# Patient Record
Sex: MALE | Race: WHITE | NOT HISPANIC OR LATINO | Employment: OTHER | URBAN - NONMETROPOLITAN AREA
[De-identification: names, ages, dates, MRNs, and addresses within clinical notes are randomized per-mention and may not be internally consistent; named-entity substitution may affect disease eponyms.]

---

## 2017-01-03 ENCOUNTER — COMMUNICATION - GICH (OUTPATIENT)
Dept: FAMILY MEDICINE | Facility: OTHER | Age: 72
End: 2017-01-03

## 2017-01-03 DIAGNOSIS — N52.9 MALE ERECTILE DYSFUNCTION: ICD-10-CM

## 2017-05-22 ENCOUNTER — COMMUNICATION - GICH (OUTPATIENT)
Dept: FAMILY MEDICINE | Facility: OTHER | Age: 72
End: 2017-05-22

## 2017-05-22 DIAGNOSIS — I10 ESSENTIAL (PRIMARY) HYPERTENSION: ICD-10-CM

## 2017-07-24 ENCOUNTER — HISTORY (OUTPATIENT)
Dept: FAMILY MEDICINE | Facility: OTHER | Age: 72
End: 2017-07-24

## 2017-07-24 ENCOUNTER — OFFICE VISIT - GICH (OUTPATIENT)
Dept: FAMILY MEDICINE | Facility: OTHER | Age: 72
End: 2017-07-24

## 2017-07-24 DIAGNOSIS — I10 ESSENTIAL (PRIMARY) HYPERTENSION: ICD-10-CM

## 2017-07-24 DIAGNOSIS — E78.00 PURE HYPERCHOLESTEROLEMIA: ICD-10-CM

## 2017-07-24 DIAGNOSIS — N52.9 MALE ERECTILE DYSFUNCTION: ICD-10-CM

## 2017-07-24 DIAGNOSIS — Z23 ENCOUNTER FOR IMMUNIZATION: ICD-10-CM

## 2017-07-24 DIAGNOSIS — N42.9 DISORDER OF PROSTATE: ICD-10-CM

## 2017-07-24 DIAGNOSIS — I71.20 THORACIC AORTIC ANEURYSM WITHOUT RUPTURE (H): ICD-10-CM

## 2017-07-24 LAB
ANION GAP - HISTORICAL: 12 (ref 5–18)
BUN SERPL-MCNC: 17 MG/DL (ref 7–25)
BUN/CREAT RATIO - HISTORICAL: 18
CALCIUM SERPL-MCNC: 9.3 MG/DL (ref 8.6–10.3)
CHLORIDE SERPLBLD-SCNC: 98 MMOL/L (ref 98–107)
CO2 SERPL-SCNC: 27 MMOL/L (ref 21–31)
CREAT SERPL-MCNC: 0.97 MG/DL (ref 0.7–1.3)
GFR IF NOT AFRICAN AMERICAN - HISTORICAL: >60 ML/MIN/1.73M2
GLUCOSE SERPL-MCNC: 102 MG/DL (ref 70–105)
POTASSIUM SERPL-SCNC: 4.3 MMOL/L (ref 3.5–5.1)
PSA TOTAL (DIAGNOSTIC) - HISTORICAL: 0.76 NG/ML
SODIUM SERPL-SCNC: 137 MMOL/L (ref 133–143)

## 2017-09-05 ENCOUNTER — HOSPITAL ENCOUNTER (OUTPATIENT)
Dept: RADIOLOGY | Facility: OTHER | Age: 72
End: 2017-09-05
Attending: FAMILY MEDICINE

## 2017-09-05 ENCOUNTER — MEDICAL CORRESPONDENCE (OUTPATIENT)
Facility: CLINIC | Age: 72
End: 2017-09-05
Payer: COMMERCIAL

## 2017-09-05 DIAGNOSIS — I71.20 THORACIC AORTIC ANEURYSM WITHOUT RUPTURE (H): ICD-10-CM

## 2017-09-05 PROCEDURE — 93306 TTE W/DOPPLER COMPLETE: CPT | Mod: 26 | Performed by: INTERNAL MEDICINE

## 2017-10-26 ENCOUNTER — OFFICE VISIT - GICH (OUTPATIENT)
Dept: INTERNAL MEDICINE | Facility: OTHER | Age: 72
End: 2017-10-26

## 2017-10-26 ENCOUNTER — HISTORY (OUTPATIENT)
Dept: INTERNAL MEDICINE | Facility: OTHER | Age: 72
End: 2017-10-26

## 2017-10-26 DIAGNOSIS — R35.0 FREQUENCY OF MICTURITION: ICD-10-CM

## 2017-10-26 DIAGNOSIS — L98.9 DISORDER OF SKIN OR SUBCUTANEOUS TISSUE: ICD-10-CM

## 2017-10-26 DIAGNOSIS — I10 ESSENTIAL (PRIMARY) HYPERTENSION: ICD-10-CM

## 2017-10-26 DIAGNOSIS — R31.29 OTHER MICROSCOPIC HEMATURIA: ICD-10-CM

## 2017-10-26 LAB
BACTERIA URINE: ABNORMAL BACTERIA/HPF
BILIRUB UR QL: NEGATIVE
CLARITY, URINE: CLEAR CLARITY
COLOR UR: YELLOW COLOR
EPITHELIAL CELLS: ABNORMAL EPI/HPF
GLUCOSE URINE: NEGATIVE MG/DL
GRANULAR CAST: ABNORMAL /LPF
HYALINE CASTS: ABNORMAL /LPF
KETONES UR QL: NEGATIVE MG/DL
LEUKOCYTE ESTERASE URINE: NEGATIVE
NITRITE UR QL STRIP: NEGATIVE
OCCULT BLOOD,URINE - HISTORICAL: ABNORMAL
PH UR: 5.5 [PH]
PROTEIN QUALITATIVE,URINE - HISTORICAL: NEGATIVE MG/DL
RBC - HISTORICAL: ABNORMAL /HPF
SP GR UR STRIP: 1.01
UROBILINOGEN,QUALITATIVE - HISTORICAL: NORMAL EU/DL
WBC - HISTORICAL: ABNORMAL /HPF

## 2017-10-26 ASSESSMENT — PATIENT HEALTH QUESTIONNAIRE - PHQ9: SUM OF ALL RESPONSES TO PHQ QUESTIONS 1-9: 0

## 2017-10-27 ENCOUNTER — OFFICE VISIT - GICH (OUTPATIENT)
Dept: UROLOGY | Facility: OTHER | Age: 72
End: 2017-10-27

## 2017-10-27 ENCOUNTER — HISTORY (OUTPATIENT)
Dept: UROLOGY | Facility: OTHER | Age: 72
End: 2017-10-27

## 2017-10-27 DIAGNOSIS — R31.29 OTHER MICROSCOPIC HEMATURIA: ICD-10-CM

## 2017-10-27 DIAGNOSIS — N52.9 MALE ERECTILE DYSFUNCTION: ICD-10-CM

## 2017-11-20 ENCOUNTER — AMBULATORY - GICH (OUTPATIENT)
Dept: LAB | Facility: OTHER | Age: 72
End: 2017-11-20

## 2017-11-20 ENCOUNTER — HISTORY (OUTPATIENT)
Dept: UROLOGY | Facility: OTHER | Age: 72
End: 2017-11-20

## 2017-11-20 ENCOUNTER — AMBULATORY - GICH (OUTPATIENT)
Dept: UROLOGY | Facility: OTHER | Age: 72
End: 2017-11-20

## 2017-11-20 ENCOUNTER — HOSPITAL ENCOUNTER (OUTPATIENT)
Dept: RADIOLOGY | Facility: OTHER | Age: 72
End: 2017-11-20
Attending: UROLOGY

## 2017-11-20 DIAGNOSIS — R31.0 GROSS HEMATURIA: ICD-10-CM

## 2017-11-20 DIAGNOSIS — R31.29 OTHER MICROSCOPIC HEMATURIA: ICD-10-CM

## 2017-11-20 LAB
CREAT SERPL-MCNC: 1.08 MG/DL (ref 0.7–1.3)
GFR IF NOT AFRICAN AMERICAN - HISTORICAL: >60 ML/MIN/1.73M2

## 2017-12-27 NOTE — PROGRESS NOTES
Patient Information     Patient Name MRN Sex Arun Israel [id number] Male 1945      Progress Notes by Rolanda Mclain at 2017 12:56 PM     Author:  Rolanda Mclain Service:  (none) Author Type:  Other Clinical Staff     Filed:  2017 12:56 PM Date of Service:  2017 12:56 PM Status:  Signed     :  Rolanda Mclain (Other Clinical Staff)            Falls Risk Criteria:    Age 65 and older or under age 4        Sensory deficits    Poor vision    Use of ambulatory aides    Impaired judgment    Unable to walk independently    Meets High Risk criteria for falls:  Yes             1.  Do you have dizziness or vertigo?    no                    2.  Do you need help standing or walking?   no                 3.  Have you fallen within the last 6 months?    no           4.  Has the patient been fasting?      yes       If any risks are marked Yes, the following interventions are utilized:    Do not leave patient unattended     Assist patient in the dressing room and bathroom    Have ambulatory aides available throughout procedure    Involve patient s family if available

## 2017-12-27 NOTE — PROGRESS NOTES
Patient Information     Patient Name MRN Sex Arun Israel [id number] Male 1945      Progress Notes by Vin Sandoval MD at 2017  8:30 AM     Author:  Vin Sandoval MD Service:  (none) Author Type:  Physician     Filed:  2017  9:58 AM Encounter Date:  2017 Status:  Signed     :  Vin Sandoval MD (Physician)            SUBJECTIVE:    Arun Wren is a 72 y.o. male who presents for evaluation of multiple chronic issues    HPI: Patient reports that overall he feels well. He continues to take his lisinopril and torsemide as prescribed for high blood pressure. He is also taking simvastatin for high cholesterol. Has not noticed any side effects.  He does continue to have erectile dysfunction. Uses Cialis 20 mg. Also in the past has used Viagra 100 mg. Both seem to be effective. Uses Cialis when he is planning on engaging in sexual activity couple times over a few days versus Viagra when he thinks it will just be single event. Has not had any chest pain or shortness of breath. Thinks perhaps some of his erectile dysfunction is worsened since his orchiectomy.  Has not had any recurrent fluid buildup from this. Denies any bowel changes. Some increased urinary frequency but not really having any problems with nocturia.    PROBLEM LIST:  Patient Active Problem List     Diagnosis  Code     ACTINIC KERATOSIS, CHEEK, LEFT L57.0     ESSENTIAL HYPERTENSION I10     HYPERCHOLESTEROLEMIA E78.00     ERECTILE DYSFUNCTION, ORGANIC N52.9     POISON IVY DERMATITIS L25.5     Basal cell carcinoma C44.91     Health care maintenance Z00.00     Diverticulosis of sigmoid colon K57.30     H/O adenomatous polyp of colon Z86.010     Thoracic aortic aneurysm without rupture (HC) I71.2     PAST MEDICAL HISTORY:  Past Medical History:     Diagnosis  Date     Basal cell carcinoma      Basilar skull fracture (HC)      Carpal tunnel syndrome     right greater than left        Erectile  dysfunction     decreased libido      External hemorrhoid     Past external hemorrhoid      Former cigarette smoker      Ganglion      Hypercholesterolemia      Hypertension      Keratosis 05/19/09    Keratosis left external ear, treated with cryocautery, recheck if recurs or persists      Left hydrocele      Onychomycosis      Poison ivy     recurrent      SURGICAL HISTORY:  Past Surgical History:      Procedure  Laterality Date     COLONOSCOPY DIAGNOSTIC  3/2/15    F/U 2020       COLONOSCOPY SCREENING  1998    at [facility], which was normal        COLONOSCOPY SCREENING  03/03/05    at [facility]       HERNIA REPAIR       Hydrocelectomy Left 05/24/16     Dr. Jeremiah GIBSON       ORCHIECTOMY SIMPLE / PARTIAL / RADICAL W/ SCROTAL / INGUINAL APPROACH Left 07/28/16    Dr. Jeremiah GIBSON         SOCIAL HISTORY:  Social History     Social History        Marital status:       Spouse name: N/A     Number of children:  N/A     Years of education:  N/A     Occupational History      Not on file.     Social History Main Topics          Smoking status:   Former Smoker      Years:  27.00      Types:  Cigarettes      Quit date:  1/1/1988      Smokeless tobacco:   Never Used      Alcohol use   Yes      Comment: 1-2 drinks per day, whiskey       Drug use:   Not on file      Sexual activity:   Not on file      Other Topics  Concern     Not on file      Social History Narrative     Quit smoking 1990.  Occ alcohol.  .     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      Stays active, owns tree farm.                 FAMILY HISTORY:  Family History       Problem   Relation Age of Onset     Diabetes  Mother      Other  Mother      joint disease       Heart Disease  Mother 70     CABG       Stroke  Father      stroke/heavy smoker       Diabetes  Paternal Grandmother      ?GMother        CURRENT MEDICATIONS:   Current Outpatient Prescriptions       Medication  Sig Dispense Refill     aspirin enteric  coated 81 mg tablet Take 1 tablet by mouth once daily with a meal.  0     lisinopril (PRINIVIL; ZESTRIL) 20 mg tablet Take 1 tablet by mouth once daily. 90 tablet 3     nystatin (MYCOSTATIN) cream Apply  topically to affected area(s) 2 times daily. 60 g 3     sildenafil (REVATIO) 20 mg tablet Take 4-5 tablets by mouth once daily if needed for Other (Specify). Take 30 minutes before sexual activity. 80 tablet 2     simvastatin (ZOCOR) 20 mg tablet Take 1 tablet by mouth at bedtime. 90 tablet 3     tadalafil (CIALIS) 20 mg tablet Take 1 tablet by mouth once daily if needed for Erectile Dysfunction. Take 30 minutes before sexual activity. 6 tablet 3     torsemide (DEMADEX) 10 mg tablet Take 1 tablet by mouth once daily. 90 tablet 3     No current facility-administered medications for this visit.      Medications have been reviewed by me and are current to the best of my knowledge and ability.    ALLERGIES:  Review of patient's allergies indicates no known allergies.    REVIEW OF SYSTEMS:  General: denies any general problems.  Eyes: denies problems  Ears/Nose/Throat: denies problems  Cardiovascular: denies problems  Respiratory: denies problems  Gastrointestinal: denies problems  Genitourinary: denies problems  Musculoskeletal: denies problems  Skin: denies problems  Neurologic: denies problems  Psychiatric: denies problems  Endocrine: denies problems  Heme/Lymphatic: denies problems  Allergic/Immunologic: denies problems  PHQ Depression Screening 7/24/2017   Date of PHQ exam (doc flow) 7/24/2017   1. Lack of interest/pleasure 0 - Not at all   2. Feeling down/depressed 0 - Not at all   PHQ-2 TOTAL SCORE 0   3. Trouble sleeping -   4. Decreased energy -   5. Appetite change -   6. Feelings of failure -   7. Trouble concentrating -   8. Activity level -   9. Hurting yourself -   PHQ-9 TOTAL SCORE -   PHQ-9 Severity Level -   Functional Impairment -   Some recent data might be hidden       OBJECTIVE:  /86  Pulse 60  " Ht 1.74 m (5' 8.5\")  Wt 101.6 kg (224 lb)  BMI 33.56 kg/m2  EXAM:   General Appearance: Pleasant, alert, appropriate appearance for age. No acute distress  Head Exam: Normal. Normocephalic, atraumatic.  Eye Exam:  Normal external eye, conjunctiva, lids, cornea. CRISTHIAN.  Fundoscopic Exam: Normal red reflex and fundoscopic exam.  Ear Exam: Normal TM's bilaterally. Normal auditory canals and external ears. Non-tender.  Nose Exam: Normal external nose, mucus membranes, and septum.  OroPharynx Exam:  Dental hygiene adequate. Normal buccal mucosa. Normal pharynx.  Neck Exam:  Supple, no masses or nodes.  Thyroid Exam: No nodules or enlargement.  Chest/Respiratory Exam: Normal chest wall and respirations. Clear to auscultation.  Cardiovascular Exam: Regular rate and rhythm. S1, S2, no murmur, click, gallop, or rubs.  Gastrointestinal Exam: Soft, non-tender, no masses or organomegaly.  Rectal Exam: Patient declined  Lymphatic Exam: Non-palpable nodes in neck, clavicular, axillary, or inguinal regions.  Musculoskeletal Exam: Back is straight and non-tender, full ROM of upper and lower extremities.  Foot Exam: Left and right foot: good pedal pulses, no lesions, nail hygiene good.  Skin: no rash or abnormalities  Neurologic Exam: Nonfocal, symmetric DTRs, normal gross motor, tone coordination and no tremor.  Psychiatric Exam: Alert and oriented - appropriate affect.    Results for orders placed or performed in visit on 07/24/17      PSA, TOTAL      Result  Value Ref Range    PSA TOTAL (DIAGNOSTIC) 0.757 <=3.100 ng/mL   BASIC METABOLIC PANEL      Result  Value Ref Range    SODIUM 137 133 - 143 mmol/L    POTASSIUM 4.3 3.5 - 5.1 mmol/L    CHLORIDE 98 98 - 107 mmol/L    CO2,TOTAL 27 21 - 31 mmol/L    ANION GAP 12 5 - 18                    GLUCOSE 102 70 - 105 mg/dL    CALCIUM 9.3 8.6 - 10.3 mg/dL    BUN 17 7 - 25 mg/dL    CREATININE 0.97 0.70 - 1.30 mg/dL    BUN/CREAT RATIO           18                    GFR if  " >60 >60 ml/min/1.73m2    GFR if not African American >60 >60 ml/min/1.73m2         ASSESSMENT/PLAN:    ICD-10-CM    1. Essential hypertension with goal blood pressure less than 140/90 I10 torsemide (DEMADEX) 10 mg tablet      lisinopril (PRINIVIL; ZESTRIL) 20 mg tablet      BASIC METABOLIC PANEL      BASIC METABOLIC PANEL - total pressure at goal. Continue current dosage    2. HYPERCHOLESTEROLEMIA E78.00 simvastatin (ZOCOR) 20 mg tablet - continue simvastatin    3. Erectile dysfunction, unspecified erectile dysfunction type N52.9 tadalafil (CIALIS) 20 mg tablet      sildenafil (REVATIO) 20 mg tablet - discussed with patient from a cost savings perspective could trial sildenafil. May need to take up to 5 tablets but overall would be less costly. He would like to trial this.       PSA, TOTAL      PSA, TOTAL   4. Disorder of prostate  N42.9 PSA, TOTAL      PSA, TOTAL - Discussed risks of false positives and benefits of early detection with COURT and PSA, he is currently asymptomatic.  Suggest both screening modalities.  He declines COURT but does accept PSA.  He understands limitations of this approach and that about 5% of prostate cancers do not produce PSA, making them undetectable by this method alone.  He voiced good understanding.       5. Immunization due Z23 OMNI PREVNAR 13 (AKA PNEUMOCOCCAL VACCINE 13-VALENT IM)      CT ADMIN VACC INITIAL   6. Thoracic aortic aneurysm without rupture (HC) I71.2 ECHO COMPLETE WO CONTRAST - we'll update echocardiogram      Mr. Bolden Body mass index is 33.56 kg/(m^2). This is out of the normal range for a 72 y.o. Normal range for ages 18+ is between 18.5 and 24.9. To lose weight we reviewed risks and benefits of appropriate options such as diet, exercise, and medications. Patient's strategy will be  self-directed nutrition plan and self-directed exercise program  BP Readings from Last 1 Encounters:07/24/17 : 138/86  Mr. Bolden blood pressure is out of the normal range for  adults. Per JNC-8 guidelines normal adult blood pressure is < 120/80, pre-hypertensive is between 120/80 and 139/89, and hypertension is 140/90 or greater. Risks of hypertension were discussed. Patient's strategy will be weight loss and reduced salt intake    Vin Sandoval MD

## 2017-12-28 NOTE — PROGRESS NOTES
Patient Information     Patient Name MRN Sex Arun Israel [id number] Male 1945      Progress Notes by Jorge Alberto Daniels MD at 2017  2:00 PM     Author:  Jorge Alberto Daniels MD Service:  (none) Author Type:  Physician     Filed:  2017  2:58 PM Encounter Date:  2017 Status:  Signed     :  Jorge Alberto Daniels MD (Physician)            Preprocedure diagnosis  Hematuria    Postprocedure diagnosis  Hematuria    Procedure  Flexible Cystourethroscopy    Surgeon  Jorge Alberto Daniels MD    Anesthesia  2% lidocaine jelly intraurethrally    Complications  None    Indications  72 y.o. male undergoing a flexible cystoscopy for the above mentioned indications.    Findings  Cystoscopic findings included a normal anterior urethra.    There was not a prominent median lobe.    The lateral lobes were not obstructive in appearance.  The bladder appeared to be normal capacity.    There were no tumors, stones or foreign bodies.    The orifices were slit-shaped and in their normal location.    Procedure  The patient was placed in supine position and prepped and draped in sterile fashion with lidocaine jelly per urethra for anesthesia.    I passed a lubricated 14F flexible cystoscope through the penile urethra and into the bladder and the bladder was completely visualized.  The cystoscope was retroflexed and the bladder neck and prostate visualized.    The cystoscope was slowly withdrawn while visualizing the urethra and the procedure terminated.    The patient tolerated the procedure well.      Plan  Reassurance

## 2017-12-28 NOTE — PATIENT INSTRUCTIONS
Patient Information     Patient Name MRN Arun Nichols [id number] Male 1945      Patient Instructions by Lety Wheeler RN at 2017  2:00 PM     Author:  Lety Wheeler RN Service:  (none) Author Type:  NURS- Registered Nurse     Filed:  2017  1:51 PM Encounter Date:  2017 Status:  Signed     :  Lety Wheeler RN (NURS- Registered Nurse)            Home Care after Cystoscopy  Follow these guidelines for your care after your procedure.    Activity  No limitations    Bathing or showering  No limitations    Symptoms  You may notice some burning with urination but this usually resolves after 1-2 days.  You may also notice small amounts of blood in your urine.  Please increase water intake for the next few days to help with these symptoms.    Contacts  General Questions: [phone]  Appointments:  [phone]  Emergencies:  [phone]    When to call the clinic  If you develop any of the following symptoms please call the clinic immediately.  If the clinic is closed please be seen at an urgent care clinic or the Emergency Department.  - Burning with urination that worsens after 2 days  - Unable to urinate causing severe pelvic pain  - Fevers of greater than 101 degrees F  - Flank pain that is not responding to pain medication    Follow up  Please follow up as discussed at the appointment.

## 2017-12-28 NOTE — PROGRESS NOTES
Patient Information     Patient Name MRN Sex Arun Israel [id number] Male 1945      Progress Notes by Rolanda Mclain at 2017 12:55 PM     Author:  Rolanda Mclain Service:  (none) Author Type:  Other Clinical Staff     Filed:  2017 12:55 PM Date of Service:  2017 12:55 PM Status:  Signed     :  Rolanda Mclain (Other Clinical Staff)            1.  Has the patient had a previous reaction to IV contrast? No    2.  Does the patient have kidney disease? No    3.  Is the patient on dialysis? No    If YES to any of these questions, exam will be reviewed with a Radiologist before administering contrast.

## 2017-12-28 NOTE — PROGRESS NOTES
Patient Information     Patient Name MRN Sex Arun Israel [id number] Male 1945      Progress Notes by Rolanda Mclain at 2017 12:53 PM     Author:  Rolanda Mclain Service:  (none) Author Type:  Other Clinical Staff     Filed:  2017 12:55 PM Date of Service:  2017 12:53 PM Status:  Signed     :  Rolanda Mclain (Other Clinical Staff)            IV Contrast- Discharge Instructions After Your CT Scan      The IV contrast you received today will be filtered from your bloodstream by your kidneys during the next 24 hours and pass from the body in urine.  You will not be aware of this process and your urine will not change in color.  To help this process you should drink at least 4 additional glasses of water or juice today.  This reduces stress on your kidneys.    Most contrast reactions are immediate.  Should you develop symptoms of concern after discharge, contact the department at the number below.  After hours you should contact your personal physician.  If you develop breathing distress or wheezing, call 911.

## 2017-12-28 NOTE — PROGRESS NOTES
Patient Information     Patient Name MRN Sex Arun Israel [id number] Male 1945      Progress Notes by Dallas Child MD at 10/26/2017  9:40 AM     Author:  Dallas Child MD Service:  (none) Author Type:  Physician     Filed:  10/26/2017 10:03 AM Encounter Date:  10/26/2017 Status:  Signed     :  Dallas Child MD (Physician)            SUBJECTIVE:    Arun Wren is a 72 y.o. male who presents for urine concerns.    HPI Comments: This patient comes in today with urine complaints. He has noticed urinary frequency and dribbling and urination of small amounts starting this morning. He has never had urinary problems including urine infections. He does not have any history of prostate problems. He has had a previous orchiectomy related to hydrocele, etc. He has not had any postsurgical issues related to that. He has not had any hernia problems. He has not had a fever with this. He does have a little bit of discomfort down in his lower abdomen. It just feels like he has to urinate all the time but he is just going in very small streams. His urine appears dark yellow but otherwise normal with no blood.    He was noted today that his blood pressure was high as well. I reviewed that with him. He tells me that the blood pressure occasionally is higher than it should be. His medications are reconciled as listed below.    He also has a skin lesion on his face that he would like me to check.      No Known Allergies,   Current Outpatient Prescriptions     Medication  Sig     aspirin enteric coated 81 mg tablet Take 1 tablet by mouth once daily with a meal.     lisinopril (PRINIVIL; ZESTRIL) 20 mg tablet Take 1 tablet by mouth once daily.     nystatin (MYCOSTATIN) cream Apply  topically to affected area(s) 2 times daily.     sildenafil (REVATIO) 20 mg tablet Take 4-5 tablets by mouth once daily if needed for Other (Specify). Take 30 minutes before sexual activity.     simvastatin (ZOCOR) 20 mg tablet  "Take 1 tablet by mouth at bedtime.     tadalafil (CIALIS) 20 mg tablet Take 1 tablet by mouth once daily if needed for Erectile Dysfunction. Take 30 minutes before sexual activity.     torsemide (DEMADEX) 10 mg tablet Take 1 tablet by mouth once daily.     No current facility-administered medications for this visit.      Medications have been reviewed by me and are current to the best of my knowledge and ability. ,   Past Medical History:     Diagnosis  Date     Basal cell carcinoma      Basilar skull fracture (HC)      Carpal tunnel syndrome     right greater than left        Erectile dysfunction     decreased libido      External hemorrhoid     Past external hemorrhoid      Former cigarette smoker      Ganglion      Hypercholesterolemia      Hypertension      Keratosis 05/19/09    Keratosis left external ear, treated with cryocautery, recheck if recurs or persists      Left hydrocele      Onychomycosis      Poison ivy     recurrent     and   Patient Active Problem List      Diagnosis Date Noted     Thoracic aortic aneurysm without rupture (HC) 07/24/2017     Diverticulosis of sigmoid colon 03/02/2015     H/O adenomatous polyp of colon 03/02/2015     Health care maintenance 02/26/2015     Basal cell carcinoma 01/22/2015     POISON IVY DERMATITIS 07/26/2011     ACTINIC KERATOSIS, CHEEK, LEFT 07/15/2010     ESSENTIAL HYPERTENSION 07/15/2010     HYPERCHOLESTEROLEMIA 07/15/2010     ERECTILE DYSFUNCTION, ORGANIC 07/15/2010       REVIEW OF SYSTEMS:  Review of Systems   Constitutional: Negative for chills and fever.   Gastrointestinal: Positive for abdominal pain. Negative for diarrhea.   Genitourinary: Positive for dysuria, frequency and urgency. Negative for flank pain and hematuria.   All other systems reviewed and are negative.      OBJECTIVE:  BP (!) 156/108  Pulse 72  Temp 97.6  F (36.4  C) (Tympanic)  Ht 1.753 m (5' 9\")  Wt 105.1 kg (231 lb 9.6 oz)  BMI 34.2 kg/m2    EXAM:   Physical Exam   Constitutional: He " is well-developed, well-nourished, and in no distress. No distress.   HENT:   Head:       Abdominal: Soft. Bowel sounds are normal. He exhibits no distension and no mass. There is no tenderness. There is no rebound and no guarding. No hernia. Hernia confirmed negative in the right inguinal area and confirmed negative in the left inguinal area.   No flank tenderness   Genitourinary: Rectum normal, prostate normal, testes/scrotum normal and penis normal. Prostate is not tender. He exhibits no abnormal testicular mass, no testicular tenderness, no abnormal scrotal mass and no scrotal tenderness.   Genitourinary Comments: Status post left orchiectomy   Skin: He is not diaphoretic.   Nursing note and vitals reviewed.      ASSESSMENT/PLAN:    ICD-10-CM    1. Frequency of urination R35.0 URINALYSIS W REFLEX MICROSCOPIC IF POSITIVE      URINALYSIS W REFLEX MICROSCOPIC IF POSITIVE      URINALYSIS MICROSCOPIC      URINALYSIS MICROSCOPIC   2. Essential hypertension I10    3. Microscopic hematuria R31.29 AMB CONSULT TO UROLOGY   4. Skin lesion L98.9         Plan:  Reassured regarding the skin lesion. Follow-up on this if it changes. He needs to monitor his blood pressure and return if it remains high. Urine today showed microscopic hematuria, no evidence for infection. This could possibly be a kidney stone although other pathology is certainly possible. Urology consultation is requested for further evaluation.

## 2017-12-30 NOTE — NURSING NOTE
Patient Information     Patient Name MRN Sex Arun Israel [id number] Male 1945      Nursing Note by Lety Wheeler RN at 2017  2:00 PM     Author:  Lety Wheeler RN Service:  (none) Author Type:  NURS- Registered Nurse     Filed:  2017  2:09 PM Encounter Date:  2017 Status:  Signed     :  Lety Wheeler RN (NURS- Registered Nurse)            Patient positioned in supine position, perineum area prepped with chlorhexidene Gluconate and patient draped per sterile technique. Per verbal order read back by Jorge Alberto Daniels MD, Urojet 10mL 2% lidocaine jelly to be instilled into urethra.  Urojet- 10ml 2% Lidocaine jelly instilled into the urethra.    Urojet 2%  Lot#: [id number]  Expiration date:   : [employer]  NDC: [address]-6    [place] Protocol    A. Pre-procedure verification complete yes  1-relevant information / documentation available, reviewed and properly matched to the patient; 2-consent accurate and complete, 3-equipment and supplies available    B. Site marking complete N/A  Site marked if not in continuous attendance with patient    C. TIME OUT completed yes  Time Out was conducted just prior to starting procedure to verify the eight required elements: 1-patient identity, 2-consent accurate and complete, 3-position, 4-correct side/site marked (if applicable), 5-procedure, 6-relevant images / results properly labeled and displayed (if applicable), 7-antibiotics / irrigation fluids (if applicable), 8-safety precautions.    After procedure perineum area rinsed. Discharge instructions reviewed with patient. Patient verbalized understanding of discharge instructions and discharged ambulatory.

## 2017-12-30 NOTE — NURSING NOTE
Patient Information     Patient Name MRN Sex Arun Israel [id number] Male 1945      Nursing Note by Dilia Goodwin LPN at 10/26/2017  9:40 AM     Author:  Dilia Goodwin LPN Service:  (none) Author Type:  NURS- Licensed Practical Nurse     Filed:  10/26/2017  9:43 AM Encounter Date:  10/26/2017 Status:  Signed     :  Dilia Goodwin LPN (NURS- Licensed Practical Nurse)            The patient is here today to be seen for lots of frequency with urination and pain on his left lower abdomen.  Dilia Goodwin LPN......10/26/2017  9:30 AM

## 2017-12-30 NOTE — NURSING NOTE
Patient Information     Patient Name MRN Sex Arun Israel [id number] Male 1945      Nursing Note by Sheela Stewart RN at 10/27/2017 11:45 AM     Author:  Sheela Stewart RN Service:  (none) Author Type:  NURS- Registered Nurse     Filed:  10/27/2017 11:31 AM Encounter Date:  10/27/2017 Status:  Signed     :  Sheela Stewart RN (NURS- Registered Nurse)            Patient here for FU and microscopic hematuria. Was seen in clinic yesterday by Dr. Child with symptoms of weak stream and feeling of retention and pain radiating from pubic bone to back. Patient states symptoms have resolved after rectal exam yesterday. No complaints today. SHEELA STEWART RN ....................  10/27/2017   11:29 AM          Review of Systems:    Weight loss:    No     Recent fever/chills:  No   Night sweats:   No  Current skin rash:  No   Recent hair loss:  No  Heat intolerance:  No   Cold intolerance:  No  Chest pain:   No   Palpitations:   No  Shortness of breath:  No   Wheezing:   No  Constipation:    No   Diarrhea:   No   Nausea:   No   Vomiting:   No   Kidney/side pain:  No   Back pain:   No  Frequent headaches:  No   Dizziness:     No  Leg swelling:   No   Calf pain:    No

## 2018-01-02 NOTE — TELEPHONE ENCOUNTER
Patient Information     Patient Name MRN Arun Nichols [id number] Male 1945      Telephone Encounter by Julia Zamora RN at 2017  9:23 AM     Author:  Julia Zamora RN Service:  (none) Author Type:  NURS- Registered Nurse     Filed:  2017  9:30 AM Encounter Date:  1/3/2017 Status:  Signed     :  Julia Zamora RN (NURS- Registered Nurse)            Medication no longer on active medication list. Please advise.    This is a Refill request from: [facility]   Name of Medication: Cialis  Quantity requested: 6  Last fill date: 10/31/2015  Due for refill: No longer on active med list  Last visit with VIN SANDOVAL was on: 2016 in [facility]  PCP:  Vin Sandoval MD  Controlled Substance Agreement:  n/a   Diagnosis r/t this medication request: Erectile Dysfunction     Unable to complete prescription refill per RN Medication Refill Policy.................... Julia Zamora RN ....................  2017   9:27 AM

## 2018-01-05 NOTE — TELEPHONE ENCOUNTER
Patient Information     Patient Name MRN Sex Arun Israel [id number] Male 1945      Telephone Encounter by Sheela Stewart RN at 2017 12:16 PM     Author:  Sheela Stewart RN Service:  (none) Author Type:  NURS- Registered Nurse     Filed:  2017 12:18 PM Encounter Date:  2017 Status:  Signed     :  Sheela Stewart RN (NURS- Registered Nurse)            Diuretics (may be prescribed for edema)    Office visit in the past 12 months or per provider note.    Last visit with KEELY THORNTON was on: 2016 in [facility]  Next visit with KEELY THORNTON is on: No future appointment listed with this provider  Next visit with Family Practice is on: No future appointment listed in this department    Lab testing requirements:  Creatinine and Potassium annually, if ordering lab, order BMP.  CREATININE (mg/dL)    Date Value   2016 0.97     POTASSIUM (mmol/L)    Date Value   2016 4.5       Review last provider visit note.  If BP reviewed and plan is noted, can refill.  Max refill for 12 months from last office visit or per provider note.    Due for BP FU per LOV note will given limited supply and send letter. Prescription refilled per RN Medication Refill Policy.................... SHEELA STEWART RN ....................  2017   12:16 PM

## 2018-01-25 VITALS
HEIGHT: 69 IN | DIASTOLIC BLOOD PRESSURE: 80 MMHG | WEIGHT: 230 LBS | HEART RATE: 62 BPM | RESPIRATION RATE: 16 BRPM | RESPIRATION RATE: 16 BRPM | BODY MASS INDEX: 32.8 KG/M2 | HEART RATE: 56 BPM | BODY MASS INDEX: 34.07 KG/M2 | WEIGHT: 228.6 LBS | SYSTOLIC BLOOD PRESSURE: 126 MMHG

## 2018-01-25 VITALS
SYSTOLIC BLOOD PRESSURE: 156 MMHG | TEMPERATURE: 97.6 F | HEIGHT: 69 IN | WEIGHT: 231.6 LBS | HEART RATE: 72 BPM | BODY MASS INDEX: 34.3 KG/M2 | DIASTOLIC BLOOD PRESSURE: 108 MMHG

## 2018-01-25 VITALS
DIASTOLIC BLOOD PRESSURE: 86 MMHG | HEART RATE: 60 BPM | BODY MASS INDEX: 33.18 KG/M2 | WEIGHT: 224 LBS | SYSTOLIC BLOOD PRESSURE: 138 MMHG | HEIGHT: 69 IN

## 2018-01-29 ENCOUNTER — DOCUMENTATION ONLY (OUTPATIENT)
Dept: FAMILY MEDICINE | Facility: OTHER | Age: 73
End: 2018-01-29

## 2018-01-29 PROBLEM — I71.20 THORACIC AORTIC ANEURYSM WITHOUT RUPTURE (H): Status: ACTIVE | Noted: 2017-07-24

## 2018-01-29 RX ORDER — SILDENAFIL CITRATE 20 MG/1
4-5 TABLET ORAL DAILY PRN
COMMUNITY
Start: 2017-07-24 | End: 2018-03-12

## 2018-01-29 RX ORDER — LISINOPRIL 20 MG/1
1 TABLET ORAL DAILY
COMMUNITY
Start: 2017-07-24 | End: 2018-06-25

## 2018-01-29 RX ORDER — ASPIRIN 81 MG/1
1 TABLET ORAL
COMMUNITY
Start: 2013-08-26 | End: 2020-03-09

## 2018-01-29 RX ORDER — TORSEMIDE 10 MG/1
1 TABLET ORAL DAILY
COMMUNITY
Start: 2017-07-24 | End: 2018-09-05

## 2018-01-29 RX ORDER — SIMVASTATIN 20 MG
1 TABLET ORAL AT BEDTIME
COMMUNITY
Start: 2017-07-24 | End: 2018-09-05

## 2018-01-29 RX ORDER — TADALAFIL 20 MG/1
1 TABLET ORAL DAILY PRN
COMMUNITY
Start: 2017-07-24 | End: 2018-09-05

## 2018-01-29 RX ORDER — SILDENAFIL 100 MG/1
.5-1 TABLET, FILM COATED ORAL PRN
COMMUNITY
Start: 2017-10-27 | End: 2018-08-07

## 2018-01-29 RX ORDER — NYSTATIN 100000 U/G
CREAM TOPICAL
COMMUNITY
Start: 2015-11-18 | End: 2020-07-22

## 2018-01-30 ASSESSMENT — PATIENT HEALTH QUESTIONNAIRE - PHQ9: SUM OF ALL RESPONSES TO PHQ QUESTIONS 1-9: 0

## 2018-03-12 DIAGNOSIS — N52.9 IMPOTENCE OF ORGANIC ORIGIN: Primary | ICD-10-CM

## 2018-03-15 RX ORDER — SILDENAFIL CITRATE 20 MG/1
TABLET ORAL
Qty: 80 TABLET | Refills: 1 | Status: SHIPPED | OUTPATIENT
Start: 2018-03-15 | End: 2018-09-05

## 2018-03-29 ENCOUNTER — OFFICE VISIT (OUTPATIENT)
Dept: FAMILY MEDICINE | Facility: OTHER | Age: 73
End: 2018-03-29
Attending: FAMILY MEDICINE
Payer: COMMERCIAL

## 2018-03-29 VITALS
SYSTOLIC BLOOD PRESSURE: 128 MMHG | DIASTOLIC BLOOD PRESSURE: 68 MMHG | HEART RATE: 60 BPM | WEIGHT: 234.6 LBS | BODY MASS INDEX: 34.64 KG/M2

## 2018-03-29 DIAGNOSIS — L24.89 IRRITANT CONTACT DERMATITIS DUE TO OTHER AGENTS: Primary | ICD-10-CM

## 2018-03-29 PROCEDURE — 99213 OFFICE O/P EST LOW 20 MIN: CPT | Performed by: FAMILY MEDICINE

## 2018-03-29 PROCEDURE — G0463 HOSPITAL OUTPT CLINIC VISIT: HCPCS

## 2018-03-29 RX ORDER — PREDNISONE 20 MG/1
20 TABLET ORAL DAILY
Qty: 5 TABLET | Refills: 1 | Status: SHIPPED | OUTPATIENT
Start: 2018-03-29 | End: 2018-09-05

## 2018-03-29 NOTE — MR AVS SNAPSHOT
"              After Visit Summary   3/29/2018    Arun Wren    MRN: [id number]           Patient Information     Date Of Birth          1945        Visit Information        Provider Department      3/29/2018 9:45 AM Timothy Baptiste MD [facility]        Today's Diagnoses     Irritant contact dermatitis due to other agents    -  1       Follow-ups after your visit        Who to contact     If you have questions or need follow up information about today's clinic visit or your schedule please contact [facility] directly at [phone].  Normal or non-critical lab and imaging results will be communicated to you by Tripnaryhart, letter or phone within 4 business days after the clinic has received the results. If you do not hear from us within 7 days, please contact the clinic through [employer] or phone. If you have a critical or abnormal lab result, we will notify you by phone as soon as possible.  Submit refill requests through [employer] or call your pharmacy and they will forward the refill request to us. Please allow 3 business days for your refill to be completed.          Additional Information About Your Visit        MyChart Information     [employer] lets you send messages to your doctor, view your test results, renew your prescriptions, schedule appointments and more. To sign up, go to [email or web address] . Click on \"Log in\" on the left side of the screen, which will take you to the Welcome page. Then click on \"Sign up Now\" on the right side of the page.     You will be asked to enter the access code listed below, as well as some personal information. Please follow the directions to create your username and password.     Your access code is: [email or web address]  Expires: 2018  9:41 AM     Your access code will  in 90 days. If you need help or a new code, please call your [place] clinic or [phone].        Care EveryWhere ID     This is your Care EveryWhere " ID. This could be used by other organizations to access your [place] medical records  [id number]        Your Vitals Were     Pulse BMI (Body Mass Index)                60 34.64 kg/m2           Blood Pressure from Last 3 Encounters:   03/29/18 128/68   10/27/17 126/80   10/26/17 (!) 156/108    Weight from Last 3 Encounters:   03/29/18 234 lb 9.6 oz (106.4 kg)   11/20/17 230 lb (104.3 kg)   10/27/17 228 lb 9.6 oz (103.7 kg)              Today, you had the following     No orders found for display         Today's Medication Changes          These changes are accurate as of 3/29/18 11:59 PM.  If you have any questions, ask your nurse or doctor.               Start taking these medicines.        Dose/Directions    predniSONE 20 MG tablet   Commonly known as:  DELTASONE   Used for:  Irritant contact dermatitis due to other agents   Started by:  Timothy Baptiste MD        Dose:  20 mg   Take 1 tablet (20 mg) by mouth daily   Quantity:  5 tablet   Refills:  1            Where to get your medicines      These medications were sent to [id number] - [place], [place] - [address]  [address], [facility] [address]     Phone:  [phone]     predniSONE 20 MG tablet                Primary Care Provider Office Phone # Fax #    Vin Sandoval [phone]       [address] [facility] [address]        Equal Access to Services     [facility]: Hadii erica ferraroo Soed, waaxda luqadaha, qaybta kaalmacristian cage roni vogel adegraciela mcqueen. So [facility] [phone].    ATENCIÓN: Si habla español, tiene a negrete disposición servicios gratuitos de asistencia lingüística. Llame al [phone].    We comply with applicable federal civil rights laws and [place] laws. We do not discriminate on the basis of race, color, national origin, age, disability, sex, sexual orientation, or gender identity.            Thank you!     Thank you for choosing  [facility] AND [facility]  for your care. Our goal is always to provide you with excellent care. Hearing back from our patients is one way we can continue to improve our services. Please take a few minutes to complete the written survey that you may receive in the mail after your visit with us. Thank you!             Your Updated Medication List - Protect others around you: Learn how to safely use, store and throw away your medicines at www.disposemymeds.org.          This list is accurate as of 3/29/18 11:59 PM.  Always use your most recent med list.                   Brand Name Dispense Instructions for use Diagnosis    aspirin EC 81 MG EC tablet      Take 1 tablet by mouth daily with food        CIALIS 20 MG tablet   Generic drug:  tadalafil      Take 1 tablet by mouth daily as needed for erectile dysfunction Take 30 minutes before sexual activity        lisinopril 20 MG tablet    PRINIVIL/ZESTRIL     Take 1 tablet by mouth daily        nystatin cream    MYCOSTATIN          predniSONE 20 MG tablet    DELTASONE    5 tablet    Take 1 tablet (20 mg) by mouth daily    Irritant contact dermatitis due to other agents       sildenafil 100 MG tablet    VIAGRA     Take 0.5-1 tablets by mouth as needed Take 1 hour prior to sexual activity        sildenafil 20 MG tablet    REVATIO    80 tablet    Take 4-5 tablets by mouth once daily if needed for Other (Specify). Take 30 minutes before sexual activity.    Impotence of organic origin       simvastatin 20 MG tablet    ZOCOR     Take 1 tablet by mouth At Bedtime        torsemide 10 MG tablet    DEMADEX     Take 1 tablet by mouth daily

## 2018-03-29 NOTE — NURSING NOTE
Patient here for rash on the left shoulder he feels it is poison poppy. Marisol Gutierrez LPN .......................3/29/2018  9:37 AM

## 2018-03-31 ASSESSMENT — ENCOUNTER SYMPTOMS
CHILLS: 0
ACTIVITY CHANGE: 0
FEVER: 0

## 2018-03-31 NOTE — PROGRESS NOTES
SUBJECTIVE:   Arun Wren is a 73 year old male who presents to clinic today for the following health issues: Poison ivy    HPI Comments: Patient arrives here for possible poison ivy.  He states that this is very similar to previous episodes.  Located on his left shoulder.  He although does not recall any exposures.  States last time he did not have any exposures that he is aware of.  He reports no pain but very itchy.        Patient Active Problem List    Diagnosis Date Noted     Irritant contact dermatitis due to other agents 03/29/2018     Priority: Medium     Thoracic aortic aneurysm without rupture (H) 07/24/2017     Priority: Medium     Diverticulosis of sigmoid colon 03/02/2015     Priority: Medium     H/O adenomatous polyp of colon 03/02/2015     Priority: Medium     Basal cell carcinoma of skin 01/22/2015     Priority: Medium     Contact dermatitis and other eczema due to plants (except food) 07/26/2011     Priority: Medium     Actinic keratosis 07/15/2010     Priority: Medium     Impotence of organic origin 07/15/2010     Priority: Medium     Essential hypertension 07/15/2010     Priority: Medium     Hypercholesterolemia 07/15/2010     Priority: Medium     Past Medical History:   Diagnosis Date     Actinic keratosis     05/19/09,Keratosis left external ear, treated with cryocautery, recheck if recurs or persists     Allergic contact dermatitis due to plants, except food     recurrent     Basal cell carcinoma of skin     No Comments Provided     Carpal tunnel syndrome     right greater than left     Closed fracture of base of skull (H)     No Comments Provided     Essential (primary) hypertension     No Comments Provided     Ganglion     No Comments Provided     Hydrocele     No Comments Provided     Male erectile dysfunction     decreased libido     Personal history of nicotine dependence     No Comments Provided     Pure hypercholesterolemia     No Comments Provided     Residual hemorrhoidal skin  tags     Past external hemorrhoid     Tinea unguium     No Comments Provided      Past Surgical History:   Procedure Laterality Date     COLONOSCOPY      1998,at [facility], which was normal     COLONOSCOPY      03/03/05,at [facility]     COLONOSCOPY      3/2/15,F/U 2020     OTHER SURGICAL HISTORY      ,HERNIA REPAIR     OTHER SURGICAL HISTORY      05/24/16,757214,OTHER,Left,Dr. Jeremiah GIBSON     OTHER SURGICAL HISTORY      07/28/16,FMX091,ORCHIECTOMY SIMPLE / PARTIAL / RADICAL W/ SCROTAL / INGUINAL APPROACH,Left,Dr. Jeremiah GIBSON     No Known Allergies    Review of Systems   Constitutional: Negative for activity change, chills and fever.        OBJECTIVE:     /68  Pulse 60  Wt 234 lb 9.6 oz (106.4 kg)  BMI 34.64 kg/m2  Body mass index is 34.64 kg/(m^2).  Physical Exam   Constitutional: He appears well-developed.   Skin:   Large erythemic area on his left shoulder extending down over the scapula.  20 x 30 cm.  Nontender to palpate.  Some edema present.  No linear lesions       none     ASSESSMENT/PLAN:         1. Irritant contact dermatitis due to other agents  This is most consistent with a contact dermatitis of some sort.  I doubt that this is cellulitis.  Patient reports history of it in the past and responded well to prednisone.  Will start a prednisone taper.  Follow-up if getting worse.  - predniSONE (DELTASONE) 20 MG tablet; Take 1 tablet (20 mg) by mouth daily  Dispense: 5 tablet; Refill: 1      Timothy Baptiste MD  [facility]

## 2018-06-25 DIAGNOSIS — I10 ESSENTIAL HYPERTENSION WITH GOAL BLOOD PRESSURE LESS THAN 140/90: Primary | ICD-10-CM

## 2018-06-28 RX ORDER — LISINOPRIL 20 MG/1
TABLET ORAL
Qty: 90 TABLET | Refills: 0 | Status: SHIPPED | OUTPATIENT
Start: 2018-06-28 | End: 2018-09-05

## 2018-06-28 NOTE — TELEPHONE ENCOUNTER
LOV was with Dr. Baptiste on 3/29/18. No changes to Rx as requested in office visit notes on that date. Patient is coming due for labs in July 2018 in relation to Rx as requested. Writer will refill Rx as requested for a 90 day supply at this time. Will not input lab orders in at this time as patient with sporadic labs in chart and some are not due at this time or in July.    Prescription refilled per RN Medication Refill Policy..................Keyon Skaggs 6/28/2018 9:24 AM

## 2018-07-23 NOTE — PROGRESS NOTES
Patient Information     Patient Name  Arun Wren MRN  [id number] Sex  Male   1945      Letter by Vin Sandoval MD at      Author:  Vin Sandoval MD Service:  (none) Author Type:  (none)    Filed:   Date of Service:   Status:  (Other)         Arun Wren  [address]  [facility] [address]    2017      Dear Mr. Wren,      The cardiologist reviewed your recent echo and overall things look quite good.  Your aorta is only mildly dilated at 4.1cm.  You have a little left ventricular hypertrophy but no wall motion abnormalities and your ejection fraction is normal.  This means the heart is functioning well and our main goal is to keep your blood pressure and cholesterol normal.    I'm sending along the actual report so that you will have it for your general interest and  records.       Take Care,   Vin Sandoval MD      No results found from the In Basket message.

## 2018-07-23 NOTE — PROGRESS NOTES
Patient Information     Patient Name  Arun Wren MRN  [id number] Sex  Male   1945      Letter by Vin Sandoval MD at      Author:  Vin Sandoval MD Service:  (none) Author Type:  (none)    Filed:   Encounter Date:  2017 Status:  (Other)           Arun Wren  [address]  [facility] [address]          May 23, 2017    Dear Mr. Wren:    A refill of  torsemide (DEMADEX) 10 mg tablet has been called into your pharmacy.    Additional refills require an office visit with Vin Sandoval MD for blood pressure follow up.   Please call the clinic at [phone] to schedule your appointment.    If you should require additional refills before your scheduled appointment, please contact your pharmacy and we will refill your medication until that date.      Thank you,    The Refill Nurse  [facility]

## 2018-07-24 NOTE — PROGRESS NOTES
Patient Information     Patient Name  Arun Wren MRN  [id number] Sex  Male   1945      Letter by Vin Sandoval MD at      Author:  Vin Sandoval MD Service:  (none) Author Type:  (none)    Filed:   Encounter Date:  2017 Status:  (Other)         Arun Wren  [address]  [place] [address]    2017      Dear Mr. Wren,    Your lab results are listed below and are acceptable/stable.  Please continue on your current medications.    Contact us with any questions.    I'm sending along your actual numbers so that you will have them for your general interest and your records.       Take Care,   Vin Sandoval MD      Resulted Orders      PSA, TOTAL (Collected: 2017  9:54 AM)      Result  Value Ref Range    PSA TOTAL (DIAGNOSTIC) 0.757 <=3.100 ng/mL    Narrative      The percentage of Free PSA can be used to enhance the   differentiation of prostate cancer from benign prostatic  disease in subjects whose PSA levels are between 4.00 and  10.00 ng/mL.      The DXI PSA assay is a Chemiluminescent Assay.  Assay values obtained with different assay   methods cannot be used interchangeably due to differences  in assay methods and reagent specificity.       BASIC METABOLIC PANEL (Collected: 2017  9:54 AM)      Result  Value Ref Range    SODIUM 137 133 - 143 mmol/L    POTASSIUM 4.3 3.5 - 5.1 mmol/L    CHLORIDE 98 98 - 107 mmol/L    CO2,TOTAL 27 21 - 31 mmol/L    ANION GAP 12 5 - 18                    GLUCOSE 102 70 - 105 mg/dL    CALCIUM 9.3 8.6 - 10.3 mg/dL    BUN 17 7 - 25 mg/dL    CREATININE 0.97 0.70 - 1.30 mg/dL    BUN/CREAT RATIO           18                    GFR if African American >60 >60 ml/min/1.73m2    GFR if not African American >60 >60 ml/min/1.73m2

## 2018-08-07 DIAGNOSIS — N52.9 ED (ERECTILE DYSFUNCTION): Primary | ICD-10-CM

## 2018-08-07 DIAGNOSIS — N52.9 ERECTILE DYSFUNCTION, UNSPECIFIED ERECTILE DYSFUNCTION TYPE: ICD-10-CM

## 2018-08-07 NOTE — TELEPHONE ENCOUNTER
Pt.'s last office visit with Jorge Alberto Daniels MD was on 11/20/17.  Last note does not clairfy whether this was ordered, but pharmacies records show it was filled on 10/27/17 the same day he had an office visit with Jorge Alberto Daniels MD.  Patient is requesting 8 tabs per fill.   To MD to address.  Corry Perez RN on 8/7/2018 at 1:55 PM

## 2018-08-08 RX ORDER — SILDENAFIL 100 MG/1
50-100 TABLET, FILM COATED ORAL PRN
Qty: 8 TABLET | Refills: 3 | Status: SHIPPED | OUTPATIENT
Start: 2018-08-08 | End: 2018-09-05

## 2018-09-05 ENCOUNTER — OFFICE VISIT (OUTPATIENT)
Dept: FAMILY MEDICINE | Facility: OTHER | Age: 73
End: 2018-09-05
Attending: FAMILY MEDICINE
Payer: MEDICARE

## 2018-09-05 VITALS
WEIGHT: 233.4 LBS | HEART RATE: 64 BPM | DIASTOLIC BLOOD PRESSURE: 80 MMHG | RESPIRATION RATE: 14 BRPM | BODY MASS INDEX: 34.47 KG/M2 | SYSTOLIC BLOOD PRESSURE: 134 MMHG

## 2018-09-05 DIAGNOSIS — L24.89 IRRITANT CONTACT DERMATITIS DUE TO OTHER AGENTS: ICD-10-CM

## 2018-09-05 DIAGNOSIS — N52.9 IMPOTENCE OF ORGANIC ORIGIN: ICD-10-CM

## 2018-09-05 DIAGNOSIS — I10 ESSENTIAL HYPERTENSION WITH GOAL BLOOD PRESSURE LESS THAN 140/90: Primary | ICD-10-CM

## 2018-09-05 DIAGNOSIS — E78.00 HYPERCHOLESTEROLEMIA: ICD-10-CM

## 2018-09-05 DIAGNOSIS — I71.20 THORACIC AORTIC ANEURYSM WITHOUT RUPTURE (H): ICD-10-CM

## 2018-09-05 DIAGNOSIS — Z12.5 ENCOUNTER FOR SCREENING FOR MALIGNANT NEOPLASM OF PROSTATE: ICD-10-CM

## 2018-09-05 LAB
ALBUMIN SERPL-MCNC: 4.2 G/DL (ref 3.5–5.7)
ALP SERPL-CCNC: 37 U/L (ref 34–104)
ALT SERPL W P-5'-P-CCNC: 33 U/L (ref 7–52)
ANION GAP SERPL CALCULATED.3IONS-SCNC: 7 MMOL/L (ref 3–14)
AST SERPL W P-5'-P-CCNC: 38 U/L (ref 13–39)
BILIRUB SERPL-MCNC: 0.6 MG/DL (ref 0.3–1)
BUN SERPL-MCNC: 16 MG/DL (ref 7–25)
CALCIUM SERPL-MCNC: 9 MG/DL (ref 8.6–10.3)
CHLORIDE SERPL-SCNC: 100 MMOL/L (ref 98–107)
CHOLEST SERPL-MCNC: 166 MG/DL
CO2 SERPL-SCNC: 30 MMOL/L (ref 21–31)
CREAT SERPL-MCNC: 1.03 MG/DL (ref 0.7–1.3)
GFR SERPL CREATININE-BSD FRML MDRD: 71 ML/MIN/1.7M2
GLUCOSE SERPL-MCNC: 102 MG/DL (ref 70–105)
HDLC SERPL-MCNC: 73 MG/DL (ref 23–92)
LDLC SERPL CALC-MCNC: 77 MG/DL
NONHDLC SERPL-MCNC: 93 MG/DL
POTASSIUM SERPL-SCNC: 3.8 MMOL/L (ref 3.5–5.1)
PROT SERPL-MCNC: 7.3 G/DL (ref 6.4–8.9)
PSA SERPL-ACNC: 0.82 NG/ML
SODIUM SERPL-SCNC: 137 MMOL/L (ref 134–144)
TRIGL SERPL-MCNC: 81 MG/DL

## 2018-09-05 PROCEDURE — 99212 OFFICE O/P EST SF 10 MIN: CPT | Mod: 25 | Performed by: FAMILY MEDICINE

## 2018-09-05 PROCEDURE — 80061 LIPID PANEL: CPT | Performed by: FAMILY MEDICINE

## 2018-09-05 PROCEDURE — G0463 HOSPITAL OUTPT CLINIC VISIT: HCPCS

## 2018-09-05 PROCEDURE — G0103 PSA SCREENING: HCPCS | Performed by: FAMILY MEDICINE

## 2018-09-05 PROCEDURE — 17000 DESTRUCT PREMALG LESION: CPT | Performed by: FAMILY MEDICINE

## 2018-09-05 PROCEDURE — 36415 COLL VENOUS BLD VENIPUNCTURE: CPT | Performed by: FAMILY MEDICINE

## 2018-09-05 PROCEDURE — 80053 COMPREHEN METABOLIC PANEL: CPT | Performed by: FAMILY MEDICINE

## 2018-09-05 RX ORDER — SILDENAFIL CITRATE 20 MG/1
TABLET ORAL
Qty: 80 TABLET | Refills: 1 | Status: SHIPPED | OUTPATIENT
Start: 2018-09-05 | End: 2019-06-24

## 2018-09-05 RX ORDER — LISINOPRIL 20 MG/1
TABLET ORAL
Qty: 90 TABLET | Refills: 3 | Status: SHIPPED | OUTPATIENT
Start: 2018-09-05 | End: 2019-06-24

## 2018-09-05 RX ORDER — TORSEMIDE 10 MG/1
10 TABLET ORAL DAILY
Qty: 90 TABLET | Refills: 3 | Status: SHIPPED | OUTPATIENT
Start: 2018-09-05 | End: 2019-06-24

## 2018-09-05 RX ORDER — PREDNISONE 20 MG/1
20 TABLET ORAL DAILY
Qty: 5 TABLET | Refills: 0 | Status: SHIPPED | OUTPATIENT
Start: 2018-09-05 | End: 2019-10-29

## 2018-09-05 RX ORDER — SIMVASTATIN 20 MG
20 TABLET ORAL AT BEDTIME
Qty: 90 TABLET | Refills: 3 | Status: SHIPPED | OUTPATIENT
Start: 2018-09-05 | End: 2019-06-24

## 2018-09-05 ASSESSMENT — PAIN SCALES - GENERAL: PAINLEVEL: NO PAIN (0)

## 2018-09-05 NOTE — Clinical Note
Please let patient know his chart shows a history of thoracic aneurysm although I dont see imaging regarding this.  We ordered an US a year ago but I dont see that it was done.  I have re-entered the ultrasound and would like him to have this done.  Also can you confirm that I froze an AK on his left temple.

## 2018-09-05 NOTE — NURSING NOTE
"Chief Complaint   Patient presents with     Refill Request     Medication refills       Initial /80 (BP Location: Right arm, Patient Position: Sitting, Cuff Size: Adult Large)  Pulse 64  Resp 14  Wt 233 lb 6.4 oz (105.9 kg)  BMI 34.47 kg/m2 Estimated body mass index is 34.47 kg/(m^2) as calculated from the following:    Height as of 11/20/17: 5' 9\" (1.753 m).    Weight as of this encounter: 233 lb 6.4 oz (105.9 kg).  Medication Reconciliation: complete    Boyd Nguyen LPN    "

## 2018-09-05 NOTE — MR AVS SNAPSHOT
After Visit Summary   9/5/2018    Arun Wren    MRN: [id number]           Patient Information     Date Of Birth          1945        Visit Information        Provider Department      9/5/2018 7:45 AM Vin Sandoval MD [facility]        Today's Diagnoses     Essential hypertension with goal blood pressure less than 140/90    -  1    Irritant contact dermatitis due to other agents        Impotence of organic origin        Encounter for screening for malignant neoplasm of prostate         Thoracic aortic aneurysm without rupture (H)        Hypercholesterolemia        Actinic keratosis           Follow-ups after your visit        Who to contact     If you have questions or need follow up information about today's clinic visit or your schedule please contact [facility] directly at [phone].  Normal or non-critical lab and imaging results will be communicated to you by MyChart, letter or phone within 4 business days after the clinic has received the results. If you do not hear from us within 7 days, please contact the clinic through MyChart or phone. If you have a critical or abnormal lab result, we will notify you by phone as soon as possible.  Submit refill requests through [employer] or call your pharmacy and they will forward the refill request to us. Please allow 3 business days for your refill to be completed.          Additional Information About Your Visit        Care EveryWhere ID     This is your Care EveryWhere ID. This could be used by other organizations to access your [facility] medical records  [id number]        Your Vitals Were     Pulse Respirations BMI (Body Mass Index)             64 14 34.47 kg/m2          Blood Pressure from Last 3 Encounters:   09/05/18 134/80   03/29/18 128/68   10/27/17 126/80    Weight from Last 3 Encounters:   09/05/18 233 lb 6.4 oz (105.9 kg)   03/29/18 234 lb 9.6 oz (106.4 kg)   11/20/17 230 lb (104.3 kg)               We Performed the Following     Comprehensive Metabolic Panel     DESTRUCT PREMALIGNANT LESION, FIRST     Lipid Panel     PSA Screen GH          Today's Medication Changes          These changes are accurate as of 9/5/18 11:59 PM.  If you have any questions, ask your nurse or doctor.               These medicines have changed or have updated prescriptions.        Dose/Directions    predniSONE 20 MG tablet   Commonly known as:  DELTASONE   This may have changed:  additional instructions   Used for:  Irritant contact dermatitis due to other agents   Changed by:  Vin Sandoval MD        Dose:  20 mg   Take 1 tablet (20 mg) by mouth daily for 5d if needed poison ivy   Quantity:  5 tablet   Refills:  0            Where to get your medicines      These medications were sent to [id number] - [place], [place] [facility] [address] [facility]  [address] [facility] [address]     Phone:  [phone]     lisinopril 20 MG tablet    predniSONE 20 MG tablet    sildenafil 20 MG tablet    simvastatin 20 MG tablet    torsemide 10 MG tablet                Primary Care Provider Office Phone # Fax #    Vin Sandoval [phone]       [address]  [facility] [address]        Equal Access to Services     [facility]: Hadii aad ku hadasho Soomaali, waaxda luqadaha, qaybta kaalmada adeegyada, anisa tamayo hayjorge fine . So [facility] [phone].    ATENCIÓN: Si habla español, tiene a negrete disposición servicios gratuitos de asistencia lingüística. [facility] [phone].    We comply with applicable federal civil rights laws and [place] laws. We do not discriminate on the basis of race, color, national origin, age, disability, sex, sexual orientation, or gender identity.            Thank you!     Thank you for choosing [facility] [place] [facility]  for your care. Our goal is always to provide you with excellent care. Hearing back from our patients is one  way we can continue to improve our services. Please take a few minutes to complete the written survey that you may receive in the mail after your visit with us. Thank you!             Your Updated Medication List - Protect others around you: Learn how to safely use, store and throw away your medicines at www.disposemymeds.org.          This list is accurate as of 9/5/18 11:59 PM.  Always use your most recent med list.                   Brand Name Dispense Instructions for use Diagnosis    aspirin 81 MG EC tablet      Take 1 tablet by mouth daily with food        lisinopril 20 MG tablet    PRINIVIL/ZESTRIL    90 tablet    TAKE 1 TABLET BY MOUTH ONCE DAILY.    Essential hypertension with goal blood pressure less than 140/90       nystatin cream    MYCOSTATIN          predniSONE 20 MG tablet    DELTASONE    5 tablet    Take 1 tablet (20 mg) by mouth daily for 5d if needed poison ivy    Irritant contact dermatitis due to other agents       sildenafil 20 MG tablet    REVATIO    80 tablet    Take 4-5 tablets by mouth once daily if needed for Other (Specify). Take 30 minutes before sexual activity.    Impotence of organic origin       simvastatin 20 MG tablet    ZOCOR    90 tablet    Take 1 tablet (20 mg) by mouth At Bedtime    Impotence of organic origin       torsemide 10 MG tablet    DEMADEX    90 tablet    Take 1 tablet (10 mg) by mouth daily    Essential hypertension with goal blood pressure less than 140/90

## 2018-09-05 NOTE — NURSING NOTE
Arun presents to the clinic today for his annual physical and medication refills.          Boyd Nguyen LPN 09/05/18 7:42 AM

## 2018-09-05 NOTE — LETTER
Arun VARGAS [place]  [address]   [place] [address]    9/10/2018      Dear Mr. Wren,      We've gotten results back from the laboratory for the samples you gave in clinic.  Things look great! Contact us with any questions.    I'm sending along your actual numbers so that youwill have them for your general interest and your records.       Take Care,   Vin Sandoval      Resulted Orders   PSA Screen GH   Result Value Ref Range    PSA Screen 0.823 <3.100 ng/mL   Lipid Panel   Result Value Ref Range    Cholesterol 166 <200 mg/dL    Triglycerides 81 <150 mg/dL    HDL Cholesterol 73 23 - 92 mg/dL    LDL Cholesterol Calculated 77 <100 mg/dL      Comment:      Desirable:       <100 mg/dl    Non HDL Cholesterol 93 <130 mg/dL   Comprehensive Metabolic Panel   Result Value Ref Range    Sodium 137 134 - 144 mmol/L    Potassium 3.8 3.5 - 5.1 mmol/L    Chloride 100 98 - 107 mmol/L    Carbon Dioxide 30 21 - 31 mmol/L    Anion Gap 7 3 - 14 mmol/L    Glucose 102 70 - 105 mg/dL    Urea Nitrogen 16 7 - 25 mg/dL    Creatinine 1.03 0.70 - 1.30 mg/dL    GFR Estimate 71 >60 mL/min/1.7m2    GFR Estimate If Black 86 >60 mL/min/1.7m2    Calcium 9.0 8.6 - 10.3 mg/dL    Bilirubin Total 0.6 0.3 - 1.0 mg/dL    Albumin 4.2 3.5 - 5.7 g/dL    Protein Total 7.3 6.4 - 8.9 g/dL    Alkaline Phosphatase 37 34 - 104 U/L    ALT 33 7 - 52 U/L    AST 38 13 - 39 U/L

## 2018-09-10 NOTE — PROGRESS NOTES
"Nursing Notes:   Boyd Nguyen LPN  9/5/2018  8:05 AM  Signed  Arun presents to the clinic today for his annual physical and medication refills.          Boyd Nguyen LPN 09/05/18 7:42 AM                 Boyd Nguyen LPN  9/5/2018 11:12 AM  Signed  Chief Complaint   Patient presents with     Refill Request     Medication refills       Initial /80 (BP Location: Right arm, Patient Position: Sitting, Cuff Size: Adult Large)  Pulse 64  Resp 14  Wt 233 lb 6.4 oz (105.9 kg)  BMI 34.47 kg/m2 Estimated body mass index is 34.47 kg/(m^2) as calculated from the following:    Height as of 11/20/17: 5' 9\" (1.753 m).    Weight as of this encounter: 233 lb 6.4 oz (105.9 kg).  Medication Reconciliation: complete    Boyd Nguyen LPN        SUBJECTIVE:  Arun Wren is a 73 year old male who comes in today to follow up on hypertension, hyperlipidemia, and impotence.  He continues to take lisinopril and torsemide for HTN.  He reports no orthostatic symptoms.  He has not had any chest pain, shortness of breath or other anginal equivalents.  His weight is up 10lbs from last year.  He has some LE edema later in the day but nothing progressive.  He admits he needs to eat healthier and watch his overall caloric intake, which he plans to do.    He has not had any side effects from simvastatin or ASA 81mg EC.      He is UTD on colonoscopy, due in 2020.  No black or bloody stools.  No change in bowel habits.    Urination also without change, he is using sildenafil for impotence and reports that it works well with no side effects.      PHQ-9  PHQ-9 SCORE 7/7/2016 9/12/2016 10/26/2017   Total Score 0 0 0     Past Surgical History:   Procedure Laterality Date     COLONOSCOPY      1998,at [facility], which was normal     COLONOSCOPY      03/03/05,at [facility]     COLONOSCOPY      3/2/15,F/U 2020     OTHER SURGICAL HISTORY      ,HERNIA REPAIR     OTHER SURGICAL HISTORY      " 05/24/16,[id number],OTHER,Left,Dr. Jeremiah GIBSON     OTHER SURGICAL HISTORY      07/28/16,[id number],ORCHIECTOMY SIMPLE / PARTIAL / RADICAL W/ SCROTAL / INGUINAL APPROACH,Left,Dr. Jeremiah GIBSON           Current Outpatient Prescriptions   Medication Sig Dispense Refill     aspirin EC 81 MG EC tablet Take 1 tablet by mouth daily with food       lisinopril (PRINIVIL/ZESTRIL) 20 MG tablet TAKE 1 TABLET BY MOUTH ONCE DAILY. 90 tablet 3     nystatin (MYCOSTATIN) cream        predniSONE (DELTASONE) 20 MG tablet Take 1 tablet (20 mg) by mouth daily for 5d if needed poison ivy 5 tablet 0     sildenafil (REVATIO) 20 MG tablet Take 4-5 tablets by mouth once daily if needed for Other (Specify). Take 30 minutes before sexual activity. 80 tablet 1     simvastatin (ZOCOR) 20 MG tablet Take 1 tablet (20 mg) by mouth At Bedtime 90 tablet 3     torsemide (DEMADEX) 10 MG tablet Take 1 tablet (10 mg) by mouth daily 90 tablet 3         Social History     Social History     Marital status:      Spouse name: N/A     Number of children: N/A     Years of education: N/A     Occupational History     Not on file.     Social History Main Topics     Smoking status: Former Smoker     Years: 27.00     Types: Cigarettes     Quit date: 1/1/1988     Smokeless tobacco: Never Used     Alcohol use Yes      Comment: Alcoholic Drinks/day: 1-2 drinks per day, whiskey     Drug use: No     Sexual activity: Not on file     Other Topics Concern     Not on file     Social History Narrative    Quit smoking 1990.  Occ alcohol.  .   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    Stays active, owns [employer].       I have personally reviewed and updated above noted social, family and/or past medical history.    ROS  CONSTITUTIONAL: NEGATIVE for fever, chills, change in weight  INTEGUMENTARY/SKIN: NEGATIVE for worrisome rashes, moles or lesions  ENT/MOUTH: NEGATIVE for ear, mouth and throat problems  RESP: NEGATIVE for significant cough or  SOB  CV: NEGATIVE for chest pain, palpitations or peripheral edema  GI: NEGATIVE for nausea, abdominal pain, heartburn, or change in bowel habits  : negative for dysuria, hematuria, or recent worsening of decreased urinary stream POSITIVE for erectile dysfunction  MUSCULOSKELETAL: NEGATIVE for significant arthralgias or myalgia  NEURO: NEGATIVE for weakness, dizziness or paresthesias  ENDOCRINE: NEGATIVE for temperature intolerance, skin/hair changes  PSYCHIATRIC: NEGATIVE for changes in mood or affect      /80 (BP Location: Right arm, Patient Position: Sitting, Cuff Size: Adult Large)  Pulse 64  Resp 14  Wt 233 lb 6.4 oz (105.9 kg)  BMI 34.47 kg/m2    Exam:  GENERAL: healthy, alert and no distress  EYES: Eyes grossly normal to inspection, PERRL and conjunctivae and sclerae normal  HENT: ear canals and TM's normal, nose and mouth without ulcers or lesions  NECK: no adenopathy, no asymmetry, masses, or scars and thyroid normal to palpation  RESP: lungs clear to auscultation - no rales, rhonchi or wheezes  CV: regular rate and rhythm, No murmurs, no JVD.  Trace peripheral edema and peripheral pulses strong  ABDOMEN: soft, nontender, no hepatosplenomegaly, no masses and bowel sounds normal  MS: no gross musculoskeletal defects noted, no edema  NEURO: Normal strength and tone, mentation intact and speech normal  PSYCH: mentation appears normal, affect normal/bright  LYMPH: no cervical, supraclavicular, axillary, or inguinal adenopathy      ASSESSMENT/Plan :    I personally reviewed the patients' labs and will review imaging.    Results for orders placed or performed in visit on 09/05/18   PSA Screen GH   Result Value Ref Range    PSA Screen 0.823 <3.100 ng/mL   Lipid Panel   Result Value Ref Range    Cholesterol 166 <200 mg/dL    Triglycerides 81 <150 mg/dL    HDL Cholesterol 73 23 - 92 mg/dL    LDL Cholesterol Calculated 77 <100 mg/dL    Non HDL Cholesterol 93 <130 mg/dL   Comprehensive Metabolic Panel    Result Value Ref Range    Sodium 137 134 - 144 mmol/L    Potassium 3.8 3.5 - 5.1 mmol/L    Chloride 100 98 - 107 mmol/L    Carbon Dioxide 30 21 - 31 mmol/L    Anion Gap 7 3 - 14 mmol/L    Glucose 102 70 - 105 mg/dL    Urea Nitrogen 16 7 - 25 mg/dL    Creatinine 1.03 0.70 - 1.30 mg/dL    GFR Estimate 71 >60 mL/min/1.7m2    GFR Estimate If Black 86 >60 mL/min/1.7m2    Calcium 9.0 8.6 - 10.3 mg/dL    Bilirubin Total 0.6 0.3 - 1.0 mg/dL    Albumin 4.2 3.5 - 5.7 g/dL    Protein Total 7.3 6.4 - 8.9 g/dL    Alkaline Phosphatase 37 34 - 104 U/L    ALT 33 7 - 52 U/L    AST 38 13 - 39 U/L       No images are attached to the encounter or orders placed in the encounter.      1. Essential hypertension with goal blood pressure less than 140/90  BP with borderline elevation.  BMP stable, continue current medication.  - lisinopril (PRINIVIL/ZESTRIL) 20 MG tablet; TAKE 1 TABLET BY MOUTH ONCE DAILY.  Dispense: 90 tablet; Refill: 3  - torsemide (DEMADEX) 10 MG tablet; Take 1 tablet (10 mg) by mouth daily  Dispense: 90 tablet; Refill: 3  - Lipid Panel; Future  - Comprehensive Metabolic Panel; Future  - Lipid Panel  - Comprehensive Metabolic Panel    2. Irritant contact dermatitis due to other agents  Currently without poison ivy but is very susceptible and would like an active order for prednisone which has worked well for him should he come into contact with it this fall.  - predniSONE (DELTASONE) 20 MG tablet; Take 1 tablet (20 mg) by mouth daily for 5d if needed poison ivy  Dispense: 5 tablet; Refill: 0    3. Impotence of organic origin  Patient reports sildenafil is working well for him and would like to continue.  - sildenafil (REVATIO) 20 MG tablet; Take 4-5 tablets by mouth once daily if needed for Other (Specify). Take 30 minutes before sexual activity.  Dispense: 80 tablet; Refill: 1  - simvastatin (ZOCOR) 20 MG tablet; Take 1 tablet (20 mg) by mouth At Bedtime  Dispense: 90 tablet; Refill: 3  - PSA Screen GH; Future  - PSA  Screen GH    4. Encounter for screening for malignant neoplasm of prostate   Discussed risks of false positives and benefits of early detection with COURT and PSA, he is currently asymptomatic.  He declines COURT but does accept PSA.  He understands limitations of this approach.  He voiced good understanding of all considerations regarding prostate cancer screening.    PSA remains stable.    - PSA Screen GH; Future  - PSA Screen GH    5. Thoracic aortic aneurysm without rupture (H)  Patient did not have his US done last year as suggested, will order US.  - US Aorta Medicare AAA Screening; Future    6.  Hyperlipidemia  Continue simvastatin.      There are no Patient Instructions on file for this visit.    Vin Sandoval    This document was created using computer generated templates and voiceactivated software.

## 2018-09-18 ENCOUNTER — HOSPITAL ENCOUNTER (OUTPATIENT)
Dept: ULTRASOUND IMAGING | Facility: OTHER | Age: 73
Discharge: HOME | End: 2018-09-18
Attending: FAMILY MEDICINE | Admitting: FAMILY MEDICINE
Payer: MEDICARE

## 2018-09-18 DIAGNOSIS — I71.20 THORACIC AORTIC ANEURYSM WITHOUT RUPTURE (H): ICD-10-CM

## 2018-09-18 PROCEDURE — 76706 US ABDL AORTA SCREEN AAA: CPT

## 2018-10-10 ENCOUNTER — TELEPHONE (OUTPATIENT)
Dept: FAMILY MEDICINE | Facility: OTHER | Age: 73
End: 2018-10-10

## 2018-10-10 NOTE — TELEPHONE ENCOUNTER
Patient had never received a report on his labs from last visit, nor on the ultrasound. I reprinted the letter and notified him of normal AAA screen result.  Sherrell Goel CMA([facility])..................10/10/2018   1:46 PM

## 2018-10-31 ENCOUNTER — HOSPITAL ENCOUNTER (OUTPATIENT)
Dept: CARDIOLOGY | Facility: OTHER | Age: 73
Discharge: HOME | End: 2018-10-31
Attending: FAMILY MEDICINE | Admitting: FAMILY MEDICINE
Payer: MEDICARE

## 2018-10-31 DIAGNOSIS — I71.20 THORACIC AORTIC ANEURYSM WITHOUT RUPTURE (H): ICD-10-CM

## 2018-10-31 PROCEDURE — 93306 TTE W/DOPPLER COMPLETE: CPT | Mod: 26 | Performed by: INTERNAL MEDICINE

## 2018-10-31 PROCEDURE — 93306 TTE W/DOPPLER COMPLETE: CPT

## 2019-05-07 ENCOUNTER — DOCUMENTATION ONLY (OUTPATIENT)
Dept: OTHER | Facility: CLINIC | Age: 74
End: 2019-05-07

## 2019-06-24 ENCOUNTER — OFFICE VISIT (OUTPATIENT)
Dept: FAMILY MEDICINE | Facility: OTHER | Age: 74
End: 2019-06-24
Attending: FAMILY MEDICINE
Payer: COMMERCIAL

## 2019-06-24 VITALS
HEIGHT: 69 IN | RESPIRATION RATE: 14 BRPM | SYSTOLIC BLOOD PRESSURE: 136 MMHG | TEMPERATURE: 97.9 F | BODY MASS INDEX: 34.33 KG/M2 | HEART RATE: 58 BPM | WEIGHT: 231.8 LBS | DIASTOLIC BLOOD PRESSURE: 82 MMHG

## 2019-06-24 DIAGNOSIS — N52.9 IMPOTENCE OF ORGANIC ORIGIN: ICD-10-CM

## 2019-06-24 DIAGNOSIS — Z00.00 ROUTINE GENERAL MEDICAL EXAMINATION AT A HEALTH CARE FACILITY: Primary | ICD-10-CM

## 2019-06-24 DIAGNOSIS — L57.0 ACTINIC KERATOSIS: ICD-10-CM

## 2019-06-24 DIAGNOSIS — E78.00 HYPERCHOLESTEROLEMIA: ICD-10-CM

## 2019-06-24 DIAGNOSIS — I10 ESSENTIAL HYPERTENSION WITH GOAL BLOOD PRESSURE LESS THAN 140/90: ICD-10-CM

## 2019-06-24 LAB — TESTOST SERPL-MCNC: 384 NG/DL (ref 175–781)

## 2019-06-24 PROCEDURE — 99397 PER PM REEVAL EST PAT 65+ YR: CPT | Mod: 25 | Performed by: FAMILY MEDICINE

## 2019-06-24 PROCEDURE — 17003 DESTRUCT PREMALG LES 2-14: CPT | Performed by: FAMILY MEDICINE

## 2019-06-24 PROCEDURE — G0463 HOSPITAL OUTPT CLINIC VISIT: HCPCS

## 2019-06-24 PROCEDURE — 17000 DESTRUCT PREMALG LESION: CPT | Performed by: FAMILY MEDICINE

## 2019-06-24 PROCEDURE — 36415 COLL VENOUS BLD VENIPUNCTURE: CPT | Mod: ZL | Performed by: FAMILY MEDICINE

## 2019-06-24 PROCEDURE — 84403 ASSAY OF TOTAL TESTOSTERONE: CPT | Mod: ZL | Performed by: FAMILY MEDICINE

## 2019-06-24 RX ORDER — SILDENAFIL CITRATE 20 MG/1
TABLET ORAL
Qty: 80 TABLET | Refills: 1 | Status: SHIPPED | OUTPATIENT
Start: 2019-06-24 | End: 2019-10-29

## 2019-06-24 RX ORDER — ATORVASTATIN CALCIUM 80 MG/1
80 TABLET, FILM COATED ORAL DAILY
Qty: 90 TABLET | Refills: 3 | Status: SHIPPED | OUTPATIENT
Start: 2019-06-24 | End: 2020-07-17

## 2019-06-24 RX ORDER — LISINOPRIL 20 MG/1
TABLET ORAL
Qty: 90 TABLET | Refills: 3 | Status: SHIPPED | OUTPATIENT
Start: 2019-06-24 | End: 2020-07-17

## 2019-06-24 RX ORDER — SIMVASTATIN 20 MG
20 TABLET ORAL AT BEDTIME
Qty: 90 TABLET | Refills: 3 | Status: SHIPPED | OUTPATIENT
Start: 2019-06-24 | End: 2019-06-24

## 2019-06-24 RX ORDER — TORSEMIDE 10 MG/1
10 TABLET ORAL DAILY
Qty: 90 TABLET | Refills: 3 | Status: SHIPPED | OUTPATIENT
Start: 2019-06-24 | End: 2020-07-17

## 2019-06-24 ASSESSMENT — ANXIETY QUESTIONNAIRES
5. BEING SO RESTLESS THAT IT IS HARD TO SIT STILL: NOT AT ALL
GAD7 TOTAL SCORE: 0
IF YOU CHECKED OFF ANY PROBLEMS ON THIS QUESTIONNAIRE, HOW DIFFICULT HAVE THESE PROBLEMS MADE IT FOR YOU TO DO YOUR WORK, TAKE CARE OF THINGS AT HOME, OR GET ALONG WITH OTHER PEOPLE: NOT DIFFICULT AT ALL
2. NOT BEING ABLE TO STOP OR CONTROL WORRYING: NOT AT ALL
6. BECOMING EASILY ANNOYED OR IRRITABLE: NOT AT ALL
3. WORRYING TOO MUCH ABOUT DIFFERENT THINGS: NOT AT ALL
1. FEELING NERVOUS, ANXIOUS, OR ON EDGE: NOT AT ALL
7. FEELING AFRAID AS IF SOMETHING AWFUL MIGHT HAPPEN: NOT AT ALL

## 2019-06-24 ASSESSMENT — MIFFLIN-ST. JEOR: SCORE: 1786.82

## 2019-06-24 ASSESSMENT — PATIENT HEALTH QUESTIONNAIRE - PHQ9: 5. POOR APPETITE OR OVEREATING: NOT AT ALL

## 2019-06-24 ASSESSMENT — PAIN SCALES - GENERAL: PAINLEVEL: NO PAIN (0)

## 2019-06-24 NOTE — NURSING NOTE
"Coming in for a physical, is fasting    Chief Complaint   Patient presents with     Physical     is fasting        Initial /82 (BP Location: Right arm, Patient Position: Sitting, Cuff Size: Adult Large)   Pulse 58   Temp 97.9  F (36.6  C) (Temporal)   Resp 14   Ht 1.753 m (5' 9\")   Wt 105.1 kg (231 lb 12.8 oz)   BMI 34.23 kg/m   Estimated body mass index is 34.23 kg/m  as calculated from the following:    Height as of this encounter: 1.753 m (5' 9\").    Weight as of this encounter: 105.1 kg (231 lb 12.8 oz).  Medication Reconciliation: complete    Kathleen Rene LPN  "

## 2019-06-24 NOTE — PROGRESS NOTES
SUBJECTIVE:   CC: Arun Wren is an 74 year old male who presents for preventive health visit.     Healthy Habits:    Do you get at least three servings of calcium containing foods daily (dairy, green leafy vegetables, etc.)? yes    Amount of exercise or daily activities, outside of work: 2-3 day(s) per week    Problems taking medications regularly No    Medication side effects: No    Have you had an eye exam in the past two years? yes    Do you see a dentist twice per year? yes    Do you have sleep apnea, excessive snoring or daytime drowsiness?no      He wants refills on his meds. Has no side effects from them.  Labs were done last .  Had an orchiectomy and feels tired, worried his testosterone is low.  Ongoing erectile dysfunction, Viagra helps a little.     The 10-year ASCVD risk score (Rima BERMUDEZ Jr., et al., 2013) is: 22.7%    Values used to calculate the score:      Age: 74 years      Sex: Male      Is Non- : No      Diabetic: No      Tobacco smoker: No      Systolic Blood Pressure: 136 mmHg      Is BP treated: Yes      HDL Cholesterol: 73 mg/dL      Total Cholesterol: 166 mg/dL        Today's PHQ-2 Score:   PHQ-2 (  [employer]) 2019   Q1: Little interest or pleasure in doing things 0 0   Q2: Feeling down, depressed or hopeless 0 0   PHQ-2 Score 0 0       Abuse: Current or Past(Physical, Sexual or Emotional)- No  Do you feel safe in your environment? Yes    Social History     Tobacco Use     Smoking status: Former Smoker     Years: 27.00     Types: Cigarettes     Last attempt to quit: 1988     Years since quittin.4     Smokeless tobacco: Never Used   Substance Use Topics     Alcohol use: Yes     Comment: Alcoholic Drinks/day: 1-2 drinks per day, whiskey     If you drink alcohol do you typically have >3 drinks per day or >7 drinks per week? No                      Last PSA: No results found for: PSA    Reviewed orders with patient. Reviewed health  maintenance and updated orders accordingly - Yes  Labs reviewed in [facility]  Current Outpatient Medications   Medication Sig Dispense Refill     aspirin EC 81 MG EC tablet Take 1 tablet by mouth daily with food       lisinopril (PRINIVIL/ZESTRIL) 20 MG tablet TAKE 1 TABLET BY MOUTH ONCE DAILY. 90 tablet 3     nystatin (MYCOSTATIN) cream        predniSONE (DELTASONE) 20 MG tablet Take 1 tablet (20 mg) by mouth daily for 5d if needed poison ivy 5 tablet 0     sildenafil (REVATIO) 20 MG tablet Take 4-5 tablets by mouth once daily if needed for Other (Specify). Take 30 minutes before sexual activity. 80 tablet 1     simvastatin (ZOCOR) 20 MG tablet Take 1 tablet (20 mg) by mouth At Bedtime 90 tablet 3     torsemide (DEMADEX) 10 MG tablet Take 1 tablet (10 mg) by mouth daily 90 tablet 3     No Known Allergies    Reviewed and updated as needed this visit by clinical staff  Tobacco  Allergies  Meds  Med Hx  Soc Hx        Reviewed and updated as needed this visit by Provider        Past Medical History:   Diagnosis Date     Actinic keratosis     05/19/09,Keratosis left external ear, treated with cryocautery, recheck if recurs or persists     Allergic contact dermatitis due to plants, except food     recurrent     Basal cell carcinoma of skin     No Comments Provided     Carpal tunnel syndrome     right greater than left     Closed fracture of base of skull (H)     No Comments Provided     Essential (primary) hypertension     No Comments Provided     Ganglion     No Comments Provided     Hydrocele     No Comments Provided     Male erectile dysfunction     decreased libido     Personal history of nicotine dependence     No Comments Provided     Pure hypercholesterolemia     No Comments Provided     Residual hemorrhoidal skin tags     Past external hemorrhoid     Tinea unguium     No Comments Provided      Past Surgical History:   Procedure Laterality Date     COLONOSCOPY  01/01/1998 1998,at [facility], which was  "normal     COLONOSCOPY  03/03/2005 03/03/05,at [facility]     COLONOSCOPY  03/02/2015    3/2/15,F/U 2020     OTHER SURGICAL HISTORY      ,HERNIA REPAIR     OTHER SURGICAL HISTORY      05/24/16,[id number],OTHER,Left,Dr. Jeremiah GIBSON     OTHER SURGICAL HISTORY      07/28/16,[id number],ORCHIECTOMY SIMPLE / PARTIAL / RADICAL W/ SCROTAL / INGUINAL APPROACH,Left,Dr. Jeremiah GIBSON       ROS:  CONSTITUTIONAL:tired  INTEGUMENTARY/SKIN: NEGATIVE for worrisome rashes, moles or lesions  EYES: NEGATIVE for vision changes or irritation  ENT: NEGATIVE for ear, mouth and throat problems  RESP: NEGATIVE for significant cough or SOB  CV: NEGATIVE for chest pain, palpitations or peripheral edema  GI: NEGATIVE for nausea, abdominal pain, heartburn, or change in bowel habits   male: erectile dysfunction  MUSCULOSKELETAL: NEGATIVE for significant arthralgias or myalgia  NEURO: NEGATIVE for weakness, dizziness or paresthesias  PSYCHIATRIC: NEGATIVE for changes in mood or affect  Skin: lesion on left cheek and left ear.  History of cryo for actinic keratoses. Biopsy 5 years ago for actinic keratosis on left cheek area.    OBJECTIVE:   /82 (BP Location: Right arm, Patient Position: Sitting, Cuff Size: Adult Large)   Pulse 58   Temp 97.9  F (36.6  C) (Temporal)   Resp 14   Ht 1.753 m (5' 9\")   Wt 105.1 kg (231 lb 12.8 oz)   BMI 34.23 kg/m    EXAM:  GENERAL: healthy, alert and no distress  EYES: Eyes grossly normal to inspection, PERRL and conjunctivae and sclerae normal  HENT: ear canals and TM's normal, nose and mouth without ulcers or lesions  NECK: no adenopathy, no asymmetry, masses, or scars and thyroid normal to palpation  RESP: lungs clear to auscultation - no rales, rhonchi or wheezes  CV: regular rate and rhythm, normal S1 S2, no S3 or S4, no murmur, click or rub, no peripheral edema and peripheral pulses strong  ABDOMEN: soft, nontender, no hepatosplenomegaly, no masses and bowel sounds normal  MS: no gross " "musculoskeletal defects noted, no edema  SKIN: no suspicious lesions or rashes  NEURO: Normal strength and tone, mentation intact and speech normal  PSYCH: mentation appears normal, affect normal/bright    Left malar area and left pinna of year with scaling patches, about 3-5 mm or so.  Mild scaling.  Both treated with 2 cycles of cryo.    Diagnostic Test Results:  Labs reviewed in Epic    ASSESSMENT/PLAN:       ICD-10-CM    1. Routine general medical examination at a health care facility Z00.00    2. Essential hypertension with goal blood pressure less than 140/90 I10 lisinopril (PRINIVIL/ZESTRIL) 20 MG tablet     torsemide (DEMADEX) 10 MG tablet   3. Impotence of organic origin N52.9 sildenafil (REVATIO) 20 MG tablet     Testosterone, Total     DISCONTINUED: simvastatin (ZOCOR) 20 MG tablet   4. Hypercholesterolemia E78.00 atorvastatin (LIPITOR) 80 MG tablet   5. Actinic keratosis L57.0 DESTRUC BENIGN/PREMAL,2-14 LESIONS [01530]       COUNSELING:  Reviewed preventive health counseling, as reflected in patient instructions       Regular exercise       Healthy diet/nutrition       Colon cancer screening       Prostate cancer screening    Estimated body mass index is 34.23 kg/m  as calculated from the following:    Height as of this encounter: 1.753 m (5' 9\").    Weight as of this encounter: 105.1 kg (231 lb 12.8 oz).    Weight management plan: Discussed healthy diet and exercise guidelines     reports that he quit smoking about 31 years ago. His smoking use included cigarettes. He quit after 27.00 years of use. He has never used smokeless tobacco.      Counseling Resources:  ATP IV Guidelines  Pooled Cohorts Equation Calculator  FRAX Risk Assessment  ICSI Preventive Guidelines  Dietary Guidelines for Americans, 2010  USDA's MyPlate  ASA Prophylaxis  Lung CA Screening    Richard Arita MD  [facility]  "

## 2019-06-24 NOTE — PATIENT INSTRUCTIONS
Preventive Health Recommendations:     See your health care provider every year to    Review health changes.     Discuss preventive care.      Review your medicines if your doctor has prescribed any.      Talk with your health care provider about whether you should have a test to screen for prostate cancer (PSA).    Every 3 years, have a diabetes test (fasting glucose). If you are at risk for diabetes, you should have this test more often.    Every 5 years, have a cholesterol test. Have this test more often if you are at risk for high cholesterol or heart disease.     Every 10 years, have a colonoscopy. Or, have a yearly FIT test (stool test). These exams will check for colon cancer.    Talk to with your health care provider about screening for Abdominal Aortic Aneurysm if you have a family history of AAA or have a history of smoking.    Shots:     Get a flu shot each year.     Get a tetanus shot every 10 years.     Talk to your doctor about your pneumonia vaccines. There are now two you should receive - Pneumovax (PPSV 23) and Prevnar (PCV 13).     Talk to your pharmacist about a shingles vaccine.     Talk to your doctor about the hepatitis B vaccine.  Nutrition:     Eat at least 5 servings of fruits and vegetables each day.     Eat whole-grain bread, whole-wheat pasta and brown rice instead of white grains and rice.     Get adequate Calcium and Vitamin D.   Lifestyle    Exercise for at least 150 minutes a week (30 minutes a day, 5 days a week). This will help you control your weight and prevent disease.     Limit alcohol to one drink per day.     No smoking.     Wear sunscreen to prevent skin cancer.    See your dentist every six months for an exam and cleaning.    See your eye doctor every 1 to 2 years to screen for conditions such as glaucoma, macular degeneration, cataracts, etc.    Personalized Prevention Plan  You are due for the preventive services outlined below.  Your care team is available to assist you  in scheduling these services.  If you have already completed any of these items, please share that information with your care team to update in your medical record.  Health Maintenance Due   Topic Date Due     Zoster (Shingles) Vaccine (2 of 3) 05/01/2008     Annual Wellness Visit  01/22/2011     PHQ-2  01/01/2019

## 2019-06-24 NOTE — LETTER
June 25, 2019      Arun Wren  [address] [address] [address]        Dear Arun,     This is normal.    Results for orders placed or performed in visit on 06/24/19   Testosterone, Total   Result Value Ref Range    Testosterone Total 384 175 - 781 ng/dL           Sincerely,        Richard Arita MD

## 2019-06-25 ASSESSMENT — ANXIETY QUESTIONNAIRES: GAD7 TOTAL SCORE: 0

## 2019-10-29 ENCOUNTER — OFFICE VISIT (OUTPATIENT)
Dept: FAMILY MEDICINE | Facility: OTHER | Age: 74
End: 2019-10-29
Attending: FAMILY MEDICINE
Payer: COMMERCIAL

## 2019-10-29 VITALS
TEMPERATURE: 98.6 F | HEART RATE: 61 BPM | RESPIRATION RATE: 16 BRPM | DIASTOLIC BLOOD PRESSURE: 72 MMHG | BODY MASS INDEX: 34.56 KG/M2 | WEIGHT: 234 LBS | OXYGEN SATURATION: 97 % | SYSTOLIC BLOOD PRESSURE: 136 MMHG

## 2019-10-29 DIAGNOSIS — N52.9 IMPOTENCE OF ORGANIC ORIGIN: ICD-10-CM

## 2019-10-29 DIAGNOSIS — L57.0 ACTINIC KERATOSIS: Primary | ICD-10-CM

## 2019-10-29 PROCEDURE — G0463 HOSPITAL OUTPT CLINIC VISIT: HCPCS

## 2019-10-29 PROCEDURE — 99213 OFFICE O/P EST LOW 20 MIN: CPT | Performed by: FAMILY MEDICINE

## 2019-10-29 RX ORDER — FLUOROURACIL 50 MG/G
CREAM TOPICAL 2 TIMES DAILY
Qty: 40 G | Refills: 0 | Status: SHIPPED | OUTPATIENT
Start: 2019-10-29 | End: 2019-11-28

## 2019-10-29 RX ORDER — SILDENAFIL CITRATE 20 MG/1
TABLET ORAL
Qty: 80 TABLET | Refills: 1 | Status: SHIPPED | OUTPATIENT
Start: 2019-10-29 | End: 2020-04-27

## 2019-10-29 ASSESSMENT — ANXIETY QUESTIONNAIRES
6. BECOMING EASILY ANNOYED OR IRRITABLE: NOT AT ALL
7. FEELING AFRAID AS IF SOMETHING AWFUL MIGHT HAPPEN: NOT AT ALL
3. WORRYING TOO MUCH ABOUT DIFFERENT THINGS: NOT AT ALL
2. NOT BEING ABLE TO STOP OR CONTROL WORRYING: NOT AT ALL
GAD7 TOTAL SCORE: 0
5. BEING SO RESTLESS THAT IT IS HARD TO SIT STILL: NOT AT ALL
IF YOU CHECKED OFF ANY PROBLEMS ON THIS QUESTIONNAIRE, HOW DIFFICULT HAVE THESE PROBLEMS MADE IT FOR YOU TO DO YOUR WORK, TAKE CARE OF THINGS AT HOME, OR GET ALONG WITH OTHER PEOPLE: NOT DIFFICULT AT ALL
1. FEELING NERVOUS, ANXIOUS, OR ON EDGE: NOT AT ALL

## 2019-10-29 ASSESSMENT — PATIENT HEALTH QUESTIONNAIRE - PHQ9: 5. POOR APPETITE OR OVEREATING: NOT AT ALL

## 2019-10-29 ASSESSMENT — ENCOUNTER SYMPTOMS
SHORTNESS OF BREATH: 0
FEVER: 0

## 2019-10-29 ASSESSMENT — PAIN SCALES - GENERAL: PAINLEVEL: NO PAIN (0)

## 2019-10-29 NOTE — PROGRESS NOTES
SUBJECTIVE:   Arun Wren is a 74 year old male who presents to clinic today for the following health issues:    Follow up for a lesion on his left side of his face and left ear that was frozen off 5 months ago. He was told to come back in and get it rechecked. Notes it looks about the same. It was scaly in appearance before, less scaly now. No pain at all. They told him it was actinic keratoses. Notes it was on his face for over a year. Notes he has had the lesion cut off before, but they continue to come back.         Patient Active Problem List    Diagnosis Date Noted     Irritant contact dermatitis due to other agents 03/29/2018     Priority: Medium     Thoracic aortic aneurysm without rupture (H) 07/24/2017     Priority: Medium     Diverticulosis of sigmoid colon 03/02/2015     Priority: Medium     H/O adenomatous polyp of colon 03/02/2015     Priority: Medium     Basal cell carcinoma of skin 01/22/2015     Priority: Medium     Contact dermatitis and other eczema due to plants (except food) 07/26/2011     Priority: Medium     Actinic keratosis 07/15/2010     Priority: Medium     Impotence of organic origin 07/15/2010     Priority: Medium     Essential hypertension 07/15/2010     Priority: Medium     Hypercholesterolemia 07/15/2010     Priority: Medium     Past Medical History:   Diagnosis Date     Actinic keratosis     05/19/09,Keratosis left external ear, treated with cryocautery, recheck if recurs or persists     Allergic contact dermatitis due to plants, except food     recurrent     Basal cell carcinoma of skin     No Comments Provided     Carpal tunnel syndrome     right greater than left     Closed fracture of base of skull (H)     No Comments Provided     Essential (primary) hypertension     No Comments Provided     Ganglion     No Comments Provided     Hydrocele     No Comments Provided     Male erectile dysfunction     decreased libido     Personal history of nicotine dependence     No Comments  Provided     Pure hypercholesterolemia     No Comments Provided     Residual hemorrhoidal skin tags     Past external hemorrhoid     Tinea unguium     No Comments Provided      Past Surgical History:   Procedure Laterality Date     COLONOSCOPY  1998    1998,at [facility], which was normal     COLONOSCOPY  2005,at [facility]     COLONOSCOPY  2015    3/2/15,F/U 2020  tubular adnomas     OTHER SURGICAL HISTORY      ,HERNIA REPAIR     OTHER SURGICAL HISTORY      [id number],OTHER,Left,Dr. Jeremiah GIBSON     OTHER SURGICAL HISTORY      16,[id number],ORCHIECTOMY SIMPLE / PARTIAL / RADICAL W/ SCROTAL / INGUINAL APPROACH,Left,Dr. Jeremiah GIBSON     Social History     Tobacco Use     Smoking status: Former Smoker     Years: 27.00     Types: Cigarettes     Last attempt to quit: 1988     Years since quittin.8     Smokeless tobacco: Never Used   Substance Use Topics     Alcohol use: Yes     Comment: Alcoholic Drinks/day: 1-2 drinks per day, whiskey     Social History     Patient does not qualify to have social determinant information on file (likely too young).   Social History Narrative    Quit smoking .  Occ alcohol.  .   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    Stays active, owns tree farm.     No Known Allergies    Review of Systems   Constitutional: Negative for fever.   HENT: Negative for congestion.    Respiratory: Negative for shortness of breath.    Cardiovascular: Negative for chest pain.   Skin: Positive for rash.   Allergic/Immunologic: Negative for immunocompromised state.        OBJECTIVE:     /72 (BP Location: Right arm, Patient Position: Sitting, Cuff Size: Adult Large)   Pulse 61   Temp 98.6  F (37  C) (Tympanic)   Resp 16   Wt 106.1 kg (234 lb)   SpO2 97%   BMI 34.56 kg/m    Body mass index is 34.56 kg/m .  Physical Exam  Constitutional:       Appearance: Normal appearance.   Cardiovascular:      Rate and Rhythm:  Normal rate and regular rhythm.      Heart sounds: Murmur present. Systolic murmur present with a grade of 2/6.   Pulmonary:      Effort: Pulmonary effort is normal.      Breath sounds: Normal breath sounds.   Skin:     General: Skin is warm.      Findings: Rash present.      Comments: Rash on left side of face and ear. Dry scaly, white area.    Neurological:      General: No focal deficit present.      Mental Status: He is alert.   Psychiatric:         Mood and Affect: Mood normal.         Diagnostic Test Results:  No results found for this or any previous visit (from the past 24 hour(s)).    ASSESSMENT/PLAN:       (L57.0) Actinic keratosis  (primary encounter diagnosis)  Comment: Was told to come back in after lesions were frozen off 5 months ago. Area is well healed but is white and scaly. Discussed either retrying the cryotherapy or 5-fluorouracil. He decided on the cream. Follow up in 4 weeks. We discussed the side effect of extreme redness from the cream.   Plan: fluorouracil (EFUDEX) 5 % external cream    (N52.9) Impotence of organic origin  Comment: Stable   Plan: sildenafil (REVATIO) 20 MG tablet               Manda Nunes, MS3, RPAP student   Working under the supervision of MD Richard Garza MD  [facility] AND [facility]    Pt was seen and examined by me as well as Manda Nunes, MS 3, I was present for the exam portion also.      Richard Arita MD on 10/29/2019 at 3:22 PM

## 2019-10-29 NOTE — NURSING NOTE
"coming in for a recheck of a lesion that was frozen on his face    Chief Complaint   Patient presents with     Lesion     lesion was frozen the last visit       Initial /72 (BP Location: Right arm, Patient Position: Sitting, Cuff Size: Adult Large)   Pulse 61   Temp 98.6  F (37  C) (Tympanic)   Resp 16   Wt 106.1 kg (234 lb)   SpO2 97%   BMI 34.56 kg/m   Estimated body mass index is 34.56 kg/m  as calculated from the following:    Height as of 6/24/19: 1.753 m (5' 9\").    Weight as of this encounter: 106.1 kg (234 lb).  Medication Reconciliation: complete    Kathleen Rene LPN  "

## 2019-10-30 ASSESSMENT — ANXIETY QUESTIONNAIRES: GAD7 TOTAL SCORE: 0

## 2020-02-14 DIAGNOSIS — Z86.0101 H/O ADENOMATOUS POLYP OF COLON: Primary | ICD-10-CM

## 2020-02-17 RX ORDER — BISACODYL 5 MG
TABLET, DELAYED RELEASE (ENTERIC COATED) ORAL
Qty: 2 TABLET | Refills: 0 | Status: ON HOLD | OUTPATIENT
Start: 2020-02-17 | End: 2020-03-16

## 2020-02-17 RX ORDER — POLYETHYLENE GLYCOL 3350, SODIUM CHLORIDE, SODIUM BICARBONATE, POTASSIUM CHLORIDE 420; 11.2; 5.72; 1.48 G/4L; G/4L; G/4L; G/4L
4000 POWDER, FOR SOLUTION ORAL ONCE
Qty: 4000 ML | Refills: 0 | Status: ON HOLD | OUTPATIENT
Start: 2020-02-17 | End: 2020-03-16

## 2020-03-16 ENCOUNTER — HOSPITAL ENCOUNTER (OUTPATIENT)
Facility: OTHER | Age: 75
Discharge: HOME | End: 2020-03-16
Attending: SURGERY | Admitting: SURGERY
Payer: COMMERCIAL

## 2020-03-16 ENCOUNTER — ANESTHESIA (OUTPATIENT)
Dept: SURGERY | Facility: OTHER | Age: 75
End: 2020-03-16
Payer: COMMERCIAL

## 2020-03-16 ENCOUNTER — ANESTHESIA EVENT (OUTPATIENT)
Dept: SURGERY | Facility: OTHER | Age: 75
End: 2020-03-16
Payer: COMMERCIAL

## 2020-03-16 VITALS
RESPIRATION RATE: 14 BRPM | TEMPERATURE: 97.5 F | SYSTOLIC BLOOD PRESSURE: 156 MMHG | BODY MASS INDEX: 34.7 KG/M2 | HEART RATE: 54 BPM | WEIGHT: 235 LBS | DIASTOLIC BLOOD PRESSURE: 98 MMHG | OXYGEN SATURATION: 93 %

## 2020-03-16 PROBLEM — K63.5 COLON POLYPS: Status: ACTIVE | Noted: 2020-03-16

## 2020-03-16 PROCEDURE — 45384 COLONOSCOPY W/LESION REMOVAL: CPT | Performed by: SURGERY

## 2020-03-16 PROCEDURE — 25000128 H RX IP 250 OP 636: Performed by: NURSE ANESTHETIST, CERTIFIED REGISTERED

## 2020-03-16 PROCEDURE — 88305 TISSUE EXAM BY PATHOLOGIST: CPT

## 2020-03-16 PROCEDURE — 45384 COLONOSCOPY W/LESION REMOVAL: CPT | Mod: PT | Performed by: SURGERY

## 2020-03-16 PROCEDURE — 99100 ANES PT EXTEME AGE<1 YR&>70: CPT | Performed by: NURSE ANESTHETIST, CERTIFIED REGISTERED

## 2020-03-16 PROCEDURE — 40000010 ZZH STATISTIC ANES STAT CODE-CRNA PER MINUTE: Performed by: SURGERY

## 2020-03-16 PROCEDURE — 45384 COLONOSCOPY W/LESION REMOVAL: CPT | Performed by: NURSE ANESTHETIST, CERTIFIED REGISTERED

## 2020-03-16 PROCEDURE — 25000125 ZZHC RX 250: Performed by: NURSE ANESTHETIST, CERTIFIED REGISTERED

## 2020-03-16 PROCEDURE — 25800030 ZZH RX IP 258 OP 636: Performed by: SURGERY

## 2020-03-16 PROCEDURE — 25000132 ZZH RX MED GY IP 250 OP 250 PS 637: Performed by: SURGERY

## 2020-03-16 PROCEDURE — 25000132 ZZH RX MED GY IP 250 OP 250 PS 637: Performed by: NURSE ANESTHETIST, CERTIFIED REGISTERED

## 2020-03-16 PROCEDURE — 25000125 ZZHC RX 250: Performed by: SURGERY

## 2020-03-16 RX ORDER — LIDOCAINE 40 MG/G
CREAM TOPICAL
Status: DISCONTINUED | OUTPATIENT
Start: 2020-03-16 | End: 2020-03-16 | Stop reason: HOSPADM

## 2020-03-16 RX ORDER — PROPOFOL 10 MG/ML
INJECTION, EMULSION INTRAVENOUS PRN
Status: DISCONTINUED | OUTPATIENT
Start: 2020-03-16 | End: 2020-03-16

## 2020-03-16 RX ORDER — SODIUM CHLORIDE, SODIUM LACTATE, POTASSIUM CHLORIDE, CALCIUM CHLORIDE 600; 310; 30; 20 MG/100ML; MG/100ML; MG/100ML; MG/100ML
INJECTION, SOLUTION INTRAVENOUS CONTINUOUS
Status: DISCONTINUED | OUTPATIENT
Start: 2020-03-16 | End: 2020-03-16 | Stop reason: HOSPADM

## 2020-03-16 RX ORDER — ONDANSETRON 2 MG/ML
4 INJECTION INTRAMUSCULAR; INTRAVENOUS
Status: DISCONTINUED | OUTPATIENT
Start: 2020-03-16 | End: 2020-03-16 | Stop reason: HOSPADM

## 2020-03-16 RX ORDER — FLUMAZENIL 0.1 MG/ML
0.2 INJECTION, SOLUTION INTRAVENOUS
Status: DISCONTINUED | OUTPATIENT
Start: 2020-03-16 | End: 2020-03-16 | Stop reason: HOSPADM

## 2020-03-16 RX ORDER — LIDOCAINE HYDROCHLORIDE 20 MG/ML
INJECTION, SOLUTION INFILTRATION; PERINEURAL PRN
Status: DISCONTINUED | OUTPATIENT
Start: 2020-03-16 | End: 2020-03-16

## 2020-03-16 RX ORDER — LISINOPRIL 20 MG/1
20 TABLET ORAL DAILY
Status: DISCONTINUED | OUTPATIENT
Start: 2020-03-16 | End: 2020-03-16 | Stop reason: HOSPADM

## 2020-03-16 RX ORDER — NALOXONE HYDROCHLORIDE 0.4 MG/ML
.1-.4 INJECTION, SOLUTION INTRAMUSCULAR; INTRAVENOUS; SUBCUTANEOUS
Status: DISCONTINUED | OUTPATIENT
Start: 2020-03-16 | End: 2020-03-16 | Stop reason: HOSPADM

## 2020-03-16 RX ORDER — SIMETHICONE 40MG/0.6ML
SUSPENSION, DROPS(FINAL DOSAGE FORM)(ML) ORAL PRN
Status: DISCONTINUED | OUTPATIENT
Start: 2020-03-16 | End: 2020-03-16 | Stop reason: HOSPADM

## 2020-03-16 RX ORDER — PROPOFOL 10 MG/ML
INJECTION, EMULSION INTRAVENOUS CONTINUOUS PRN
Status: DISCONTINUED | OUTPATIENT
Start: 2020-03-16 | End: 2020-03-16

## 2020-03-16 RX ADMIN — LIDOCAINE HYDROCHLORIDE 60 MG: 20 INJECTION, SOLUTION INFILTRATION; PERINEURAL at 07:32

## 2020-03-16 RX ADMIN — PROPOFOL 140 MCG/KG/MIN: 10 INJECTION, EMULSION INTRAVENOUS at 07:32

## 2020-03-16 RX ADMIN — SODIUM CHLORIDE, POTASSIUM CHLORIDE, SODIUM LACTATE AND CALCIUM CHLORIDE: 600; 310; 30; 20 INJECTION, SOLUTION INTRAVENOUS at 07:26

## 2020-03-16 RX ADMIN — LISINOPRIL 20 MG: 20 TABLET ORAL at 07:22

## 2020-03-16 RX ADMIN — PROPOFOL 100 MG: 10 INJECTION, EMULSION INTRAVENOUS at 07:32

## 2020-03-16 NOTE — ANESTHESIA POSTPROCEDURE EVALUATION
Patient: Arun Wren    Procedure(s):  COLONOSCOPY, WITH LESION EXCISION USING HOT BIOPSY DEVICE    Diagnosis:Encounter for screening colonoscopy [Z12.11]  Diagnosis Additional Information: No value filed.    Anesthesia Type:  MAC    Note:  Anesthesia Post Evaluation    Patient location during evaluation: Bedside  Patient participation: Able to fully participate in evaluation  Level of consciousness: awake and alert  Pain management: adequate  Airway patency: patent  Cardiovascular status: acceptable  Respiratory status: acceptable  Hydration status: acceptable  PONV: none     Anesthetic complications: None          Last vitals:  Vitals:    03/16/20 0810 03/16/20 0815 03/16/20 0830   BP: 127/79 135/83 (!) 156/98   Pulse:  55 54   Resp:      Temp: 97.5  F (36.4  C)     SpO2: 98% 97% 93%         Electronically Signed By: DUSTIN Valenzuela CRNA  March 16, 2020  9:56 AM

## 2020-03-16 NOTE — H&P
History and Physical    CHIEF COMPLAINT / REASON FOR PROCEDURE:  H/o polyps    PERTINENT HISTORY   Patient is a 75 year old male who presents today for colonoscopy for h/o polyps.   Last colonoscopy 2015.  Patient has no complaints.    Past Medical History:   Diagnosis Date     Actinic keratosis     05/19/09,Keratosis left external ear, treated with cryocautery, recheck if recurs or persists     Allergic contact dermatitis due to plants, except food     recurrent     Basal cell carcinoma of skin     No Comments Provided     Carpal tunnel syndrome     right greater than left     Closed fracture of base of skull (H)     No Comments Provided     Essential (primary) hypertension     No Comments Provided     Ganglion     No Comments Provided     Hydrocele     No Comments Provided     Male erectile dysfunction     decreased libido     Personal history of nicotine dependence     No Comments Provided     Pure hypercholesterolemia     No Comments Provided     Residual hemorrhoidal skin tags     Past external hemorrhoid     Tinea unguium     No Comments Provided     Past Surgical History:   Procedure Laterality Date     COLONOSCOPY  01/01/1998 1998,at [facility], which was normal     COLONOSCOPY  03/03/2005 03/03/05,at [facility]     COLONOSCOPY  03/02/2015    3/2/15,F/U 2020  tubular adnomas     OTHER SURGICAL HISTORY      ,HERNIA REPAIR     OTHER SURGICAL HISTORY      05/24/16,[id number],OTHER,Left,Dr. Jeremiah GIBSON     OTHER SURGICAL HISTORY      07/28/16,[id number],ORCHIECTOMY SIMPLE / PARTIAL / RADICAL W/ SCROTAL / INGUINAL APPROACH,Left,Dr. Jeremiah GIBSON       Bleeding tendencies:  No    ALLERGIES/SENSITIVITIES: No Known Allergies     CURRENT MEDICATIONS:    Prior to Admission medications    Medication Sig Start Date End Date Taking? Authorizing Provider   lisinopril (PRINIVIL/ZESTRIL) 20 MG tablet TAKE 1 TABLET BY MOUTH ONCE DAILY. 6/24/19  Yes Richard Arita MD   nystatin (MYCOSTATIN) cream  11/18/15  Yes  Reported, Patient   torsemide (DEMADEX) 10 MG tablet Take 1 tablet (10 mg) by mouth daily 6/24/19  Yes Richard Arita MD   atorvastatin (LIPITOR) 80 MG tablet Take 1 tablet (80 mg) by mouth daily Replace simvastatin with this 6/24/19   Richard Arita MD   fluorouracil (EFUDEX) 5 % external cream Apply topically 2 times daily 10/29/19 11/28/19  Richard Arita MD   sildenafil (REVATIO) 20 MG tablet Take 4-5 tablets by mouth once daily if needed for Other (Specify). Take 30 minutes before sexual activity. 10/29/19   Richard Arita MD       Physical Exam:  BP (!) 179/110   Pulse 59   Temp 97.7  F (36.5  C) (Tympanic)   Resp 14   Wt 106.6 kg (235 lb)   SpO2 98%   BMI 34.70 kg/m    EXAM:  Chest/Respiratory Exam: Normal - Clear to auscultation without rales, rhonchi, or wheezing.  Cardiovascular Exam: normal, regular rate and rhythm        PLAN: COLONOSCOPY .  Patient understands risks of bleeding, perforation, potential inability to reach cecum, aspiration and wishes to proceed. MAC needed for age.

## 2020-03-16 NOTE — ANESTHESIA PREPROCEDURE EVALUATION
Anesthesia Pre-Procedure Evaluation    Patient: Arun Wren   MRN: [id number] : 1945          Preoperative Diagnosis: Encounter for screening colonoscopy [Z12.11]    Procedure(s):  COLONOSCOPY    Past Medical History:   Diagnosis Date     Actinic keratosis     09,Keratosis left external ear, treated with cryocautery, recheck if recurs or persists     Allergic contact dermatitis due to plants, except food     recurrent     Basal cell carcinoma of skin     No Comments Provided     Carpal tunnel syndrome     right greater than left     Closed fracture of base of skull (H)     No Comments Provided     Essential (primary) hypertension     No Comments Provided     Ganglion     No Comments Provided     Hydrocele     No Comments Provided     Male erectile dysfunction     decreased libido     Personal history of nicotine dependence     No Comments Provided     Pure hypercholesterolemia     No Comments Provided     Residual hemorrhoidal skin tags     Past external hemorrhoid     Tinea unguium     No Comments Provided     Past Surgical History:   Procedure Laterality Date     COLONOSCOPY  1998,at [facility], which was normal     COLONOSCOPY  2005,at [facility]     COLONOSCOPY  2015    3/2/15,F/U 2020  tubular adnomas     OTHER SURGICAL HISTORY      ,HERNIA REPAIR     OTHER SURGICAL HISTORY      [id number],OTHER,Left,Dr. Jeremiah GIBSON     OTHER SURGICAL HISTORY      16,[id number],ORCHIECTOMY SIMPLE / PARTIAL / RADICAL W/ SCROTAL / INGUINAL APPROACH,Left,Dr. Jeremiah GIBSON       Anesthesia Evaluation     . Pt has had prior anesthetic.     No history of anesthetic complications          ROS/MED HX    ENT/Pulmonary:  - neg pulmonary ROS     Neurologic:  - neg neurologic ROS     Cardiovascular:     (+) hypertension----. : . . . :. . Previous cardiac testing Echodate:10/31/18results:date: results: date: results: date: results:          METS/Exercise  "Tolerance:  >4 METS   Hematologic:  - neg hematologic  ROS       Musculoskeletal:  - neg musculoskeletal ROS       GI/Hepatic:  - neg GI/hepatic ROS       Renal/Genitourinary:  - ROS Renal section negative       Endo:  - neg endo ROS       Psychiatric:  - neg psychiatric ROS       Infectious Disease:  - neg infectious disease ROS       Malignancy:      - no malignancy   Other:    - neg other ROS                      Physical Exam  Normal systems: cardiovascular, pulmonary and dental    Airway   Mallampati: II  TM distance: >3 FB  Neck ROM: full    Dental     Cardiovascular   Rhythm and rate: regular and normal  (+) murmur       Pulmonary    breath sounds clear to auscultation            Lab Results   Component Value Date    HGB 16.1 05/20/2016    HCT 47.7 05/20/2016     05/20/2016     09/05/2018    POTASSIUM 3.8 09/05/2018    CHLORIDE 100 09/05/2018    CO2 30 09/05/2018    BUN 16 09/05/2018    CR 1.03 09/05/2018     09/05/2018    DEVORA 9.0 09/05/2018    ALBUMIN 4.2 09/05/2018    PROTTOTAL 7.3 09/05/2018    ALT 33 09/05/2018    AST 38 09/05/2018    ALKPHOS 37 09/05/2018    BILITOTAL 0.6 09/05/2018       Preop Vitals  BP Readings from Last 3 Encounters:   03/16/20 (!) 179/110   10/29/19 136/72   06/24/19 136/82    Pulse Readings from Last 3 Encounters:   03/16/20 59   10/29/19 61   06/24/19 58      Resp Readings from Last 3 Encounters:   03/16/20 14   10/29/19 16   06/24/19 14    SpO2 Readings from Last 3 Encounters:   03/16/20 98%   10/29/19 97%      Temp Readings from Last 1 Encounters:   03/16/20 97.7  F (36.5  C) (Tympanic)    Ht Readings from Last 1 Encounters:   06/24/19 1.753 m (5' 9\")      Wt Readings from Last 1 Encounters:   03/16/20 106.6 kg (235 lb)    Estimated body mass index is 34.7 kg/m  as calculated from the following:    Height as of 6/24/19: 1.753 m (5' 9\").    Weight as of this encounter: 106.6 kg (235 lb).       Anesthesia Plan      History & Physical Review      ASA Status:  2 " .    NPO Status:  > 6 hours    Plan for MAC with Intravenous induction.            Postoperative Care      Consents  Anesthetic plan, risks, benefits and alternatives discussed with:  Patient..                 DUSTIN PRUITT CRNA

## 2020-03-16 NOTE — OR NURSING
Discharged to home with wife. Ambulatory to exit. Iv site discontinued. Patient tolerated juice and muffin.

## 2020-03-16 NOTE — OP NOTE
PROCEDURE NOTE    DATE OF SERVICE: 3/16/2020    SURGEON: Issac Garcia MD    PRE-OP DIAGNOSIS:    History of Polyps    POST-OP DIAGNOSIS:  Same  Sigmoid Diverticulosis  Polyps at 15 cm and rectum    PROCEDURE:   Colonoscopy with hot biopsy    ANESTHESIA:  PAMELA Moss CRNA    INDICATION FOR THE PROCEDURE: The patient is a 75 year old male with h/o polyps . The patient has no other complaints  . After explaining the risks to include bleeding, perforation, potential inability toreach the cecum, the patient wished to proceed.    PROCEDURE:After adequate sedation, the patient was in the left lateral decubitus position.  Rectal exam was performed.  There was normal tone and no palpable masses .  The colonoscope was introduced into the rectum and advanced to the cecum with Mild difficulty.  The patient's prep was excellent.  The terminal cecum was reached.  The cecum, ascending, transverse, descending and sigmoid colon was with sigmoid diverticulosis and multiple diminutive polyps at 15 cm that were hot biopsied and destroyed .  The scope was retroflexed in the rectum.  The rectum was remarkable for one flat polyp under 0.5 cm that was hot biopsied and destroyed .  The scope was straightened and removed.  The patient tolerated the procedure well.     ESTIMATED BLOOD LOSS: none    COMPLICATIONS:  None    TISSUE REMOVED:  Yes    RECOMMEND:    Follow-up pending pathology  Fiber  Given literature on diverticulosis    Issac Garcia MD FACS

## 2020-03-16 NOTE — DISCHARGE INSTRUCTIONS
[facility]  Adult Discharge Orders & Instructions    ________________________________________________________________          For 12 hours after surgery  1. Get plenty of rest.  A responsible adult must stay with you for at least 12 hours after you leave the hospital.   2. You may feel lightheaded.  IF so, sit for a few minutes before standing.  Have someone help you get up.   3. You may have a slight fever. Call the doctor if your fever is over 101 F (38.3 C) (taken under the tongue) or lasts longer than 24 hours.  4. You may have a dry mouth, a sore throat, muscle aches or trouble sleeping.  These should go away after 24 hours.  5. Do not make important or legal decisions.  6.   Do not drive or use heavy equipment.  If you have weakness or tingling, don't drive or use heavy equipment until this feeling goes away.    To contact a doctor, call   [phone]_______________________

## 2020-03-18 PROBLEM — K63.5 COLON POLYPS: Status: RESOLVED | Noted: 2020-03-16 | Resolved: 2020-03-18

## 2020-04-27 DIAGNOSIS — N52.9 IMPOTENCE OF ORGANIC ORIGIN: ICD-10-CM

## 2020-04-27 RX ORDER — SILDENAFIL CITRATE 20 MG/1
TABLET ORAL
Qty: 80 TABLET | Refills: 1 | Status: SHIPPED | OUTPATIENT
Start: 2020-04-27 | End: 2020-11-13

## 2020-04-27 NOTE — TELEPHONE ENCOUNTER
[id number] sent Rx request for the following:      SILDENAFIL 20MG TABLET   Sig: TAKE 4-5 TABLETS BY MOUTH ONCE DAILY IF NEEDED FOR OTHER (SPECIFY).TAKE 30 MINUTES BEFORE SEXUAL ACTIVITY.      Last Prescription Date:   10/29/2019  Last Fill Qty/Refills:         80, R-1    Last Office Visit:              10/29/2019   Future Office visit:           none      Prescription refilled per RN Medication Refill Policy.................... Justice Stearns RN ....................  4/27/2020   2:44 PM

## 2020-06-21 ENCOUNTER — HOSPITAL ENCOUNTER (EMERGENCY)
Facility: OTHER | Age: 75
Discharge: HOME | End: 2020-06-21
Attending: FAMILY MEDICINE | Admitting: FAMILY MEDICINE
Payer: COMMERCIAL

## 2020-06-21 VITALS
RESPIRATION RATE: 16 BRPM | TEMPERATURE: 97.1 F | WEIGHT: 230 LBS | OXYGEN SATURATION: 96 % | BODY MASS INDEX: 33.97 KG/M2 | SYSTOLIC BLOOD PRESSURE: 181 MMHG | DIASTOLIC BLOOD PRESSURE: 94 MMHG
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
## 2020-06-21 DIAGNOSIS — S90.562A INSECT BITE OF LEFT ANKLE, INITIAL ENCOUNTER: ICD-10-CM

## 2020-06-21 DIAGNOSIS — I10 ESSENTIAL HYPERTENSION: ICD-10-CM

## 2020-06-21 PROCEDURE — 99282 EMERGENCY DEPT VISIT SF MDM: CPT | Mod: Z6 | Performed by: FAMILY MEDICINE

## 2020-06-21 PROCEDURE — 99282 EMERGENCY DEPT VISIT SF MDM: CPT | Performed by: FAMILY MEDICINE

## 2020-06-21 NOTE — ED PROVIDER NOTES
"  History     Chief Complaint   Patient presents with     Insect Bite     Leg Pain     HPI  Arun Wren is a 75 year old male who presents with an insect bite on the front of his left ankle.  He believes it occurred about 2 days ago.  Since then he has had brief shooting pains right in the area of the bite that come and go fleetingly.  It is interfering with sleep even.  He is taken ibuprofen for it and is not sure if that helped or not.  The shooting pain does not go up his leg or radiate anywhere it is right in the area of the bite.  Reviewed nurses notes below, similar history is related to me.  Pt presents ambulatory for an insect bite on his left ankle/front of foot. Pt states it has shooting pain that comes and goes. Pt currently denies pain. Pt thought ER was only thing open. Told patient that Clinic openedat 0800. Pt still interested in being seen now. While pt was sitting he moved his left leg quickly and stated, \"ooh there was a pain\".   Allergies:  No Known Allergies    Problem List:    Patient Active Problem List    Diagnosis Date Noted     Irritant contact dermatitis due to other agents 03/29/2018     Priority: Medium     Thoracic aortic aneurysm without rupture (H) 07/24/2017     Priority: Medium     Diverticulosis of sigmoid colon 03/02/2015     Priority: Medium     H/O adenomatous polyp of colon 03/02/2015     Priority: Medium     Basal cell carcinoma of skin 01/22/2015     Priority: Medium     Contact dermatitis and other eczema due to plants (except food) 07/26/2011     Priority: Medium     Actinic keratosis 07/15/2010     Priority: Medium     Impotence of organic origin 07/15/2010     Priority: Medium     Essential hypertension 07/15/2010     Priority: Medium     Hypercholesterolemia 07/15/2010     Priority: Medium        Past Medical History:    Past Medical History:   Diagnosis Date     Actinic keratosis      Allergic contact dermatitis due to plants, except food      Basal cell carcinoma " of skin      Carpal tunnel syndrome      Closed fracture of base of skull (H)      Essential (primary) hypertension      Ganglion      Hydrocele      Male erectile dysfunction      Personal history of nicotine dependence      Pure hypercholesterolemia      Residual hemorrhoidal skin tags      Tinea unguium        Past Surgical History:    Past Surgical History:   Procedure Laterality Date     COLONOSCOPY  1998    1998,at [facility], which was normal     COLONOSCOPY  2005,at [facility]     COLONOSCOPY  2015    3/2/15,F/U   tubular adnomas     COLONOSCOPY  2020    F/U N/A hyperplastic     OTHER SURGICAL HISTORY      ,HERNIA REPAIR     OTHER SURGICAL HISTORY      16,[id number],OTHER,Left,Dr. Jeremiah GIBSON     OTHER SURGICAL HISTORY      16,[id number],ORCHIECTOMY SIMPLE / PARTIAL / RADICAL W/ SCROTAL / INGUINAL APPROACH,Left,Dr. Jeremiah GIBSON       Family History:    Family History   Problem Relation Age of Onset     Diabetes Mother         Diabetes     Other - See Comments Mother         joint disease     Heart Disease Mother 70        Heart Disease,CABG     Other - See Comments Father         Stroke,stroke/heavy smoker     Diabetes Paternal Grandmother         Diabetes,?GMother       Social History:  Marital Status:   [2]  Social History     Tobacco Use     Smoking status: Former Smoker     Years: 27.00     Types: Cigarettes     Last attempt to quit: 1988     Years since quittin.4     Smokeless tobacco: Never Used   Substance Use Topics     Alcohol use: Yes     Comment: Alcoholic Drinks/day: 1-2 drinks per day, whiskey     Drug use: No        Medications:    atorvastatin (LIPITOR) 80 MG tablet  lisinopril (PRINIVIL/ZESTRIL) 20 MG tablet  torsemide (DEMADEX) 10 MG tablet  nystatin (MYCOSTATIN) cream  sildenafil (REVATIO) 20 MG tablet          Review of Systems  No fevers chills or shakes muscle aches joint aches, chest pain, shortness of breath,  abdominal pain or nausea vomiting diarrhea  Physical Exam   BP: (!) 181/94  Heart Rate: 66  Temp: 97.1  F (36.2  C)  Resp: 16  Weight: 104.3 kg (230 lb)  SpO2: 96 %      Physical Exam  Vitals signs and nursing note reviewed.   Cardiovascular:      Rate and Rhythm: Normal rate.   Musculoskeletal:        Feet:    Skin:     Capillary Refill: Capillary refill takes less than 2 seconds.   Neurological:      Mental Status: He is alert.         ED Course        Procedures    No results found for this or any previous visit (from the past 24 hour(s)).    Medications - No data to display    Assessments & Plan (with Medical Decision Making)     Discharge Medication List as of 6/21/2020  7:45 AM        Insect bite anterior ankle: He does not appear to have any significant cellulitis at this time.  No residual foreign bodies appreciated.  This does not look like a erythema chronicum migrans.  My clinical impression is that he has some very mild soft tissue swelling around this local reaction that is likely just near the tendon sheath and its consequent innervation that is causing some brief neuropathic type pain.  This is likely to be benign and self-limited and I told him that antibiotics or nerve modulating medications likely pose more risk than benefit.  He did seem accepting of that.  I did state that he could try over-the-counter medications such as ibuprofen or Tylenol to see if that helps.  If his sleep really became an issue he could try a ibuprofen PM type of product but that to be careful with it because Benadryl is sometimes not well tolerated by folks in his demographic.  He verbalized understanding of this.  Return to the ER with fever, worsening redness or swelling drainage, joint or muscle aches or worsening pain.  Patient verbalized understanding plan is agreement he left the ER in improved condition.  Final diagnoses:   Insect bite of left ankle, initial encounter   Essential hypertension       6/21/2020   [facility]  [facility] HospitalYoan isabel MD  06/21/20 [id number]

## 2020-06-21 NOTE — DISCHARGE INSTRUCTIONS
Your blood pressure was elevated today.  This does not seem to be causing him any distress or symptoms.  You are on a excellent high blood pressure medication but would recommend follow-up with your primary to make sure they do not think you need more medications or higher dose.  It was a pleasure taking care of you today, and Happy Father's Day!

## 2020-06-21 NOTE — ED AVS SNAPSHOT
[facility]  [address]  [place] [address]  Phone:  [phone]  Fax:  [phone]                                    Arun Wren   MRN: [id number]    Department:  [facility]   Date of Visit:  6/21/2020           After Visit Summary Signature Page    I have received my discharge instructions, and my questions have been answered. I have discussed any challenges I see with this plan with the nurse or doctor.    ..........................................................................................................................................  Patient/Patient Representative Signature      ..........................................................................................................................................  Patient Representative Print Name and Relationship to Patient    ..................................................               ................................................  Date                                   Time    ..........................................................................................................................................  Reviewed by Signature/Title    ...................................................              ..............................................  Date                                               Time          22EPIC Rev 08/18

## 2020-06-21 NOTE — ED TRIAGE NOTES
"Pt presents ambulatory for an insect bite on his left ankle/front of foot. Pt states it has shooting pain that comes and goes. Pt currently denies pain. Pt thought ER was only thing open. Told patient that Clinic openedat 0800. Pt still interested in being seen now. While pt was sitting he moved his left leg quickly and stated, \"ooh there was a pain\". .aign  BP (!) 181/94   Temp 97.1  F (36.2  C) (Tympanic)   Resp 16   Wt 104.3 kg (230 lb)   SpO2 96%   BMI 33.97 kg/m      "

## 2020-07-17 DIAGNOSIS — L57.0 ACTINIC KERATOSIS: Primary | ICD-10-CM

## 2020-07-22 RX ORDER — NYSTATIN 100000 U/G
CREAM TOPICAL
Qty: 60 G | Refills: 4 | Status: SHIPPED | OUTPATIENT
Start: 2020-07-22 | End: 2022-03-11

## 2020-07-22 NOTE — TELEPHONE ENCOUNTER
" Disp  Refills  Start  End  LEELEE    nystatin (MYCOSTATIN) cream    11/18/2015   --    Class: Historical        LOV: 10/29/2019  Future Office visit:  No future appointment scheduled at this time.     Routing refill request to provider for review/approval because:  Medication is reported/historical    Requested Prescriptions   Pending Prescriptions Disp Refills     nystatin (MYCOSTATIN) 587785 UNIT/GM external cream [Pharmacy Med Name: NYSTATIN 100,000UNIT/G CREAM] 60 g      Sig: APPLY TOPICALLY TO AFFECTED AREA(S) TWICE DAILY       Antifungal Agents Passed - 7/17/2020  9:55 AM        Passed - Recent (12 mo) or future (30 days) visit within the authorizing provider's specialty     Patient has had an office visit with the authorizing provider or a provider within the authorizing providers department within the previous 12 mos or has a future within next 30 days. See \"Patient Info\" tab in inbasket, or \"Choose Columns\" in Meds & Orders section of the refill encounter.              Passed - Not Fluconazole or Terconazole      If oral Fluconazole or Terconazole, may refill if indicated in progress notes.           Passed - Medication is active on med list         Unable to complete prescription refill per RN Medication Refill Policy.................... Sheila Quintero RN ....................  7/22/2020   9:16 AM        "

## 2020-11-13 ENCOUNTER — OFFICE VISIT (OUTPATIENT)
Dept: FAMILY MEDICINE | Facility: OTHER | Age: 75
End: 2020-11-13
Attending: FAMILY MEDICINE
Payer: COMMERCIAL

## 2020-11-13 VITALS
TEMPERATURE: 97.3 F | WEIGHT: 231.2 LBS | SYSTOLIC BLOOD PRESSURE: 132 MMHG | DIASTOLIC BLOOD PRESSURE: 76 MMHG | HEART RATE: 63 BPM | OXYGEN SATURATION: 97 % | RESPIRATION RATE: 16 BRPM | BODY MASS INDEX: 34.24 KG/M2 | HEIGHT: 69 IN

## 2020-11-13 DIAGNOSIS — Z86.0101 H/O ADENOMATOUS POLYP OF COLON: ICD-10-CM

## 2020-11-13 DIAGNOSIS — Z12.5 SCREENING FOR PROSTATE CANCER: ICD-10-CM

## 2020-11-13 DIAGNOSIS — Z00.00 ENCOUNTER FOR MEDICARE ANNUAL WELLNESS EXAM: Primary | ICD-10-CM

## 2020-11-13 DIAGNOSIS — Z23 NEED FOR PNEUMOCOCCAL VACCINE: ICD-10-CM

## 2020-11-13 DIAGNOSIS — I35.8 AORTIC VALVE SCLEROSIS: ICD-10-CM

## 2020-11-13 DIAGNOSIS — N52.9 IMPOTENCE OF ORGANIC ORIGIN: ICD-10-CM

## 2020-11-13 DIAGNOSIS — Z23 NEED FOR VACCINATION: ICD-10-CM

## 2020-11-13 DIAGNOSIS — E78.00 HYPERCHOLESTEROLEMIA: ICD-10-CM

## 2020-11-13 DIAGNOSIS — I10 ESSENTIAL HYPERTENSION WITH GOAL BLOOD PRESSURE LESS THAN 140/90: ICD-10-CM

## 2020-11-13 DIAGNOSIS — I71.20 THORACIC AORTIC ANEURYSM WITHOUT RUPTURE (H): ICD-10-CM

## 2020-11-13 LAB
ANION GAP SERPL CALCULATED.3IONS-SCNC: 7 MMOL/L (ref 3–14)
BUN SERPL-MCNC: 17 MG/DL (ref 7–25)
CALCIUM SERPL-MCNC: 8.9 MG/DL (ref 8.6–10.3)
CHLORIDE SERPL-SCNC: 98 MMOL/L (ref 98–107)
CHOLEST SERPL-MCNC: 126 MG/DL
CO2 SERPL-SCNC: 31 MMOL/L (ref 21–31)
CREAT SERPL-MCNC: 1.07 MG/DL (ref 0.7–1.3)
GFR SERPL CREATININE-BSD FRML MDRD: 68 ML/MIN/{1.73_M2}
GLUCOSE SERPL-MCNC: 100 MG/DL (ref 70–105)
HDLC SERPL-MCNC: 62 MG/DL (ref 23–92)
LDLC SERPL CALC-MCNC: 51 MG/DL
NONHDLC SERPL-MCNC: 64 MG/DL
POTASSIUM SERPL-SCNC: 3.9 MMOL/L (ref 3.5–5.1)
PSA SERPL-ACNC: 0.85 NG/ML
SODIUM SERPL-SCNC: 136 MMOL/L (ref 134–144)
TRIGL SERPL-MCNC: 65 MG/DL

## 2020-11-13 PROCEDURE — 80061 LIPID PANEL: CPT | Mod: ZL | Performed by: FAMILY MEDICINE

## 2020-11-13 PROCEDURE — 80048 BASIC METABOLIC PNL TOTAL CA: CPT | Mod: ZL | Performed by: FAMILY MEDICINE

## 2020-11-13 PROCEDURE — G0009 ADMIN PNEUMOCOCCAL VACCINE: HCPCS

## 2020-11-13 PROCEDURE — G0463 HOSPITAL OUTPT CLINIC VISIT: HCPCS

## 2020-11-13 PROCEDURE — G0439 PPPS, SUBSEQ VISIT: HCPCS | Performed by: FAMILY MEDICINE

## 2020-11-13 PROCEDURE — 36415 COLL VENOUS BLD VENIPUNCTURE: CPT | Mod: ZL | Performed by: FAMILY MEDICINE

## 2020-11-13 PROCEDURE — G0463 HOSPITAL OUTPT CLINIC VISIT: HCPCS | Mod: 25

## 2020-11-13 PROCEDURE — G0103 PSA SCREENING: HCPCS | Mod: ZL | Performed by: FAMILY MEDICINE

## 2020-11-13 RX ORDER — LISINOPRIL 20 MG/1
20 TABLET ORAL DAILY
Qty: 90 TABLET | Refills: 3 | Status: SHIPPED | OUTPATIENT
Start: 2020-11-13 | End: 2022-01-19

## 2020-11-13 RX ORDER — ATORVASTATIN CALCIUM 80 MG/1
80 TABLET, FILM COATED ORAL DAILY
Qty: 90 TABLET | Refills: 3 | Status: SHIPPED | OUTPATIENT
Start: 2020-11-13 | End: 2022-01-19

## 2020-11-13 RX ORDER — SILDENAFIL CITRATE 20 MG/1
TABLET ORAL
Qty: 80 TABLET | Refills: 1 | Status: SHIPPED | OUTPATIENT
Start: 2020-11-13 | End: 2022-01-19

## 2020-11-13 ASSESSMENT — ANXIETY QUESTIONNAIRES
1. FEELING NERVOUS, ANXIOUS, OR ON EDGE: NOT AT ALL
3. WORRYING TOO MUCH ABOUT DIFFERENT THINGS: NOT AT ALL
IF YOU CHECKED OFF ANY PROBLEMS ON THIS QUESTIONNAIRE, HOW DIFFICULT HAVE THESE PROBLEMS MADE IT FOR YOU TO DO YOUR WORK, TAKE CARE OF THINGS AT HOME, OR GET ALONG WITH OTHER PEOPLE: NOT DIFFICULT AT ALL
2. NOT BEING ABLE TO STOP OR CONTROL WORRYING: NOT AT ALL
6. BECOMING EASILY ANNOYED OR IRRITABLE: NOT AT ALL
7. FEELING AFRAID AS IF SOMETHING AWFUL MIGHT HAPPEN: NOT AT ALL
GAD7 TOTAL SCORE: 0
5. BEING SO RESTLESS THAT IT IS HARD TO SIT STILL: NOT AT ALL

## 2020-11-13 ASSESSMENT — PATIENT HEALTH QUESTIONNAIRE - PHQ9: 5. POOR APPETITE OR OVEREATING: NOT AT ALL

## 2020-11-13 ASSESSMENT — MIFFLIN-ST. JEOR: SCORE: 1775.13

## 2020-11-13 ASSESSMENT — PAIN SCALES - GENERAL: PAINLEVEL: NO PAIN (0)

## 2020-11-13 NOTE — PROGRESS NOTES
"SUBJECTIVE:   Arun Wren is a 75 year old male who presents for Preventive Visit.      Patient has been advised of split billing requirements and indicates understanding: Yes  Are you in the first 12 months of your Medicare Part B coverage?  No    Physical Health:    In general, how would you rate your overall physical health? good    Outside of work, how many days during the week do you exercise? 4-5 days/week    Outside of work, approximately how many minutes a day do you exercise?45-60 minutes    If you drink alcohol do you typically have >3 drinks per day or >7 drinks per week? No    Do you usually eat at least 4 servings of fruit and vegetables a day, include whole grains & fiber and avoid regularly eating high fat or \"junk\" foods? Yes    Do you have any problems taking medications regularly?  No    Do you have any side effects from medications? not applicable    Needs assistance for the following daily activities: no assistance needed    Which of the following safety concerns are present in your home?  none identified     Hearing impairment: No    In the past 6 months, have you been bothered by leaking of urine? no    Mental Health:    In general, how would you rate your overall mental or emotional health? excellent  PHQ-2 Score: 0    Do you feel safe in your environment? Yes    Have you ever done Advance Care Planning? (For example, a Health Directive, POLST, or a discussion with a medical provider or your loved ones about your wishes): Yes, advance care planning is on file.    Additional concerns to address?  No    Fall risk:  Fallen 2 or more times in the past year?: No  Any fall with injury in the past year?: No  click delete button to remove this line now  Cognitive Screenin) Repeat 3 items (Leader, Season, Table)    2) Clock draw: NORMAL  3) 3 item recall: Recalls 2 objects   Results: NORMAL clock, 1-2 items recalled: COGNITIVE IMPAIRMENT LESS LIKELY    Mini-CogTM Copyright S Serge. Licensed " by the author for use in [facility]; reprinted with permission (jordon@[facility]). All rights reserved.      Do you have sleep apnea, excessive snoring or daytime drowsiness?: no        Wellness physical    Reviewed and updated as needed this visit by clinical staff  Tobacco  Allergies  Meds   Med Hx    Soc Hx        Reviewed and updated as needed this visit by Provider                Social History     Tobacco Use     Smoking status: Former Smoker     Years: 27.00     Types: Cigarettes     Quit date: 1988     Years since quittin.8     Smokeless tobacco: Never Used   Substance Use Topics     Alcohol use: Yes     Comment: Alcoholic Drinks/day: 1-2 drinks per day, whiskey                           Current providers sharing in care for this patient include:    Patient Care Team:  Richard Arita MD as PCP - General (Family Practice)  Richard Arita MD as Assigned PCP    The following health maintenance items are reviewed in Epic and correct as of today:  Health Maintenance   Topic Date Due     Pneumococcal Vaccine: 65+ Years (2 of 2 - PPSV23) 2018     COLORECTAL CANCER SCREENING  2020     MEDICARE ANNUAL WELLNESS VISIT  2020     INFLUENZA VACCINE (1) 2020     FALL RISK ASSESSMENT  10/29/2020     ZOSTER IMMUNIZATION (3 of 3) 10/29/2020     DTAP/TDAP/TD IMMUNIZATION (4 - Td) 2022     LIPID  2023     ADVANCE CARE PLANNING  2024     HEPATITIS C SCREENING  Completed     PHQ-2  Completed     AORTIC ANEURYSM SCREENING (SYSTEM ASSIGNED)  Completed     Pneumococcal Vaccine: Pediatrics (0 to 5 Years) and At-Risk Patients (6 to 64 Years)  Aged Out     IPV IMMUNIZATION  Aged Out     MENINGITIS IMMUNIZATION  Aged Out     HEPATITIS B IMMUNIZATION  Aged Out     Lab work is in process  Labs reviewed in [facility]  Current Outpatient Medications   Medication Sig Dispense Refill     atorvastatin (LIPITOR) 80 MG tablet Take 1 tablet (80 mg) by mouth daily Replace simvastatin with  "this 90 tablet 3     lisinopril (ZESTRIL) 20 MG tablet Take 1 tablet (20 mg) by mouth daily 90 tablet 3     nystatin (MYCOSTATIN) 300088 UNIT/GM external cream APPLY TOPICALLY TO AFFECTED AREA(S) TWICE DAILY 60 g 4     sildenafil (REVATIO) 20 MG tablet TAKE 4-5 TABLETS BY MOUTH ONCE DAILY IF NEEDED FOR OTHER (SPECIFY).TAKE 30 MINUTES BEFORE SEXUAL ACTIVITY. 80 tablet 1     torsemide (DEMADEX) 10 MG tablet TAKE 1 TABLET (10 MG) BY MOUTH DAILY 90 tablet 3     No Known Allergies  Pneumonia Vaccine:Adults age 65+ who have not received previous Pneumovax (PPSV23) or PCV13 as an adult: Should first be given PCV13 AND then should be given PPSV23 6-12 months after PCV13    ROS:  Constitutional, HEENT, cardiovascular, pulmonary, GI, , musculoskeletal, neuro, skin, endocrine and psych systems are negative, except as otherwise noted.    OBJECTIVE:   /76   Pulse 63   Temp 97.3  F (36.3  C)   Resp 16   Ht 1.746 m (5' 8.75\")   Wt 104.9 kg (231 lb 3.2 oz)   SpO2 97%   BMI 34.39 kg/m   Estimated body mass index is 34.39 kg/m  as calculated from the following:    Height as of this encounter: 1.746 m (5' 8.75\").    Weight as of this encounter: 104.9 kg (231 lb 3.2 oz).  EXAM:   GENERAL: healthy, alert and no distress  EYES: Eyes grossly normal to inspection, PERRL and conjunctivae and sclerae normal  HENT: ear canals and TM's normal, nose and mouth without ulcers or lesions  NECK: no adenopathy, no asymmetry, masses, or scars and thyroid normal to palpation  RESP: lungs clear to auscultation - no rales, rhonchi or wheezes  CV: regular rate and rhythm, normal S1 S2, no S3 or S4, 3/6 systolic ejection murmur along right sternal border , click or rub, no peripheral edema and peripheral pulses strong  ABDOMEN: soft, nontender, no hepatosplenomegaly, no masses and bowel sounds normal  MS: no gross musculoskeletal defects noted, no edema  SKIN: no suspicious lesions or rashes  NEURO: Normal strength and tone, mentation " intact and speech normal  PSYCH: mentation appears normal, affect normal/bright    Diagnostic Test Results:  Labs reviewed in Epic  Results for orders placed or performed in visit on 11/13/20 (from the past 24 hour(s))   Lipid Panel   Result Value Ref Range    Cholesterol 126 <200 mg/dL    Triglycerides 65 <150 mg/dL    HDL Cholesterol 62 23 - 92 mg/dL    LDL Cholesterol Calculated 51 <100 mg/dL    Non HDL Cholesterol 64 <130 mg/dL   PSA Screen GH   Result Value Ref Range    PSA Screen 0.849 <3.100 ng/mL   Basic Metabolic Panel   Result Value Ref Range    Sodium 136 134 - 144 mmol/L    Potassium 3.9 3.5 - 5.1 mmol/L    Chloride 98 98 - 107 mmol/L    Carbon Dioxide 31 21 - 31 mmol/L    Anion Gap 7 3 - 14 mmol/L    Glucose 100 70 - 105 mg/dL    Urea Nitrogen 17 7 - 25 mg/dL    Creatinine 1.07 0.70 - 1.30 mg/dL    GFR Estimate 68 >60 mL/min/[1.73_m2]    GFR Estimate If Black 82 >60 mL/min/[1.73_m2]    Calcium 8.9 8.6 - 10.3 mg/dL       ASSESSMENT / PLAN:       ICD-10-CM    1. Encounter for Medicare annual wellness exam  Z00.00    2. H/O adenomatous polyp of colon  Z86.010    3. Need for vaccination  Z23 GH IMM-  PNEUMOCOCCAL VACCINE,ADULT,SQ OR IM   4. Hypercholesterolemia  E78.00 atorvastatin (LIPITOR) 80 MG tablet     Lipid Panel     Lipid Panel   5. Essential hypertension with goal blood pressure less than 140/90  I10 lisinopril (ZESTRIL) 20 MG tablet     Basic Metabolic Panel     Basic Metabolic Panel   6. Impotence of organic origin  N52.9 sildenafil (REVATIO) 20 MG tablet   7. Need for pneumococcal vaccine  Z23 GH IMM-  PNEUMOCOCCAL VACCINE,ADULT,SQ OR IM   8. Screening for prostate cancer  Z12.5 PSA Screen GH     PSA Screen GH   9. Thoracic aortic aneurysm without rupture (H)  I71.2    10. 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  Has no symptoms.  continue with lipitor.     Patient has been advised of split billing requirements and indicates  "understanding: Yes    COUNSELING:  Reviewed preventive health counseling, as reflected in patient instructions       Regular exercise       Healthy diet/nutrition       Colon cancer screening       Prostate cancer screening    Estimated body mass index is 34.39 kg/m  as calculated from the following:    Height as of this encounter: 1.746 m (5' 8.75\").    Weight as of this encounter: 104.9 kg (231 lb 3.2 oz).    Weight management plan: Discussed healthy diet and exercise guidelines    He reports that he quit smoking about 32 years ago. His smoking use included cigarettes. He quit after 27.00 years of use. He has never used smokeless tobacco.    Appropriate preventive services were discussed with this patient, including applicable screening as appropriate for cardiovascular disease, diabetes, osteopenia/osteoporosis, and glaucoma.  As appropriate for age/gender, discussed screening for colorectal cancer, prostate cancer, breast cancer, and cervical cancer. Checklist reviewing preventive services available has been given to the patient.    Reviewed patients plan of care and provided an AVS. The Basic Care Plan (routine screening as documented in Health Maintenance) for Arun meets the Care Plan requirement. This Care Plan has been established and reviewed with the Patient.    Counseling Resources:  ATP IV Guidelines  Pooled Cohorts Equation Calculator  Breast Cancer Risk Calculator  BRCA-Related Cancer Risk Assessment: FHS-7 Tool  FRAX Risk Assessment  ICSI Preventive Guidelines  Dietary Guidelines for Americans, 2010  USDA's MyPlate  ASA Prophylaxis  Lung CA Screening    Richard Arita MD  [facility]  "

## 2020-11-13 NOTE — NURSING NOTE
"coming in for a physical check up    Visual Acuity Screening - Snellen Chart   Visualacuity OD (right eye): 20/ 32   Visual acuity OS (left eye): 20/ 32   Visual acuity OU (both eyes): 20/ 25   Corrective lenses worn: no    Chief Complaint   Patient presents with     Physical     is fasting       Initial /76   Pulse 63   Temp 97.3  F (36.3  C)   Resp 16   Ht 1.746 m (5' 8.75\")   Wt 104.9 kg (231 lb 3.2 oz)   SpO2 97%   BMI 34.39 kg/m   Estimated body mass index is 34.39 kg/m  as calculated from the following:    Height as of this encounter: 1.746 m (5' 8.75\").    Weight as of this encounter: 104.9 kg (231 lb 3.2 oz).  Medication Reconciliation: complete    Kathleen Rene LPN             "

## 2020-11-13 NOTE — PATIENT INSTRUCTIONS
Patient Education   Personalized Prevention Plan  You are due for the preventive services outlined below.  Your care team is available to assist you in scheduling these services.  If you have already completed any of these items, please share that information with your care team to update in your medical record.  Health Maintenance Due   Topic Date Due     Pneumococcal Vaccine (2 of 2 - PPSV23) 07/24/2018     Colorectal Cancer Screening  03/02/2020     Flu Vaccine (1) 09/01/2020     FALL RISK ASSESSMENT  10/29/2020     Zoster (Shingles) Vaccine (3 of 3) 10/29/2020

## 2020-11-13 NOTE — LETTER
November 13, 2020      Arun Wren  [address] [address]        Dear ,    We are writing to inform you of your test results.    Your test results fall within the expected range(s) or remain unchanged from previous results.  Please continue with current treatment plan.    Resulted Orders   Lipid Panel   Result Value Ref Range    Cholesterol 126 <200 mg/dL    Triglycerides 65 <150 mg/dL    HDL Cholesterol 62 23 - 92 mg/dL    LDL Cholesterol Calculated 51 <100 mg/dL      Comment:      Desirable:       <100 mg/dl    Non HDL Cholesterol 64 <130 mg/dL   PSA Screen GH   Result Value Ref Range    PSA Screen 0.849 <3.100 ng/mL      Comment:      The DXI Access PSAS WHO assay is a two site immunoenzymatic assay. Assay   values obtained with different assay methods cannot be used interchangeably   due to differences in assay methods and reagent specificity.     Basic Metabolic Panel   Result Value Ref Range    Sodium 136 134 - 144 mmol/L    Potassium 3.9 3.5 - 5.1 mmol/L    Chloride 98 98 - 107 mmol/L    Carbon Dioxide 31 21 - 31 mmol/L    Anion Gap 7 3 - 14 mmol/L    Glucose 100 70 - 105 mg/dL    Urea Nitrogen 17 7 - 25 mg/dL    Creatinine 1.07 0.70 - 1.30 mg/dL    GFR Estimate 68 >60 mL/min/[1.73_m2]    GFR Estimate If Black 82 >60 mL/min/[1.73_m2]    Calcium 8.9 8.6 - 10.3 mg/dL       If you have any questions or concerns, please call the clinic at the number listed above.       Sincerely,        Richard Arita MD

## 2020-11-14 ASSESSMENT — ANXIETY QUESTIONNAIRES: GAD7 TOTAL SCORE: 0

## 2021-02-09 ENCOUNTER — IMMUNIZATION (OUTPATIENT)
Dept: FAMILY MEDICINE | Facility: OTHER | Age: 76
End: 2021-02-09
Attending: FAMILY MEDICINE
Payer: COMMERCIAL

## 2021-02-09 PROCEDURE — 91300 PR COVID VAC PFIZER DIL RECON 30 MCG/0.3 ML IM: CPT

## 2021-03-02 ENCOUNTER — IMMUNIZATION (OUTPATIENT)
Dept: FAMILY MEDICINE | Facility: OTHER | Age: 76
End: 2021-03-02
Attending: FAMILY MEDICINE
Payer: COMMERCIAL

## 2021-03-02 PROCEDURE — 91300 PR COVID VAC PFIZER DIL RECON 30 MCG/0.3 ML IM: CPT

## 2021-08-12 DIAGNOSIS — I10 ESSENTIAL HYPERTENSION WITH GOAL BLOOD PRESSURE LESS THAN 140/90: ICD-10-CM

## 2021-08-13 RX ORDER — TORSEMIDE 10 MG/1
10 TABLET ORAL DAILY
Qty: 90 TABLET | Refills: 0 | Status: SHIPPED | OUTPATIENT
Start: 2021-08-13 | End: 2021-11-11

## 2021-08-13 NOTE — TELEPHONE ENCOUNTER
"[id number] sent Rx request for the following:   torsemide (DEMADEX) 10 MG tablet  SigTAKE 1 TABLET (10 MG) BY MOUTH DAILY    Last Prescription Date:   07/17/2020  Last Fill Qty/Refills:         90, R-3    Last Office Visit:              11/13/2020 ([facility])   Future Office visit:           None noted   Diuretics (Including Combos) Protocol Passed - 8/12/2021  1:08 PM        Passed - Blood pressure under 140/90 in past 12 months     BP Readings from Last 3 Encounters:   11/13/20 132/76   06/21/20 (!) 181/94   03/16/20 (!) 156/98                 Passed - Recent (12 mo) or future (30 days) visit within the authorizing provider's specialty     Patient has had an office visit with the authorizing provider or a provider within the authorizing providers department within the previous 12 mos or has a future within next 30 days. See \"Patient Info\" tab in inbasket, or \"Choose Columns\" in Meds & Orders section of the refill encounter.              Passed - Medication is active on med list        Passed - Patient is age 18 or older        Passed - Normal serum creatinine on file in past 12 months     Recent Labs   Lab Test 11/13/20  0907   CR 1.07              Passed - Normal serum potassium on file in past 12 months     Recent Labs   Lab Test 11/13/20  0907   POTASSIUM 3.9                    Passed - Normal serum sodium on file in past 12 months     Recent Labs   Lab Test 11/13/20  0907                  Prescription approved per [facility] Refill Protocol.  Luzmaria Womack RN ....................  8/13/2021   10:39 AM        "

## 2021-11-11 DIAGNOSIS — I10 ESSENTIAL HYPERTENSION WITH GOAL BLOOD PRESSURE LESS THAN 140/90: ICD-10-CM

## 2021-11-11 RX ORDER — TORSEMIDE 10 MG/1
10 TABLET ORAL DAILY
Qty: 90 TABLET | Refills: 0 | Status: SHIPPED | OUTPATIENT
Start: 2021-11-11 | End: 2022-01-19

## 2021-11-11 NOTE — TELEPHONE ENCOUNTER
"[id number] sent Rx request for the following:   torsemide (DEMADEX) 10 MG tablet  SigTAKE 1 TABLET (10 MG) BY MOUTH DAILY    Last Prescription Date:   08/13/2021  Last Fill Qty/Refills:         90, R-0    Last Office Visit:              11/13/2020 ([facility])   Future Office visit:           None noted   Diuretics (Including Combos) Protocol Passed - 11/11/2021 12:56 PM        Passed - Blood pressure under 140/90 in past 12 months     BP Readings from Last 3 Encounters:   11/13/20 132/76   06/21/20 (!) 181/94   03/16/20 (!) 156/98                 Passed - Recent (12 mo) or future (30 days) visit within the authorizing provider's specialty     Patient has had an office visit with the authorizing provider or a provider within the authorizing providers department within the previous 12 mos or has a future within next 30 days. See \"Patient Info\" tab in inbasket, or \"Choose Columns\" in Meds & Orders section of the refill encounter.              Passed - Medication is active on med list        Passed - Patient is age 18 or older        Passed - Normal serum creatinine on file in past 12 months     Recent Labs   Lab Test 11/13/20  0907   CR 1.07              Passed - Normal serum potassium on file in past 12 months     Recent Labs   Lab Test 11/13/20  0907   POTASSIUM 3.9                    Passed - Normal serum sodium on file in past 12 months     Recent Labs   Lab Test 11/13/20  0907                  Patient due for annual review. Letter sent. Routing for limited refill consideration. Unable to complete prescription refill per RN Medication Refill Policy.................... Luzmaria Womack RN ....................  11/11/2021   1:49 PM        "

## 2021-11-11 NOTE — LETTER
November 11, 2021      Arun Wren  [address] [address]        Dear Arun,         A refill of torsemide (DEMADEX) 10 MG tablet has been requested by your pharmacy.  We noticed that it has been greater than 12 months since your last comprehensive visit and labs with Richard Arita MD.  A limited 90 day supply has been sent to your pharmacy at this time.    Additional refills require a medication management appointment.  Your health is very important to us.  Please call the clinic at [phone] to schedule your appointment.    Thank you,    The Refill Nurse  [facility]

## 2022-01-19 ENCOUNTER — OFFICE VISIT (OUTPATIENT)
Dept: FAMILY MEDICINE | Facility: OTHER | Age: 77
End: 2022-01-19
Attending: FAMILY MEDICINE
Payer: COMMERCIAL

## 2022-01-19 VITALS
TEMPERATURE: 98 F | DIASTOLIC BLOOD PRESSURE: 74 MMHG | OXYGEN SATURATION: 97 % | WEIGHT: 233.6 LBS | HEART RATE: 52 BPM | BODY MASS INDEX: 35.4 KG/M2 | HEIGHT: 68 IN | SYSTOLIC BLOOD PRESSURE: 138 MMHG | RESPIRATION RATE: 16 BRPM

## 2022-01-19 DIAGNOSIS — Z00.00 ENCOUNTER FOR MEDICARE ANNUAL WELLNESS EXAM: Primary | ICD-10-CM

## 2022-01-19 DIAGNOSIS — I10 ESSENTIAL HYPERTENSION WITH GOAL BLOOD PRESSURE LESS THAN 140/90: ICD-10-CM

## 2022-01-19 DIAGNOSIS — I71.20 THORACIC AORTIC ANEURYSM WITHOUT RUPTURE (H): ICD-10-CM

## 2022-01-19 DIAGNOSIS — E66.01 MORBID OBESITY (H): ICD-10-CM

## 2022-01-19 DIAGNOSIS — E78.00 HYPERCHOLESTEROLEMIA: ICD-10-CM

## 2022-01-19 DIAGNOSIS — B02.9 HERPES ZOSTER WITHOUT COMPLICATION: ICD-10-CM

## 2022-01-19 DIAGNOSIS — L57.0 ACTINIC KERATOSIS: ICD-10-CM

## 2022-01-19 DIAGNOSIS — N52.9 IMPOTENCE OF ORGANIC ORIGIN: ICD-10-CM

## 2022-01-19 DIAGNOSIS — I35.8 AORTIC VALVE SCLEROSIS: ICD-10-CM

## 2022-01-19 LAB
ANION GAP SERPL CALCULATED.3IONS-SCNC: 9 MMOL/L (ref 3–14)
BUN SERPL-MCNC: 28 MG/DL (ref 7–25)
CALCIUM SERPL-MCNC: 9.8 MG/DL (ref 8.6–10.3)
CHLORIDE BLD-SCNC: 99 MMOL/L (ref 98–107)
CHOLEST SERPL-MCNC: 174 MG/DL
CO2 SERPL-SCNC: 31 MMOL/L (ref 21–31)
CREAT SERPL-MCNC: 1.08 MG/DL (ref 0.7–1.3)
FASTING STATUS PATIENT QL REPORTED: NO
GFR SERPL CREATININE-BSD FRML MDRD: 72 ML/MIN/1.73M2
GLUCOSE BLD-MCNC: 84 MG/DL (ref 70–105)
HDLC SERPL-MCNC: 64 MG/DL (ref 23–92)
LDLC SERPL CALC-MCNC: 83 MG/DL
NONHDLC SERPL-MCNC: 110 MG/DL
POTASSIUM BLD-SCNC: 4.3 MMOL/L (ref 3.5–5.1)
SODIUM SERPL-SCNC: 139 MMOL/L (ref 134–144)
TRIGL SERPL-MCNC: 135 MG/DL

## 2022-01-19 PROCEDURE — 80048 BASIC METABOLIC PNL TOTAL CA: CPT | Mod: ZL | Performed by: FAMILY MEDICINE

## 2022-01-19 PROCEDURE — 80061 LIPID PANEL: CPT | Mod: ZL | Performed by: FAMILY MEDICINE

## 2022-01-19 PROCEDURE — 36415 COLL VENOUS BLD VENIPUNCTURE: CPT | Mod: ZL | Performed by: FAMILY MEDICINE

## 2022-01-19 PROCEDURE — G0439 PPPS, SUBSEQ VISIT: HCPCS | Mod: 25 | Performed by: FAMILY MEDICINE

## 2022-01-19 PROCEDURE — G0463 HOSPITAL OUTPT CLINIC VISIT: HCPCS | Mod: 25

## 2022-01-19 PROCEDURE — 17000 DESTRUCT PREMALG LESION: CPT | Performed by: FAMILY MEDICINE

## 2022-01-19 PROCEDURE — 17003 DESTRUCT PREMALG LES 2-14: CPT | Performed by: FAMILY MEDICINE

## 2022-01-19 RX ORDER — TRIAMCINOLONE ACETONIDE 1 MG/G
CREAM TOPICAL 2 TIMES DAILY
Qty: 80 G | Refills: 4 | Status: SHIPPED | OUTPATIENT
Start: 2022-01-19 | End: 2022-08-15

## 2022-01-19 RX ORDER — LISINOPRIL 20 MG/1
20 TABLET ORAL DAILY
Qty: 90 TABLET | Refills: 3 | Status: ON HOLD | OUTPATIENT
Start: 2022-01-19 | End: 2022-06-09

## 2022-01-19 RX ORDER — ROSUVASTATIN CALCIUM 40 MG/1
40 TABLET, COATED ORAL DAILY
Qty: 90 TABLET | Refills: 4 | Status: SHIPPED | OUTPATIENT
Start: 2022-01-19 | End: 2023-04-07

## 2022-01-19 RX ORDER — SILDENAFIL CITRATE 20 MG/1
TABLET ORAL
Qty: 80 TABLET | Refills: 1 | Status: SHIPPED | OUTPATIENT
Start: 2022-01-19

## 2022-01-19 RX ORDER — TORSEMIDE 10 MG/1
10 TABLET ORAL DAILY
Qty: 90 TABLET | Refills: 3 | Status: SHIPPED | OUTPATIENT
Start: 2022-01-19 | End: 2022-06-09

## 2022-01-19 ASSESSMENT — ACTIVITIES OF DAILY LIVING (ADL): CURRENT_FUNCTION: NO ASSISTANCE NEEDED

## 2022-01-19 ASSESSMENT — PAIN SCALES - GENERAL: PAINLEVEL: NO PAIN (0)

## 2022-01-19 ASSESSMENT — MIFFLIN-ST. JEOR: SCORE: 1769.1

## 2022-01-19 NOTE — PATIENT INSTRUCTIONS
Patient Education   Personalized Prevention Plan  You are due for the preventive services outlined below.  Your care team is available to assist you in scheduling these services.  If you have already completed any of these items, please share that information with your care team to update in your medical record.  Health Maintenance Due   Topic Date Due     Colorectal Cancer Screening  03/02/2020     FALL RISK ASSESSMENT  11/13/2021       Understanding USDA MyPlate  The USDA has guidelines to help you make healthy food choices. These are called MyPlate. MyPlate shows the food groups that make up healthy meals using the image of a place setting. Before you eat, think about the healthiest choices for what to put on your plate or in your cup or bowl. To learn more about building a healthy plate, visit www.choosemyplate.gov.    The food groups    Fruits. Any fruit or 100% fruit juice counts as part of the Fruit Group. Fruits may be fresh, canned, frozen, or dried, and may be whole, cut-up, or pureed. Make 1/2 of your plate fruits and vegetables.    Vegetables. Any vegetable or 100% vegetable juice counts as a member of the Vegetable Group. Vegetables may be fresh, frozen, canned, or dried. They can be served raw or cooked and may be whole, cut-up, or mashed. Make 1/2 of your plate fruits and vegetables.    Grains. All foods made from grains are part of the Grains Group. These include wheat, rice, oats, cornmeal, and barley. Grains are often used to make foods such as bread, pasta, oatmeal, cereal, tortillas, and grits. Grains should be no more than 1/4 of your plate. At least half of your grains should be whole grains.    Protein. This group includes meat, poultry, seafood, beans and peas, eggs, processed soy products (such as tofu), nuts (including nut butters), and seeds. Make protein choices no more than 1/4 of your plate. Meat and poultry choices should be lean or low fat.    Dairy. The Dairy Group includes all  fluid milk products and foods made from milk that contain calcium, such as yogurt and cheese. (Foods that have little calcium, such as cream, butter, and cream cheese, are not part of this group.) Most dairy choices should be low-fat or fat-free.    Oils. Oils aren't a food group, but they do contain essential nutrients. However it's important to watch your intake of oils. These are fats that are liquid at room temperature. They include canola, corn, olive, soybean, vegetable, and sunflower oil. Foods that are mainly oil include mayonnaise, certain salad dressings, and soft margarines. You likely already get your daily oil allowance from the foods you eat.  Things to limit  Eating healthy also means limiting these things in your diet:       Salt (sodium). Many processed foods have a lot of sodium. To keep sodium intake down, eat fresh vegetables, meats, poultry, and seafood when possible. Purchase low-sodium, reduced-sodium, or no-salt-added food products at the store. And don't add salt to your meals at home. Instead, season them with herbs and spices such as dill, oregano, cumin, and paprika. Or try adding flavor with lemon or lime zest and juice.    Saturated fat. Saturated fats are most often found in animal products such as beef, pork, and chicken. They are often solid at room temperature, such as butter. To reduce your saturated fat intake, choose leaner cuts of meat and poultry. And try healthier cooking methods such as grilling, broiling, roasting, or baking. For a simple lower-fat swap, use plain nonfat yogurt instead of mayonnaise when making potato salad or macaroni salad.    Added sugars. These are sugars added to foods. They are in foods such as ice cream, candy, soda, fruit drinks, sports drinks, energy drinks, cookies, pastries, jams, and syrups. Cut down on added sugars by sharing sweet treats with a family member or friend. You can also choose fruit for dessert, and drink water or other unsweetened  susanne Yoder last reviewed this educational content on 6/1/2020 2000-2021 [employer]. All rights reserved. This information is not intended as a substitute for professional medical care. Always follow your healthcare professional's instructions.          Signs of Hearing Loss      Hearing much better with one ear can be a sign of hearing loss.   Hearing loss is a problem shared by many people. In fact, it is one of the most common health problems, particularly as people age. Most people age 65 and older have some hearing loss. By age 80, almost everyone does. Hearing loss often occurs slowly over the years. So you may not realize your hearing has gotten worse.  Have your hearing checked  Call your healthcare provider if you:    Have to strain to hear normal conversation    Have to watch other people s faces very carefully to follow what they re saying    Need to ask people to repeat what they ve said    Often misunderstand what people are saying    Turn the volume of the television or radio up so high that others complain    Feel that people are mumbling when they re talking to you    Find that the effort to hear leaves you feeling tired and irritated    Notice, when using the phone, that you hear better with one ear than the other  [employer] last reviewed this educational content on 1/1/2020 2000-2021 [employer]. All rights reserved. This information is not intended as a substitute for professional medical care. Always follow your healthcare professional's instructions.

## 2022-01-19 NOTE — NURSING NOTE
"Coming in for a physical check up    Chief Complaint   Patient presents with     Physical     check rash on his ribs, spot on his nose and cheek       Initial /74   Pulse 52   Temp 98  F (36.7  C)   Resp 16   Ht 1.727 m (5' 8\")   Wt 106 kg (233 lb 9.6 oz)   SpO2 97%   BMI 35.52 kg/m   Estimated body mass index is 35.52 kg/m  as calculated from the following:    Height as of this encounter: 1.727 m (5' 8\").    Weight as of this encounter: 106 kg (233 lb 9.6 oz).  Medication Reconciliation: complete.  FOOD SECURITY SCREENING QUESTIONS  Hunger Vital Signs:  Within the past 12 months we worried whether our food would run out before we got money to buy more. Never  Within the past 12 months the food we bought just didn't last and we didn't have money to get more. Never  Kathleen Rene LPN 1/19/2022 10:09 AM      Kathleen Rene LPN     Visual Acuity Screening - Snellen Chart   Visualacuity OD (right eye): 20/ 25   Visual acuity OS (left eye): 20/ 25   Visual acuity OU (both eyes): 20/ 25   Corrective lenses worn: no             "

## 2022-01-19 NOTE — LETTER
January 20, 2022      Arun Wren  [address] [facility] [address]        Dear ,    We are writing to inform you of your test results.    Your test results fall within the expected range(s) or remain unchanged from previous results.  Please continue with current treatment plan.    Resulted Orders   Basic Metabolic Panel   Result Value Ref Range    Sodium 139 134 - 144 mmol/L    Potassium 4.3 3.5 - 5.1 mmol/L    Chloride 99 98 - 107 mmol/L    Carbon Dioxide (CO2) 31 21 - 31 mmol/L    Anion Gap 9 3 - 14 mmol/L    Urea Nitrogen 28 (H) 7 - 25 mg/dL    Creatinine 1.08 0.70 - 1.30 mg/dL    Calcium 9.8 8.6 - 10.3 mg/dL    Glucose 84 70 - 105 mg/dL    GFR Estimate 72 >60 mL/min/1.73m2      Comment:      Effective December 21, 2021 eGFRcr in adults is calculated using the 2021 CKD-EPI creatinine equation which includes age and gender (Richy et al., NEJ, DOI: 10.1056/KHYDnh8673683)   Lipid Panel   Result Value Ref Range    Cholesterol 174 <200 mg/dL    Triglycerides 135 <150 mg/dL    Direct Measure HDL 64 23 - 92 mg/dL    LDL Cholesterol Calculated 83 <=100 mg/dL    Non HDL Cholesterol 110 <130 mg/dL    Patient Fasting > 8hrs? No     Narrative    Cholesterol  Desirable:  <200 mg/dL    Triglycerides  Normal:  Less than 150 mg/dL  Borderline High:  150-199 mg/dL  High:  200-499 mg/dL  Very High:  Greater than or equal to 500 mg/dL    Direct Measure HDL  Female:  Greater than or equal to 50 mg/dL   Male:  Greater than or equal to 40 mg/dL    LDL Cholesterol  Desirable:  <100mg/dL  Above Desirable:  100-129 mg/dL   Borderline High:  130-159 mg/dL   High:  160-189 mg/dL   Very High:  >= 190 mg/dL    Non HDL Cholesterol  Desirable:  130 mg/dL  Above Desirable:  130-159 mg/dL  Borderline High:  160-189 mg/dL  High:  190-219 mg/dL  Very High:  Greater than or equal to 220 mg/dL       If you have any questions or concerns, please call the clinic at the number listed above.       Sincerely,      Richard Arita,  MD

## 2022-01-19 NOTE — PROGRESS NOTES
"SUBJECTIVE:   Arun Wren is a 76 year old male who presents for Preventive Visit.      Patient has been advised of split billing requirements and indicates understanding: Yes  Are you in the first 12 months of your Medicare coverage?  No    Healthy Habits:     In general, how would you rate your overall health?  Good    Frequency of exercise:  2-3 days/week    Duration of exercise:  15-30 minutes    Do you usually eat at least 4 servings of fruit and vegetables a day, include whole grains    & fiber and avoid regularly eating high fat or \"junk\" foods?  No    Taking medications regularly:  Yes    Medication side effects:  None    Ability to successfully perform activities of daily living:  No assistance needed    Home Safety:  No safety concerns identified    Hearing Impairment:  Difficulty following a conversation in a noisy restaurant or crowded room, feel that people are mumbling or not speaking clearly, difficulty following dialogue in the theater, difficult to understand a speaker at a public meeting or [employer] service, need to ask people to speak up or repeat themselves, find that men's voices are easier to understand than woman's and difficulty understanding soft or whispered speech    In the past 6 months, have you been bothered by leaking of urine?  No    In general, how would you rate your overall mental or emotional health?  Excellent      PHQ-2 Total Score: 0    Additional concerns today:  No    Do you feel safe in your environment? Yes    Have you ever done Advance Care Planning? (For example, a Health Directive, POLST, or a discussion with a medical provider or your loved ones about your wishes): Yes, advance care planning is on file.       Fall risk     click delete button to remove this line now  Cognitive Screening   1) Repeat 3 items (Leader, Season, Table)    2) Clock draw: NORMAL  3) 3 item recall: Recalls 1 object   Results: NORMAL clock, 1-2 items recalled: COGNITIVE IMPAIRMENT LESS " LIKELY    Mini-CogTM Copyright S Serge. Licensed by the author for use in [facility]; reprinted with permission ([email or web address]). All rights reserved.      Do you have sleep apnea, excessive snoring or daytime drowsiness?: no    Reviewed and updated as needed this visit by clinical staff  Tobacco  Allergies  Meds  Problems  Med Hx    Soc Hx       Reviewed and updated as needed this visit by Provider     Problems           Social History     Tobacco Use     Smoking status: Former Smoker     Years: 27.00     Types: Cigarettes     Quit date: 1988     Years since quittin.0     Smokeless tobacco: Never Used   Substance Use Topics     Alcohol use: Yes     Comment: Alcoholic Drinks/day: 1-2 drinks per day, whiskey     If you drink alcohol do you typically have >3 drinks per day or >7 drinks per week? No    Alcohol Use 2022   Prescreen: >3 drinks/day or >7 drinks/week? No   Prescreen: >3 drinks/day or >7 drinks/week? -           wellness    Current providers sharing in care for this patient include:   Patient Care Team:  Richard Arita MD as PCP - General (Family Practice)  Richard Arita MD as Assigned PCP    The following health maintenance items are reviewed in Epic and correct as of today:  Health Maintenance Due   Topic Date Due     COLORECTAL CANCER SCREENING  2020     FALL RISK ASSESSMENT  2021     Lab work is in process  Labs reviewed in [facility]  Current Outpatient Medications   Medication Sig Dispense Refill     lisinopril (ZESTRIL) 20 MG tablet Take 1 tablet (20 mg) by mouth daily 90 tablet 3     nystatin (MYCOSTATIN) 436134 UNIT/GM external cream APPLY TOPICALLY TO AFFECTED AREA(S) TWICE DAILY 60 g 4     rosuvastatin (CRESTOR) 40 MG tablet Take 1 tablet (40 mg) by mouth daily 90 tablet 4     sildenafil (REVATIO) 20 MG tablet TAKE 4-5 TABLETS BY MOUTH ONCE DAILY IF NEEDED FOR OTHER (SPECIFY).TAKE 30 MINUTES BEFORE SEXUAL ACTIVITY. 80 tablet 1     torsemide (DEMADEX) 10 MG  "tablet Take 1 tablet (10 mg) by mouth daily 90 tablet 3     No Known Allergies          Review of Systems   Wants follow up on the thoracic aneurysm and aortic sclerosis with mild stenosis. last echo was in 2018.  Has no symptoms.    Rash in right thorax for 4 months started with small \"bug bite\" then itching and burning. Nearly gone now.      Constitutional, HEENT, cardiovascular, pulmonary, GI, , musculoskeletal, neuro, skin, endocrine and psych systems are negative, except as otherwise noted.    OBJECTIVE:   /74   Pulse 52   Temp 98  F (36.7  C)   Resp 16   Ht 1.727 m (5' 8\")   Wt 106 kg (233 lb 9.6 oz)   SpO2 97%   BMI 35.52 kg/m   Estimated body mass index is 35.52 kg/m  as calculated from the following:    Height as of this encounter: 1.727 m (5' 8\").    Weight as of this encounter: 106 kg (233 lb 9.6 oz).  Physical Exam  GENERAL: healthy, alert and no distress  EYES: Eyes grossly normal to inspection, PERRL and conjunctivae and sclerae normal  HENT: ear canals and TM's normal, nose and mouth without ulcers or lesions  NECK: no adenopathy, no asymmetry, masses, or scars and thyroid normal to palpation  RESP: lungs clear to auscultation - no rales, rhonchi or wheezes  CV: regular rates and rhythm, normal S1 S2, no S3 or S4, grade 2/6 systolic ejection murmur murmur heard best over the right sternal border  , peripheral pulses strong and no peripheral edema  ABDOMEN: soft, nontender, no hepatosplenomegaly, no masses and bowel sounds normal  MS: no gross musculoskeletal defects noted, no edema  SKIN: bridge of nose and left malar area with rough papules, about 2 mm each.  All treated with 3 cycles of cryo.  Right T 7 dermatome with an oval slightly excoriated pink patch, 5 x 10 cm or so.    NEURO: Normal strength and tone, mentation intact and speech normal  PSYCH: mentation appears normal, affect normal/bright    Diagnostic Test Results:  Labs reviewed in Epic  Results for orders placed or " performed in visit on 01/19/22   Basic Metabolic Panel     Status: Abnormal   Result Value Ref Range    Sodium 139 134 - 144 mmol/L    Potassium 4.3 3.5 - 5.1 mmol/L    Chloride 99 98 - 107 mmol/L    Carbon Dioxide (CO2) 31 21 - 31 mmol/L    Anion Gap 9 3 - 14 mmol/L    Urea Nitrogen 28 (H) 7 - 25 mg/dL    Creatinine 1.08 0.70 - 1.30 mg/dL    Calcium 9.8 8.6 - 10.3 mg/dL    Glucose 84 70 - 105 mg/dL    GFR Estimate 72 >60 mL/min/1.73m2   Lipid Panel     Status: None   Result Value Ref Range    Cholesterol 174 <200 mg/dL    Triglycerides 135 <150 mg/dL    Direct Measure HDL 64 23 - 92 mg/dL    LDL Cholesterol Calculated 83 <=100 mg/dL    Non HDL Cholesterol 110 <130 mg/dL    Patient Fasting > 8hrs? No     Narrative    Cholesterol  Desirable:  <200 mg/dL    Triglycerides  Normal:  Less than 150 mg/dL  Borderline High:  150-199 mg/dL  High:  200-499 mg/dL  Very High:  Greater than or equal to 500 mg/dL    Direct Measure HDL  Female:  Greater than or equal to 50 mg/dL   Male:  Greater than or equal to 40 mg/dL    LDL Cholesterol  Desirable:  <100mg/dL  Above Desirable:  100-129 mg/dL   Borderline High:  130-159 mg/dL   High:  160-189 mg/dL   Very High:  >= 190 mg/dL    Non HDL Cholesterol  Desirable:  130 mg/dL  Above Desirable:  130-159 mg/dL  Borderline High:  160-189 mg/dL  High:  190-219 mg/dL  Very High:  Greater than or equal to 220 mg/dL         ASSESSMENT / PLAN:       ICD-10-CM    1. Encounter for Medicare annual wellness exam  Z00.00    2. Thoracic aortic aneurysm without rupture (H)  I71.2    3. Morbid obesity (H)  E66.01    4. Essential hypertension with goal blood pressure less than 140/90  I10 lisinopril (ZESTRIL) 20 MG tablet     torsemide (DEMADEX) 10 MG tablet   5. Hypercholesterolemia  E78.00 rosuvastatin (CRESTOR) 40 MG tablet   6. Aortic valve sclerosis  I35.8 Echocardiogram Complete   7. Impotence of organic origin  N52.9 sildenafil (REVATIO) 20 MG tablet     Above conditions are stable. Refilled  "his meds. He does not want to change blood pressure meds, says he gets some lows in the morning and has had significant side effects with readings in the 120's in the past too. Skin on thorax is most consistent with prior shingles.  Treatment with steroids for symptom relief.      He wanted to change from lipitor to crestor due to hydrophilic nature of crestor.    Patient has been advised of split billing requirements and indicates understanding: Yes    COUNSELING:  Reviewed preventive health counseling, as reflected in patient instructions       Regular exercise       Healthy diet/nutrition       Fall risk prevention    Estimated body mass index is 35.52 kg/m  as calculated from the following:    Height as of this encounter: 1.727 m (5' 8\").    Weight as of this encounter: 106 kg (233 lb 9.6 oz).    Weight management plan: Discussed healthy diet and exercise guidelines    He reports that he quit smoking about 34 years ago. His smoking use included cigarettes. He quit after 27.00 years of use. He has never used smokeless tobacco.      Appropriate preventive services were discussed with this patient, including applicable screening as appropriate for cardiovascular disease, diabetes, osteopenia/osteoporosis, and glaucoma.  As appropriate for age/gender, discussed screening for colorectal cancer, prostate cancer, breast cancer, and cervical cancer. Checklist reviewing preventive services available has been given to the patient.    Reviewed patients plan of care and provided an AVS. The Basic Care Plan (routine screening as documented in Health Maintenance) for Arun meets the Care Plan requirement. This Care Plan has been established and reviewed with the Patient.    Counseling Resources:  ATP IV Guidelines  Pooled Cohorts Equation Calculator  Breast Cancer Risk Calculator  Breast Cancer: Medication to Reduce Risk  FRAX Risk Assessment  ICSI Preventive Guidelines  Dietary Guidelines for Americans, 2010  USDA's " MyPlate  ASA Prophylaxis  Lung CA Screening    Richard Arita MD  [facility]    Identified Health Risks:    The patient was counseled and encouraged to consider modifying their diet and eating habits. He was provided with information on recommended healthy diet options.  The patient was provided with written information regarding signs of hearing loss.

## 2022-02-03 DIAGNOSIS — I10 ESSENTIAL HYPERTENSION WITH GOAL BLOOD PRESSURE LESS THAN 140/90: ICD-10-CM

## 2022-02-03 RX ORDER — TORSEMIDE 10 MG/1
TABLET ORAL
Qty: 15 TABLET | Refills: 0 | OUTPATIENT
Start: 2022-02-03

## 2022-02-03 NOTE — TELEPHONE ENCOUNTER
torsemide (DEMADEX) 10 MG tablet 90 tablet 3 1/19/2022  No   Sig - Route: Take 1 tablet (10 mg) by mouth daily - Oral   Sent to pharmacy as: Torsemide 10 MG Oral Tablet (DEMADEX)   Class: E-Prescribe   Order: [id number]   E-Prescribing Status: Receipt confirmed by pharmacy (1/19/2022 10:28 AM CST)     Redundant refill request refused: Too soon:    Unable to complete prescription refill per RN Medication Refill Policy.................... Luzmaria Womack RN ....................  2/3/2022   9:37 AM

## 2022-02-11 ENCOUNTER — TELEPHONE (OUTPATIENT)
Dept: FAMILY MEDICINE | Facility: OTHER | Age: 77
End: 2022-02-11
Payer: COMMERCIAL

## 2022-02-11 DIAGNOSIS — H90.3 SENSORY HEARING LOSS, BILATERAL: Primary | ICD-10-CM

## 2022-02-11 NOTE — TELEPHONE ENCOUNTER
TJP-patient needs a referral for a hearing test he is having on 02.15.22 please call and advise    Thank You       Pamela Hampton on 2/11/2022 at 9:59 AM

## 2022-02-15 ENCOUNTER — HOSPITAL ENCOUNTER (OUTPATIENT)
Dept: CARDIOLOGY | Facility: OTHER | Age: 77
Discharge: HOME | End: 2022-02-15
Attending: FAMILY MEDICINE | Admitting: FAMILY MEDICINE
Payer: COMMERCIAL

## 2022-02-15 VITALS — SYSTOLIC BLOOD PRESSURE: 170 MMHG | DIASTOLIC BLOOD PRESSURE: 90 MMHG

## 2022-02-15 DIAGNOSIS — I35.8 AORTIC VALVE SCLEROSIS: ICD-10-CM

## 2022-02-15 LAB — LVEF ECHO: NORMAL

## 2022-02-15 PROCEDURE — 93306 TTE W/DOPPLER COMPLETE: CPT | Mod: 26 | Performed by: STUDENT IN AN ORGANIZED HEALTH CARE EDUCATION/TRAINING PROGRAM

## 2022-02-15 PROCEDURE — 93306 TTE W/DOPPLER COMPLETE: CPT

## 2022-02-16 ENCOUNTER — TELEPHONE (OUTPATIENT)
Dept: FAMILY MEDICINE | Facility: OTHER | Age: 77
End: 2022-02-16
Payer: COMMERCIAL

## 2022-02-16 DIAGNOSIS — I35.0 AORTIC STENOSIS, SEVERE: Primary | ICD-10-CM

## 2022-02-16 DIAGNOSIS — H90.3 BILATERAL SENSORINEURAL HEARING LOSS: ICD-10-CM

## 2022-02-16 NOTE — TELEPHONE ENCOUNTER
Patient has hearing aid test tomorrow and would like a referral to Dr LLANOS at [facility], so he can get his hearing aids cheaper.   Fax: [phone]    Isaura Dorsey on 2/16/2022 at 8:32 AM

## 2022-03-11 DIAGNOSIS — L57.0 ACTINIC KERATOSIS: ICD-10-CM

## 2022-03-11 RX ORDER — NYSTATIN 100000 U/G
CREAM TOPICAL
Qty: 60 G | Refills: 4 | Status: SHIPPED | OUTPATIENT
Start: 2022-03-11 | End: 2022-07-14

## 2022-03-11 NOTE — TELEPHONE ENCOUNTER
"[id number]  sent Rx request for the following:      Requested Prescriptions   Pending Prescriptions Disp Refills     nystatin (MYCOSTATIN) 290497 UNIT/GM external cream [Pharmacy Med Name: NYSTATIN 100,000UNIT/G CREAM]  4     Sig: APPLY TOPICALLY TO AFFECTEDAREA(S) TWICE DAILY       Antifungal Agents Passed - 3/11/2022 11:29 AM        Passed - Recent (12 mo) or future (30 days) visit within the authorizing provider's specialty     Patient has had an office visit with the authorizing provider or a provider within the authorizing providers department within the previous 12 mos or has a future within next 30 days. See \"Patient Info\" tab in inbasket, or \"Choose Columns\" in Meds & Orders section of the refill encounter.          Passed - Not Fluconazole or Terconazole      If oral Fluconazole or Terconazole, may refill if indicated in progress notes.           Passed - Medication is active on med list     Last Prescription Date:   7/22/20  Last Fill Qty/Refills:         60 g, R-4  Last Office Visit:              1/19/22  Future Office visit:           None    Rena Gutierrez RN .............. 3/11/2022  4:09 PM    "

## 2022-03-30 NOTE — PROGRESS NOTES
[facility] HEART CARE   CARDIOLOGY CONSULT     Arun Wren   1945  [id number]    Richard Arita     Chief Complaint   Patient presents with     Consult For     AORTIC STENOSIS, SEVERE          HPI:   Mr. Wren is a 77-year-old gentleman who is being seen by cardiology for severe aortic stenosis.  He was seen in the clinic on 1/19/2022.  Repeat echocardiogram completed on 2/15/2022.  He was found to have severe aortic stenosis.  He was referred to cardiology as a result.    He had an echo on 10/31/2018.  At that time, he was found to have mild to moderate aortic valve sclerosis with mild aortic stenosis.  Mean gradient was 18 mmHg.  Valve area was 1.5 cm .  Repeat echocardiogram on 2/15/2022.  Echocardiogram showed a mean gradient of 50 mmHg, peak velocity of 4.3 m/s, and DVI of 0 0.26.  Aortic valve area 0.95 cm .  Ascending aorta was 4.2 cm.    We discussed the results of his echocardiogram.  He understands he has severe aortic stenosis.  We discussed TAVR versus SAVR.  He has denied dyspnea, swelling, chest pain, near-syncope, or syncope.    We had a long discussion using the heart model in the room as well as [employer].  We discussed TAVR to replace his aortic valve.  He understands will likely require 2 trips to the [facility].  He was explained that the first work-up may include a cardiac catheterization.  We will plan for an ultrasound of his carotids and abdomen for AAA here locally.  He is also aware that after valve replacement, he will need to take amoxicillin 2 g, 30 to 60 minutes prior to dental procedures.  He currently has an abscess tooth which he is on antibiotics.  He needs a tooth pulled.  He cannot get in for couple weeks.  He has an appointment.  He understands this will need to be taken care of prior to valve replacement.  Risks and benefits discussed.  Patient agreeable to going to the [facility].    IMAGING RESULTS:   Echo on 2/15/2022:  Global and regional left ventricular  function is normal with an EF of 55-60%.  Grade II or moderate diastolic dysfunction.  The right ventricle is normal in function and size.  Severe aortic stenosis is present (MG 50 mmHg, PV 4.3 m/s, DVI 0.26)  Ascending aorta is dilated measuring 4.2 cm  No pericardial effusion is present.  This study was compared with the study from 10/31/18 the AS has progressed and aorta is more dilated.    Echo on 10/31/2018:  EF 55-60%.   Thickening of the anterior basal septum present.  Diastolic function normal.  Atria are normal.  Mild aortic stenosis present.    US abdominal aorta on 9/18/2018:  No abdominal aortic aneurysm.       CURRENT MEDICATIONS:   Prior to Admission medications    Medication Sig Start Date End Date Taking? Authorizing Provider   nystatin (MYCOSTATIN) 226860 UNIT/GM external cream APPLY TOPICALLY TO AFFECTEDAREA(S) TWICE DAILY 3/11/22   Richard Arita MD   lisinopril (ZESTRIL) 20 MG tablet Take 1 tablet (20 mg) by mouth daily 1/19/22   Richard Arita MD   rosuvastatin (CRESTOR) 40 MG tablet Take 1 tablet (40 mg) by mouth daily 1/19/22   Richard Arita MD   sildenafil (REVATIO) 20 MG tablet TAKE 4-5 TABLETS BY MOUTH ONCE DAILY IF NEEDED FOR OTHER (SPECIFY).TAKE 30 MINUTES BEFORE SEXUAL ACTIVITY. 1/19/22   Richard Arita MD   torsemide (DEMADEX) 10 MG tablet Take 1 tablet (10 mg) by mouth daily 1/19/22   Richard Arita MD   triamcinolone (KENALOG) 0.1 % external cream Apply topically 2 times daily 1/19/22   Richard Arita MD       ALLERGIES:   No Known Allergies     PAST MEDICAL HISTORY:   Past Medical History:   Diagnosis Date     Actinic keratosis     05/19/09,Keratosis left external ear, treated with cryocautery, recheck if recurs or persists     Allergic contact dermatitis due to plants, except food     recurrent     Basal cell carcinoma of skin     No Comments Provided     Carpal tunnel syndrome     right greater than left     Closed fracture of base of skull (H)     No Comments Provided      Essential (primary) hypertension     No Comments Provided     Ganglion     No Comments Provided     Hydrocele     No Comments Provided     Male erectile dysfunction     decreased libido     Personal history of nicotine dependence     No Comments Provided     Pure hypercholesterolemia     No Comments Provided     Residual hemorrhoidal skin tags     Past external hemorrhoid     Tinea unguium     No Comments Provided        PAST SURGICAL HISTORY:   Past Surgical History:   Procedure Laterality Date     COLONOSCOPY  1998    1998,at [facility], which was normal     COLONOSCOPY  2005,at [facility]     COLONOSCOPY  2015    3/2/15,F/U   tubular adnomas     COLONOSCOPY  2020    F/U N/A hyperplastic     OTHER SURGICAL HISTORY      ,HERNIA REPAIR     OTHER SURGICAL HISTORY      [id number],OTHER,Left,Dr. Jeremiah GIBSON     OTHER SURGICAL HISTORY      16,[id number],ORCHIECTOMY SIMPLE / PARTIAL / RADICAL W/ SCROTAL / INGUINAL APPROACH,Left,Dr. Jeremiah GIBSON        FAMILY HISTORY:   Family History   Problem Relation Age of Onset     Diabetes Mother         Diabetes     Other - See Comments Mother         joint disease     Heart Disease Mother 70        Heart Disease,CABG     Other - See Comments Father         Stroke,stroke/heavy smoker     Diabetes Paternal Grandmother         Diabetes,?GMother        SOCIAL HISTORY:   Social History     Socioeconomic History     Marital status:      Spouse name: Not on file     Number of children: Not on file     Years of education: Not on file     Highest education level: Not on file   Occupational History     Not on file   Tobacco Use     Smoking status: Former Smoker     Years: 27.00     Types: Cigarettes     Quit date: 1988     Years since quittin.2     Smokeless tobacco: Never Used   Vaping Use     Vaping Use: Never used   Substance and Sexual Activity     Alcohol use: Yes     Comment: Alcoholic Drinks/day: 1-2 drinks  per day, whiskey     Drug use: No     Sexual activity: Not Currently   Other Topics Concern     Parent/sibling w/ CABG, MI or angioplasty before 65F 55M? Not Asked   Social History Narrative    Quit smoking 1990.  Occ alcohol.  .   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    Stays active, owns tree farm.     Social Determinants of Health     Financial Resource Strain: Not on file   Food Insecurity: Not on file   Transportation Needs: Not on file   Physical Activity: Not on file   Stress: Not on file   Social Connections: Not on file   Intimate Partner Violence: Not on file   Housing Stability: Not on file          ROS:   CONSTITUTIONAL: No weight loss, fever, chills, weakness or fatigue.   HEENT: Eyes: No visual changes. Ears, Nose, Throat: No hearing loss, congestion or difficulty swallowing.   CARDIOVASCULAR: No chest pain, chest pressure or chest discomfort. No palpitations or lower extremity edema.   RESPIRATORY: No shortness of breath, dyspnea upon exertion, cough or sputum production.   GASTROINTESTINAL: No abdominal pain. No anorexia, nausea, vomiting or diarrhea.   NEUROLOGICAL: No headache, lightheadedness, dizziness, syncope, ataxia or weakness.   HEMATOLOGIC: No anemia, bleeding or bruising.   PSYCHIATRIC: No history of depression or anxiety.   ENDOCRINOLOGIC: No reports of sweating, cold or heat intolerance. No polyuria or polydipsia.   SKIN: No abnormal rashes or itching.       PHYSICAL EXAM:   GENERAL: The patient is a well-developed, well-nourished, in no apparent distress. Alert and oriented x3.   HEENT: Head is normocephalic and atraumatic. Eyes are symmetrical with normal visual tracking.  HEART: Regular rate and rhythm, S1S2 present late peaking crescendo decrescendo murmur heard best at right sternal border, rub or gallop.   LUNGS: Respirations regular and unlabored. Clear to auscultation.   EXTREMITIES: Scant peripheral edema present.   NEUROLOGIC: Alert and oriented X3.     SKIN: No jaundice. No rashes or visible skin lesions present.        LAB RESULTS:   No visits with results within 2 Month(s) from this visit.   Latest known visit with results is:   Office Visit on 01/19/2022   Component Date Value Ref Range Status     Sodium 01/19/2022 139  134 - 144 mmol/L Final     Potassium 01/19/2022 4.3  3.5 - 5.1 mmol/L Final     Chloride 01/19/2022 99  98 - 107 mmol/L Final     Carbon Dioxide (CO2) 01/19/2022 31  21 - 31 mmol/L Final     Anion Gap 01/19/2022 9  3 - 14 mmol/L Final     Urea Nitrogen 01/19/2022 28 (A) 7 - 25 mg/dL Final     Creatinine 01/19/2022 1.08  0.70 - 1.30 mg/dL Final     Calcium 01/19/2022 9.8  8.6 - 10.3 mg/dL Final     Glucose 01/19/2022 84  70 - 105 mg/dL Final     GFR Estimate 01/19/2022 72  >60 mL/min/1.73m2 Final     Cholesterol 01/19/2022 174  <200 mg/dL Final     Triglycerides 01/19/2022 135  <150 mg/dL Final     Direct Measure HDL 01/19/2022 64  23 - 92 mg/dL Final     LDL Cholesterol Calculated 01/19/2022 83  <=100 mg/dL Final     Non HDL Cholesterol 01/19/2022 110  <130 mg/dL Final     Patient Fasting > 8hrs? 01/19/2022 No   Final          ASSESSMENT:       ICD-10-CM    1. Aortic stenosis, severe  I35.0 Adult Cardiology Eval Referral     EKG 12-lead, tracing only     amoxicillin (AMOXIL) 500 MG capsule   2. Aortic valve sclerosis  I35.8 amoxicillin (AMOXIL) 500 MG capsule   3. Essential hypertension  I10    4. Thoracic aortic aneurysm without rupture (H)  I71.2    5. Benign essential hypertension  I10    6. Diastolic dysfunction with chronic heart failure (H)  I50.32    7. Mixed hyperlipidemia  E78.2    8. History of tobacco abuse  Z87.891    9. Encounter for screening for abdominal aortic aneurysm (AAA) in patient 50 years of age or older without other risk factors for AAA  Z13.6  Aorta Medicare AAA Screening   10. Bruit  R09.89 US Carotid Bilateral   11. Abscessed tooth  K04.7          PLAN:   1.  Severe aortic stenosis: Had a long discussion about  TAVR versus SAVR.  Patient understands will require 2 trips to [facility] for TAVR.  Will likely involve cardiac catheterization and CT scans, will plan for an ultrasound of carotids and abdomen for AAA here locally.  He is aware he will need to take antibiotics after the procedure.  He does have an abscessed tooth which he was told will need to be taken care of prior to valve replacement.  He plans to see maxillofacial surgery, but cannot get in for a few weeks.  He believes his tooth needs to be pulled.  Risks and benefits discussed.  He has been referred to the [facility].  He understands they will call him to set this up.  If he is found to have multivessel disease, understands he may need to have SAVR with CABG.  2.  Follow-up after procedure.    Total time spent on day of visit, including review of tests, obtaining/reviewing separately obtained history, ordering medications/tests/procedures, communicating with PCP/consultants, and documenting in electronic medical record: 60 minutes.       Thank you for allowing me to participate in the care of your patient. Please do not hesitate to contact me if you have any questions.     Jann Ulloa, DO    `

## 2022-03-31 ENCOUNTER — OFFICE VISIT (OUTPATIENT)
Dept: CARDIOLOGY | Facility: OTHER | Age: 77
End: 2022-03-31
Attending: INTERNAL MEDICINE
Payer: COMMERCIAL

## 2022-03-31 VITALS
SYSTOLIC BLOOD PRESSURE: 134 MMHG | WEIGHT: 237 LBS | OXYGEN SATURATION: 94 % | HEART RATE: 56 BPM | BODY MASS INDEX: 35.92 KG/M2 | TEMPERATURE: 97.3 F | DIASTOLIC BLOOD PRESSURE: 86 MMHG | RESPIRATION RATE: 18 BRPM | HEIGHT: 68 IN

## 2022-03-31 DIAGNOSIS — R09.89 BRUIT: ICD-10-CM

## 2022-03-31 DIAGNOSIS — Z13.6 ENCOUNTER FOR SCREENING FOR ABDOMINAL AORTIC ANEURYSM (AAA) IN PATIENT 50 YEARS OF AGE OR OLDER WITHOUT OTHER RISK FACTORS FOR AAA: ICD-10-CM

## 2022-03-31 DIAGNOSIS — I35.0 AORTIC STENOSIS, SEVERE: Primary | ICD-10-CM

## 2022-03-31 DIAGNOSIS — I10 BENIGN ESSENTIAL HYPERTENSION: ICD-10-CM

## 2022-03-31 DIAGNOSIS — K04.7 ABSCESSED TOOTH: ICD-10-CM

## 2022-03-31 DIAGNOSIS — I71.20 THORACIC AORTIC ANEURYSM WITHOUT RUPTURE (H): ICD-10-CM

## 2022-03-31 DIAGNOSIS — I50.32 DIASTOLIC DYSFUNCTION WITH CHRONIC HEART FAILURE (H): ICD-10-CM

## 2022-03-31 DIAGNOSIS — I10 ESSENTIAL HYPERTENSION: ICD-10-CM

## 2022-03-31 DIAGNOSIS — E78.2 MIXED HYPERLIPIDEMIA: ICD-10-CM

## 2022-03-31 DIAGNOSIS — I35.8 AORTIC VALVE SCLEROSIS: ICD-10-CM

## 2022-03-31 DIAGNOSIS — Z87.891 HISTORY OF TOBACCO ABUSE: ICD-10-CM

## 2022-03-31 LAB
ATRIAL RATE - MUSE: 53 BPM
DIASTOLIC BLOOD PRESSURE - MUSE: NORMAL MMHG
INTERPRETATION ECG - MUSE: NORMAL
P AXIS - MUSE: 55 DEGREES
PR INTERVAL - MUSE: 166 MS
QRS DURATION - MUSE: 100 MS
QT - MUSE: 454 MS
QTC - MUSE: 426 MS
R AXIS - MUSE: 48 DEGREES
SYSTOLIC BLOOD PRESSURE - MUSE: NORMAL MMHG
T AXIS - MUSE: 193 DEGREES
VENTRICULAR RATE- MUSE: 53 BPM

## 2022-03-31 PROCEDURE — 93005 ELECTROCARDIOGRAM TRACING: CPT | Performed by: INTERNAL MEDICINE

## 2022-03-31 PROCEDURE — 99205 OFFICE O/P NEW HI 60 MIN: CPT | Performed by: INTERNAL MEDICINE

## 2022-03-31 PROCEDURE — 93010 ELECTROCARDIOGRAM REPORT: CPT | Performed by: INTERNAL MEDICINE

## 2022-03-31 PROCEDURE — G0463 HOSPITAL OUTPT CLINIC VISIT: HCPCS | Mod: 25

## 2022-03-31 RX ORDER — AMOXICILLIN 500 MG/1
2000 CAPSULE ORAL
Qty: 4 CAPSULE | Refills: 3 | Status: SHIPPED | OUTPATIENT
Start: 2022-03-31 | End: 2022-06-28

## 2022-03-31 ASSESSMENT — PAIN SCALES - GENERAL: PAINLEVEL: NO PAIN (0)

## 2022-03-31 NOTE — NURSING NOTE
"Patient comes in for consult for aortic stenosis, severe. Marine Griffiths LPN ....................3/31/2022   9:13 AM  Chief Complaint   Patient presents with     Consult For     AORTIC STENOSIS, SEVERE       Initial BP (!) 144/92 (BP Location: Right arm, Patient Position: Sitting, Cuff Size: Adult Large)   Pulse 56   Temp 97.3  F (36.3  C) (Tympanic)   Resp 18   Ht 1.72 m (5' 7.72\")   Wt 107.5 kg (237 lb)   SpO2 94%   BMI 36.34 kg/m   Estimated body mass index is 36.34 kg/m  as calculated from the following:    Height as of this encounter: 1.72 m (5' 7.72\").    Weight as of this encounter: 107.5 kg (237 lb).  Meds Reconciled: complete  Pt is not on Aspirin  Pt is on a Statin  PHQ and/or STEVENSON reviewed. Pt referred to PCP/MH Provider as appropriate.    Marine Griffiths LPN    FOOD SECURITY SCREENING QUESTIONS  Hunger Vital Signs:  Within the past 12 months we worried whether our food would run out before we got money to buy more. Never  Within the past 12 months the food we bought just didn't last and we didn't have money to get more. Never  Marine Griffiths LPN 3/31/2022 9:13 AM    "

## 2022-03-31 NOTE — PATIENT INSTRUCTIONS
1.  Plan for an ultrasound of your carotid arteries.  They will call you from [facility] [place] to set this up.    2.  Plan for an ultrasound of your abdomen looking for an aneurysm.  They will call you to set up from [facility].    3.  After your valve was replaced, you are to take amoxicillin 2 g 30 to 60 minutes prior to dental procedures.

## 2022-04-04 DIAGNOSIS — I35.0 AORTIC STENOSIS, SEVERE: Primary | ICD-10-CM

## 2022-04-04 RX ORDER — ASPIRIN 325 MG
325 TABLET ORAL ONCE
Status: CANCELLED | OUTPATIENT
Start: 2022-04-04 | End: 2022-04-04

## 2022-04-04 RX ORDER — POTASSIUM CHLORIDE 1500 MG/1
40 TABLET, EXTENDED RELEASE ORAL
Status: CANCELLED | OUTPATIENT
Start: 2022-04-04

## 2022-04-04 RX ORDER — SODIUM CHLORIDE 9 MG/ML
INJECTION, SOLUTION INTRAVENOUS CONTINUOUS
Status: CANCELLED | OUTPATIENT
Start: 2022-04-04

## 2022-04-04 RX ORDER — LIDOCAINE 40 MG/G
CREAM TOPICAL
Status: CANCELLED | OUTPATIENT
Start: 2022-04-04

## 2022-04-04 RX ORDER — ASPIRIN 81 MG/1
243 TABLET, CHEWABLE ORAL ONCE
Status: CANCELLED | OUTPATIENT
Start: 2022-04-04

## 2022-04-04 RX ORDER — POTASSIUM CHLORIDE 1500 MG/1
20 TABLET, EXTENDED RELEASE ORAL
Status: CANCELLED | OUTPATIENT
Start: 2022-04-04

## 2022-04-07 DIAGNOSIS — Z11.59 ENCOUNTER FOR SCREENING FOR OTHER VIRAL DISEASES: Primary | ICD-10-CM

## 2022-04-18 ENCOUNTER — ALLIED HEALTH/NURSE VISIT (OUTPATIENT)
Dept: FAMILY MEDICINE | Facility: OTHER | Age: 77
End: 2022-04-18
Attending: FAMILY MEDICINE
Payer: COMMERCIAL

## 2022-04-18 DIAGNOSIS — Z11.59 ENCOUNTER FOR SCREENING FOR OTHER VIRAL DISEASES: ICD-10-CM

## 2022-04-18 PROCEDURE — C9803 HOPD COVID-19 SPEC COLLECT: HCPCS

## 2022-04-18 PROCEDURE — U0003 INFECTIOUS AGENT DETECTION BY NUCLEIC ACID (DNA OR RNA); SEVERE ACUTE RESPIRATORY SYNDROME CORONAVIRUS 2 (SARS-COV-2) (CORONAVIRUS DISEASE [COVID-19]), AMPLIFIED PROBE TECHNIQUE, MAKING USE OF HIGH THROUGHPUT TECHNOLOGIES AS DESCRIBED BY CMS-2020-01-R: HCPCS | Mod: ZL

## 2022-04-18 NOTE — PROGRESS NOTES
Patient here today for Covid test. Procedure on 04/22/2022 with Dr. Tidwell.   Fax:[phone]     Corry Myers CNA .............................on 4/18/2022 at 10:08 AM

## 2022-04-19 ENCOUNTER — TELEPHONE (OUTPATIENT)
Dept: CARDIOLOGY | Facility: CLINIC | Age: 77
End: 2022-04-19
Payer: COMMERCIAL

## 2022-04-19 ENCOUNTER — TELEPHONE (OUTPATIENT)
Dept: CARDIOLOGY | Facility: CLINIC | Age: 77
End: 2022-04-19

## 2022-04-19 LAB — SARS-COV-2 RNA RESP QL NAA+PROBE: NEGATIVE

## 2022-04-19 NOTE — TELEPHONE ENCOUNTER
[facility] Call Center    Phone Message    May a detailed message be left on voicemail: yes     Reason for Call: Other: patient daughter Candie was told that a information packet will be sent to patient on his up coming appts, and how to prepare for the apts but they havent recevied anything. Candie is asking is the information packet can be sent via email to her? @ [email or web address]     Action Taken: Message routed to:  [facility] ([facility] cardio    Travel Screening: Not Applicable

## 2022-04-21 ENCOUNTER — APPOINTMENT (OUTPATIENT)
Dept: LAB | Facility: CLINIC | Age: 77
End: 2022-04-21
Attending: INTERNAL MEDICINE
Payer: COMMERCIAL

## 2022-04-21 ENCOUNTER — HOSPITAL ENCOUNTER (OUTPATIENT)
Dept: ULTRASOUND IMAGING | Facility: CLINIC | Age: 77
Discharge: HOME | End: 2022-04-21
Attending: INTERNAL MEDICINE
Payer: COMMERCIAL

## 2022-04-21 ENCOUNTER — HOSPITAL ENCOUNTER (OUTPATIENT)
Dept: CT IMAGING | Facility: CLINIC | Age: 77
Discharge: HOME | End: 2022-04-21
Attending: INTERNAL MEDICINE
Payer: COMMERCIAL

## 2022-04-21 ENCOUNTER — OFFICE VISIT (OUTPATIENT)
Dept: CARDIOLOGY | Facility: CLINIC | Age: 77
End: 2022-04-21
Attending: INTERNAL MEDICINE
Payer: COMMERCIAL

## 2022-04-21 VITALS
BODY MASS INDEX: 35.58 KG/M2 | HEART RATE: 61 BPM | SYSTOLIC BLOOD PRESSURE: 152 MMHG | HEIGHT: 68 IN | WEIGHT: 234.8 LBS | OXYGEN SATURATION: 94 % | DIASTOLIC BLOOD PRESSURE: 92 MMHG

## 2022-04-21 VITALS
HEART RATE: 61 BPM | BODY MASS INDEX: 35.58 KG/M2 | HEIGHT: 68 IN | WEIGHT: 234.8 LBS | SYSTOLIC BLOOD PRESSURE: 152 MMHG | OXYGEN SATURATION: 94 % | DIASTOLIC BLOOD PRESSURE: 92 MMHG

## 2022-04-21 DIAGNOSIS — R09.89 BRUIT: ICD-10-CM

## 2022-04-21 DIAGNOSIS — Z13.6 ENCOUNTER FOR SCREENING FOR ABDOMINAL AORTIC ANEURYSM (AAA) IN PATIENT 50 YEARS OF AGE OR OLDER WITHOUT OTHER RISK FACTORS FOR AAA: ICD-10-CM

## 2022-04-21 DIAGNOSIS — I35.0 NONRHEUMATIC AORTIC VALVE STENOSIS: Primary | ICD-10-CM

## 2022-04-21 DIAGNOSIS — I35.0 AORTIC STENOSIS, SEVERE: ICD-10-CM

## 2022-04-21 DIAGNOSIS — I35.0 AORTIC STENOSIS, SEVERE: Primary | ICD-10-CM

## 2022-04-21 DIAGNOSIS — I65.23 BILATERAL CAROTID ARTERY STENOSIS: Primary | ICD-10-CM

## 2022-04-21 LAB
ANION GAP SERPL CALCULATED.3IONS-SCNC: 5 MMOL/L (ref 3–14)
BUN SERPL-MCNC: 22 MG/DL (ref 7–30)
CALCIUM SERPL-MCNC: 8.9 MG/DL (ref 8.5–10.1)
CHLORIDE BLD-SCNC: 106 MMOL/L (ref 94–109)
CO2 SERPL-SCNC: 30 MMOL/L (ref 20–32)
CREAT SERPL-MCNC: 1.04 MG/DL (ref 0.66–1.25)
ERYTHROCYTE [DISTWIDTH] IN BLOOD BY AUTOMATED COUNT: 13.2 % (ref 10–15)
GFR SERPL CREATININE-BSD FRML MDRD: 74 ML/MIN/1.73M2
GLUCOSE BLD-MCNC: 99 MG/DL (ref 70–99)
HCT VFR BLD AUTO: 47.7 % (ref 40–53)
HGB BLD-MCNC: 15.6 G/DL (ref 13.3–17.7)
INR PPP: 1.02 (ref 0.85–1.15)
MCH RBC QN AUTO: 32.1 PG (ref 26.5–33)
MCHC RBC AUTO-ENTMCNC: 32.7 G/DL (ref 31.5–36.5)
MCV RBC AUTO: 98 FL (ref 78–100)
PLATELET # BLD AUTO: 195 10E3/UL (ref 150–450)
POTASSIUM BLD-SCNC: 4.4 MMOL/L (ref 3.4–5.3)
RBC # BLD AUTO: 4.86 10E6/UL (ref 4.4–5.9)
SODIUM SERPL-SCNC: 141 MMOL/L (ref 133–144)
WBC # BLD AUTO: 7.2 10E3/UL (ref 4–11)

## 2022-04-21 PROCEDURE — 36415 COLL VENOUS BLD VENIPUNCTURE: CPT | Performed by: INTERNAL MEDICINE

## 2022-04-21 PROCEDURE — 85018 HEMOGLOBIN: CPT | Performed by: INTERNAL MEDICINE

## 2022-04-21 PROCEDURE — 93880 EXTRACRANIAL BILAT STUDY: CPT

## 2022-04-21 PROCEDURE — 74174 CTA ABD&PLVS W/CONTRAST: CPT

## 2022-04-21 PROCEDURE — 80048 BASIC METABOLIC PNL TOTAL CA: CPT | Performed by: INTERNAL MEDICINE

## 2022-04-21 PROCEDURE — 99204 OFFICE O/P NEW MOD 45 MIN: CPT | Mod: 25

## 2022-04-21 PROCEDURE — 999N000105 HC STATISTIC NO DOCUMENTATION TO SUPPORT CHARGE

## 2022-04-21 PROCEDURE — 93880 EXTRACRANIAL BILAT STUDY: CPT | Mod: 26 | Performed by: RADIOLOGY

## 2022-04-21 PROCEDURE — 76706 US ABDL AORTA SCREEN AAA: CPT

## 2022-04-21 PROCEDURE — G0463 HOSPITAL OUTPT CLINIC VISIT: HCPCS | Mod: 25

## 2022-04-21 PROCEDURE — 76706 US ABDL AORTA SCREEN AAA: CPT | Mod: 26 | Performed by: RADIOLOGY

## 2022-04-21 PROCEDURE — G0463 HOSPITAL OUTPT CLINIC VISIT: HCPCS | Mod: 25,27

## 2022-04-21 PROCEDURE — 74174 CTA ABD&PLVS W/CONTRAST: CPT | Mod: 26 | Performed by: STUDENT IN AN ORGANIZED HEALTH CARE EDUCATION/TRAINING PROGRAM

## 2022-04-21 PROCEDURE — 85610 PROTHROMBIN TIME: CPT | Performed by: INTERNAL MEDICINE

## 2022-04-21 PROCEDURE — 71275 CT ANGIOGRAPHY CHEST: CPT | Mod: 26 | Performed by: STUDENT IN AN ORGANIZED HEALTH CARE EDUCATION/TRAINING PROGRAM

## 2022-04-21 PROCEDURE — 250N000011 HC RX IP 250 OP 636: Performed by: STUDENT IN AN ORGANIZED HEALTH CARE EDUCATION/TRAINING PROGRAM

## 2022-04-21 RX ORDER — IOPAMIDOL 755 MG/ML
120 INJECTION, SOLUTION INTRAVASCULAR ONCE
Status: COMPLETED | OUTPATIENT
Start: 2022-04-21 | End: 2022-04-21

## 2022-04-21 RX ORDER — LANOLIN ALCOHOL/MO/W.PET/CERES
3 CREAM (GRAM) TOPICAL
COMMUNITY
End: 2022-06-29

## 2022-04-21 RX ORDER — ASPIRIN 81 MG/1
81 TABLET, CHEWABLE ORAL DAILY
Qty: 90 TABLET | Refills: 3 | Status: SHIPPED | OUTPATIENT
Start: 2022-04-21 | End: 2022-06-28

## 2022-04-21 RX ADMIN — IOPAMIDOL 120 ML: 755 INJECTION, SOLUTION INTRAVENOUS at 08:54

## 2022-04-21 ASSESSMENT — PAIN SCALES - GENERAL
PAINLEVEL: NO PAIN (0)
PAINLEVEL: NO PAIN (0)

## 2022-04-21 NOTE — PROGRESS NOTES
Structural Heart Coordinator visit:  Provided additional education regarding TAVR/MitraClip procedure, after being present for discussion with physician. Explained the work-up process and next steps for patient. Patient provided our direct contact number and instructed to call with any questions.     Frailty Score: 0/5    Completed frailty testing and KCCQ.       5 meter walk: 4.66  Trial 1:5  Trail 2: 5  Trial 3: 4    : 81.36  Albumin: 4.2 on 9/5/2018  Eye ball test: Pass  ADL: 6/6    Dental Clearance: Pass      KCCQ Results:   1a. 5  1b. 5  1c. 4  2. 5  3. 7  4. 7  5. 5  6. 5  7. 4  8a. 5  8b. 5  8c. 5    STS Risk Score:   NYHA Class:       Ekaterina Bassett CMA  Structural Heart   [facility]

## 2022-04-21 NOTE — PROGRESS NOTES
[facility] HEART CARE  CARDIOVASCULAR DIVISION    VALVE CLINIC INITIAL CONSULTATION     PRIMARY CARDIOLOGIST: Dr. Ulloa       PERTINENT CLINICAL HISTORY:     Arun Wren is a very pleasant 77 year old male with severe aortic valvular stenosis referred to our clinic for evaluation and consideration for potential transcatheter aortic valve replacement (TAVR).  his severe aortic stenosis is characterized with an area of  0.95 cm2, mean gradient 50 mmHg and peak velocity of 4.3 m/sec with LVEF 55-60% by echocardiogram on 2/15/22.  Additional notable medical history of HTN and aortic aneurysm of 4.3 cm.  He denies any CP, or undue dyspnea on exertion. He does get SOB after walking 3-4 blocks but doesn't have to stop. No syncope, presyncope, edema, or HF sx otherwise.         PAST MEDICAL HISTORY:     Past Medical History:   Diagnosis Date     Actinic keratosis     05/19/09,Keratosis left external ear, treated with cryocautery, recheck if recurs or persists     Allergic contact dermatitis due to plants, except food     recurrent     Basal cell carcinoma of skin     No Comments Provided     Carpal tunnel syndrome     right greater than left     Closed fracture of base of skull (H)     No Comments Provided     Essential (primary) hypertension     No Comments Provided     Ganglion     No Comments Provided     Hydrocele     No Comments Provided     Male erectile dysfunction     decreased libido     Personal history of nicotine dependence     No Comments Provided     Pure hypercholesterolemia     No Comments Provided     Residual hemorrhoidal skin tags     Past external hemorrhoid     Tinea unguium     No Comments Provided        PAST SURGICAL HISTORY:     Past Surgical History:   Procedure Laterality Date     COLONOSCOPY  01/01/1998 1998,at [facility], which was normal     COLONOSCOPY  03/03/2005 03/03/05,at [facility]     COLONOSCOPY  03/02/2015    [facility]     COL[facility]COPY   03/16/2020    F/U N/A hyperplastic     OTHER SURGICAL HISTORY      ,HERNIA REPAIR     OTHER SURGICAL HISTORY      05/24/16,[id number],OTHER,Left,Dr. Jeremiah GIBSON     OTHER SURGICAL HISTORY      07/28/16,[id number],ORCHIECTOMY SIMPLE / PARTIAL / RADICAL W/ SCROTAL / INGUINAL APPROACH,Left,Dr. Jeremiah GIBSON        CURRENT MEDICATIONS:     Current Outpatient Medications   Medication Sig Dispense Refill     cholecalciferol (VITAMIN D3) 25 mcg (1000 units) capsule Take 1 capsule by mouth daily       lisinopril (ZESTRIL) 20 MG tablet Take 1 tablet (20 mg) by mouth daily 90 tablet 3     melatonin 3 MG tablet Take 1 mg by mouth nightly as needed for sleep       rosuvastatin (CRESTOR) 40 MG tablet Take 1 tablet (40 mg) by mouth daily 90 tablet 4     sildenafil (REVATIO) 20 MG tablet TAKE 4-5 TABLETS BY MOUTH ONCE DAILY IF NEEDED FOR OTHER (SPECIFY).TAKE 30 MINUTES BEFORE SEXUAL ACTIVITY. 80 tablet 1     torsemide (DEMADEX) 10 MG tablet Take 1 tablet (10 mg) by mouth daily 90 tablet 3     amoxicillin (AMOXIL) 500 MG capsule Take 4 capsules (2,000 mg) by mouth once as needed (30-60 minutes prior to dental procedures/cleanings.) (Patient not taking: Reported on 4/21/2022) 4 capsule 3     aspirin (ASA) 81 MG chewable tablet Take 1 tablet (81 mg) by mouth daily (Patient not taking: Reported on 4/21/2022) 90 tablet 3     nystatin (MYCOSTATIN) 105705 UNIT/GM external cream APPLY TOPICALLY TO AFFECTEDAREA(S) TWICE DAILY (Patient not taking: Reported on 4/21/2022) 60 g 4     triamcinolone (KENALOG) 0.1 % external cream Apply topically 2 times daily (Patient not taking: Reported on 4/21/2022) 80 g 4        ALLERGIES:   No Known Allergies     FAMILY HISTORY:     Family History   Problem Relation Age of Onset     Diabetes Mother         Diabetes     Other - See Comments Mother         joint disease     Heart Disease Mother 70        Heart Disease,CABG     Other - See Comments Father         Stroke,stroke/heavy smoker     Diabetes Paternal  "Grandmother         Diabetes,?GMother        SOCIAL HISTORY:     Social History     Socioeconomic History     Marital status:      Spouse name: None     Number of children: None     Years of education: None     Highest education level: None   Tobacco Use     Smoking status: Former Smoker     Years: 27.00     Types: Cigarettes     Quit date: 1988     Years since quittin.3     Smokeless tobacco: Never Used   Vaping Use     Vaping Use: Never used   Substance and Sexual Activity     Alcohol use: Yes     Comment: Alcoholic Drinks/day: 1-2 drinks per day, whiskey     Drug use: No     Sexual activity: Not Currently   Social History Narrative    Quit smoking .  Occ alcohol.  .   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        REVIEW OF SYSTEMS:     Constitutional: No fevers or chills  Skin: No new rash or itching  Eyes: No acute change in vision  Ears/Nose/Throat: No purulent rhinorrhea, new hearing loss, or new vertigo  Respiratory: No cough or hemoptysis  Cardiovascular: See HPI  Gastrointestinal: No change in appetite, vomiting, hematemesis or diarrhea  Genitourinary: No dysuria or hematuria  Musculoskeletal: No new back pain, neck pain or muscle pain  Neurologic: No new headaches, focal weakness or behavior changes  Psychiatric: No hallucinations, excessive alcohol consumption or illegal drug usage  Hematologic/Lymphatic/Immunologic: No bleeding, chills, fever, night sweats or weight loss  Endocrine: No new cold intolerance, heat intolerance, polyphagia, polydipsia or polyuria      PHYSICAL EXAMINATION:     BP (!) 152/92 (BP Location: Right arm, Patient Position: Chair, Cuff Size: Adult Regular)   Pulse 61   Ht 1.727 m (5' 8\")   Wt 106.5 kg (234 lb 12.8 oz)   SpO2 94%   BMI 35.70 kg/m      GENERAL: No acute distress.  HEENT: EOMI. Sclerae white, not injected. Nares clear. Pharynx without erythema or exudate.   Neck: No adenopathy. No " thyromegaly. Symmetrical.   Heart: Regular rate and rhythm. Harsh crescendo decrescendo late peaking systolic murmur with radiation to carotids. Delayed carotid pulses.   Lungs: Clear to auscultation. No ronchi, wheezes, rales.   Abdomen: Soft, nontender, nondistended. Bowel sounds present.  Extremities: No clubbing, cyanosis, or edema.   Neurologic: Alert and oriented to person/place/time, normal speech and affect. No focal deficits.  Skin: No petechiae, purpura or rash.     LABORATORY DATA:     LIPID RESULTS:  Lab Results   Component Value Date    CHOL 174 01/19/2022    CHOL 126 11/13/2020    HDL 64 01/19/2022    HDL 62 11/13/2020    LDL 83 01/19/2022    LDL 51 11/13/2020    TRIG 135 01/19/2022    TRIG 65 11/13/2020       LIVER ENZYME RESULTS:  Lab Results   Component Value Date    AST 38 09/05/2018    ALT 33 09/05/2018       CBC RESULTS:  Lab Results   Component Value Date    WBC 7.2 04/21/2022    RBC 4.86 04/21/2022    HGB 15.6 04/21/2022    HGB 16.1 05/20/2016    HCT 47.7 04/21/2022    HCT 47.7 05/20/2016    MCV 98 04/21/2022    MCV 96 05/20/2016    MCH 32.1 04/21/2022    MCH 32.5 05/20/2016    MCHC 32.7 04/21/2022    MCHC 33.7 05/20/2016    RDW 13.2 04/21/2022    RDW 12.2 05/20/2016     04/21/2022     05/20/2016       BMP RESULTS:  Lab Results   Component Value Date     04/21/2022     11/13/2020    POTASSIUM 4.4 04/21/2022    POTASSIUM 3.9 11/13/2020    CHLORIDE 106 04/21/2022    CHLORIDE 98 11/13/2020    CO2 30 04/21/2022    CO2 31 11/13/2020    ANIONGAP 5 04/21/2022    ANIONGAP 7 11/13/2020    GLC 99 04/21/2022     11/13/2020    BUN 22 04/21/2022    BUN 17 11/13/2020    CR 1.04 04/21/2022    CR 1.07 11/13/2020    GFRESTIMATED 74 04/21/2022    GFRESTIMATED 68 11/13/2020    GFRESTBLACK 82 11/13/2020    DEVORA 8.9 04/21/2022    DEVORA 8.9 11/13/2020        A1C RESULTS:  No results found for: A1C    INR RESULTS:  Lab Results   Component Value Date    INR 1.02 04/21/2022           PROCEDURES & FURTHER ASSESSMENTS:       CT TAVR: pending result. Appears to have severe coronary calcification on my review.    Coronary Angiogram and Right Heart Catheterization: pending    STS RISK SCORE: 1.74  FRAILTY SCORE: 0/4  NYHA CLASS: 1-2  Dental clearance: cleared     CLINICAL IMPRESSION:     Arun Wren is a 77 year old male with asymptomatic severe aortic stenosis who is a good candidate for transcatheter aortic valve replacement based on age, frailty, co-morbidities and overall surgical mortality risk estimation of 1.74% based on the STS score.      The pre-procedural TAVR evaluation currently requires a coronary angiogram, and pending assessment with CT TAVR,     Plan Summary:  1) Severe Aortic Stenosis:  -CT TAVR and coronary angiogram pending.  -Presentation of data to the Heart team to assess the optimal treatment of his severe aortic stenosis  - If patient has multivessel CAD, CABG with SAVR is recommended.    Marcia Mcpherson MD  Interventional Cardiology Fellow    CC  Patient Care Team:  Richard Arita MD as PCP - General (Family Medicine)  Richard Arita MD as Assigned PCP        I have seen and examined the patient with the TAVR team. I agree with the assessment and plan of the note above.I have reviewed pertinent labs.       Jorge Phillips MD  Interventional Cardiology  Pager: [phone]

## 2022-04-21 NOTE — PATIENT INSTRUCTIONS
You were seen today in the Cardiovascular Clinic at the [facility].      Cardiology Providers you saw during your visit:  Dr. Jorge Phillips    Recommendations:    Coronary angiogram  Heart surgeon consultation      Pre-procedure Instructions:    ===================================================  (Coronary Angiogram)  You will be scheduled [place] [address]. You are to check in at the [facility] Waiting Area.       Pre procedure instructions:  1. Please make arrangements to have a  as you will not be allowed to drive following the procedure.  2. Do not eat or drink after midnight the day of your procedure.  3. You may take your usual morning medications with a sip of water the morning of your procedure.  4. Please continue to take your 81 mg aspirin the day before and the day of your procedure.  5. Make arrangements to have someone stay with you the night after your procedure.  6.  You will need COVID testing prior to your procedure.  You will be contacted by the COVID testing team.      Please call me if you have questions regarding these instructions or your scheduled procedure.  _____________________________________________________________    You will be escorted back to the pre procedure area called 2A. Here they will insert an IV, draw labs, and obtain a short medical history. Please bring an updated list of your current medications.     A physician will come and talk with you about the procedure and obtain consent.     A nurse from the Cardiac Catheterization Lab will then escort you to the procedure area. You will be receiving sedation during the procedure so you will need someone to drive you to and from the hospital.     After the procedure you will recover for a short period (2 - 6 hours). You will be discharged with instructions for post procedure care.     Depending on what the  angiogram shows, you may have stents placed to open tight narrowings in your coronary arteries.    You will be scheduled for a Coronary Angiogram at the [facility], [address], [place] [address]. You are to check in at the [facility] Waiting Area.     ===========================================================  After all of your imaging has been completed, your case will be discussed at our weekly Heart Valve Conference.  After this is completed, you will be called and the details of this conference will be discussed with you.    ===========================================================    For questions, you may call the following numbers:    [phone]:  Erika Structural Heart Lead [facility]  2.  Nursing questions: Poonam Gallardo RN:  [phone]  3.  For emergencies call [phone].    It was a pleasure to see you in clinic.  If you have any questions at all, please feel free to give me a call.      Poonam Gallardo RN  Structural Heart Care Coordinator   TAVR and MitraClip Programs  [facility]  Office: [phone]    Clinics and Surgery Center  [address] [place] [address]  Cardiology Clinic [id number]  [place] [address]

## 2022-04-21 NOTE — LETTER
4/21/2022      RE: Arun Wren  [address] [address] [facility] [address] [facility] [address]       Dear Colleague,    Thank you for the opportunity to participate in the care of your patient, Arun Wren, at the [facility]ART CLINIC [facility]. Please see a copy of my visit note below.        [facility] HEART CARE  CARDIOVASCULAR DIVISION    VALVE CLINIC INITIAL CONSULTATION     PRIMARY CARDIOLOGIST: Dr. Ulloa       PERTINENT CLINICAL HISTORY:     Arun Wren is a very pleasant 77 year old male with severe aortic valvular stenosis referred to our clinic for evaluation and consideration for potential transcatheter aortic valve replacement (TAVR).  his severe aortic stenosis is characterized with an area of  0.95 cm2, mean gradient 50 mmHg and peak velocity of 4.3 m/sec with LVEF 55-60% by echocardiogram on 2/15/22.  Additional notable medical history of HTN and aortic aneurysm of 4.3 cm.  He denies any CP, or undue dyspnea on exertion. He does get SOB after walking 3-4 blocks but doesn't have to stop. No syncope, presyncope, edema, or HF sx otherwise.         PAST MEDICAL HISTORY:     Past Medical History:   Diagnosis Date     Actinic keratosis     05/19/09,Keratosis left external ear, treated with cryocautery, recheck if recurs or persists     Allergic contact dermatitis due to plants, except food     recurrent     Basal cell carcinoma of skin     No Comments Provided     Carpal tunnel syndrome     right greater than left     Closed fracture of base of skull (H)     No Comments Provided     Essential (primary) hypertension     No Comments Provided     Ganglion     No Comments Provided     Hydrocele     No Comments Provided     Male erectile dysfunction     decreased libido     Personal history of nicotine dependence     No Comments Provided     Pure hypercholesterolemia     No Comments Provided     Residual hemorrhoidal skin tags     Past external  hemorrhoid     Tinea unguium     No Comments Provided        PAST SURGICAL HISTORY:     Past Surgical History:   Procedure Laterality Date     COLONOSCOPY  01/01/1998 1998,at [facility], which was normal     COLONOSCOPY  03/03/2005 03/03/05,at [facility]     COLONOSCOPY  03/02/2015    3/2/15,F/U 2020  tubular adnomas     COLONOSCOPY  03/16/2020    F/U N/A hyperplastic     OTHER SURGICAL HISTORY      ,HERNIA REPAIR     OTHER SURGICAL HISTORY      05/24/16,[id number],OTHER,Left,Dr. Jeremiah GIBSON     OTHER SURGICAL HISTORY      07/28/16,[id number],ORCHIECTOMY SIMPLE / PARTIAL / RADICAL W/ SCROTAL / INGUINAL APPROACH,Left,Dr. Jeremiah GIBSON        CURRENT MEDICATIONS:     Current Outpatient Medications   Medication Sig Dispense Refill     cholecalciferol (VITAMIN D3) 25 mcg (1000 units) capsule Take 1 capsule by mouth daily       lisinopril (ZESTRIL) 20 MG tablet Take 1 tablet (20 mg) by mouth daily 90 tablet 3     melatonin 3 MG tablet Take 1 mg by mouth nightly as needed for sleep       rosuvastatin (CRESTOR) 40 MG tablet Take 1 tablet (40 mg) by mouth daily 90 tablet 4     sildenafil (REVATIO) 20 MG tablet TAKE 4-5 TABLETS BY MOUTH ONCE DAILY IF NEEDED FOR OTHER (SPECIFY).TAKE 30 MINUTES BEFORE SEXUAL ACTIVITY. 80 tablet 1     torsemide (DEMADEX) 10 MG tablet Take 1 tablet (10 mg) by mouth daily 90 tablet 3     amoxicillin (AMOXIL) 500 MG capsule Take 4 capsules (2,000 mg) by mouth once as needed (30-60 minutes prior to dental procedures/cleanings.) (Patient not taking: Reported on 4/21/2022) 4 capsule 3     aspirin (ASA) 81 MG chewable tablet Take 1 tablet (81 mg) by mouth daily (Patient not taking: Reported on 4/21/2022) 90 tablet 3     nystatin (MYCOSTATIN) 002698 UNIT/GM external cream APPLY TOPICALLY TO AFFECTEDAREA(S) TWICE DAILY (Patient not taking: Reported on 4/21/2022) 60 g 4     triamcinolone (KENALOG) 0.1 % external cream Apply topically 2 times daily (Patient not taking: Reported on 4/21/2022)  80 g 4        ALLERGIES:   No Known Allergies     FAMILY HISTORY:     Family History   Problem Relation Age of Onset     Diabetes Mother         Diabetes     Other - See Comments Mother         joint disease     Heart Disease Mother 70        Heart Disease,CABG     Other - See Comments Father         Stroke,stroke/heavy smoker     Diabetes Paternal Grandmother         Diabetes,?GMother        SOCIAL HISTORY:     Social History     Socioeconomic History     Marital status:      Spouse name: None     Number of children: None     Years of education: None     Highest education level: None   Tobacco Use     Smoking status: Former Smoker     Years: 27.00     Types: Cigarettes     Quit date: 1988     Years since quittin.3     Smokeless tobacco: Never Used   Vaping Use     Vaping Use: Never used   Substance and Sexual Activity     Alcohol use: Yes     Comment: Alcoholic Drinks/day: 1-2 drinks per day, whiskey     Drug use: No     Sexual activity: Not Currently   Social History Narrative    Quit smoking .  Occ alcohol.  .   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    Stays active, owns [employer].        REVIEW OF SYSTEMS:     Constitutional: No fevers or chills  Skin: No new rash or itching  Eyes: No acute change in vision  Ears/Nose/Throat: No purulent rhinorrhea, new hearing loss, or new vertigo  Respiratory: No cough or hemoptysis  Cardiovascular: See HPI  Gastrointestinal: No change in appetite, vomiting, hematemesis or diarrhea  Genitourinary: No dysuria or hematuria  Musculoskeletal: No new back pain, neck pain or muscle pain  Neurologic: No new headaches, focal weakness or behavior changes  Psychiatric: No hallucinations, excessive alcohol consumption or illegal drug usage  Hematologic/Lymphatic/Immunologic: No bleeding, chills, fever, night sweats or weight loss  Endocrine: No new cold intolerance, heat intolerance, polyphagia, polydipsia or polyuria      PHYSICAL  "EXAMINATION:     BP (!) 152/92 (BP Location: Right arm, Patient Position: Chair, Cuff Size: Adult Regular)   Pulse 61   Ht 1.727 m (5' 8\")   Wt 106.5 kg (234 lb 12.8 oz)   SpO2 94%   BMI 35.70 kg/m      GENERAL: No acute distress.  HEENT: EOMI. Sclerae white, not injected. Nares clear. Pharynx without erythema or exudate.   Neck: No adenopathy. No thyromegaly. Symmetrical.   Heart: Regular rate and rhythm. Harsh crescendo decrescendo late peaking systolic murmur with radiation to carotids. Delayed carotid pulses.   Lungs: Clear to auscultation. No ronchi, wheezes, rales.   Abdomen: Soft, nontender, nondistended. Bowel sounds present.  Extremities: No clubbing, cyanosis, or edema.   Neurologic: Alert and oriented to person/place/time, normal speech and affect. No focal deficits.  Skin: No petechiae, purpura or rash.     LABORATORY DATA:     LIPID RESULTS:  Lab Results   Component Value Date    CHOL 174 01/19/2022    CHOL 126 11/13/2020    HDL 64 01/19/2022    HDL 62 11/13/2020    LDL 83 01/19/2022    LDL 51 11/13/2020    TRIG 135 01/19/2022    TRIG 65 11/13/2020       LIVER ENZYME RESULTS:  Lab Results   Component Value Date    AST 38 09/05/2018    ALT 33 09/05/2018       CBC RESULTS:  Lab Results   Component Value Date    WBC 7.2 04/21/2022    RBC 4.86 04/21/2022    HGB 15.6 04/21/2022    HGB 16.1 05/20/2016    HCT 47.7 04/21/2022    HCT 47.7 05/20/2016    MCV 98 04/21/2022    MCV 96 05/20/2016    MCH 32.1 04/21/2022    MCH 32.5 05/20/2016    MCHC 32.7 04/21/2022    MCHC 33.7 05/20/2016    RDW 13.2 04/21/2022    RDW 12.2 05/20/2016     04/21/2022     05/20/2016       BMP RESULTS:  Lab Results   Component Value Date     04/21/2022     11/13/2020    POTASSIUM 4.4 04/21/2022    POTASSIUM 3.9 11/13/2020    CHLORIDE 106 04/21/2022    CHLORIDE 98 11/13/2020    CO2 30 04/21/2022    CO2 31 11/13/2020    ANIONGAP 5 04/21/2022    ANIONGAP 7 11/13/2020    GLC 99 04/21/2022     11/13/2020 "    BUN 22 04/21/2022    BUN 17 11/13/2020    CR 1.04 04/21/2022    CR 1.07 11/13/2020    GFRESTIMATED 74 04/21/2022    GFRESTIMATED 68 11/13/2020    GFRESTBLACK 82 11/13/2020    DEVORA 8.9 04/21/2022    DEVORA 8.9 11/13/2020        A1C RESULTS:  No results found for: A1C    INR RESULTS:  Lab Results   Component Value Date    INR 1.02 04/21/2022          PROCEDURES & FURTHER ASSESSMENTS:       CT TAVR: pending result. Appears to have severe coronary calcification on my review.    Coronary Angiogram and Right Heart Catheterization: pending    STS RISK SCORE: 1.74  FRAILTY SCORE: 0/4  NYHA CLASS: 1-2  Dental clearance: cleared     CLINICAL IMPRESSION:     Arun Wren is a 77 year old male with asymptomatic severe aortic stenosis who is a good candidate for transcatheter aortic valve replacement based on age, frailty, co-morbidities and overall surgical mortality risk estimation of 1.74% based on the STS score.      The pre-procedural TAVR evaluation currently requires a coronary angiogram, and pending assessment with CT TAVR,     Plan Summary:  1) Severe Aortic Stenosis:  -CT TAVR and coronary angiogram pending.  -Presentation of data to the Heart team to assess the optimal treatment of his severe aortic stenosis  - If patient has multivessel CAD, CABG with SAVR is recommended.    Marcia Mcpherson MD  Interventional Cardiology Fellow    I have seen and examined the patient with the TAVR team. I agree with the assessment and plan of the note above.I have reviewed pertinent labs.       Jorge Phillips MD  Interventional Cardiology  Pager: [phone]       Structural Heart Coordinator visit:  Provided additional education regarding TAVR/MitraClip procedure, after being present for discussion with physician. Explained the work-up process and next steps for patient. Patient provided our direct contact number and instructed to call with any questions.     Frailty Score: 0/5    Completed frailty testing and KCCQ.       5  meter walk: 4.66  Trial 1:5  Trail 2: 5  Trial 3: 4    : 81.36  Albumin: 4.2 on 9/5/2018  Eye ball test: Pass  ADL: 6/6    Dental Clearance: Pass      KCCQ Results:   1a. 5  1b. 5  1c. 4  2. 5  3. 7  4. 7  5. 5  6. 5  7. 4  8a. 5  8b. 5  8c. 5    STS Risk Score:   NYHA Class:       Ekaterina Bassett CMA  Structural Heart   [facility]        Please do not hesitate to contact me if you have any questions/concerns.     Sincerely,     [facility]

## 2022-04-21 NOTE — LETTER
4/21/2022      RE: Arun Jamestz  [address] [address] [facility] [address]       Dear Colleague,    Thank you for the opportunity to participate in the care of your patient, Arun Wren, at the [facility] CL[facility]. Please see a copy of my visit note below.    I did not see the patient during this visit      Please do not hesitate to contact me if you have any questions/concerns.     Sincerely,     Pasquale Delgado MD

## 2022-04-21 NOTE — NURSING NOTE
Chief Complaint   Patient presents with     New Patient     TAVR Consult      Vitals were taken and medications were reconciled.   MOY Reese  12:58 PM

## 2022-04-22 ENCOUNTER — APPOINTMENT (OUTPATIENT)
Dept: MEDSURG UNIT | Facility: CLINIC | Age: 77
End: 2022-04-22
Attending: INTERNAL MEDICINE
Payer: COMMERCIAL

## 2022-04-22 ENCOUNTER — HOSPITAL ENCOUNTER (OUTPATIENT)
Facility: CLINIC | Age: 77
Discharge: HOME | End: 2022-04-22
Attending: INTERNAL MEDICINE | Admitting: INTERNAL MEDICINE
Payer: COMMERCIAL

## 2022-04-22 ENCOUNTER — APPOINTMENT (OUTPATIENT)
Dept: LAB | Facility: CLINIC | Age: 77
End: 2022-04-22
Attending: INTERNAL MEDICINE
Payer: COMMERCIAL

## 2022-04-22 VITALS
WEIGHT: 234 LBS | SYSTOLIC BLOOD PRESSURE: 111 MMHG | RESPIRATION RATE: 16 BRPM | TEMPERATURE: 97.6 F | HEART RATE: 65 BPM | DIASTOLIC BLOOD PRESSURE: 69 MMHG | OXYGEN SATURATION: 96 % | HEIGHT: 68 IN | BODY MASS INDEX: 35.46 KG/M2

## 2022-04-22 DIAGNOSIS — I25.10 CORONARY ARTERY DISEASE INVOLVING NATIVE CORONARY ARTERY OF NATIVE HEART WITHOUT ANGINA PECTORIS: Primary | ICD-10-CM

## 2022-04-22 DIAGNOSIS — I35.0 AORTIC STENOSIS, SEVERE: ICD-10-CM

## 2022-04-22 PROBLEM — Z98.890 STATUS POST CORONARY ANGIOGRAM: Status: ACTIVE | Noted: 2022-04-22

## 2022-04-22 LAB
HGB BLD-MCNC: 15.4 G/DL (ref 13.3–17.7)
OXYHGB MFR BLDV: 72 % (ref 92–100)

## 2022-04-22 PROCEDURE — 99153 MOD SED SAME PHYS/QHP EA: CPT | Performed by: INTERNAL MEDICINE

## 2022-04-22 PROCEDURE — 85018 HEMOGLOBIN: CPT

## 2022-04-22 PROCEDURE — 999N000142 HC STATISTIC PROCEDURE PREP ONLY

## 2022-04-22 PROCEDURE — 272N000002 HC OR SUPPLY OTHER OPNP: Performed by: INTERNAL MEDICINE

## 2022-04-22 PROCEDURE — 258N000003 HC RX IP 258 OP 636: Performed by: INTERNAL MEDICINE

## 2022-04-22 PROCEDURE — 250N000011 HC RX IP 250 OP 636: Performed by: INTERNAL MEDICINE

## 2022-04-22 PROCEDURE — 250N000009 HC RX 250: Performed by: INTERNAL MEDICINE

## 2022-04-22 PROCEDURE — 82810 BLOOD GASES O2 SAT ONLY: CPT

## 2022-04-22 PROCEDURE — 93456 R HRT CORONARY ARTERY ANGIO: CPT | Performed by: INTERNAL MEDICINE

## 2022-04-22 PROCEDURE — 999N000134 HC STATISTIC PP CARE STAGE 3

## 2022-04-22 PROCEDURE — 99152 MOD SED SAME PHYS/QHP 5/>YRS: CPT | Performed by: INTERNAL MEDICINE

## 2022-04-22 PROCEDURE — 999N000054 HC STATISTIC EKG NON-CHARGEABLE

## 2022-04-22 PROCEDURE — 93010 ELECTROCARDIOGRAM REPORT: CPT | Performed by: INTERNAL MEDICINE

## 2022-04-22 PROCEDURE — 93005 ELECTROCARDIOGRAM TRACING: CPT

## 2022-04-22 PROCEDURE — 272N000001 HC OR GENERAL SUPPLY STERILE: Performed by: INTERNAL MEDICINE

## 2022-04-22 PROCEDURE — C1894 INTRO/SHEATH, NON-LASER: HCPCS | Performed by: INTERNAL MEDICINE

## 2022-04-22 PROCEDURE — 250N000013 HC RX MED GY IP 250 OP 250 PS 637: Performed by: INTERNAL MEDICINE

## 2022-04-22 RX ORDER — HEPARIN SODIUM 10000 [USP'U]/100ML
100-1000 INJECTION, SOLUTION INTRAVENOUS CONTINUOUS PRN
Status: DISCONTINUED | OUTPATIENT
Start: 2022-04-22 | End: 2022-04-22 | Stop reason: HOSPADM

## 2022-04-22 RX ORDER — NALOXONE HYDROCHLORIDE 0.4 MG/ML
0.2 INJECTION, SOLUTION INTRAMUSCULAR; INTRAVENOUS; SUBCUTANEOUS
Status: DISCONTINUED | OUTPATIENT
Start: 2022-04-22 | End: 2022-04-22 | Stop reason: HOSPADM

## 2022-04-22 RX ORDER — ASPIRIN 325 MG
325 TABLET ORAL ONCE
Status: COMPLETED | OUTPATIENT
Start: 2022-04-22 | End: 2022-04-22

## 2022-04-22 RX ORDER — OXYCODONE HYDROCHLORIDE 5 MG/1
5 TABLET ORAL EVERY 4 HOURS PRN
Status: DISCONTINUED | OUTPATIENT
Start: 2022-04-22 | End: 2022-04-22 | Stop reason: HOSPADM

## 2022-04-22 RX ORDER — NITROGLYCERIN 20 MG/100ML
10-200 INJECTION INTRAVENOUS CONTINUOUS PRN
Status: DISCONTINUED | OUTPATIENT
Start: 2022-04-22 | End: 2022-04-22 | Stop reason: HOSPADM

## 2022-04-22 RX ORDER — IOPAMIDOL 755 MG/ML
INJECTION, SOLUTION INTRAVASCULAR
Status: DISCONTINUED | OUTPATIENT
Start: 2022-04-22 | End: 2022-04-22 | Stop reason: HOSPADM

## 2022-04-22 RX ORDER — FENTANYL CITRATE 50 UG/ML
25 INJECTION, SOLUTION INTRAMUSCULAR; INTRAVENOUS
Status: DISCONTINUED | OUTPATIENT
Start: 2022-04-22 | End: 2022-04-22 | Stop reason: HOSPADM

## 2022-04-22 RX ORDER — FENTANYL CITRATE 50 UG/ML
INJECTION, SOLUTION INTRAMUSCULAR; INTRAVENOUS
Status: DISCONTINUED | OUTPATIENT
Start: 2022-04-22 | End: 2022-04-22 | Stop reason: HOSPADM

## 2022-04-22 RX ORDER — LIDOCAINE 40 MG/G
CREAM TOPICAL
Status: DISCONTINUED | OUTPATIENT
Start: 2022-04-22 | End: 2022-04-22 | Stop reason: HOSPADM

## 2022-04-22 RX ORDER — EPTIFIBATIDE 2 MG/ML
180 INJECTION, SOLUTION INTRAVENOUS EVERY 10 MIN PRN
Status: DISCONTINUED | OUTPATIENT
Start: 2022-04-22 | End: 2022-04-22 | Stop reason: HOSPADM

## 2022-04-22 RX ORDER — DOBUTAMINE HYDROCHLORIDE 200 MG/100ML
2-20 INJECTION INTRAVENOUS CONTINUOUS PRN
Status: DISCONTINUED | OUTPATIENT
Start: 2022-04-22 | End: 2022-04-22 | Stop reason: HOSPADM

## 2022-04-22 RX ORDER — ASPIRIN 81 MG/1
243 TABLET, CHEWABLE ORAL ONCE
Status: COMPLETED | OUTPATIENT
Start: 2022-04-22 | End: 2022-04-22

## 2022-04-22 RX ORDER — NICARDIPINE HYDROCHLORIDE 2.5 MG/ML
INJECTION INTRAVENOUS
Status: DISCONTINUED | OUTPATIENT
Start: 2022-04-22 | End: 2022-04-22 | Stop reason: HOSPADM

## 2022-04-22 RX ORDER — HEPARIN SODIUM 1000 [USP'U]/ML
INJECTION, SOLUTION INTRAVENOUS; SUBCUTANEOUS
Status: DISCONTINUED | OUTPATIENT
Start: 2022-04-22 | End: 2022-04-22 | Stop reason: HOSPADM

## 2022-04-22 RX ORDER — DOPAMINE HYDROCHLORIDE 160 MG/100ML
2-20 INJECTION, SOLUTION INTRAVENOUS CONTINUOUS PRN
Status: DISCONTINUED | OUTPATIENT
Start: 2022-04-22 | End: 2022-04-22 | Stop reason: HOSPADM

## 2022-04-22 RX ORDER — POTASSIUM CHLORIDE 750 MG/1
40 TABLET, EXTENDED RELEASE ORAL
Status: DISCONTINUED | OUTPATIENT
Start: 2022-04-22 | End: 2022-04-22 | Stop reason: HOSPADM

## 2022-04-22 RX ORDER — NALOXONE HYDROCHLORIDE 0.4 MG/ML
0.4 INJECTION, SOLUTION INTRAMUSCULAR; INTRAVENOUS; SUBCUTANEOUS
Status: DISCONTINUED | OUTPATIENT
Start: 2022-04-22 | End: 2022-04-22 | Stop reason: HOSPADM

## 2022-04-22 RX ORDER — FLUMAZENIL 0.1 MG/ML
0.2 INJECTION, SOLUTION INTRAVENOUS
Status: DISCONTINUED | OUTPATIENT
Start: 2022-04-22 | End: 2022-04-22 | Stop reason: HOSPADM

## 2022-04-22 RX ORDER — POTASSIUM CHLORIDE 750 MG/1
20 TABLET, EXTENDED RELEASE ORAL
Status: DISCONTINUED | OUTPATIENT
Start: 2022-04-22 | End: 2022-04-22 | Stop reason: HOSPADM

## 2022-04-22 RX ORDER — EPTIFIBATIDE 2 MG/ML
2 INJECTION, SOLUTION INTRAVENOUS CONTINUOUS PRN
Status: DISCONTINUED | OUTPATIENT
Start: 2022-04-22 | End: 2022-04-22 | Stop reason: HOSPADM

## 2022-04-22 RX ORDER — ARGATROBAN 1 MG/ML
150 INJECTION, SOLUTION INTRAVENOUS
Status: DISCONTINUED | OUTPATIENT
Start: 2022-04-22 | End: 2022-04-22 | Stop reason: HOSPADM

## 2022-04-22 RX ORDER — OXYCODONE HYDROCHLORIDE 10 MG/1
10 TABLET ORAL EVERY 4 HOURS PRN
Status: DISCONTINUED | OUTPATIENT
Start: 2022-04-22 | End: 2022-04-22 | Stop reason: HOSPADM

## 2022-04-22 RX ORDER — ARGATROBAN 1 MG/ML
350 INJECTION, SOLUTION INTRAVENOUS
Status: DISCONTINUED | OUTPATIENT
Start: 2022-04-22 | End: 2022-04-22 | Stop reason: HOSPADM

## 2022-04-22 RX ORDER — SODIUM CHLORIDE 9 MG/ML
INJECTION, SOLUTION INTRAVENOUS CONTINUOUS
Status: DISCONTINUED | OUTPATIENT
Start: 2022-04-22 | End: 2022-04-22 | Stop reason: HOSPADM

## 2022-04-22 RX ORDER — HYDRALAZINE HYDROCHLORIDE 20 MG/ML
10 INJECTION INTRAMUSCULAR; INTRAVENOUS EVERY 10 MIN PRN
Status: DISCONTINUED | OUTPATIENT
Start: 2022-04-22 | End: 2022-04-22 | Stop reason: HOSPADM

## 2022-04-22 RX ORDER — ATROPINE SULFATE 0.1 MG/ML
0.5 INJECTION INTRAVENOUS
Status: DISCONTINUED | OUTPATIENT
Start: 2022-04-22 | End: 2022-04-22 | Stop reason: HOSPADM

## 2022-04-22 RX ADMIN — HYDRALAZINE HYDROCHLORIDE 10 MG: 20 INJECTION INTRAMUSCULAR; INTRAVENOUS at 12:50

## 2022-04-22 RX ADMIN — SODIUM CHLORIDE: 9 INJECTION, SOLUTION INTRAVENOUS at 10:58

## 2022-04-22 RX ADMIN — LIDOCAINE: 40 CREAM TOPICAL at 10:59

## 2022-04-22 RX ADMIN — HYDRALAZINE HYDROCHLORIDE 10 MG: 20 INJECTION INTRAMUSCULAR; INTRAVENOUS at 12:40

## 2022-04-22 RX ADMIN — ASPIRIN 325 MG ORAL TABLET 325 MG: 325 PILL ORAL at 09:00

## 2022-04-22 NOTE — PROGRESS NOTES
Prepped for RH/cors/possible PCI.  Denies pain.  Wife waiting in room with him.  Consent needed.  Labs current.  H & P current.  BP elevated.

## 2022-04-22 NOTE — PROGRESS NOTES
D/I/A: Pt roomed on 3C in bay 1330.  Arrived via litter and accompanied by RN off monitor.  VSSA.  Rhythm upon arrival SR on monitor.  Denies pain or sob.  Reviewed activity restrictions and when to notify RN, ie-changes to breathing or increased chest pressure or chest pain.  CCL access:  L radial access site has TR band with 12cc of air. To start deflating air at 1515.  P: Continue to monitor status.  Discharge to home once meeting criteria.

## 2022-04-22 NOTE — PROGRESS NOTES
D/I/A:  Patient is tolerating liquids and foods, ambulating, urinating, puncture sites are stable ( no bleeding and no hematoma) and patient has a .  A+O x4 and making needs known.  CCL access sites C/D/I; no bleeding or hematoma; CMS intact.  VSSA.  SR on monitor.  IV access removed.  Education completed and outlined in AVS or handout: medications reviewed with patient.  Questions answered prior to discharge.  Belongings returned to patient at discharge.  R neck site drsg cdi. No hematoma.  L wrist covered with drsg. Site CDI. No hematoma.   P: Discharged to self care.  Patient to follow up with appts as per discharge instruction.

## 2022-04-22 NOTE — PROGRESS NOTES
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  No intervention undertaken.  Plan for outpatient surgical consult, better suited for CABG with SAVR vs TAVR.   Will place outpatient surgical consult order.     Continue ASA/statin unchanged.     Family aware of plan.      Annia Padron PA-C  [facility]  Pager [phone]

## 2022-04-22 NOTE — PROGRESS NOTES
"    Cardiology Cath Lab Service      Arun Wren MRN# [id number]   YOB: 1945 Age: 77 year old       Assessment and plan:     Arun Wren is a 77 year old male with a PMHx of HTN and aortic aneurysm of 4.3 cm, along with severe aortic valvular stenosis who is being evaluated for possible transcatheter aortic valve replacement (TAVR).        Patient has been referred for a coronary angiogram with possible intervention, by Dr. Mcpherson.     Contrast allergy: No  Anticoagulation: No  Use of phosphodiesterase type 5 inhibitor: Yes--but has not taken the last few weeks  Aspirin/antiplatelet: Yes --ASA taken this AM  Renal concerns: No  Pt NPO at least 6 hours: Yes  Does patient have : Yes (son will be driving)    Note: Pt has not taken his torsemide or lisinopril the last 2 days. BP very high today 180-190/100 in holding area. D/w cardiac fellow, no intervention planned.     Pre procedure labs reviewed as below.     CONSENT:    We discussed recommendations for angiogram, and reviewed risks and benefits. These include but are not limited to: heart attack, stroke, infection, hematoma/vascular complications, use of stents, and cardiac arrhythmias. We also discussed the risk of potential dye nephropathy and possible need for DAPT if stenting is required. The patient voices his/her understanding and wishes to proceed. Verbal and written consent obtained.    No contraindications identified, will move forward with planned procedure.   Patient plans to discharge home with his wife and son after post procedure orders have been met.      Physical Examination:  Vitals: BP (!) 191/110 (BP Location: Left arm)   Pulse 62   Temp 97.9  F (36.6  C) (Oral)   Resp 18   Ht 1.727 m (5' 8\")   Wt 106.1 kg (234 lb)   SpO2 96%   BMI 35.58 kg/m    BMI= Body mass index is 35.58 kg/m .    Wt Readings from Last 4 Encounters:   04/22/22 106.1 kg (234 lb)   04/21/22 106.5 kg (234 lb 12.8 oz)   04/21/22 106.5 kg (234 " lb 12.8 oz)   03/31/22 107.5 kg (237 lb)       GEN:  In general, this is a well nourished male in no acute distress on room air.  Patient accompanied by his wife.   C/V:  Regular rate and rhythm, no murmur, rub or gallop.  RESP: Respirations are unlabored. No use of accessory muscles. Clear to auscultation bilaterally without wheezing, rales, or rhonchi.  NEURO: Alert and oriented, cooperative. Gait not formally assessed. No obvious focal deficits.     Past Medical History:   Diagnosis Date     Actinic keratosis     05/19/09,Keratosis left external ear, treated with cryocautery, recheck if recurs or persists     Allergic contact dermatitis due to plants, except food     recurrent     Basal cell carcinoma of skin     No Comments Provided     Carpal tunnel syndrome     right greater than left     Closed fracture of base of skull (H)     No Comments Provided     Essential (primary) hypertension     No Comments Provided     Ganglion     No Comments Provided     Hydrocele     No Comments Provided     Male erectile dysfunction     decreased libido     Personal history of nicotine dependence     No Comments Provided     Pure hypercholesterolemia     No Comments Provided     Residual hemorrhoidal skin tags     Past external hemorrhoid     Tinea unguium     No Comments Provided       Past Surgical History:   Procedure Laterality Date     COLONOSCOPY  01/01/1998 1998,at [facility], which was normal     COLONOSCOPY  03/03/2005 03/03/05,at [facility]     COLONOSCOPY  03/02/2015    3/2/15,F/U 2020  tubular adnomas     COLONOSCOPY  03/16/2020    F/U N/A hyperplastic     OTHER SURGICAL HISTORY      ,HERNIA REPAIR     OTHER SURGICAL HISTORY      05/24/16,[id number],OTHER,Left,Dr. Jeremiah GIBSON     OTHER SURGICAL HISTORY      07/28/16,[id number],ORCHIECTOMY SIMPLE / PARTIAL / RADICAL W/ SCROTAL / INGUINAL APPROACH,Left,Dr. Jeremiah GIBSON       No current facility-administered medications on file prior to  encounter.  amoxicillin (AMOXIL) 500 MG capsule, Take 4 capsules (2,000 mg) by mouth once as needed (30-60 minutes prior to dental procedures/cleanings.)  lisinopril (ZESTRIL) 20 MG tablet, Take 1 tablet (20 mg) by mouth daily  nystatin (MYCOSTATIN) 944873 UNIT/GM external cream, APPLY TOPICALLY TO AFFECTEDAREA(S) TWICE DAILY  rosuvastatin (CRESTOR) 40 MG tablet, Take 1 tablet (40 mg) by mouth daily  sildenafil (REVATIO) 20 MG tablet, TAKE 4-5 TABLETS BY MOUTH ONCE DAILY IF NEEDED FOR OTHER (SPECIFY).TAKE 30 MINUTES BEFORE SEXUAL ACTIVITY.  torsemide (DEMADEX) 10 MG tablet, Take 1 tablet (10 mg) by mouth daily  triamcinolone (KENALOG) 0.1 % external cream, Apply topically 2 times daily        Social History     Tobacco Use     Smoking status: Former Smoker     Years: 27.00     Types: Cigarettes     Quit date: 1988     Years since quittin.3     Smokeless tobacco: Never Used   Substance Use Topics     Alcohol use: Yes     Comment: Alcoholic Drinks/day: 1-2 drinks per day, whiskey       No Known Allergies      Recent Lab Results:   Latest Reference Range & Units 22 07:15   Sodium 133 - 144 mmol/L 141   Potassium 3.4 - 5.3 mmol/L 4.4   Chloride 94 - 109 mmol/L 106   Carbon Dioxide 20 - 32 mmol/L 30   Urea Nitrogen 7 - 30 mg/dL 22   Creatinine 0.66 - 1.25 mg/dL 1.04   GFR Estimate >60 mL/min/1.73m2 74 [1]   Calcium 8.5 - 10.1 mg/dL 8.9   Anion Gap 3 - 14 mmol/L 5   Glucose 70 - 99 mg/dL 99   WBC 4.0 - 11.0 10e3/uL 7.2   Hemoglobin 13.3 - 17.7 g/dL 15.6   Hematocrit 40.0 - 53.0 % 47.7   Platelet Count 150 - 450 10e3/uL 195   RBC Count 4.40 - 5.90 10e6/uL 4.86   MCV 78 - 100 fL 98   MCH 26.5 - 33.0 pg 32.1   MCHC 31.5 - 36.5 g/dL 32.7   RDW 10.0 - 15.0 % 13.2   INR 0.85 - 1.15  1.02   [1] Effective 2021 eGFRcr in adults is calculated using the  CKD-EPI creatinine equation which includes age and gender (Richy et al., NEJM, DOI: 10.1056/XJWAjv5366706)    Other pertinent testing:  Echo  2/15/22  Interpretation Summary     Global and regional left ventricular function is normal with an EF of 55-60%.  The right ventricle is normal in function and size.  Severe aortic stenosis is present (MG 50mmHg, PV 4.3m/s, DVI 0.26)  Ascending aorta is dilated measuring 4.2cm  No pericardial effusion is present.  This study was compared with the study from 10/31/18 the AS has progressed and  aorta is more dilated .      EK22        Annia Padron PA-C  [facility] Heart  Pager [phone]

## 2022-04-22 NOTE — DISCHARGE INSTRUCTIONS
Going Home after an Angiogram with TR Band Placement        After you go home:  Have an adult stay with you for 24 hours.  Drink plenty of fluids.  You may eat your normal diet, unless your doctor tells you otherwise.    For 24 hours:  - Relax and take it easy.  - Do NOT smoke.  - Do NOT make any important or legal decisions.  - Do NOT drive or operate machines at home or at work.  - Do NOT drink alcohol.    Remove the Band-Aid after 24 hours. If there is minor oozing, apply another Band-aid and remove it after 12 hours.  For 2 days, do NOT have sex or do any heavy exercise.  Do NOT take a bath, or use a hot tub or pool for at least 3 days. You may shower.              Care of wrist or arm site              It is normal to have soreness at the puncture site and mild tingling in your hand for up to 3 days.  For 2 days, do not use your hand or arm to support your weight (such as rising from a chair) or bend your wrist (such as lifting a garage door).  For 2 days, do not lift more than 5 pounds or exercise your arm (tennis, golf or bowling).                    If you start bleeding from the site in your arm:  Sit down and press firmly on the site with your fingers for 10 minutes. Call your doctor as soon as you can.  If the bleeding stops, sit still and keep your wrist straight for 2 hours.    Call your doctor if:  You have a large or growing hard lump around the site.    The site is red, swollen, hot or tender.  Blood or fluid is draining from the site.  You have chills or a fever greater than 101 F (38 C).  Your leg or arm turns bluish, feels numb or cool.  You have hives, a rash or unusual itching.    Call 911 right away if you have:   Bleeding that does not stop.   Heavy bleeding.    [facility] [place]:  [phone] (7 days a week)  We will call you tomorrow to follow-up. Number where we can reach you: ***

## 2022-04-23 LAB
ATRIAL RATE - MUSE: 62 BPM
DIASTOLIC BLOOD PRESSURE - MUSE: NORMAL MMHG
INTERPRETATION ECG - MUSE: NORMAL
P AXIS - MUSE: 48 DEGREES
PR INTERVAL - MUSE: 174 MS
QRS DURATION - MUSE: 98 MS
QT - MUSE: 456 MS
QTC - MUSE: 462 MS
R AXIS - MUSE: 41 DEGREES
SYSTOLIC BLOOD PRESSURE - MUSE: NORMAL MMHG
T AXIS - MUSE: 86 DEGREES
VENTRICULAR RATE- MUSE: 62 BPM

## 2022-04-27 ENCOUNTER — TELEPHONE (OUTPATIENT)
Dept: CARDIOLOGY | Facility: CLINIC | Age: 77
End: 2022-04-27
Payer: COMMERCIAL

## 2022-04-28 ENCOUNTER — PREP FOR PROCEDURE (OUTPATIENT)
Dept: CARDIOLOGY | Facility: CLINIC | Age: 77
End: 2022-04-28
Payer: COMMERCIAL

## 2022-04-28 ENCOUNTER — OFFICE VISIT (OUTPATIENT)
Dept: CARDIOLOGY | Facility: CLINIC | Age: 77
End: 2022-04-28
Attending: PHYSICIAN ASSISTANT
Payer: COMMERCIAL

## 2022-04-28 VITALS
BODY MASS INDEX: 35.25 KG/M2 | DIASTOLIC BLOOD PRESSURE: 105 MMHG | HEIGHT: 69 IN | OXYGEN SATURATION: 95 % | HEART RATE: 58 BPM | WEIGHT: 238 LBS | SYSTOLIC BLOOD PRESSURE: 196 MMHG

## 2022-04-28 DIAGNOSIS — I25.10 CAD (CORONARY ARTERY DISEASE): Primary | ICD-10-CM

## 2022-04-28 DIAGNOSIS — I35.0 AORTIC STENOSIS: ICD-10-CM

## 2022-04-28 DIAGNOSIS — I35.0 NONRHEUMATIC AORTIC VALVE STENOSIS: Primary | ICD-10-CM

## 2022-04-28 DIAGNOSIS — I25.10 CORONARY ARTERY DISEASE INVOLVING NATIVE CORONARY ARTERY OF NATIVE HEART WITHOUT ANGINA PECTORIS: ICD-10-CM

## 2022-04-28 DIAGNOSIS — I99.8 OTHER DISORDER OF CIRCULATORY SYSTEM: ICD-10-CM

## 2022-04-28 DIAGNOSIS — R79.1 ABNORMAL COAGULATION PROFILE: ICD-10-CM

## 2022-04-28 DIAGNOSIS — R79.9 ABNORMAL FINDING OF BLOOD CHEMISTRY, UNSPECIFIED: ICD-10-CM

## 2022-04-28 PROCEDURE — 99204 OFFICE O/P NEW MOD 45 MIN: CPT | Performed by: SURGERY

## 2022-04-28 PROCEDURE — G0463 HOSPITAL OUTPT CLINIC VISIT: HCPCS

## 2022-04-28 ASSESSMENT — PAIN SCALES - GENERAL: PAINLEVEL: NO PAIN (0)

## 2022-04-28 NOTE — LETTER
4/28/2022      RE: Arun Wren  [address] [address] [address]       Dear Colleague,    Thank you for the opportunity to participate in the care of your patient, Arun Wren, at the [facility]. Please see a copy of my visit note below.    CV Surgery    Patient seen, clinic note dictated #[id number].    Sai Benítez MD      Please do not hesitate to contact me if you have any questions/concerns.     Sincerely,     Sai Benítez MD

## 2022-04-28 NOTE — LETTER
4/28/2022       RE: Arun Wren  [address] [address] [address]     Dear Colleague,    Thank you for referring your patient, Arun Wren, to the [facility] [place] [facility]. Please see a copy of my visit note below.    CV Surgery      Sai Benítez MD    Service Date: 04/28/2022    REFERRING CARDIOLOGIST:  Jorge Phillips MD    REASON FOR CONSULTATION:  Evaluation for aortic valve replacement and coronary artery bypass grafting.    HISTORY OF PRESENT ILLNESS:  Mr. Wren is a very pleasant 77-year-old gentleman who was recently worked up in the [facility] for evaluation for his severe symptomatic aortic stenosis.  TAVR workup was initiated.  Coronary angiogram, however, demonstrated severe multivessel coronary artery disease.  For this reason, the patient was felt to be a better candidate for surgical AVR and concomitant coronary artery bypass surgery.  Therefore, he is seeing me in clinic today to be evaluated for this.    PAST MEDICAL HISTORY:  Significant for hypertension, hydrocele ganglionic cyst, hyperlipidemia, actinic keratosis, basal cell carcinoma of the skin.    PAST SURGICAL HISTORY:  Inguinal hernia repair.    ALLERGIES:  No known drug allergies.    CURRENT OUTPATIENT MEDICATIONS:  Reviewed in Epic chart.    FAMILY HISTORY:  Significant for coronary artery disease and diabetes.    SOCIAL HISTORY:  He is .  Former smoker for 27 years, but quit in 1988.  Drinks 1-2 alcoholic beverages per day.    REVIEW OF SYSTEMS:  As per HPI.  Complains of dyspnea on exertion.  No chest pain.  All other 10-point review of systems are completed and otherwise negative unless stated above.    PHYSICAL EXAMINATION:    VITAL SIGNS:  All vital signs are stable.  GENERAL:  Appears well, in no acute distress.  HEENT:  Within normal limits.  NECK:  Supple, no lymphadenopathy.  CARDIOVASCULAR:  Regular rate and  rhythm, normal S1, S2.  Grade 3/6 systolic murmur at the right upper sternal border.  LUNGS:  Clear bilaterally.  ABDOMEN:  Soft, nontender, nondistended.  EXTREMITIES:  Negative for edema, cyanosis or clubbing.  NEUROLOGIC:  A and O x3.  No focal deficits.    PERTINENT LABORATORY STUDIES:  Most recent transthoracic echo from 02/15/2022 demonstrates normal LV systolic size and function with EF of 55% to 60%, normal RV function, severe aortic stenosis with a mean gradient of 50 mmHg, mildly dilated ascending aorta measuring 4.2 cm, mild MAC, mild MR, trace TR.  TAVR protocol CT scan performed 04/21/2022 demonstrates a trileaflet aortic valve that is heavily calcified, mildly calcified ascending aorta with maximal diameter of 4.2 cm, coronary angiogram performed 04/22/2022 demonstrates severe triple vessel coronary artery disease with 95% ostial to proximal left circumflex disease, 90% ostial ramus disease, 70% proximal to mid RCA disease, but no flow-limiting LAD disease.    ASSESSMENT AND PLAN:  Mr. Wren is a very pleasant 77-year-old gentleman with severe symptomatic aortic stenosis and severe multivessel coronary artery disease.  My recommendation is bioprosthetic aortic valve replacement and concomitant coronary artery bypass grafting.  I went over the diagnosis in detail with the patient and the reason for recommending surgical AVR and coronary artery bypass grafting.  I went over the risks and benefits of the operation and the potential complications associated with the surgery.  These complications include but are not strictly limited to infection, bleeding, stroke, postop MI, postop arrhythmias, pulmonary or renal complications.  I think overall he is an excellent surgical candidate and I quoted an operative mortality risk of around 1% to 2%.  The patient understands and agrees to proceed.  The patient will be scheduled for aortic valve replacement and coronary artery bypass grafting with endoscopic vein  harvest within the next several weeks.  It was a pleasure meeting  Wren today.  Thank you very much for this referral.      Sai Benítez MD        D: 2022   T: 2022   MT: david    Name:     IVELISSE WREN  MRN:      [id number]        Account:      [id number]   :      1945           Service Date: 2022       Document: [id number]

## 2022-04-28 NOTE — PATIENT INSTRUCTIONS
You were seen today in the [facility]    Kathleen will call you to schedule:    Pre-operative assessment Center visit (PAC)  Vein mapping  Blood work  Surgery date    Please call JP Davila surgery coordinator with any questions.  Thank you.    Phone [phone]  Fax [phone]

## 2022-04-28 NOTE — NURSING NOTE
Chief Complaint   Patient presents with     New Patient     New surgery   Vitals were taken and medications reconciled.    Messi Escalona, EMT  2:58 PM

## 2022-04-29 DIAGNOSIS — R79.9 ABNORMAL FINDING OF BLOOD CHEMISTRY, UNSPECIFIED: ICD-10-CM

## 2022-04-29 DIAGNOSIS — R79.1 ABNORMAL COAGULATION PROFILE: ICD-10-CM

## 2022-04-29 DIAGNOSIS — I25.10 CORONARY ARTERY DISEASE INVOLVING NATIVE CORONARY ARTERY OF NATIVE HEART WITHOUT ANGINA PECTORIS: ICD-10-CM

## 2022-04-29 DIAGNOSIS — I35.0 NONRHEUMATIC AORTIC VALVE STENOSIS: ICD-10-CM

## 2022-04-29 DIAGNOSIS — I99.8 OTHER DISORDER OF CIRCULATORY SYSTEM: ICD-10-CM

## 2022-05-01 NOTE — PROGRESS NOTES
Service Date: 04/28/2022    REFERRING CARDIOLOGIST:  Jorge Phillips MD    REASON FOR CONSULTATION:  Evaluation for aortic valve replacement and coronary artery bypass grafting.    HISTORY OF PRESENT ILLNESS:  Mr. Wren is a very pleasant 77-year-old gentleman who was recently worked up in the [facility] for evaluation for his severe symptomatic aortic stenosis.  TAVR workup was initiated.  Coronary angiogram, however, demonstrated severe multivessel coronary artery disease.  For this reason, the patient was felt to be a better candidate for surgical AVR and concomitant coronary artery bypass surgery.  Therefore, he is seeing me in clinic today to be evaluated for this.    PAST MEDICAL HISTORY:  Significant for hypertension, hydrocele ganglionic cyst, hyperlipidemia, actinic keratosis, basal cell carcinoma of the skin.    PAST SURGICAL HISTORY:  Inguinal hernia repair.    ALLERGIES:  No known drug allergies.    CURRENT OUTPATIENT MEDICATIONS:  Reviewed in [facility] chart.    FAMILY HISTORY:  Significant for coronary artery disease and diabetes.    SOCIAL HISTORY:  He is .  Former smoker for 27 years, but quit in 1988.  Drinks 1-2 alcoholic beverages per day.    REVIEW OF SYSTEMS:  As per HPI.  Complains of dyspnea on exertion.  No chest pain.  All other 10-point review of systems are completed and otherwise negative unless stated above.    PHYSICAL EXAMINATION:    VITAL SIGNS:  All vital signs are stable.  GENERAL:  Appears well, in no acute distress.  HEENT:  Within normal limits.  NECK:  Supple, no lymphadenopathy.  CARDIOVASCULAR:  Regular rate and rhythm, normal S1, S2.  Grade 3/6 systolic murmur at the right upper sternal border.  LUNGS:  Clear bilaterally.  ABDOMEN:  Soft, nontender, nondistended.  EXTREMITIES:  Negative for edema, cyanosis or clubbing.  NEUROLOGIC:  A and O x3.  No focal deficits.    PERTINENT LABORATORY STUDIES:  Most recent transthoracic echo from 02/15/2022  demonstrates normal LV systolic size and function with EF of 55% to 60%, normal RV function, severe aortic stenosis with a mean gradient of 50 mmHg, mildly dilated ascending aorta measuring 4.2 cm, mild MAC, mild MR, trace TR.  TAVR protocol CT scan performed 2022 demonstrates a trileaflet aortic valve that is heavily calcified, mildly calcified ascending aorta with maximal diameter of 4.2 cm, coronary angiogram performed 2022 demonstrates severe triple vessel coronary artery disease with 95% ostial to proximal left circumflex disease, 90% ostial ramus disease, 70% proximal to mid RCA disease, but no flow-limiting LAD disease.    ASSESSMENT AND PLAN:  Mr. Wren is a very pleasant 77-year-old gentleman with severe symptomatic aortic stenosis and severe multivessel coronary artery disease.  My recommendation is bioprosthetic aortic valve replacement and concomitant coronary artery bypass grafting.  I went over the diagnosis in detail with the patient and the reason for recommending surgical AVR and coronary artery bypass grafting.  I went over the risks and benefits of the operation and the potential complications associated with the surgery.  These complications include but are not strictly limited to infection, bleeding, stroke, postop MI, postop arrhythmias, pulmonary or renal complications.  I think overall he is an excellent surgical candidate and I quoted an operative mortality risk of around 1% to 2%.  The patient understands and agrees to proceed.  The patient will be scheduled for aortic valve replacement and coronary artery bypass grafting with endoscopic vein harvest within the next several weeks.  It was a pleasure meeting Mr. Wren today.  Thank you very much for this referral.      Sai Benítez MD        D: 2022   T: 2022   MT: david    Name:     IVELISSE WREN  MRN:      [id number]        Account:      [id number]   :      1945           Service Date:  04/28/2022       Document: [id number]

## 2022-05-02 ENCOUNTER — TELEPHONE (OUTPATIENT)
Dept: CARDIOLOGY | Facility: CLINIC | Age: 77
End: 2022-05-02
Payer: COMMERCIAL

## 2022-05-02 RX ORDER — ACETAMINOPHEN 325 MG/1
975 TABLET ORAL ONCE
Status: CANCELLED | OUTPATIENT
Start: 2022-05-02 | End: 2022-05-02

## 2022-05-02 RX ORDER — CEFAZOLIN SODIUM 2 G/50ML
2 SOLUTION INTRAVENOUS SEE ADMIN INSTRUCTIONS
Status: CANCELLED | OUTPATIENT
Start: 2022-05-02

## 2022-05-02 RX ORDER — DEXMEDETOMIDINE HYDROCHLORIDE 4 UG/ML
0.2-1.2 INJECTION, SOLUTION INTRAVENOUS CONTINUOUS
Status: CANCELLED | OUTPATIENT
Start: 2022-05-31

## 2022-05-02 RX ORDER — LIDOCAINE 40 MG/G
CREAM TOPICAL
Status: CANCELLED | OUTPATIENT
Start: 2022-05-02

## 2022-05-02 RX ORDER — ASPIRIN 81 MG/1
81 TABLET, CHEWABLE ORAL
Status: CANCELLED | OUTPATIENT
Start: 2022-05-02

## 2022-05-02 RX ORDER — PHENYLEPHRINE HCL IN 0.9% NACL 50MG/250ML
.1-6 PLASTIC BAG, INJECTION (ML) INTRAVENOUS CONTINUOUS
Status: CANCELLED | OUTPATIENT
Start: 2022-05-31

## 2022-05-02 RX ORDER — GABAPENTIN 100 MG/1
100 CAPSULE ORAL
Status: CANCELLED | OUTPATIENT
Start: 2022-05-02

## 2022-05-02 RX ORDER — ASPIRIN 81 MG/1
162 TABLET, CHEWABLE ORAL
Status: CANCELLED | OUTPATIENT
Start: 2022-05-02

## 2022-05-02 RX ORDER — FAMOTIDINE 20 MG/1
20 TABLET, FILM COATED ORAL
Status: CANCELLED | OUTPATIENT
Start: 2022-05-02

## 2022-05-02 RX ORDER — CEFAZOLIN SODIUM 2 G/50ML
2 SOLUTION INTRAVENOUS
Status: CANCELLED | OUTPATIENT
Start: 2022-05-02

## 2022-05-02 RX ORDER — CHLORHEXIDINE GLUCONATE ORAL RINSE 1.2 MG/ML
10 SOLUTION DENTAL ONCE
Status: CANCELLED | OUTPATIENT
Start: 2022-05-02 | End: 2022-05-02

## 2022-05-02 NOTE — TELEPHONE ENCOUNTER
Per task , pt needs to schedule preop and surgery with  at [facility]. Talked with pt and scheduled preop for 5/18 and surgery is scheduled for 5/31. Will call if anything changes

## 2022-05-03 NOTE — TELEPHONE ENCOUNTER
FUTURE VISIT INFORMATION      SURGERY INFORMATION:    Date: 22    Location: [facility] or    Surgeon:  Sai Benítez MD    Anesthesia Type:  General    Procedure: CORONARY ARTERY BYPASS GRAFT (CABG) AORTIC VALVE REPAIR POSSIBLE REPLACEMENT AND ASSOCIATED PROCEDURES    Consult: ov     RECORDS REQUESTED FROM:        Primary Care Provider: Richard Arita MD- [facility]    Pertinent Medical History: hypertension, thoracic aortic aneurysm without rupture, aortic valve sclerosis    Most recent EKG+ Tracin22    Most recent ECHO: 2/15/22    Most recent Coronary Angiogram: 22

## 2022-05-16 ENCOUNTER — TELEPHONE (OUTPATIENT)
Dept: CARDIOLOGY | Facility: CLINIC | Age: 77
End: 2022-05-16
Payer: COMMERCIAL

## 2022-05-16 NOTE — TELEPHONE ENCOUNTER
Called Alejandro to verify his mailing address because I got his surgery letter returned to sender today.Address was correct in system and on envelope.  Letter had originally been sent out 5/2. Told him I will resend to to please contact me if he doesn't receive it before the end of the week

## 2022-05-17 NOTE — PROGRESS NOTES
Preoperative Assessment Center Medication History Note    Medication history completed on May 17, 2022 by this writer. See Epic admission navigator for prior to admission medications. Operating room staff will still need to confirm medications and last dose information on day of surgery.     Medication history interview sources  Patient interview: Yes  Care Everywhere records: No  Surescripts pharmacy refill records: Yes  Other (if applicable):     Changes made to PTA medication list  Added: none  Deleted: none  Changed: nystatin sig, triamcinolone sig,     Additional medication history information (including reliability of information, actions taken by pharmacist):    -- No recent (within 30 days) course of antibiotics  -- No recent (within 30 days) course of systemic steroids  -- Reports not being on blood thinning medications - except aspririn 81 mg (last dose on day before surgery).    -- Declines being on any other prescription or over-the-counter medications    Prior to Admission medications    Medication Sig Last Dose Taking? Auth Provider   aspirin (ASA) 81 MG chewable tablet Take 1 tablet (81 mg) by mouth daily Taking Yes Jann Ulloa,    cholecalciferol (VITAMIN D3) 25 mcg (1000 units) capsule Take 1 capsule by mouth daily Taking Yes Reported, Patient   lisinopril (ZESTRIL) 20 MG tablet Take 1 tablet (20 mg) by mouth daily Taking Yes Richard Arita MD   melatonin 3 MG tablet Take 3 mg by mouth nightly as needed for sleep Taking Yes Reported, Patient   nystatin (MYCOSTATIN) 343695 UNIT/GM external cream APPLY TOPICALLY TO AFFECTEDAREA(S) TWICE DAILY  Patient taking differently: No sig reported Taking Yes Richard Arita MD   rosuvastatin (CRESTOR) 40 MG tablet Take 1 tablet (40 mg) by mouth daily Taking Yes Richard Arita MD   sildenafil (REVATIO) 20 MG tablet TAKE 4-5 TABLETS BY MOUTH ONCE DAILY IF NEEDED FOR OTHER (SPECIFY).TAKE 30 MINUTES BEFORE SEXUAL ACTIVITY. Taking Yes Richard Arita MD    torsemide (DEMADEX) 10 MG tablet Take 1 tablet (10 mg) by mouth daily Taking Yes Richard Arita MD   triamcinolone (KENALOG) 0.1 % external cream Apply topically 2 times daily  Patient taking differently: Apply topically 2 times daily as needed for irritation Taking Yes Richard Arita MD   amoxicillin (AMOXIL) 500 MG capsule Take 4 capsules (2,000 mg) by mouth once as needed (30-60 minutes prior to dental procedures/cleanings.)  Patient not taking: Reported on 5/17/2022 Not Taking  Jann Ulloa,           Medication history completed by: Yuri Moyer [facility]

## 2022-05-18 ENCOUNTER — LAB (OUTPATIENT)
Dept: LAB | Facility: CLINIC | Age: 77
End: 2022-05-18
Payer: COMMERCIAL

## 2022-05-18 ENCOUNTER — PRE VISIT (OUTPATIENT)
Dept: SURGERY | Facility: CLINIC | Age: 77
End: 2022-05-18
Payer: COMMERCIAL

## 2022-05-18 ENCOUNTER — OFFICE VISIT (OUTPATIENT)
Dept: SURGERY | Facility: CLINIC | Age: 77
End: 2022-05-18
Payer: COMMERCIAL

## 2022-05-18 ENCOUNTER — ANCILLARY PROCEDURE (OUTPATIENT)
Dept: ULTRASOUND IMAGING | Facility: CLINIC | Age: 77
End: 2022-05-18
Attending: SURGERY
Payer: COMMERCIAL

## 2022-05-18 ENCOUNTER — ANESTHESIA EVENT (OUTPATIENT)
Dept: SURGERY | Facility: CLINIC | Age: 77
DRG: 220 | End: 2022-05-18
Payer: COMMERCIAL

## 2022-05-18 VITALS
HEIGHT: 69 IN | BODY MASS INDEX: 35.31 KG/M2 | TEMPERATURE: 97.8 F | OXYGEN SATURATION: 96 % | HEART RATE: 52 BPM | DIASTOLIC BLOOD PRESSURE: 90 MMHG | SYSTOLIC BLOOD PRESSURE: 180 MMHG | WEIGHT: 238.4 LBS | RESPIRATION RATE: 16 BRPM

## 2022-05-18 DIAGNOSIS — Z11.59 ENCOUNTER FOR SCREENING FOR OTHER VIRAL DISEASES: Primary | ICD-10-CM

## 2022-05-18 DIAGNOSIS — Z01.818 PRE-OP EVALUATION: ICD-10-CM

## 2022-05-18 DIAGNOSIS — Z01.818 PRE-OP EVALUATION: Primary | ICD-10-CM

## 2022-05-18 DIAGNOSIS — I35.0 AORTIC STENOSIS, SEVERE: ICD-10-CM

## 2022-05-18 LAB
ABO/RH(D): NORMAL
ALBUMIN SERPL-MCNC: 3.8 G/DL (ref 3.4–5)
ALP SERPL-CCNC: 47 U/L (ref 40–150)
ALT SERPL W P-5'-P-CCNC: 36 U/L (ref 0–70)
ANION GAP SERPL CALCULATED.3IONS-SCNC: 4 MMOL/L (ref 3–14)
ANTIBODY SCREEN: NEGATIVE
AST SERPL W P-5'-P-CCNC: 32 U/L (ref 0–45)
BILIRUB SERPL-MCNC: 0.5 MG/DL (ref 0.2–1.3)
BUN SERPL-MCNC: 21 MG/DL (ref 7–30)
CALCIUM SERPL-MCNC: 9 MG/DL (ref 8.5–10.1)
CHLORIDE BLD-SCNC: 104 MMOL/L (ref 94–109)
CO2 SERPL-SCNC: 30 MMOL/L (ref 20–32)
CREAT SERPL-MCNC: 0.99 MG/DL (ref 0.66–1.25)
ERYTHROCYTE [DISTWIDTH] IN BLOOD BY AUTOMATED COUNT: 13.2 % (ref 10–15)
GFR SERPL CREATININE-BSD FRML MDRD: 79 ML/MIN/1.73M2
GLUCOSE BLD-MCNC: 97 MG/DL (ref 70–99)
HCT VFR BLD AUTO: 46.5 % (ref 40–53)
HGB BLD-MCNC: 15.2 G/DL (ref 13.3–17.7)
MCH RBC QN AUTO: 32 PG (ref 26.5–33)
MCHC RBC AUTO-ENTMCNC: 32.7 G/DL (ref 31.5–36.5)
MCV RBC AUTO: 98 FL (ref 78–100)
PLATELET # BLD AUTO: 185 10E3/UL (ref 150–450)
POTASSIUM BLD-SCNC: 4.2 MMOL/L (ref 3.4–5.3)
PROT SERPL-MCNC: 7.6 G/DL (ref 6.8–8.8)
RBC # BLD AUTO: 4.75 10E6/UL (ref 4.4–5.9)
SODIUM SERPL-SCNC: 138 MMOL/L (ref 133–144)
SPECIMEN EXPIRATION DATE: NORMAL
WBC # BLD AUTO: 7.7 10E3/UL (ref 4–11)

## 2022-05-18 PROCEDURE — 99204 OFFICE O/P NEW MOD 45 MIN: CPT | Performed by: NURSE PRACTITIONER

## 2022-05-18 PROCEDURE — 93970 EXTREMITY STUDY: CPT | Performed by: RADIOLOGY

## 2022-05-18 PROCEDURE — 86850 RBC ANTIBODY SCREEN: CPT | Performed by: NURSE PRACTITIONER

## 2022-05-18 PROCEDURE — 83735 ASSAY OF MAGNESIUM: CPT

## 2022-05-18 PROCEDURE — 36415 COLL VENOUS BLD VENIPUNCTURE: CPT | Performed by: PATHOLOGY

## 2022-05-18 PROCEDURE — 85027 COMPLETE CBC AUTOMATED: CPT | Performed by: PATHOLOGY

## 2022-05-18 PROCEDURE — 80053 COMPREHEN METABOLIC PANEL: CPT | Performed by: PATHOLOGY

## 2022-05-18 PROCEDURE — 84134 ASSAY OF PREALBUMIN: CPT

## 2022-05-18 PROCEDURE — 86901 BLOOD TYPING SEROLOGIC RH(D): CPT | Performed by: NURSE PRACTITIONER

## 2022-05-18 ASSESSMENT — PAIN SCALES - GENERAL: PAINLEVEL: NO PAIN (0)

## 2022-05-18 ASSESSMENT — LIFESTYLE VARIABLES: TOBACCO_USE: 1

## 2022-05-18 ASSESSMENT — ENCOUNTER SYMPTOMS: ORTHOPNEA: 0

## 2022-05-18 NOTE — H&P
Pre-Operative H & P     CC:  Preoperative exam to assess for increased cardiopulmonary risk while undergoing surgery and anesthesia.    Date of Encounter: 5/18/2022  Primary Care Physician:  Richard Arita     Reason for visit: Pre-operative evaluation    HPI  Arun Wren is a 77 year old male who presents for pre-operative H & P in preparation for  Procedure Information     Case: [id number] Date/Time: 05/31/22 0800    Procedures:       CORONARY ARTERY BYPASS GRAFT, WITH AORTIC VALVE REPAIR AND ASSOCIATED PROCEDURES with Transesophageal Echocardiogram (BREANNE) (N/A Heart)      possible REPLACEMENT, AORTIC VALVE (N/A Heart)    Anesthesia type: General    Diagnosis:       CAD (coronary artery disease) [I25.10]      Aortic stenosis [I35.0]    Pre-op diagnosis:       CAD (coronary artery disease) [I25.10]      Aortic stenosis [I35.0]    Location:  OR  /  OR    Providers: Sai Benítez MD            77-year-old gentleman who was recently worked up in the [facility] for evaluation for his severe symptomatic aortic stenosis.  TAVR workup was initiated.  Coronary angiogram, however, demonstrated severe multivessel coronary artery disease.  For this reason, the patient was felt to be a better candidate for surgical AVR and concomitant coronary artery bypass surgery.  Therefore he has consulted with Dr. Benítez and presents to PAC in preparation for the above procedure.      PMH also includes: HTN, hydrocele ganglionic cyst, hyperlipidemia, actinic keratosis, basal cell carcinoma of the skin.     History is obtained from the patient and chart review    Hx of abnormal bleeding or anti-platelet use: yes asa      Past Medical History  Past Medical History:   Diagnosis Date     Actinic keratosis     05/19/09,Keratosis left external ear, treated with cryocautery, recheck if recurs or persists     Allergic contact dermatitis due to plants, except food     recurrent     Basal cell carcinoma of skin      No Comments Provided     Carpal tunnel syndrome     right greater than left     Closed fracture of base of skull (H)     No Comments Provided     Essential (primary) hypertension     No Comments Provided     Ganglion     No Comments Provided     Hydrocele     No Comments Provided     Male erectile dysfunction     decreased libido     Personal history of nicotine dependence     No Comments Provided     Pure hypercholesterolemia     No Comments Provided     Residual hemorrhoidal skin tags     Past external hemorrhoid     Tinea unguium     No Comments Provided       Past Surgical History  Past Surgical History:   Procedure Laterality Date     COLONOSCOPY  01/01/1998 1998,at [facility], which was normal     COLONOSCOPY  03/03/2005 03/03/05,at [facility]     COLONOSCOPY  03/02/2015    3/2/15,F/U 2020  tubular adnomas     COLONOSCOPY  03/16/2020    F/U N/A hyperplastic     CV CORONARY ANGIOGRAM N/A 4/22/2022    Procedure: Coronary Angiogram with possible intervention;  Surgeon: Saulo Mccarthy MD;  Location:  HEART CARDIAC CATH LAB     CV RIGHT HEART CATH MEASUREMENTS RECORDED N/A 4/22/2022    Procedure: Right Heart Cath;  Surgeon: Saulo Mccarthy MD;  Location:  HEART CARDIAC CATH LAB     OTHER SURGICAL HISTORY      ,HERNIA REPAIR     OTHER SURGICAL HISTORY      05/24/16,[id number],OTHER,Left,Dr. Jeremiah GIBSON     OTHER SURGICAL HISTORY      07/28/16,[id number],ORCHIECTOMY SIMPLE / PARTIAL / RADICAL W/ SCROTAL / INGUINAL APPROACH,Left,Dr. Jeremiah GIBSON       Prior to Admission Medications  Current Outpatient Medications   Medication Sig Dispense Refill     aspirin (ASA) 81 MG chewable tablet Take 1 tablet (81 mg) by mouth daily 90 tablet 3     cholecalciferol (VITAMIN D3) 25 mcg (1000 units) capsule Take 1 capsule by mouth daily       lisinopril (ZESTRIL) 20 MG tablet Take 1 tablet (20 mg) by mouth daily 90 tablet 3     melatonin 3 MG tablet Take 3 mg by mouth nightly as needed for sleep       nystatin  (MYCOSTATIN) 087802 UNIT/GM external cream APPLY TOPICALLY TO AFFECTEDAREA(S) TWICE DAILY (Patient taking differently: No sig reported) 60 g 4     rosuvastatin (CRESTOR) 40 MG tablet Take 1 tablet (40 mg) by mouth daily 90 tablet 4     sildenafil (REVATIO) 20 MG tablet TAKE 4-5 TABLETS BY MOUTH ONCE DAILY IF NEEDED FOR OTHER (SPECIFY).TAKE 30 MINUTES BEFORE SEXUAL ACTIVITY. 80 tablet 1     torsemide (DEMADEX) 10 MG tablet Take 1 tablet (10 mg) by mouth daily 90 tablet 3     triamcinolone (KENALOG) 0.1 % external cream Apply topically 2 times daily (Patient taking differently: Apply topically 2 times daily as needed for irritation) 80 g 4     amoxicillin (AMOXIL) 500 MG capsule Take 4 capsules (2,000 mg) by mouth once as needed (30-60 minutes prior to dental procedures/cleanings.) (Patient not taking: Reported on 2022) 4 capsule 3       Allergies  No Known Allergies    Social History  Social History     Socioeconomic History     Marital status:      Spouse name: Not on file     Number of children: Not on file     Years of education: Not on file     Highest education level: Not on file   Occupational History     Not on file   Tobacco Use     Smoking status: Former Smoker     Years: 27.00     Types: Cigarettes     Quit date: 1988     Years since quittin.4     Smokeless tobacco: Never Used   Vaping Use     Vaping Use: Never used   Substance and Sexual Activity     Alcohol use: Yes     Comment: Alcoholic Drinks/day: 1-2 drinks per day, whiskey     Drug use: No     Sexual activity: Not Currently   Other Topics Concern     Parent/sibling w/ CABG, MI or angioplasty before 65F 55M? Not Asked   Social History Narrative    Quit smoking .  Occ alcohol.  .   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    Stays active, owns tree farm.     Social Determinants of Health     Financial Resource Strain: Not on file   Food Insecurity: Not on file   Transportation Needs: Not on file    Physical Activity: Not on file   Stress: Not on file   Social Connections: Not on file   Intimate Partner Violence: Not on file   Housing Stability: Not on file       Family History  Family History   Problem Relation Age of Onset     Diabetes Mother         Diabetes     Other - See Comments Mother         joint disease     Heart Disease Mother 70        Heart Disease,CABG     Other - See Comments Father         Stroke,stroke/heavy smoker     Diabetes Paternal Grandmother         Diabetes,?GMother       Review of Systems  The complete review of systems is negative other than noted in the HPI or here.   Anesthesia Evaluation   Pt has had prior anesthetic.     No history of anesthetic complications       ROS/MED HX  ENT/Pulmonary:     (+) YVETTE risk factors, snores loudly, hypertension, obese, tobacco use, Past use,  (-) asthma   Neurologic:  - neg neurologic ROS     Cardiovascular:     (+) hypertension--CAD ---Taking blood thinners Pt has received instructions: Instructions Given to patient: last dose asa 5/30. PHELPS. valvular problems/murmurs type: AS severe aortic stenosis (SPEEDY 0.95 cm2, mean gradient 50 mmHg, peak velocity 4.3 m/sec) . Previous cardiac testing   Echo: Date: 2/2022 Results:  Echocardiogram 2/15/2022   Summary   Global and regional left ventricular function is normal with an EF of 55-60%. The right ventricle is normal in function and size. Severe aortic stenosis is present (MG 50mmHg, PV 4.3m/s, DVI 0.26) Ascending aorta is dilated measuring 4.2cm No pericardial effusion is present. This study was compared with the study from 10/31/18 the AS has progressed and aorta is more dilated .   Stress Test: Date: Results:    ECG Reviewed: Date: Results:    Cath: Date: 4/2022 Results:  Triple vessel coronary artery disease.    (-) orthopnea/PND and irregular heartbeat/palpitations   METS/Exercise Tolerance: 3 - Able to walk 1-2 blocks without stopping    Hematologic:  - neg hematologic  ROS     Musculoskeletal:   "- neg musculoskeletal ROS     GI/Hepatic:  - neg GI/hepatic ROS     Renal/Genitourinary:  - neg Renal ROS     Endo:  - neg endo ROS   (+) Obesity,     Psychiatric/Substance Use:  - neg psychiatric ROS     Infectious Disease: Comment: Fully vaccinated for covid - neg infectious disease ROS     Malignancy:  - neg malignancy ROS     Other:            BP (!) 188/97 (BP Location: Right arm, Patient Position: Chair, Cuff Size: Adult Regular)   Pulse 52   Temp 97.8  F (36.6  C) (Oral)   Resp 16   Ht 1.753 m (5' 9\")   Wt 108.1 kg (238 lb 6.4 oz)   SpO2 96%   BMI 35.21 kg/m      Physical Exam   Constitutional: Awake, alert, cooperative, no apparent distress, and appears stated age.  Eyes: Pupils equal, round and reactive to light, extra ocular muscles intact, sclera clear, conjunctiva normal.  HENT: Normocephalic, oral pharynx with moist mucus membranes, good dentition. No goiter appreciated.   Respiratory: Clear to auscultation bilaterally, no crackles or wheezing.  Cardiovascular: Regular rate and rhythm, normal S1 and S2, and 4/6  murmur of aortic stenosis noted.  Carotids +2, no bruits. No edema. Palpable pulses to radial  DP and PT arteries.   GI: Normal bowel sounds, soft, non-distended, non-tender, no masses palpated, no hepatosplenomegaly.    Lymph/Hematologic: No cervical lymphadenopathy and no supraclavicular lymphadenopathy.  Genitourinary:  deferred  Skin: Warm and dry.  No rashes at anticipated surgical site.   Musculoskeletal:  limited ROM of neck. There is no redness, warmth, or swelling of the joints. Gross motor strength is normal.    Neurologic: Awake, alert, oriented to name, place and time. Cranial nerves II-XII are grossly intact. Gait is normal.   Neuropsychiatric: Calm, cooperative. Normal affect.     Prior Labs/Diagnostic Studies   All labs and imaging personally reviewed     EKG/ stress test - if available please see in ROS above     Echo result w/o MOPS: Interpretation Summary Global and " regional left ventricular function is normal with an EF of 55-60%.The right ventricle is normal in function and size.Severe aortic stenosis is present (MG 50mmHg, PV 4.3m/s, DVI 0.26)Ascending aorta is dilated measuring 4.2cmNo pericardial effusion is present.This study was compared with the study from 10/31/18 the AS has progressed andaorta is more dilated .    Right Heart angigogram 4/22/2022   Triple vessel coronary artery disease.   Ostial to Prox LCx lesion is 95% stenosed.   Ostial ramus lesion is 90% stenosed.   Prox RCA lesion is 70% stenosed. Mid RCA lesion is 75% stenosed.   Right sided filling pressures are mildly elevated (mean RA 11 mm Hg).   Normal pulmonary artery pressures (mPAP 24 mm Hg, PVR 2.3 KEANE).   Left sided filling pressures are mildly elevated (PCWP 18 mm Hg).   Normal cardiac output level.   Recommend surgical consultation for surgical AVR and CABG evaluation.         CT angiogram    4/21/2022   IMPRESSION:   1.  See below for TAVR related aortic annular and vascular measurements.    2.  No acute aortic dissection, intramural hematoma or contained rupture noted.   3.  The LVOT is Moderately calcified, with bulky calcified nodules.    4.  Please review detailed radiology report, to follow separately, for incidental non-cardiovascular findings.       FINDINGS:   1.  Severely calcified tricuspid aortic valve. Aortic valve calcium score is 5320. The LVOT is Moderately calcified, with bulky calcified nodules. The ascending aorta is mildly calcified, aortic arch is  severely calcified, the descending thoracic aorta is mildly calcified. There is mild mitral annular calcification.   2.  There are no adverse aortic root features, ie coronary heights are adequate and there is no significant aortic root calcification.   3.  There are significant native coronary calcifications.    4.  The arch vessel branching pattern is normal.    5.  The suprarenal abdominal aorta is severely calcified; infrarenal  abdominal aorta is severely calcified   6.  Widely patent origins of celiac trunk, superior mesenteric artery and inferior mesenteric artery.  Single bilateral renal arteries with widely patent origins.    7.  There is no acute aortic pathology, such as dissection, intramural hematoma, or contained rupture.          No flowsheet data found.      The patient's records and results personally reviewed by this provider.     Outside records reviewed from: Care Everywhere    LAB/DIAGNOSTIC STUDIES TODAY:    Lab Results   Component Value Date    WBC 7.7 05/18/2022     Lab Results   Component Value Date    RBC 4.75 05/18/2022     Lab Results   Component Value Date    HGB 15.2 05/18/2022    HGB 16.1 05/20/2016     Lab Results   Component Value Date    HCT 46.5 05/18/2022    HCT 47.7 05/20/2016     No components found for: MCT  Lab Results   Component Value Date    MCV 98 05/18/2022    MCV 96 05/20/2016     Lab Results   Component Value Date    MCH 32.0 05/18/2022    MCH 32.5 05/20/2016     Lab Results   Component Value Date    MCHC 32.7 05/18/2022    MCHC 33.7 05/20/2016     Lab Results   Component Value Date    RDW 13.2 05/18/2022    RDW 12.2 05/20/2016     Lab Results   Component Value Date     05/18/2022     05/20/2016     Last Comprehensive Metabolic Panel:  Sodium   Date Value Ref Range Status   04/21/2022 141 133 - 144 mmol/L Final   11/13/2020 136 134 - 144 mmol/L Final     Potassium   Date Value Ref Range Status   04/21/2022 4.4 3.4 - 5.3 mmol/L Final   11/13/2020 3.9 3.5 - 5.1 mmol/L Final     Chloride   Date Value Ref Range Status   04/21/2022 106 94 - 109 mmol/L Final   11/13/2020 98 98 - 107 mmol/L Final     Carbon Dioxide   Date Value Ref Range Status   11/13/2020 31 21 - 31 mmol/L Final     Carbon Dioxide (CO2)   Date Value Ref Range Status   04/21/2022 30 20 - 32 mmol/L Final     Anion Gap   Date Value Ref Range Status   04/21/2022 5 3 - 14 mmol/L Final   11/13/2020 7 3 - 14 mmol/L Final      Glucose   Date Value Ref Range Status   04/21/2022 99 70 - 99 mg/dL Final   11/13/2020 100 70 - 105 mg/dL Final     Urea Nitrogen   Date Value Ref Range Status   04/21/2022 22 7 - 30 mg/dL Final   11/13/2020 17 7 - 25 mg/dL Final     Creatinine   Date Value Ref Range Status   04/21/2022 1.04 0.66 - 1.25 mg/dL Final   11/13/2020 1.07 0.70 - 1.30 mg/dL Final     GFR Estimate   Date Value Ref Range Status   04/21/2022 74 >60 mL/min/1.73m2 Final     Comment:     Effective December 21, 2021 eGFRcr in adults is calculated using the 2021 CKD-EPI creatinine equation which includes age and gender (Richy et al., NEJ, DOI: 10.1056/DLXKev8291093)   11/13/2020 68 >60 mL/min/[1.73_m2] Final     Calcium   Date Value Ref Range Status   04/21/2022 8.9 8.5 - 10.1 mg/dL Final   11/13/2020 8.9 8.6 - 10.3 mg/dL Final     Lab Results   Component Value Date    AST 38 09/05/2018     Lab Results   Component Value Date    ALT 33 09/05/2018     No results found for: BILICONJ   Lab Results   Component Value Date    BILITOTAL 0.6 09/05/2018     Lab Results   Component Value Date    ALBUMIN 4.2 09/05/2018     Lab Results   Component Value Date    PROTTOTAL 7.3 09/05/2018      Lab Results   Component Value Date    ALKPHOS 37 09/05/2018             Assessment      Arun Wren is a 77 year old male seen as a PAC referral for risk assessment and optimization for anesthesia.    Plan/Recommendations  Pt will be optimized for the proposed procedure.  See below for details on the assessment, risk, and preoperative recommendations    NEUROLOGY  - No history of TIA, CVA or seizure    -Post Op delirium risk factors:  No risk identified    ENT  - No current airway concerns.  Will need to be reassessed day of surgery.  Mallampati: II  TM: > 3    CARDIAC  - CAD-multi-vessel disease  - above procedure scheduled.   - AORTIC STENOSIS- Severe- above procedure now scheduled  + PHELPS, able to walk to his mailbox and back which is 2 blocks. Denies angina,  "palpitations, dizziness or syncopal episodes.    AAA- measuring 4.3cm  - Hypertension- elevated in clinic  Patients reports at home his average Systolic BP is 160. Will need better management post-operatively.  - Hyperlipidemia  Well controlled on home regimen      - METS (Metabolic Equivalents) METS < 3  Patient CANNOT perform 4 METS exercise without symptoms            Total Score: 1    Functional Capacity: Unable to complete 4 METS      RCRI-Very low risk: Class 1 0.4% complication rate            Total Score: -        Criteria with incomplete data:    RCRI: High Risk Surgery    RCRI: CAD    RCRI: CHF    RCRI: Cerebrovascular Disease     RCRI: Diabetes    RCRI: Elevated Creatinine      - This score is not calculated because of inadequate data       PULMONARY    YVETTE High Risk            Total Score: 5    YVETTE: Snores loudly    YVETTE: Hypertension    YVETTE: BMI over 35 kg/m2    YVETTE: Over 50 ys old    YVETTE: Male      - Denies asthma or inhaler use  - Tobacco History      History   Smoking Status     Former Smoker     Years: 27.00     Types: Cigarettes     Quit date: 1/1/1988   Smokeless Tobacco     Never Used       GI    PONV Medium Risk  Total Score: 2           1 AN PONV: Patient is not a current smoker    1 AN PONV: Intended Post Op Opioids        ENDOCRINE    - BMI: Estimated body mass index is 35.21 kg/m  as calculated from the following:    Height as of this encounter: 1.753 m (5' 9\").    Weight as of this encounter: 108.1 kg (238 lb 6.4 oz).  Obesity (BMI >30)  - No history of Diabetes Mellitus    HEME  VTE Low Risk 0.5%            Total Score: 3    VTE: Greater than 59 yrs old    VTE: Male      - Platelet disfunction second to Aspirin (Flavio, many others)      MSK  Patient is NOT Frail            Total Score: -        Criteria with incomplete data:    Frailty: Weight loss 10 lbs or greater    Frailty: Slower walking speed    Frailty: Decrease in strength    Frailty: Increased exhaustion    Frailty: Overall lack of " energy      - This score is not calculated because of inadequate data       The patient is optimized for their procedure. AVS with information on surgery time/arrival time, meds and NPO status given by nursing staff. No further diagnostic testing indicated.      On the day of service:     Prep time: 20 minutes  Visit time: 20 minutes  Documentation time: 15 minutes  ------------------------------------------  Total time: 55 minutes      DUSTIN Dhillon CNP  Preoperative Assessment Center  [facility]  Clinic and Surgery Center  Phone: [phone]  Fax: [phone]

## 2022-05-18 NOTE — PATIENT INSTRUCTIONS
Preparing for Your Surgery      Name:  Arun Wren   MRN:  [id number]   :  1945   Today's Date:  2022       Arriving for surgery:  Surgery date:  2022  Arrival time:  6:00 am    Restrictions due to COVID 19       Effective 22 [facility] is implementing the following visitor policy:     1 person may accompany the patient through the Pre-Op process.      That same person may wait in the Surgery Waiting room, provided there is enough room to social distance         Inpatients are allowed 2 visitors per day for the duration of their stay.        Visitors must wear a mask.      Visitors must not be ill.      Visiting hours are 8 am to 8 pm.    [employer] parking is available for anyone with mobility limitations or disabilities.  ([place]  24 hours/ 7 days a week; [facility]  7 am- 3:30 pm, Mon- Fri)    Please come to:     [facility] [address]  [address] [place] [address]    - ?[employer] parking is available in front of the hospital      -    Please proceed to [address] on the 3rd floor. [phone]?     - ?If you are in need of directions, wheelchair or escort please stop at the Information Desk in the lobby.     What can I eat or drink?  -  You may eat and drink normally for up to 8 hours before your surgery. (Until Midnight)  -  You may have clear liquids until 2 hours before surgery. (Until 6 am arrival time)    Examples of clear liquids:  Water  Clear broth  Juices (apple, white grape, white cranberry  and cider) without pulp  Noncarbonated, powder based beverages  (lemonade and Efren-Aid)  Sodas (Sprite, 7-Up, ginger ale and seltzer)  Coffee or tea (without milk or cream)  Gatorade    -  No Alcohol for at least 24 hours before surgery     Which medicines can I take?      Hold Multivitamins for 7 days before surgery.  Hold Supplements for 7 days before surgery.  Hold Ibuprofen (Advil, Motrin) for 1 day before  surgery.  Hold Naproxen (Aleve) for 4 days before surgery.    Hold Sildenafil for at least 24 hours before surgery    -  DO NOT take these medications the day of surgery:  Aspirin   Vitamin D  Lisinopril  Torsemide      -  PLEASE TAKE these medications the day of surgery:  Rosuvastatin      How do I prepare myself?  - Please take 2 showers before surgery using Scrubcare or Hibiclens soap.    Use this soap only from the neck to your toes.     Leave the soap on your skin for one minute--then rinse thoroughly.      You may use your own shampoo and conditioner; no other hair products.   - Please remove all jewelry and body piercings.  - No lotions, deodorants or fragrance.  - No makeup or fingernail polish.   - Bring your ID and insurance card.    -If you have a Deep Brain Stimulator, Spinal Cord Stimulator or any neuro stimulator device---you must bring the remote control to the hospital     - All patients are required to have a Covid-19 test within 4 days of surgery/procedure.      -Patients will be contacted by the [facility] scheduling team within 1 week of surgery to make an appointment.      - Patients may call the Scheduling team at [phone] if they have not been scheduled within 4 days of  surgery.            Questions or Concerns:    - For any questions regarding the day of surgery or your hospital stay, please contact the Pre Admission Nursing Office at [phone].       - If you have questions about your medications, test results or have a change in your health status call your surgery coordinator, JP Davila during regular business hours.  Call Lola or the [facility] number ([phone]) if you develop any of the following symptoms; fever, cough, shortness of breath, sore throat, runny or stuffy nose, muscle or body aches, headaches, fatigue, vomiting or diarrhea.        For questions after surgery please call your surgeons office.     Please call me with any questions.  Thank you,  Lola  JP Morrison  Cardiothoracic Surgery Coordinator  [phone]  Direct Phone

## 2022-05-19 LAB
MAGNESIUM SERPL-MCNC: 2.4 MG/DL (ref 1.6–2.3)
PREALB SERPL IA-MCNC: 22 MG/DL (ref 15–45)

## 2022-05-26 ENCOUNTER — TELEPHONE (OUTPATIENT)
Dept: CARDIOLOGY | Facility: CLINIC | Age: 77
End: 2022-05-26
Payer: COMMERCIAL

## 2022-05-26 NOTE — TELEPHONE ENCOUNTER
Spoke to Charge RN in preop holding. Pt will have the remainder of blood work and MRSA and UA drawn morning of surgery. Pt did not get these drawn in [facility] on his PAC day as the lab staff did not see the orders.

## 2022-05-27 ENCOUNTER — ALLIED HEALTH/NURSE VISIT (OUTPATIENT)
Dept: FAMILY MEDICINE | Facility: OTHER | Age: 77
End: 2022-05-27
Attending: FAMILY MEDICINE
Payer: COMMERCIAL

## 2022-05-27 DIAGNOSIS — Z11.59 ENCOUNTER FOR SCREENING FOR OTHER VIRAL DISEASES: ICD-10-CM

## 2022-05-27 PROCEDURE — U0005 INFEC AGEN DETEC AMPLI PROBE: HCPCS | Mod: ZL

## 2022-05-27 PROCEDURE — C9803 HOPD COVID-19 SPEC COLLECT: HCPCS

## 2022-05-27 NOTE — PROGRESS NOTES
Patient here today for Covid test. Procedure on 5/31/22 at the [facility].  Dr. Benítez   Fax to [phone].     Corry Myers CNA .............................on 5/27/2022 at 9:46 AM

## 2022-05-28 LAB — SARS-COV-2 RNA RESP QL NAA+PROBE: NEGATIVE

## 2022-05-30 ASSESSMENT — LIFESTYLE VARIABLES: TOBACCO_USE: 1

## 2022-05-30 ASSESSMENT — ENCOUNTER SYMPTOMS: ORTHOPNEA: 0

## 2022-05-30 NOTE — ANESTHESIA PREPROCEDURE EVALUATION
Pre-Operative H & P     CC:  Preoperative exam to assess for increased cardiopulmonary risk while undergoing surgery and anesthesia.    Date of Encounter: 5/18/2022  Primary Care Physician:  Richard Arita     Reason for visit: Pre-operative evaluation    HPI  Arun Wren is a 77 year old male who presents for pre-operative H & P in preparation for  Procedure Information     Case: [id number] Date/Time: 05/31/22 0800    Procedures:       CORONARY ARTERY BYPASS GRAFT, WITH AORTIC VALVE REPAIR AND ASSOCIATED PROCEDURES with Transesophageal Echocardiogram (BREANNE) (N/A Heart)      possible REPLACEMENT, AORTIC VALVE (N/A Heart)    Anesthesia type: Combined General with Block    Diagnosis:       CAD (coronary artery disease) [I25.10]      Aortic stenosis [I35.0]    Pre-op diagnosis:       CAD (coronary artery disease) [I25.10]      Aortic stenosis [I35.0]    Location:  OR  /  OR    Providers: Sai Benítez MD            77-year-old gentleman who was recently worked up in the [facility] for evaluation for his severe symptomatic aortic stenosis.  TAVR workup was initiated.  Coronary angiogram, however, demonstrated severe multivessel coronary artery disease.  For this reason, the patient was felt to be a better candidate for surgical AVR and concomitant coronary artery bypass surgery.  Therefore he has consulted with Dr. Benítez and presents to PAC in preparation for the above procedure.      PMH also includes: HTN, hydrocele ganglionic cyst, hyperlipidemia, actinic keratosis, basal cell carcinoma of the skin.     History is obtained from the patient and chart review    Hx of abnormal bleeding or anti-platelet use: yes asa      Past Medical History  Past Medical History:   Diagnosis Date     Actinic keratosis     05/19/09,Keratosis left external ear, treated with cryocautery, recheck if recurs or persists     Allergic contact dermatitis due to plants, except food     recurrent     Basal cell  carcinoma of skin     No Comments Provided     Carpal tunnel syndrome     right greater than left     Closed fracture of base of skull (H)     No Comments Provided     Essential (primary) hypertension     No Comments Provided     Ganglion     No Comments Provided     Hydrocele     No Comments Provided     Male erectile dysfunction     decreased libido     Personal history of nicotine dependence     No Comments Provided     Pure hypercholesterolemia     No Comments Provided     Residual hemorrhoidal skin tags     Past external hemorrhoid     Tinea unguium     No Comments Provided       Past Surgical History  Past Surgical History:   Procedure Laterality Date     COLONOSCOPY  01/01/1998 1998,at [facility], which was normal     COLONOSCOPY  03/03/2005 03/03/05,at [facility]     COLONOSCOPY  03/02/2015    3/2/15,F/U 2020  tubular adnomas     COLONOSCOPY  03/16/2020    F/U N/A hyperplastic     CV CORONARY ANGIOGRAM N/A 4/22/2022    Procedure: Coronary Angiogram with possible intervention;  Surgeon: Saulo Mccarthy MD;  Location:  HEART CARDI[facility] CATH LAB     CV RIGHT HEART CATH MEASUREMENTS RECORDED N/A 4/22/2022    Procedure: Right Heart Cath;  Surgeon: Saulo Mccarthy MD;  Location:  HEART [facility]ARD[facility] CATH LAB     OTHER SURGICAL HISTORY      ,HERNIA REPAIR     OTHER SURGICAL HISTORY      05/24/16,[id number],OTHER,Left,Dr. Jeremiah GIBSON     OTHER SURGICAL HISTORY      07/28/16,[id number],ORCHIECTOMY SIMPLE / PARTIAL / RADICAL W/ SCROTAL / INGUINAL APPROACH,Left,Dr. Jeremiah GIBSON       Prior to Admission Medications  Current Outpatient Medications   Medication Sig Dispense Refill     amoxicillin (AMOXIL) 500 MG capsule Take 4 capsules (2,000 mg) by mouth once as needed (30-60 minutes prior to dental procedures/cleanings.) (Patient not taking: Reported on 5/17/2022) 4 capsule 3     aspirin (ASA) 81 MG chewable tablet Take 1 tablet (81 mg) by mouth daily 90 tablet 3     cholecalciferol (VITAMIN D3) 25 mcg (1000  units) capsule Take 1 capsule by mouth daily       lisinopril (ZESTRIL) 20 MG tablet Take 1 tablet (20 mg) by mouth daily 90 tablet 3     melatonin 3 MG tablet Take 3 mg by mouth nightly as needed for sleep       nystatin (MYCOSTATIN) 135057 UNIT/GM external cream APPLY TOPICALLY TO AFFECTEDAREA(S) TWICE DAILY (Patient taking differently: No sig reported) 60 g 4     rosuvastatin (CRESTOR) 40 MG tablet Take 1 tablet (40 mg) by mouth daily 90 tablet 4     sildenafil (REVATIO) 20 MG tablet TAKE 4-5 TABLETS BY MOUTH ONCE DAILY IF NEEDED FOR OTHER (SPECIFY).TAKE 30 MINUTES BEFORE SEXUAL ACTIVITY. 80 tablet 1     torsemide (DEMADEX) 10 MG tablet Take 1 tablet (10 mg) by mouth daily 90 tablet 3     triamcinolone (KENALOG) 0.1 % external cream Apply topically 2 times daily (Patient taking differently: Apply topically 2 times daily as needed for irritation) 80 g 4       Allergies  No Known Allergies    Social History  Social History     Socioeconomic History     Marital status:      Spouse name: Not on file     Number of children: Not on file     Years of education: Not on file     Highest education level: Not on file   Occupational History     Not on file   Tobacco Use     Smoking status: Former Smoker     Years: 27.00     Types: Cigarettes     Quit date: 1988     Years since quittin.4     Smokeless tobacco: Never Used   Vaping Use     Vaping Use: Never used   Substance and Sexual Activity     Alcohol use: Yes     Comment: Alcoholic Drinks/day: 1-2 drinks per day, whiskey     Drug use: No     Sexual activity: Not Currently   Other Topics Concern     Parent/sibling w/ CABG, MI or angioplasty before 65F 55M? Not Asked   Social History Narrative    Quit smoking .  Occ alcohol.  .   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    Stays active, owns tree farm.     Social Determinants of Health     Financial Resource Strain: Not on file   Food Insecurity: Not on file   Transportation  Needs: Not on file   Physical Activity: Not on file   Stress: Not on file   Social Connections: Not on file   Intimate Partner Violence: Not on file   Housing Stability: Not on file       Family History  Family History   Problem Relation Age of Onset     Diabetes Mother         Diabetes     Other - See Comments Mother         joint disease     Heart Disease Mother 70        Heart Disease,CABG     Other - See Comments Father         Stroke,stroke/heavy smoker     Diabetes Paternal Grandmother         Diabetes,?GMother       Review of Systems  The complete review of systems is negative other than noted in the HPI or here.   Anesthesia Evaluation   Pt has had prior anesthetic.     No history of anesthetic complications       ROS/MED HX  ENT/Pulmonary:     (+) YVETTE risk factors, snores loudly, hypertension, obese, tobacco use, Past use,  (-) asthma   Neurologic:  - neg neurologic ROS     Cardiovascular:     (+) hypertension--CAD ---Taking blood thinners Pt has received instructions: Instructions Given to patient: last dose asa 5/30. PHELPS. valvular problems/murmurs type: AS severe aortic stenosis (SPEEDY 0.95 cm2, mean gradient 50 mmHg, peak velocity 4.3 m/sec) . Previous cardiac testing   Echo: Date: 2/2022 Results:  Echocardiogram 2/15/2022   Summary   Global and regional left ventricular function is normal with an EF of 55-60%. The right ventricle is normal in function and size. Severe aortic stenosis is present (MG 50mmHg, PV 4.3m/s, DVI 0.26) Ascending aorta is dilated measuring 4.2cm No pericardial effusion is present. This study was compared with the study from 10/31/18 the AS has progressed and aorta is more dilated .   Stress Test: Date: Results:    ECG Reviewed: Date: Results:    Cath: Date: 4/2022 Results:  Triple vessel coronary artery disease.    (-) orthopnea/PND and irregular heartbeat/palpitations   METS/Exercise Tolerance: 3 - Able to walk 1-2 blocks without stopping    Hematologic:  - neg hematologic  ROS      Musculoskeletal:  - neg musculoskeletal ROS     GI/Hepatic:  - neg GI/hepatic ROS     Renal/Genitourinary:  - neg Renal ROS     Endo:  - neg endo ROS   (+) Obesity,     Psychiatric/Substance Use:  - neg psychiatric ROS     Infectious Disease: Comment: Fully vaccinated for covid - neg infectious disease ROS     Malignancy:  - neg malignancy ROS     Other:            There were no vitals taken for this visit.    Physical Exam   Constitutional: Awake, alert, cooperative, no apparent distress, and appears stated age.  Eyes: Pupils equal, round and reactive to light, extra ocular muscles intact, sclera clear, conjunctiva normal.  HENT: Normocephalic, oral pharynx with moist mucus membranes, good dentition. No goiter appreciated.   Respiratory: Clear to auscultation bilaterally, no crackles or wheezing.  Cardiovascular: Regular rate and rhythm, normal S1 and S2, and 4/6  murmur of aortic stenosis noted.  Carotids +2, no bruits. No edema. Palpable pulses to radial  DP and PT arteries.   GI: Normal bowel sounds, soft, non-distended, non-tender, no masses palpated, no hepatosplenomegaly.    Lymph/Hematologic: No cervical lymphadenopathy and no supraclavicular lymphadenopathy.  Genitourinary:  deferred  Skin: Warm and dry.  No rashes at anticipated surgical site.   Musculoskeletal:  limited ROM of neck. There is no redness, warmth, or swelling of the joints. Gross motor strength is normal.    Neurologic: Awake, alert, oriented to name, place and time. Cranial nerves II-XII are grossly intact. Gait is normal.   Neuropsychiatric: Calm, cooperative. Normal affect.     Prior Labs/Diagnostic Studies   All labs and imaging personally reviewed     EKG/ stress test - if available please see in ROS above     Echo result w/o MOPS: Interpretation Summary Global and regional left ventricular function is normal with an EF of 55-60%.The right ventricle is normal in function and size.Severe aortic stenosis is present (MG 50mmHg, PV  4.3m/s, DVI 0.26)Ascending aorta is dilated measuring 4.2cmNo pericardial effusion is present.This study was compared with the study from 10/31/18 the AS has progressed andaorta is more dilated .    Right Heart angigogram 4/22/2022   Triple vessel coronary artery disease.   Ostial to Prox LCx lesion is 95% stenosed.   Ostial ramus lesion is 90% stenosed.   Prox RCA lesion is 70% stenosed. Mid RCA lesion is 75% stenosed.   Right sided filling pressures are mildly elevated (mean RA 11 mm Hg).   Normal pulmonary artery pressures (mPAP 24 mm Hg, PVR 2.3 KEANE).   Left sided filling pressures are mildly elevated (PCWP 18 mm Hg).   Normal cardiac output level.   Recommend surgical consultation for surgical AVR and CABG evaluation.               CT angiogram    4/21/2022   IMPRESSION:   1.  See below for TAVR related aortic annular and vascular measurements.    2.  No acute aortic dissection, intramural hematoma or contained rupture noted.   3.  The LVOT is Moderately calcified, with bulky calcified nodules.    4.  Please review detailed radiology report, to follow separately, for incidental non-cardiovascular findings.       FINDINGS:   1.  Severely calcified tricuspid aortic valve. Aortic valve calcium score is 5320. The LVOT is Moderately calcified, with bulky calcified nodules. The ascending aorta is mildly calcified, aortic arch is  severely calcified, the descending thoracic aorta is mildly calcified. There is mild mitral annular calcification.   2.  There are no adverse aortic root features, ie coronary heights are adequate and there is no significant aortic root calcification.   3.  There are significant native coronary calcifications.    4.  The arch vessel branching pattern is normal.    5.  The suprarenal abdominal aorta is severely calcified; infrarenal abdominal aorta is severely calcified   6.  Widely patent origins of celiac trunk, superior mesenteric artery and inferior mesenteric artery.  Single bilateral  renal arteries with widely patent origins.    7.  There is no acute aortic pathology, such as dissection, intramural hematoma, or contained rupture.          No flowsheet data found.      The patient's records and results personally reviewed by this provider.     Outside records reviewed from: Care Everywhere    LAB/DIAGNOSTIC STUDIES TODAY:    Lab Results   Component Value Date    WBC 7.7 05/18/2022     Lab Results   Component Value Date    RBC 4.75 05/18/2022     Lab Results   Component Value Date    HGB 15.2 05/18/2022    HGB 16.1 05/20/2016     Lab Results   Component Value Date    HCT 46.5 05/18/2022    HCT 47.7 05/20/2016     No components found for: MCT  Lab Results   Component Value Date    MCV 98 05/18/2022    MCV 96 05/20/2016     Lab Results   Component Value Date    MCH 32.0 05/18/2022    MCH 32.5 05/20/2016     Lab Results   Component Value Date    MCHC 32.7 05/18/2022    MCHC 33.7 05/20/2016     Lab Results   Component Value Date    RDW 13.2 05/18/2022    RDW 12.2 05/20/2016     Lab Results   Component Value Date     05/18/2022     05/20/2016     Last Comprehensive Metabolic Panel:  Sodium   Date Value Ref Range Status   05/18/2022 138 133 - 144 mmol/L Final   11/13/2020 136 134 - 144 mmol/L Final     Potassium   Date Value Ref Range Status   05/18/2022 4.2 3.4 - 5.3 mmol/L Final   11/13/2020 3.9 3.5 - 5.1 mmol/L Final     Chloride   Date Value Ref Range Status   05/18/2022 104 94 - 109 mmol/L Final   11/13/2020 98 98 - 107 mmol/L Final     Carbon Dioxide   Date Value Ref Range Status   11/13/2020 31 21 - 31 mmol/L Final     Carbon Dioxide (CO2)   Date Value Ref Range Status   05/18/2022 30 20 - 32 mmol/L Final     Anion Gap   Date Value Ref Range Status   05/18/2022 4 3 - 14 mmol/L Final   11/13/2020 7 3 - 14 mmol/L Final     Glucose   Date Value Ref Range Status   05/18/2022 97 70 - 99 mg/dL Final   11/13/2020 100 70 - 105 mg/dL Final     Urea Nitrogen   Date Value Ref Range Status    05/18/2022 21 7 - 30 mg/dL Final   11/13/2020 17 7 - 25 mg/dL Final     Creatinine   Date Value Ref Range Status   05/18/2022 0.99 0.66 - 1.25 mg/dL Final   11/13/2020 1.07 0.70 - 1.30 mg/dL Final     GFR Estimate   Date Value Ref Range Status   05/18/2022 79 >60 mL/min/1.73m2 Final     Comment:     Effective December 21, 2021 eGFRcr in adults is calculated using the 2021 CKD-EPI creatinine equation which includes age and gender (Richy et al., NEJ, DOI: 10.1056/MKKIav3751030)   11/13/2020 68 >60 mL/min/[1.73_m2] Final     Calcium   Date Value Ref Range Status   05/18/2022 9.0 8.5 - 10.1 mg/dL Final   11/13/2020 8.9 8.6 - 10.3 mg/dL Final     Lab Results   Component Value Date    AST 38 09/05/2018     Lab Results   Component Value Date    ALT 33 09/05/2018     No results found for: BILICONJ   Lab Results   Component Value Date    BILITOTAL 0.6 09/05/2018     Lab Results   Component Value Date    ALBUMIN 4.2 09/05/2018     Lab Results   Component Value Date    PROTTOTAL 7.3 09/05/2018      Lab Results   Component Value Date    ALKPHOS 37 09/05/2018             Assessment      Arun Wren is a 77 year old male seen as a PAC referral for risk assessment and optimization for anesthesia.    Plan/Recommendations  Pt will be optimized for the proposed procedure.  See below for details on the assessment, risk, and preoperative recommendations    NEUROLOGY  - No history of TIA, CVA or seizure    -Post Op delirium risk factors:  No risk identified    ENT  - No current airway concerns.  Will need to be reassessed day of surgery.  Mallampati: II  TM: > 3    CARDIAC  - CAD-multi-vessel disease  - above procedure scheduled.   - AORTIC STENOSIS- Severe- above procedure now scheduled  + PHELPS, able to walk to his mailbox and back which is 2 blocks. Denies angina, palpitations, dizziness or syncopal episodes.    AAA- measuring 4.3cm  - Hypertension- elevated in clinic  Patients reports at home his average Systolic BP is 160.  "Will need better management post-operatively.  - Hyperlipidemia  Well controlled on home regimen      - METS (Metabolic Equivalents) METS < 3  Patient CANNOT perform 4 METS exercise without symptoms            Total Score: 1    Functional Capacity: Unable to complete 4 METS      RCRI-Very low risk: Class 1 0.4% complication rate            Total Score: -        Criteria with incomplete data:    RCRI: High Risk Surgery    RCRI: CAD    RCRI: CHF    RCRI: Cerebrovascular Disease     RCRI: Diabetes    RCRI: Elevated Creatinine      - This score is not calculated because of inadequate data       PULMONARY    YVETTE High Risk            Total Score: 5    YVETTE: Snores loudly    YVETTE: Hypertension    YVETTE: BMI over 35 kg/m2    YVETTE: Over 50 ys old    YVETTE: Male      - Denies asthma or inhaler use  - Tobacco History      History   Smoking Status     Former Smoker     Years: 27.00     Types: Cigarettes     Quit date: 1/1/1988   Smokeless Tobacco     Never Used       GI    PONV Medium Risk  Total Score: 2           1 AN PONV: Patient is not a current smoker    1 AN PONV: Intended Post Op Opioids        ENDOCRINE    - BMI: Estimated body mass index is 35.21 kg/m  as calculated from the following:    Height as of 5/18/22: 1.753 m (5' 9\").    Weight as of 5/18/22: 108.1 kg (238 lb 6.4 oz).  Obesity (BMI >30)  - No history of Diabetes Mellitus    HEME  VTE Low Risk 0.5%            Total Score: 3    VTE: Greater than 59 yrs old    VTE: Male      - Platelet disfunction second to Aspirin (Flavio, many others)      MSK  Patient is NOT Frail            Total Score: -        Criteria with incomplete data:    Frailty: Weight loss 10 lbs or greater    Frailty: Slower walking speed    Frailty: Decrease in strength    Frailty: Increased exhaustion    Frailty: Overall lack of energy      - This score is not calculated because of inadequate data       The patient is optimized for their procedure. AVS with information on surgery time/arrival time, meds " and NPO status given by nursing staff. No further diagnostic testing indicated.      On the day of service:     Prep time: 20 minutes  Visit time: 20 minutes  Documentation time: 15 minutes  ------------------------------------------  Total time: 55 minutes      DUSTIN Dhillon CNP  Preoperative Assessment Center  [facility]  Phone: [phone]  Fax: [phone]           Anesthesia Plan    ASA Status:  4      Anesthesia Type: General.     - Airway: ETT   Induction: Intravenous.   Maintenance: Inhalation.   Techniques and Equipment:     - Airway: Video-Laryngoscope, Shoulder Deandra/Ramp     - Lines/Monitors: 2nd IV, Arterial Line, Central Line, PAC, CVP, BIS, NIRS, BREANNE            BREANNE Absolute Contra-indication: NONE            BREANNE Relative Contra-indication: NONE     - Blood: T&S     - Drips/Meds: Phenylephrine, Norepi, Epinephrine     Consents            Postoperative Care            Comments:

## 2022-05-31 ENCOUNTER — APPOINTMENT (OUTPATIENT)
Dept: GENERAL RADIOLOGY | Facility: CLINIC | Age: 77
DRG: 220 | End: 2022-05-31
Attending: PHYSICIAN ASSISTANT
Payer: COMMERCIAL

## 2022-05-31 ENCOUNTER — APPOINTMENT (OUTPATIENT)
Dept: GENERAL RADIOLOGY | Facility: CLINIC | Age: 77
DRG: 220 | End: 2022-05-31
Attending: INTERNAL MEDICINE
Payer: COMMERCIAL

## 2022-05-31 ENCOUNTER — ANESTHESIA (OUTPATIENT)
Dept: SURGERY | Facility: CLINIC | Age: 77
DRG: 220 | End: 2022-05-31
Payer: COMMERCIAL

## 2022-05-31 ENCOUNTER — HOSPITAL ENCOUNTER (INPATIENT)
Facility: CLINIC | Age: 77
LOS: 9 days | Discharge: HOME | DRG: 220 | End: 2022-06-09
Attending: SURGERY | Admitting: INTERNAL MEDICINE
Payer: COMMERCIAL

## 2022-05-31 DIAGNOSIS — Z95.1 S/P CABG (CORONARY ARTERY BYPASS GRAFT): ICD-10-CM

## 2022-05-31 DIAGNOSIS — G47.52 DREAM ENACTMENT BEHAVIOR: ICD-10-CM

## 2022-05-31 DIAGNOSIS — Z95.2 S/P AVR: ICD-10-CM

## 2022-05-31 DIAGNOSIS — Z95.3 BIOPROSTHETIC AORTIC VALVE REPLACEMENT DURING CURRENT HOSPITALIZATION: Primary | ICD-10-CM

## 2022-05-31 DIAGNOSIS — I48.0 PAROXYSMAL ATRIAL FIBRILLATION (H): ICD-10-CM

## 2022-05-31 LAB
ALBUMIN SERPL-MCNC: 2.6 G/DL (ref 3.4–5)
ALP SERPL-CCNC: 35 U/L (ref 40–150)
ALT SERPL W P-5'-P-CCNC: 21 U/L (ref 0–70)
ANION GAP SERPL CALCULATED.3IONS-SCNC: 8 MMOL/L (ref 3–14)
APTT PPP: 103 SECONDS (ref 22–38)
APTT PPP: 108 SECONDS (ref 22–38)
APTT PPP: 124 SECONDS (ref 22–38)
APTT PPP: 66 SECONDS (ref 22–38)
AST SERPL W P-5'-P-CCNC: 34 U/L (ref 0–45)
BASE EXCESS BLDA CALC-SCNC: -0.1 MMOL/L (ref -9–1.8)
BASE EXCESS BLDA CALC-SCNC: -0.6 MMOL/L (ref -9–1.8)
BASE EXCESS BLDA CALC-SCNC: 0.8 MMOL/L (ref -9–1.8)
BASE EXCESS BLDA CALC-SCNC: 0.9 MMOL/L (ref -9.6–2)
BASE EXCESS BLDA CALC-SCNC: 1.7 MMOL/L (ref -9.6–2)
BASE EXCESS BLDA CALC-SCNC: 2 MMOL/L (ref -9.6–2)
BASE EXCESS BLDA CALC-SCNC: 2.6 MMOL/L (ref -9.6–2)
BASE EXCESS BLDA CALC-SCNC: 2.7 MMOL/L (ref -9.6–2)
BASE EXCESS BLDA CALC-SCNC: 3.1 MMOL/L (ref -9.6–2)
BASE EXCESS BLDV CALC-SCNC: 1.4 MMOL/L (ref -7.7–1.9)
BASE EXCESS BLDV CALC-SCNC: 2.5 MMOL/L (ref -7.7–1.9)
BASE EXCESS BLDV CALC-SCNC: 4.2 MMOL/L (ref -8.1–1.9)
BILIRUB SERPL-MCNC: 0.6 MG/DL (ref 0.2–1.3)
BLD PROD TYP BPU: NORMAL
BLD PROD TYP BPU: NORMAL
BLOOD COMPONENT TYPE: NORMAL
BLOOD COMPONENT TYPE: NORMAL
BUN SERPL-MCNC: 18 MG/DL (ref 7–30)
CA-I BLD-MCNC: 3.9 MG/DL (ref 4.4–5.2)
CA-I BLD-MCNC: 4 MG/DL (ref 4.4–5.2)
CA-I BLD-MCNC: 4 MG/DL (ref 4.4–5.2)
CA-I BLD-MCNC: 4.1 MG/DL (ref 4.4–5.2)
CA-I BLD-MCNC: 4.4 MG/DL (ref 4.4–5.2)
CA-I BLD-MCNC: 4.4 MG/DL (ref 4.4–5.2)
CA-I BLD-MCNC: 4.5 MG/DL (ref 4.4–5.2)
CA-I BLD-MCNC: 4.8 MG/DL (ref 4.4–5.2)
CALCIUM SERPL-MCNC: 7.5 MG/DL (ref 8.5–10.1)
CF REDUC 30M P MA P HEP LENFR BLD TEG: 0.4 % (ref 0–8)
CF REDUC 60M P MA LENFR BLD TEG: 2.6 % (ref 0–15)
CF REDUC 60M P MA LENFR BLD TEG: 3.5 % (ref 0–15)
CF REDUC 60M P MA P HEPASE LENFR BLD TEG: 2.9 % (ref 0–15)
CFT BLD TEG: 1.2 MINUTE (ref 1–3)
CFT BLD TEG: 1.4 MINUTE (ref 1–3)
CFT P HPASE BLD TEG: 1.2 MINUTE (ref 1–3)
CHLORIDE BLD-SCNC: 111 MMOL/L (ref 94–109)
CI (COAGULATION INDEX)(Z) NON NATIVE: 2.2 (ref -3–3)
CI (COAGULATION INDEX)(Z) NON NATIVE: 2.3 (ref -3–3)
CI (COAGULATION INDEX)(Z): 1.9 (ref -3–3)
CLOT ANGLE BLD TEG: 70.1 DEGREES (ref 53–72)
CLOT ANGLE BLD TEG: 72.4 DEGREES (ref 53–72)
CLOT ANGLE P HPASE BLD TEG: 71.8 DEGREES (ref 53–72)
CLOT INIT BLD TEG: 3.9 MINUTE (ref 5–10)
CLOT INIT BLD TEG: 4.5 MINUTE (ref 5–10)
CLOT INIT P HPASE BLD TEG: 4.3 MINUTE (ref 5–10)
CLOT LYSIS 30M P MA LENFR BLD TEG: 0.3 % (ref 0–8)
CLOT LYSIS 30M P MA LENFR BLD TEG: 0.9 % (ref 0–8)
CLOT STRENGTH BLD TEG: 8.2 KD/SC (ref 4.5–11)
CLOT STRENGTH BLD TEG: 9.8 KD/SC (ref 4.5–11)
CLOT STRENGTH P HPASE BLD TEG: 7.9 KD/SC (ref 4.5–11)
CO2 SERPL-SCNC: 24 MMOL/L (ref 20–32)
CODING SYSTEM: NORMAL
CODING SYSTEM: NORMAL
CREAT SERPL-MCNC: 0.85 MG/DL (ref 0.66–1.25)
CROSSMATCH: NORMAL
CROSSMATCH: NORMAL
ERYTHROCYTE [DISTWIDTH] IN BLOOD BY AUTOMATED COUNT: 13.4 % (ref 10–15)
ERYTHROCYTE [DISTWIDTH] IN BLOOD BY AUTOMATED COUNT: 13.4 % (ref 10–15)
FIBRINOGEN PPP-MCNC: 217 MG/DL (ref 170–490)
GFR SERPL CREATININE-BSD FRML MDRD: 90 ML/MIN/1.73M2
GLUCOSE BLD-MCNC: 100 MG/DL (ref 70–99)
GLUCOSE BLD-MCNC: 109 MG/DL (ref 70–99)
GLUCOSE BLD-MCNC: 113 MG/DL (ref 70–99)
GLUCOSE BLD-MCNC: 116 MG/DL (ref 70–99)
GLUCOSE BLD-MCNC: 118 MG/DL (ref 70–99)
GLUCOSE BLD-MCNC: 118 MG/DL (ref 70–99)
GLUCOSE BLD-MCNC: 126 MG/DL (ref 70–99)
GLUCOSE BLD-MCNC: 99 MG/DL (ref 70–99)
GLUCOSE BLDC GLUCOMTR-MCNC: 118 MG/DL (ref 70–99)
GLUCOSE BLDC GLUCOMTR-MCNC: 129 MG/DL (ref 70–99)
GLUCOSE BLDC GLUCOMTR-MCNC: 82 MG/DL (ref 70–99)
GLUCOSE BLDC GLUCOMTR-MCNC: 93 MG/DL (ref 70–99)
GLUCOSE BLDC GLUCOMTR-MCNC: 99 MG/DL (ref 70–99)
HCO3 BLD-SCNC: 24 MMOL/L (ref 21–28)
HCO3 BLD-SCNC: 25 MMOL/L (ref 21–28)
HCO3 BLD-SCNC: 26 MMOL/L (ref 21–28)
HCO3 BLDA-SCNC: 27 MMOL/L (ref 21–28)
HCO3 BLDA-SCNC: 29 MMOL/L (ref 21–28)
HCO3 BLDV-SCNC: 28 MMOL/L (ref 21–28)
HCO3 BLDV-SCNC: 29 MMOL/L (ref 21–28)
HCO3 BLDV-SCNC: 30 MMOL/L (ref 21–28)
HCT VFR BLD AUTO: 33.9 % (ref 40–53)
HCT VFR BLD AUTO: 37.2 % (ref 40–53)
HGB BLD-MCNC: 11.1 G/DL (ref 13.3–17.7)
HGB BLD-MCNC: 11.2 G/DL (ref 13.3–17.7)
HGB BLD-MCNC: 11.5 G/DL (ref 13.3–17.7)
HGB BLD-MCNC: 11.6 G/DL (ref 13.3–17.7)
HGB BLD-MCNC: 11.7 G/DL (ref 13.3–17.7)
HGB BLD-MCNC: 11.8 G/DL (ref 13.3–17.7)
HGB BLD-MCNC: 12 G/DL (ref 13.3–17.7)
HGB BLD-MCNC: 12.5 G/DL (ref 13.3–17.7)
HGB BLD-MCNC: 14.5 G/DL (ref 13.3–17.7)
INR PPP: 1.24 (ref 0.85–1.15)
INR PPP: 1.37 (ref 0.85–1.15)
INR PPP: 1.58 (ref 0.85–1.15)
LACTATE BLD-SCNC: 0.8 MMOL/L
LACTATE BLD-SCNC: 0.8 MMOL/L
LACTATE BLD-SCNC: 0.9 MMOL/L
LACTATE BLD-SCNC: 1 MMOL/L
LACTATE BLD-SCNC: 1.1 MMOL/L
LACTATE BLD-SCNC: 1.6 MMOL/L
LACTATE BLD-SCNC: 1.6 MMOL/L
LACTATE SERPL-SCNC: 1.3 MMOL/L (ref 0.7–2)
LACTATE SERPL-SCNC: 2.2 MMOL/L (ref 0.7–2)
MAGNESIUM SERPL-MCNC: 2.9 MG/DL (ref 1.6–2.3)
MCF BLD TEG: 62.1 MM (ref 50–70)
MCF BLD TEG: 66.3 MM (ref 50–70)
MCF P HPASE BLD TEG: 61.3 MM (ref 50–70)
MCH RBC QN AUTO: 32.4 PG (ref 26.5–33)
MCH RBC QN AUTO: 32.9 PG (ref 26.5–33)
MCHC RBC AUTO-ENTMCNC: 32.7 G/DL (ref 31.5–36.5)
MCHC RBC AUTO-ENTMCNC: 33.6 G/DL (ref 31.5–36.5)
MCV RBC AUTO: 98 FL (ref 78–100)
MCV RBC AUTO: 99 FL (ref 78–100)
O2/TOTAL GAS SETTING VFR VENT: 100 %
O2/TOTAL GAS SETTING VFR VENT: 40 %
O2/TOTAL GAS SETTING VFR VENT: 50 %
O2/TOTAL GAS SETTING VFR VENT: 50 %
O2/TOTAL GAS SETTING VFR VENT: 60 %
O2/TOTAL GAS SETTING VFR VENT: 70 %
O2/TOTAL GAS SETTING VFR VENT: 80 %
OXYHGB MFR BLD: 96 % (ref 92–100)
OXYHGB MFR BLD: 98 % (ref 92–100)
OXYHGB MFR BLDA: 98 % (ref 92–100)
OXYHGB MFR BLDA: 98 % (ref 92–100)
OXYHGB MFR BLDV: 60 % (ref 70–75)
OXYHGB MFR BLDV: 62 % (ref 70–75)
PCO2 BLD: 38 MM HG (ref 35–45)
PCO2 BLD: 41 MM HG (ref 35–45)
PCO2 BLD: 50 MM HG (ref 35–45)
PCO2 BLDA: 44 MM HG (ref 35–45)
PCO2 BLDA: 44 MM HG (ref 35–45)
PCO2 BLDA: 46 MM HG (ref 35–45)
PCO2 BLDA: 46 MM HG (ref 35–45)
PCO2 BLDA: 48 MM HG (ref 35–45)
PCO2 BLDA: 49 MM HG (ref 35–45)
PCO2 BLDV: 46 MM HG (ref 40–50)
PCO2 BLDV: 50 MM HG (ref 40–50)
PCO2 BLDV: 55 MM HG (ref 40–50)
PH BLD: 7.34 [PH] (ref 7.35–7.45)
PH BLD: 7.39 [PH] (ref 7.35–7.45)
PH BLD: 7.43 [PH] (ref 7.35–7.45)
PH BLDA: 7.37 [PH] (ref 7.35–7.45)
PH BLDA: 7.37 [PH] (ref 7.35–7.45)
PH BLDA: 7.38 [PH] (ref 7.35–7.45)
PH BLDA: 7.39 [PH] (ref 7.35–7.45)
PH BLDA: 7.4 [PH] (ref 7.35–7.45)
PH BLDA: 7.4 [PH] (ref 7.35–7.45)
PH BLDV: 7.32 [PH] (ref 7.32–7.43)
PH BLDV: 7.39 [PH] (ref 7.32–7.43)
PH BLDV: 7.39 [PH] (ref 7.32–7.43)
PHOSPHATE SERPL-MCNC: 3.3 MG/DL (ref 2.5–4.5)
PLATELET # BLD AUTO: 109 10E3/UL (ref 150–450)
PLATELET # BLD AUTO: 116 10E3/UL (ref 150–450)
PO2 BLD: 157 MM HG (ref 80–105)
PO2 BLD: 85 MM HG (ref 80–105)
PO2 BLD: 93 MM HG (ref 80–105)
PO2 BLDA: 118 MM HG (ref 80–105)
PO2 BLDA: 185 MM HG (ref 80–105)
PO2 BLDA: 247 MM HG (ref 80–105)
PO2 BLDA: 264 MM HG (ref 80–105)
PO2 BLDA: 283 MM HG (ref 80–105)
PO2 BLDA: 497 MM HG (ref 80–105)
PO2 BLDV: 33 MM HG (ref 25–47)
PO2 BLDV: 34 MM HG (ref 25–47)
PO2 BLDV: 48 MM HG (ref 25–47)
POTASSIUM BLD-SCNC: 3.8 MMOL/L (ref 3.5–5)
POTASSIUM BLD-SCNC: 3.9 MMOL/L (ref 3.5–5)
POTASSIUM BLD-SCNC: 4.1 MMOL/L (ref 3.5–5)
POTASSIUM BLD-SCNC: 4.2 MMOL/L (ref 3.4–5.3)
POTASSIUM BLD-SCNC: 4.2 MMOL/L (ref 3.4–5.3)
POTASSIUM BLD-SCNC: 4.5 MMOL/L (ref 3.5–5)
POTASSIUM BLD-SCNC: 4.6 MMOL/L (ref 3.5–5)
POTASSIUM BLD-SCNC: 4.6 MMOL/L (ref 3.5–5)
POTASSIUM BLD-SCNC: 4.7 MMOL/L (ref 3.5–5)
PROT SERPL-MCNC: 4.9 G/DL (ref 6.8–8.8)
RBC # BLD AUTO: 3.43 10E6/UL (ref 4.4–5.9)
RBC # BLD AUTO: 3.8 10E6/UL (ref 4.4–5.9)
SODIUM BLD-SCNC: 139 MMOL/L (ref 133–144)
SODIUM BLD-SCNC: 140 MMOL/L (ref 133–144)
SODIUM BLD-SCNC: 140 MMOL/L (ref 133–144)
SODIUM SERPL-SCNC: 143 MMOL/L (ref 133–144)
UNIT ABO/RH: NORMAL
UNIT ABO/RH: NORMAL
UNIT NUMBER: NORMAL
UNIT NUMBER: NORMAL
UNIT STATUS: NORMAL
UNIT STATUS: NORMAL
UNIT TYPE ISBT: 5100
UNIT TYPE ISBT: 5100
WBC # BLD AUTO: 11.5 10E3/UL (ref 4–11)
WBC # BLD AUTO: 12.6 10E3/UL (ref 4–11)

## 2022-05-31 PROCEDURE — 93010 ELECTROCARDIOGRAM REPORT: CPT | Mod: 59 | Performed by: INTERNAL MEDICINE

## 2022-05-31 PROCEDURE — 88305 TISSUE EXAM BY PATHOLOGIST: CPT | Mod: TC | Performed by: SURGERY

## 2022-05-31 PROCEDURE — 272N000088 HC PUMP APP ADULT PERFUSION: Performed by: SURGERY

## 2022-05-31 PROCEDURE — 258N000003 HC RX IP 258 OP 636: Performed by: SURGERY

## 2022-05-31 PROCEDURE — 250N000011 HC RX IP 250 OP 636: Performed by: STUDENT IN AN ORGANIZED HEALTH CARE EDUCATION/TRAINING PROGRAM

## 2022-05-31 PROCEDURE — 85384 FIBRINOGEN ACTIVITY: CPT | Performed by: SURGERY

## 2022-05-31 PROCEDURE — 02RF08Z REPLACEMENT OF AORTIC VALVE WITH ZOOPLASTIC TISSUE, OPEN APPROACH: ICD-10-PCS | Performed by: SURGERY

## 2022-05-31 PROCEDURE — 272N000001 HC OR GENERAL SUPPLY STERILE: Performed by: SURGERY

## 2022-05-31 PROCEDURE — 85396 CLOTTING ASSAY WHOLE BLOOD: CPT | Performed by: SURGERY

## 2022-05-31 PROCEDURE — 93005 ELECTROCARDIOGRAM TRACING: CPT

## 2022-05-31 PROCEDURE — 250N000009 HC RX 250: Performed by: SURGERY

## 2022-05-31 PROCEDURE — 86923 COMPATIBILITY TEST ELECTRIC: CPT | Performed by: SURGERY

## 2022-05-31 PROCEDURE — 74018 RADEX ABDOMEN 1 VIEW: CPT | Mod: 26 | Performed by: RADIOLOGY

## 2022-05-31 PROCEDURE — 82805 BLOOD GASES W/O2 SATURATION: CPT | Performed by: PHYSICIAN ASSISTANT

## 2022-05-31 PROCEDURE — 82810 BLOOD GASES O2 SAT ONLY: CPT

## 2022-05-31 PROCEDURE — 85610 PROTHROMBIN TIME: CPT | Performed by: SURGERY

## 2022-05-31 PROCEDURE — 83735 ASSAY OF MAGNESIUM: CPT | Performed by: PHYSICIAN ASSISTANT

## 2022-05-31 PROCEDURE — 999N000157 HC STATISTIC RCP TIME EA 10 MIN

## 2022-05-31 PROCEDURE — 82805 BLOOD GASES W/O2 SATURATION: CPT | Performed by: SURGERY

## 2022-05-31 PROCEDURE — 021109W BYPASS CORONARY ARTERY, TWO ARTERIES FROM AORTA WITH AUTOLOGOUS VENOUS TISSUE, OPEN APPROACH: ICD-10-PCS | Performed by: SURGERY

## 2022-05-31 PROCEDURE — 84132 ASSAY OF SERUM POTASSIUM: CPT | Performed by: PHYSICIAN ASSISTANT

## 2022-05-31 PROCEDURE — 272N000085 HC PACK CELL SAVER CSP: Performed by: SURGERY

## 2022-05-31 PROCEDURE — 88311 DECALCIFY TISSUE: CPT | Mod: TC | Performed by: SURGERY

## 2022-05-31 PROCEDURE — P9041 ALBUMIN (HUMAN),5%, 50ML: HCPCS | Performed by: STUDENT IN AN ORGANIZED HEALTH CARE EDUCATION/TRAINING PROGRAM

## 2022-05-31 PROCEDURE — 258N000003 HC RX IP 258 OP 636: Performed by: PHYSICIAN ASSISTANT

## 2022-05-31 PROCEDURE — 85730 THROMBOPLASTIN TIME PARTIAL: CPT | Performed by: SURGERY

## 2022-05-31 PROCEDURE — 84295 ASSAY OF SERUM SODIUM: CPT | Performed by: PHYSICIAN ASSISTANT

## 2022-05-31 PROCEDURE — 200N000002 HC R&B ICU UMMC

## 2022-05-31 PROCEDURE — C1713 ANCHOR/SCREW BN/BN,TIS/BN: HCPCS | Performed by: SURGERY

## 2022-05-31 PROCEDURE — 250N000011 HC RX IP 250 OP 636: Performed by: PHYSICIAN ASSISTANT

## 2022-05-31 PROCEDURE — 999N000065 XR CHEST PORT 1 VIEW

## 2022-05-31 PROCEDURE — 85610 PROTHROMBIN TIME: CPT | Performed by: PHYSICIAN ASSISTANT

## 2022-05-31 PROCEDURE — 360N000079 HC SURGERY LEVEL 6, PER MIN: Performed by: SURGERY

## 2022-05-31 PROCEDURE — 5A1221Z PERFORMANCE OF CARDIAC OUTPUT, CONTINUOUS: ICD-10-PCS | Performed by: SURGERY

## 2022-05-31 PROCEDURE — C1898 LEAD, PMKR, OTHER THAN TRANS: HCPCS | Performed by: SURGERY

## 2022-05-31 PROCEDURE — 999N000141 HC STATISTIC PRE-PROCEDURE NURSING ASSESSMENT: Performed by: SURGERY

## 2022-05-31 PROCEDURE — 83605 ASSAY OF LACTIC ACID: CPT | Performed by: SURGERY

## 2022-05-31 PROCEDURE — 82330 ASSAY OF CALCIUM: CPT

## 2022-05-31 PROCEDURE — 94002 VENT MGMT INPAT INIT DAY: CPT

## 2022-05-31 PROCEDURE — 271N000002 HC RX 271: Performed by: STUDENT IN AN ORGANIZED HEALTH CARE EDUCATION/TRAINING PROGRAM

## 2022-05-31 PROCEDURE — 82803 BLOOD GASES ANY COMBINATION: CPT

## 2022-05-31 PROCEDURE — 85027 COMPLETE CBC AUTOMATED: CPT | Performed by: SURGERY

## 2022-05-31 PROCEDURE — 250N000009 HC RX 250: Performed by: STUDENT IN AN ORGANIZED HEALTH CARE EDUCATION/TRAINING PROGRAM

## 2022-05-31 PROCEDURE — 250N000009 HC RX 250: Performed by: PHYSICIAN ASSISTANT

## 2022-05-31 PROCEDURE — 83605 ASSAY OF LACTIC ACID: CPT | Performed by: PHYSICIAN ASSISTANT

## 2022-05-31 PROCEDURE — 06BP4ZZ EXCISION OF RIGHT SAPHENOUS VEIN, PERCUTANEOUS ENDOSCOPIC APPROACH: ICD-10-PCS | Performed by: SURGERY

## 2022-05-31 PROCEDURE — 99291 CRITICAL CARE FIRST HOUR: CPT | Mod: 24 | Performed by: INTERNAL MEDICINE

## 2022-05-31 PROCEDURE — 278N000051 HC OR IMPLANT GENERAL: Performed by: SURGERY

## 2022-05-31 PROCEDURE — 33511 CABG VEIN TWO: CPT | Mod: 51 | Performed by: SURGERY

## 2022-05-31 PROCEDURE — 71045 X-RAY EXAM CHEST 1 VIEW: CPT | Mod: 26 | Performed by: RADIOLOGY

## 2022-05-31 PROCEDURE — 85027 COMPLETE CBC AUTOMATED: CPT | Performed by: PHYSICIAN ASSISTANT

## 2022-05-31 PROCEDURE — 999N000065 XR ABDOMEN 1 VIEW

## 2022-05-31 PROCEDURE — 33508 ENDOSCOPIC VEIN HARVEST: CPT | Mod: XU | Performed by: SURGERY

## 2022-05-31 PROCEDURE — 250N000011 HC RX IP 250 OP 636: Performed by: SURGERY

## 2022-05-31 PROCEDURE — 82803 BLOOD GASES ANY COMBINATION: CPT | Performed by: SURGERY

## 2022-05-31 PROCEDURE — 410N000004: Performed by: SURGERY

## 2022-05-31 PROCEDURE — 82330 ASSAY OF CALCIUM: CPT | Performed by: PHYSICIAN ASSISTANT

## 2022-05-31 PROCEDURE — 85730 THROMBOPLASTIN TIME PARTIAL: CPT | Performed by: PHYSICIAN ASSISTANT

## 2022-05-31 PROCEDURE — 258N000003 HC RX IP 258 OP 636: Performed by: STUDENT IN AN ORGANIZED HEALTH CARE EDUCATION/TRAINING PROGRAM

## 2022-05-31 PROCEDURE — 250N000013 HC RX MED GY IP 250 OP 250 PS 637: Performed by: SURGERY

## 2022-05-31 PROCEDURE — 250N000024 HC ISOFLURANE, PER MIN: Performed by: SURGERY

## 2022-05-31 PROCEDURE — 85730 THROMBOPLASTIN TIME PARTIAL: CPT | Performed by: STUDENT IN AN ORGANIZED HEALTH CARE EDUCATION/TRAINING PROGRAM

## 2022-05-31 PROCEDURE — 250N000013 HC RX MED GY IP 250 OP 250 PS 637: Performed by: PHYSICIAN ASSISTANT

## 2022-05-31 PROCEDURE — 84100 ASSAY OF PHOSPHORUS: CPT | Performed by: PHYSICIAN ASSISTANT

## 2022-05-31 PROCEDURE — 999N000253 HC STATISTIC WEANING TRIALS

## 2022-05-31 PROCEDURE — 410N000003 HC PER-PERFUSION 1ST 30 MIN: Performed by: SURGERY

## 2022-05-31 PROCEDURE — 84132 ASSAY OF SERUM POTASSIUM: CPT

## 2022-05-31 PROCEDURE — 370N000017 HC ANESTHESIA TECHNICAL FEE, PER MIN: Performed by: SURGERY

## 2022-05-31 PROCEDURE — 33405 REPLACEMENT AORTIC VALVE OPN: CPT | Performed by: SURGERY

## 2022-05-31 DEVICE — COR-KNOT MINI® COMBO KIT
Type: IMPLANTABLE DEVICE | Site: HEART | Status: FUNCTIONAL
Brand: COR-KNOT MINI®

## 2022-05-31 DEVICE — VALVE AORTIC INSPIRIS RESILIA 11500A25 SZ 25MM: Type: IMPLANTABLE DEVICE | Site: HEART | Status: FUNCTIONAL

## 2022-05-31 RX ORDER — PROTAMINE SULFATE 10 MG/ML
25 INJECTION, SOLUTION INTRAVENOUS ONCE
Status: COMPLETED | OUTPATIENT
Start: 2022-05-31 | End: 2022-05-31

## 2022-05-31 RX ORDER — PHENYLEPHRINE HCL IN 0.9% NACL 50MG/250ML
.1-6 PLASTIC BAG, INJECTION (ML) INTRAVENOUS CONTINUOUS
Status: DISCONTINUED | OUTPATIENT
Start: 2022-05-31 | End: 2022-06-01

## 2022-05-31 RX ORDER — METHOCARBAMOL 500 MG/1
500 TABLET, FILM COATED ORAL EVERY 6 HOURS PRN
Status: DISCONTINUED | OUTPATIENT
Start: 2022-05-31 | End: 2022-06-09 | Stop reason: HOSPADM

## 2022-05-31 RX ORDER — FENTANYL CITRATE 50 UG/ML
INJECTION, SOLUTION INTRAMUSCULAR; INTRAVENOUS PRN
Status: DISCONTINUED | OUTPATIENT
Start: 2022-05-31 | End: 2022-05-31

## 2022-05-31 RX ORDER — AMOXICILLIN 250 MG
1 CAPSULE ORAL 2 TIMES DAILY
Status: DISCONTINUED | OUTPATIENT
Start: 2022-05-31 | End: 2022-06-09 | Stop reason: HOSPADM

## 2022-05-31 RX ORDER — SODIUM CHLORIDE, SODIUM LACTATE, POTASSIUM CHLORIDE, CALCIUM CHLORIDE 600; 310; 30; 20 MG/100ML; MG/100ML; MG/100ML; MG/100ML
INJECTION, SOLUTION INTRAVENOUS CONTINUOUS
Status: DISCONTINUED | OUTPATIENT
Start: 2022-05-31 | End: 2022-05-31 | Stop reason: HOSPADM

## 2022-05-31 RX ORDER — NALOXONE HYDROCHLORIDE 0.4 MG/ML
0.4 INJECTION, SOLUTION INTRAMUSCULAR; INTRAVENOUS; SUBCUTANEOUS
Status: DISCONTINUED | OUTPATIENT
Start: 2022-05-31 | End: 2022-06-09 | Stop reason: HOSPADM

## 2022-05-31 RX ORDER — ONDANSETRON 2 MG/ML
4 INJECTION INTRAMUSCULAR; INTRAVENOUS EVERY 6 HOURS PRN
Status: DISCONTINUED | OUTPATIENT
Start: 2022-05-31 | End: 2022-06-09 | Stop reason: HOSPADM

## 2022-05-31 RX ORDER — ALBUMIN, HUMAN INJ 5% 5 %
25 SOLUTION INTRAVENOUS ONCE
Status: COMPLETED | OUTPATIENT
Start: 2022-05-31 | End: 2022-05-31

## 2022-05-31 RX ORDER — LIDOCAINE 40 MG/G
CREAM TOPICAL
Status: DISCONTINUED | OUTPATIENT
Start: 2022-05-31 | End: 2022-06-08

## 2022-05-31 RX ORDER — OXYCODONE HYDROCHLORIDE 5 MG/1
5 TABLET ORAL EVERY 4 HOURS PRN
Status: DISCONTINUED | OUTPATIENT
Start: 2022-05-31 | End: 2022-06-09 | Stop reason: HOSPADM

## 2022-05-31 RX ORDER — BISACODYL 10 MG
10 SUPPOSITORY, RECTAL RECTAL DAILY PRN
Status: DISCONTINUED | OUTPATIENT
Start: 2022-05-31 | End: 2022-06-09 | Stop reason: HOSPADM

## 2022-05-31 RX ORDER — EPHEDRINE SULFATE 50 MG/ML
INJECTION, SOLUTION INTRAMUSCULAR; INTRAVENOUS; SUBCUTANEOUS PRN
Status: DISCONTINUED | OUTPATIENT
Start: 2022-05-31 | End: 2022-05-31

## 2022-05-31 RX ORDER — OXYCODONE HYDROCHLORIDE 10 MG/1
10 TABLET ORAL EVERY 4 HOURS PRN
Status: DISCONTINUED | OUTPATIENT
Start: 2022-05-31 | End: 2022-06-02

## 2022-05-31 RX ORDER — CEFAZOLIN SODIUM/WATER 2 G/20 ML
2 SYRINGE (ML) INTRAVENOUS
Status: COMPLETED | OUTPATIENT
Start: 2022-05-31 | End: 2022-05-31

## 2022-05-31 RX ORDER — NOREPINEPHRINE BITARTRATE 0.06 MG/ML
.01-.6 INJECTION, SOLUTION INTRAVENOUS CONTINUOUS
Status: DISCONTINUED | OUTPATIENT
Start: 2022-05-31 | End: 2022-06-01

## 2022-05-31 RX ORDER — FLUMAZENIL 0.1 MG/ML
0.2 INJECTION, SOLUTION INTRAVENOUS
Status: DISCONTINUED | OUTPATIENT
Start: 2022-05-31 | End: 2022-05-31 | Stop reason: HOSPADM

## 2022-05-31 RX ORDER — PROPOFOL 10 MG/ML
INJECTION, EMULSION INTRAVENOUS PRN
Status: DISCONTINUED | OUTPATIENT
Start: 2022-05-31 | End: 2022-05-31

## 2022-05-31 RX ORDER — CALCIUM GLUCONATE 20 MG/ML
2 INJECTION, SOLUTION INTRAVENOUS
Status: DISCONTINUED | OUTPATIENT
Start: 2022-05-31 | End: 2022-06-02

## 2022-05-31 RX ORDER — ASPIRIN 81 MG/1
81 TABLET, CHEWABLE ORAL DAILY
Status: DISCONTINUED | OUTPATIENT
Start: 2022-06-01 | End: 2022-06-02

## 2022-05-31 RX ORDER — HYDRALAZINE HYDROCHLORIDE 20 MG/ML
10 INJECTION INTRAMUSCULAR; INTRAVENOUS EVERY 30 MIN PRN
Status: DISCONTINUED | OUTPATIENT
Start: 2022-05-31 | End: 2022-06-09 | Stop reason: HOSPADM

## 2022-05-31 RX ORDER — NITROGLYCERIN 20 MG/100ML
10-200 INJECTION INTRAVENOUS CONTINUOUS
Status: DISCONTINUED | OUTPATIENT
Start: 2022-05-31 | End: 2022-06-01

## 2022-05-31 RX ORDER — ASPIRIN 81 MG/1
81 TABLET, CHEWABLE ORAL
Status: DISCONTINUED | OUTPATIENT
Start: 2022-05-31 | End: 2022-05-31 | Stop reason: HOSPADM

## 2022-05-31 RX ORDER — PANTOPRAZOLE SODIUM 40 MG/1
40 TABLET, DELAYED RELEASE ORAL DAILY
Status: DISCONTINUED | OUTPATIENT
Start: 2022-05-31 | End: 2022-06-09 | Stop reason: HOSPADM

## 2022-05-31 RX ORDER — BUPIVACAINE HYDROCHLORIDE 2.5 MG/ML
INJECTION, SOLUTION EPIDURAL; INFILTRATION; INTRACAUDAL
Status: COMPLETED | OUTPATIENT
Start: 2022-05-31 | End: 2022-05-31

## 2022-05-31 RX ORDER — NOREPINEPHRINE BITARTRATE 0.06 MG/ML
.01-.4 INJECTION, SOLUTION INTRAVENOUS CONTINUOUS PRN
Status: DISCONTINUED | OUTPATIENT
Start: 2022-05-31 | End: 2022-05-31

## 2022-05-31 RX ORDER — PHENYLEPHRINE HCL IN 0.9% NACL 50MG/250ML
.1-6 PLASTIC BAG, INJECTION (ML) INTRAVENOUS CONTINUOUS
Status: DISCONTINUED | OUTPATIENT
Start: 2022-05-31 | End: 2022-05-31 | Stop reason: HOSPADM

## 2022-05-31 RX ORDER — CEFAZOLIN SODIUM/WATER 2 G/20 ML
2 SYRINGE (ML) INTRAVENOUS SEE ADMIN INSTRUCTIONS
Status: DISCONTINUED | OUTPATIENT
Start: 2022-05-31 | End: 2022-05-31 | Stop reason: HOSPADM

## 2022-05-31 RX ORDER — DEXMEDETOMIDINE HYDROCHLORIDE 4 UG/ML
.2-.7 INJECTION, SOLUTION INTRAVENOUS CONTINUOUS
Status: DISCONTINUED | OUTPATIENT
Start: 2022-05-31 | End: 2022-06-01

## 2022-05-31 RX ORDER — HYDROMORPHONE HCL IN WATER/PF 6 MG/30 ML
0.4 PATIENT CONTROLLED ANALGESIA SYRINGE INTRAVENOUS
Status: DISCONTINUED | OUTPATIENT
Start: 2022-05-31 | End: 2022-06-09 | Stop reason: HOSPADM

## 2022-05-31 RX ORDER — ONDANSETRON 4 MG/1
4 TABLET, ORALLY DISINTEGRATING ORAL EVERY 6 HOURS PRN
Status: DISCONTINUED | OUTPATIENT
Start: 2022-05-31 | End: 2022-06-09 | Stop reason: HOSPADM

## 2022-05-31 RX ORDER — POLYETHYLENE GLYCOL 3350 17 G/17G
17 POWDER, FOR SOLUTION ORAL DAILY
Status: DISCONTINUED | OUTPATIENT
Start: 2022-06-01 | End: 2022-06-03

## 2022-05-31 RX ORDER — SODIUM CHLORIDE, SODIUM LACTATE, POTASSIUM CHLORIDE, CALCIUM CHLORIDE 600; 310; 30; 20 MG/100ML; MG/100ML; MG/100ML; MG/100ML
INJECTION, SOLUTION INTRAVENOUS CONTINUOUS
Status: DISCONTINUED | OUTPATIENT
Start: 2022-05-31 | End: 2022-06-01

## 2022-05-31 RX ORDER — LIDOCAINE 40 MG/G
CREAM TOPICAL
Status: DISCONTINUED | OUTPATIENT
Start: 2022-05-31 | End: 2022-05-31 | Stop reason: HOSPADM

## 2022-05-31 RX ORDER — MUPIROCIN 20 MG/G
0.5 OINTMENT TOPICAL 2 TIMES DAILY
Status: DISPENSED | OUTPATIENT
Start: 2022-05-31 | End: 2022-06-05

## 2022-05-31 RX ORDER — FENTANYL CITRATE 50 UG/ML
25-50 INJECTION, SOLUTION INTRAMUSCULAR; INTRAVENOUS
Status: DISCONTINUED | OUTPATIENT
Start: 2022-05-31 | End: 2022-05-31 | Stop reason: HOSPADM

## 2022-05-31 RX ORDER — CHLORHEXIDINE GLUCONATE ORAL RINSE 1.2 MG/ML
10 SOLUTION DENTAL ONCE
Status: COMPLETED | OUTPATIENT
Start: 2022-05-31 | End: 2022-05-31

## 2022-05-31 RX ORDER — NITROGLYCERIN 10 MG/100ML
INJECTION INTRAVENOUS PRN
Status: DISCONTINUED | OUTPATIENT
Start: 2022-05-31 | End: 2022-05-31

## 2022-05-31 RX ORDER — SODIUM CHLORIDE, SODIUM GLUCONATE, SODIUM ACETATE, POTASSIUM CHLORIDE AND MAGNESIUM CHLORIDE 526; 502; 368; 37; 30 MG/100ML; MG/100ML; MG/100ML; MG/100ML; MG/100ML
INJECTION, SOLUTION INTRAVENOUS CONTINUOUS PRN
Status: DISCONTINUED | OUTPATIENT
Start: 2022-05-31 | End: 2022-05-31

## 2022-05-31 RX ORDER — HYDROMORPHONE HCL IN WATER/PF 6 MG/30 ML
0.2 PATIENT CONTROLLED ANALGESIA SYRINGE INTRAVENOUS
Status: DISCONTINUED | OUTPATIENT
Start: 2022-05-31 | End: 2022-06-09 | Stop reason: HOSPADM

## 2022-05-31 RX ORDER — FAMOTIDINE 20 MG/1
20 TABLET, FILM COATED ORAL
Status: COMPLETED | OUTPATIENT
Start: 2022-05-31 | End: 2022-05-31

## 2022-05-31 RX ORDER — ACETAMINOPHEN 325 MG/1
975 TABLET ORAL EVERY 8 HOURS
Status: COMPLETED | OUTPATIENT
Start: 2022-05-31 | End: 2022-06-03

## 2022-05-31 RX ORDER — DEXMEDETOMIDINE HYDROCHLORIDE 4 UG/ML
0.2-1.2 INJECTION, SOLUTION INTRAVENOUS CONTINUOUS
Status: DISCONTINUED | OUTPATIENT
Start: 2022-05-31 | End: 2022-05-31 | Stop reason: HOSPADM

## 2022-05-31 RX ORDER — HEPARIN SODIUM 1000 [USP'U]/ML
INJECTION, SOLUTION INTRAVENOUS; SUBCUTANEOUS PRN
Status: DISCONTINUED | OUTPATIENT
Start: 2022-05-31 | End: 2022-05-31

## 2022-05-31 RX ORDER — DEXTROSE MONOHYDRATE 25 G/50ML
25-50 INJECTION, SOLUTION INTRAVENOUS
Status: DISCONTINUED | OUTPATIENT
Start: 2022-05-31 | End: 2022-06-09 | Stop reason: HOSPADM

## 2022-05-31 RX ORDER — HEPARIN SODIUM 5000 [USP'U]/.5ML
5000 INJECTION, SOLUTION INTRAVENOUS; SUBCUTANEOUS EVERY 8 HOURS
Status: DISCONTINUED | OUTPATIENT
Start: 2022-06-01 | End: 2022-06-06

## 2022-05-31 RX ORDER — CALCIUM GLUCONATE 20 MG/ML
1 INJECTION, SOLUTION INTRAVENOUS
Status: DISCONTINUED | OUTPATIENT
Start: 2022-05-31 | End: 2022-06-02

## 2022-05-31 RX ORDER — NALOXONE HYDROCHLORIDE 0.4 MG/ML
0.2 INJECTION, SOLUTION INTRAMUSCULAR; INTRAVENOUS; SUBCUTANEOUS
Status: DISCONTINUED | OUTPATIENT
Start: 2022-05-31 | End: 2022-06-09 | Stop reason: HOSPADM

## 2022-05-31 RX ORDER — CEFAZOLIN SODIUM 2 G/100ML
2 INJECTION, SOLUTION INTRAVENOUS EVERY 8 HOURS
Status: COMPLETED | OUTPATIENT
Start: 2022-05-31 | End: 2022-06-01

## 2022-05-31 RX ORDER — ASPIRIN 81 MG/1
162 TABLET, CHEWABLE ORAL
Status: DISCONTINUED | OUTPATIENT
Start: 2022-05-31 | End: 2022-05-31 | Stop reason: HOSPADM

## 2022-05-31 RX ORDER — NITROGLYCERIN 20 MG/100ML
INJECTION INTRAVENOUS CONTINUOUS PRN
Status: DISCONTINUED | OUTPATIENT
Start: 2022-05-31 | End: 2022-05-31

## 2022-05-31 RX ORDER — ACETAMINOPHEN 325 MG/1
975 TABLET ORAL ONCE
Status: COMPLETED | OUTPATIENT
Start: 2022-05-31 | End: 2022-05-31

## 2022-05-31 RX ORDER — PROTAMINE SULFATE 10 MG/ML
INJECTION, SOLUTION INTRAVENOUS PRN
Status: DISCONTINUED | OUTPATIENT
Start: 2022-05-31 | End: 2022-05-31

## 2022-05-31 RX ORDER — ACETAMINOPHEN 325 MG/1
650 TABLET ORAL EVERY 4 HOURS PRN
Status: DISCONTINUED | OUTPATIENT
Start: 2022-06-03 | End: 2022-06-09 | Stop reason: HOSPADM

## 2022-05-31 RX ORDER — GABAPENTIN 100 MG/1
100 CAPSULE ORAL
Status: COMPLETED | OUTPATIENT
Start: 2022-05-31 | End: 2022-05-31

## 2022-05-31 RX ORDER — PROCHLORPERAZINE MALEATE 5 MG
5 TABLET ORAL EVERY 6 HOURS PRN
Status: DISCONTINUED | OUTPATIENT
Start: 2022-05-31 | End: 2022-06-09 | Stop reason: HOSPADM

## 2022-05-31 RX ORDER — NICOTINE POLACRILEX 4 MG
15-30 LOZENGE BUCCAL
Status: DISCONTINUED | OUTPATIENT
Start: 2022-05-31 | End: 2022-06-09 | Stop reason: HOSPADM

## 2022-05-31 RX ADMIN — VANCOMYCIN HYDROCHLORIDE 1500 MG: 500 INJECTION, POWDER, LYOPHILIZED, FOR SOLUTION INTRAVENOUS at 08:43

## 2022-05-31 RX ADMIN — DEXMEDETOMIDINE HYDROCHLORIDE 0.5 MCG/KG/HR: 4 INJECTION, SOLUTION INTRAVENOUS at 13:32

## 2022-05-31 RX ADMIN — OXYCODONE HYDROCHLORIDE 10 MG: 10 TABLET ORAL at 20:23

## 2022-05-31 RX ADMIN — Medication 12.5 MG: at 07:40

## 2022-05-31 RX ADMIN — Medication 50 MG: at 10:24

## 2022-05-31 RX ADMIN — FENTANYL CITRATE 150 MCG: 50 INJECTION, SOLUTION INTRAMUSCULAR; INTRAVENOUS at 10:25

## 2022-05-31 RX ADMIN — HEPARIN SODIUM 43000 UNITS: 1000 INJECTION INTRAVENOUS; SUBCUTANEOUS at 10:38

## 2022-05-31 RX ADMIN — Medication 10 MG: at 08:29

## 2022-05-31 RX ADMIN — PROPOFOL 20 MG: 10 INJECTION, EMULSION INTRAVENOUS at 08:29

## 2022-05-31 RX ADMIN — GABAPENTIN 100 MG: 100 CAPSULE ORAL at 07:40

## 2022-05-31 RX ADMIN — Medication 50 MG: at 12:33

## 2022-05-31 RX ADMIN — Medication 2 G: at 12:58

## 2022-05-31 RX ADMIN — FENTANYL CITRATE 100 MCG: 50 INJECTION, SOLUTION INTRAMUSCULAR; INTRAVENOUS at 09:30

## 2022-05-31 RX ADMIN — NITROGLYCERIN 100 MCG: 10 INJECTION INTRAVENOUS at 10:25

## 2022-05-31 RX ADMIN — Medication 7 ML/HR: at 09:26

## 2022-05-31 RX ADMIN — FENTANYL CITRATE 100 MCG: 50 INJECTION, SOLUTION INTRAMUSCULAR; INTRAVENOUS at 10:12

## 2022-05-31 RX ADMIN — PROTAMINE SULFATE 190 MG: 10 INJECTION, SOLUTION INTRAVENOUS at 12:48

## 2022-05-31 RX ADMIN — NITROGLYCERIN 200 MCG: 10 INJECTION INTRAVENOUS at 13:02

## 2022-05-31 RX ADMIN — CHLORHEXIDINE GLUCONATE 10 ML: 1.2 SOLUTION ORAL at 07:40

## 2022-05-31 RX ADMIN — SODIUM CHLORIDE, SODIUM GLUCONATE, SODIUM ACETATE, POTASSIUM CHLORIDE AND MAGNESIUM CHLORIDE: 526; 502; 368; 37; 30 INJECTION, SOLUTION INTRAVENOUS at 13:06

## 2022-05-31 RX ADMIN — BUPIVACAINE HYDROCHLORIDE 20 ML: 2.5 INJECTION, SOLUTION EPIDURAL; INFILTRATION; INTRACAUDAL; PERINEURAL at 07:45

## 2022-05-31 RX ADMIN — VANCOMYCIN HYDROCHLORIDE 1500 MG: 10 INJECTION, POWDER, LYOPHILIZED, FOR SOLUTION INTRAVENOUS at 21:12

## 2022-05-31 RX ADMIN — SODIUM CHLORIDE 7.5 G: 900 INJECTION, SOLUTION INTRAVENOUS at 08:43

## 2022-05-31 RX ADMIN — NOREPINEPHRINE BITARTRATE 0.03 MCG/KG/MIN: 1 INJECTION, SOLUTION, CONCENTRATE INTRAVENOUS at 12:40

## 2022-05-31 RX ADMIN — SODIUM CHLORIDE 1.25 G/HR: 900 INJECTION, SOLUTION INTRAVENOUS at 09:51

## 2022-05-31 RX ADMIN — EPINEPHRINE 0.03 MCG/KG/MIN: 1 INJECTION INTRAMUSCULAR; INTRAVENOUS; SUBCUTANEOUS at 12:36

## 2022-05-31 RX ADMIN — NITROGLYCERIN 200 MCG: 10 INJECTION INTRAVENOUS at 13:01

## 2022-05-31 RX ADMIN — NITROGLYCERIN 10 MCG/MIN: 20 INJECTION INTRAVENOUS at 15:10

## 2022-05-31 RX ADMIN — ACETAMINOPHEN 975 MG: 325 TABLET, FILM COATED ORAL at 16:02

## 2022-05-31 RX ADMIN — PROTAMINE SULFATE 25 MG: 10 INJECTION, SOLUTION INTRAVENOUS at 14:31

## 2022-05-31 RX ADMIN — SODIUM CHLORIDE, SODIUM GLUCONATE, SODIUM ACETATE, POTASSIUM CHLORIDE AND MAGNESIUM CHLORIDE: 526; 502; 368; 37; 30 INJECTION, SOLUTION INTRAVENOUS at 08:16

## 2022-05-31 RX ADMIN — FENTANYL CITRATE 100 MCG: 50 INJECTION, SOLUTION INTRAMUSCULAR; INTRAVENOUS at 10:36

## 2022-05-31 RX ADMIN — ACETAMINOPHEN 325MG 975 MG: 325 TABLET ORAL at 08:18

## 2022-05-31 RX ADMIN — DEXMEDETOMIDINE HYDROCHLORIDE 0.7 MCG/KG/HR: 400 INJECTION INTRAVENOUS at 18:48

## 2022-05-31 RX ADMIN — PROPOFOL 20 MG: 10 INJECTION, EMULSION INTRAVENOUS at 08:32

## 2022-05-31 RX ADMIN — ALBUMIN HUMAN 25 G: 0.05 INJECTION, SOLUTION INTRAVENOUS at 20:46

## 2022-05-31 RX ADMIN — MIDAZOLAM HYDROCHLORIDE 1 MG: 1 INJECTION, SOLUTION INTRAMUSCULAR; INTRAVENOUS at 07:19

## 2022-05-31 RX ADMIN — FENTANYL CITRATE 300 MCG: 50 INJECTION, SOLUTION INTRAMUSCULAR; INTRAVENOUS at 08:27

## 2022-05-31 RX ADMIN — SUGAMMADEX 200 MG: 100 INJECTION, SOLUTION INTRAVENOUS at 14:31

## 2022-05-31 RX ADMIN — CEFAZOLIN SODIUM 2 G: 2 INJECTION, SOLUTION INTRAVENOUS at 20:23

## 2022-05-31 RX ADMIN — PROPOFOL 30 MG: 10 INJECTION, EMULSION INTRAVENOUS at 08:27

## 2022-05-31 RX ADMIN — FAMOTIDINE 20 MG: 20 TABLET ORAL at 07:40

## 2022-05-31 RX ADMIN — Medication 40 MG: at 16:02

## 2022-05-31 RX ADMIN — FENTANYL CITRATE 250 MCG: 50 INJECTION, SOLUTION INTRAMUSCULAR; INTRAVENOUS at 10:24

## 2022-05-31 RX ADMIN — FENTANYL CITRATE 250 MCG: 50 INJECTION, SOLUTION INTRAMUSCULAR; INTRAVENOUS at 12:47

## 2022-05-31 RX ADMIN — Medication 2 G: at 08:46

## 2022-05-31 RX ADMIN — DEXMEDETOMIDINE HYDROCHLORIDE 0.7 MCG/KG/HR: 400 INJECTION INTRAVENOUS at 15:12

## 2022-05-31 RX ADMIN — Medication 100 MG: at 08:29

## 2022-05-31 RX ADMIN — PHENYLEPHRINE HYDROCHLORIDE 100 MCG: 10 INJECTION INTRAVENOUS at 08:27

## 2022-05-31 RX ADMIN — FENTANYL CITRATE 50 MCG: 50 INJECTION, SOLUTION INTRAMUSCULAR; INTRAVENOUS at 07:19

## 2022-05-31 RX ADMIN — NITROGLYCERIN 5 MCG/MIN: 20 INJECTION INTRAVENOUS at 13:01

## 2022-05-31 RX ADMIN — FENTANYL CITRATE 250 MCG: 50 INJECTION, SOLUTION INTRAMUSCULAR; INTRAVENOUS at 12:57

## 2022-05-31 RX ADMIN — MUPIROCIN 0.5 G: 20 OINTMENT TOPICAL at 21:11

## 2022-05-31 RX ADMIN — METHOCARBAMOL 500 MG: 500 TABLET ORAL at 20:23

## 2022-05-31 RX ADMIN — MIDAZOLAM 2 MG: 1 INJECTION INTRAMUSCULAR; INTRAVENOUS at 08:27

## 2022-05-31 RX ADMIN — SENNOSIDES AND DOCUSATE SODIUM 1 TABLET: 50; 8.6 TABLET ORAL at 20:18

## 2022-05-31 RX ADMIN — NITROGLYCERIN 100 MCG: 10 INJECTION INTRAVENOUS at 09:32

## 2022-05-31 RX ADMIN — SODIUM CHLORIDE, SODIUM GLUCONATE, SODIUM ACETATE, POTASSIUM CHLORIDE AND MAGNESIUM CHLORIDE: 526; 502; 368; 37; 30 INJECTION, SOLUTION INTRAVENOUS at 08:43

## 2022-05-31 ASSESSMENT — ACTIVITIES OF DAILY LIVING (ADL)
ADLS_ACUITY_SCORE: 28
ADLS_ACUITY_SCORE: 18
ADLS_ACUITY_SCORE: 28
ADLS_ACUITY_SCORE: 18
ADLS_ACUITY_SCORE: 28
ADLS_ACUITY_SCORE: 26
ADLS_ACUITY_SCORE: 28
ADLS_ACUITY_SCORE: 18
ADLS_ACUITY_SCORE: 18

## 2022-05-31 NOTE — PLAN OF CARE
Major Shift Events:  Pt arrived from OR ~1400. Dex 0.7, not following commands. SB w/ MAP goal >65 and SBP<130.  Pacer backup HR 50. CMV 40/500/18/7, failed PS trial. CTx2 mediastinal -20 suction. OG in place for meds. Zhang w/ adequate UOP. R internal jugular swan w/ MAC at 57cm, L A-line, L PIV.  Plan: Monitor hemodynamics, wean vent as able for extubation.   For vital signs and complete assessments, please see documentation flowsheets.

## 2022-05-31 NOTE — H&P
CV ICU ADMISSION NOTE  5/31/2022    PRIMARY TEAM: Cardiothoracic Surgery  PRIMARY PHYSICIAN: Dr. Benítez    REASON FOR CRITICAL CARE ADMISSION: Mechanical ventilation and Hemodynamic instability   ADMITTING PHYSICIAN: Dr. Sotomayor    ASSESSMENT: Arun Wren is a 78 yo M with a PMH significant for hypertension, severe symptomatic aortic stenosis, and severe multivessel coronary artery disease who is admitted to the CVICU after median sternotomy, CABG x2, and AVR with bioprosthetic valve with Dr. Benítez on 5/31/2022.     EBL 1000 mL  No product given  500 mL cell saver    PLAN:   Neuro/ pain/ sedation:  Acute Post-op Pain  Bilateral ES catheters in place  - Monitor neurological status. Notify the MD for any acute changes in exam.  - Pain: Fentanyl gtt. Bilateral ES catheters. Dania Tylenol. PRN tylenol, oxy, dilaudid  - Sedation: Dexmedetomidine gtt     Pulmonary care:  Post-op Ventilatory Management   Post-op: Intubated, CMV.   - Possible extubation tonight  - Supplemental oxygen to keep saturation above 92% when extubated  - When extubated: OOB, incentive spirometry, flutter valve, chest physio as needed     Cardiovascular:  Hx Hypertension  Hx Severe Symptomatic Aortic Stenosis  Hx Severe Multivessel CAD  S/p CABG x2 and bioprosthetic AVR  PTA meds: ASA 81 daily, Rosuvastatin 40 daily, Lisinopril 20 daily, Torsemide 10 daily.   Pre-op ECHO with LVEF 55 - 60%  Post-op BREANNE: LVEF 55%, LV hypertrophy present, normal global function of both the RV and LV.   - Monitor hemodynamic status  - Goal MAP > 65 and SBP < 130   - Epi, NE, Vaso; wean as tolerated   - Nitroglycerin gtt.; wean as tolerated   - PRN hydralazine  - Temp pacing wires, DDD @ 50   - Start ASA 81 mg daily on 6/1   - BB; initiate when off vasopressors for > 12 hours  - Statin; restart PTA on 6/1 pending normal LFTs     GI care:   - NPO except ice chips and medications  - Bowel regimen: Miralax, Senna. PRN suppository      Renal/Fluids/Electrolytes/Nutrition:   Baseline Cr ~ 1.00.   - ICU electrolyte replacement protocol  - Hold parenteral nutrition today  - Will monitor intake and output     Endocrine:  Stress induced hyperglycemia    - Insulin gtt; transition to sliding scale insulin when able  - Goal BG < 180      ID/ Antibiotics:  Stress leukocytosis  - Perioperative Ancef and Vancomycin  - Monitor fever curve  - Trend WBC     Heme:  Acute blood loss anemia  Acute blood loss thrombocytopenia  Pre-op Hgb ~15.5 Plts ~ 200. Post op hgb 11.1, plts 116.   - Monitor for signs of active bleeding  - Monitor chest tube output   - CBC daily     Prophylaxis:    - Mechanical prophylaxis for DVT.  - Chemical DVT prophylaxis: Start SQH on 6/1  - PPI daily      MSK:    - Sternal precautions  - PT/OT/CR     Lines/ tubes/ drains:  - RIJ CVC  - PA catheter  - Radial arterial line  - ETT 8.0  - CT x2 mediastinal   - Zhang  - Temp pacing wires     Disposition:  - CV ICU    Patient seen, findings and plan discussed with CV ICU staff, Dr. Sotomayor.     Jorge Sharma MD  PGY1 [facility] Surgery  5/31/2022  [phone]    - - - - - - - - - - - - - - - - - - - - - - - - - - - - - - - - - - - - - - - - - - - - - - - - - - - - - - - - - - - - - - - - - - - - - - - -     HISTORY PRESENTING ILLNESS:  Arun Wren is a 77-year-old male with history of severe symptomatic aortic stenosis, with plans for TAVR, but coronary angiograms demonstrated severe multivessel coronary artery disease.  Patient was felt to be a better candidate for surgical AVR and con commitment CABG.    He has a history of hypertension, hydrocele ganglionic cyst complex hyperlipidemia, actinic keratosis, and basal cell carcinoma the skin.    REVIEW OF SYSTEMS: 10 point ROS neg other than the symptoms noted above in the HPI.    PAST MEDICAL HISTORY:   Past Medical History:   Diagnosis Date     Actinic keratosis     05/19/09,Keratosis left external ear, treated with cryocautery, recheck if  recurs or persists     Allergic contact dermatitis due to plants, except food     recurrent     Basal cell carcinoma of skin     No Comments Provided     Carpal tunnel syndrome     right greater than left     Closed fracture of base of skull (H)     No Comments Provided     Essential (primary) hypertension     No Comments Provided     Ganglion     No Comments Provided     Hydrocele     No Comments Provided     Male erectile dysfunction     decreased libido     Personal history of nicotine dependence     No Comments Provided     Pure hypercholesterolemia     No Comments Provided     Residual hemorrhoidal skin tags     Past external hemorrhoid     Tinea unguium     No Comments Provided     SURGICAL HISTORY:   Past Surgical History:   Procedure Laterality Date     COLONOSCOPY  01/01/1998 1998,at [facility], which was normal     COLONOSCOPY  03/03/2005 03/03/05,at [facility]     COLONOSCOPY  03/02/2015    3/2/15,F/U 2020  tubular adnomas     COLONOSCOPY  03/16/2020    F/U N/A hyperplastic     CV CORONARY ANGIOGRAM N/A 4/22/2022    Procedure: Coronary Angiogram with possible intervention;  Surgeon: Saulo Mccarthy MD;  Location:  HEART CARDIAC CATH LAB     CV RIGHT HEART CATH MEASUREMENTS RECORDED N/A 4/22/2022    Procedure: Right Heart Cath;  Surgeon: Saulo Mccarthy MD;  Location:  HEART CARDIAC CATH LAB     OTHER SURGICAL HISTORY      ,HERNIA REPAIR     OTHER SURGICAL HISTORY      05/24/16,[id number],OTHER,Left,Dr. Jeremiah GIBSON     OTHER SURGICAL HISTORY      07/28/16,[id number],ORCHIECTOMY SIMPLE / PARTIAL / RADICAL W/ SCROTAL / INGUINAL APPROACH,Left,Dr. Jeremiah GIBSON     SOCIAL HISTORY:   Social History     Socioeconomic History     Marital status:      Spouse name: None     Number of children: None     Years of education: None     Highest education level: None   Tobacco Use     Smoking status: Former Smoker     Years: 27.00     Types: Cigarettes     Quit date: 1/1/1988     Years since  quittin.4     Smokeless tobacco: Never Used   Vaping Use     Vaping Use: Never used   Substance and Sexual Activity     Alcohol use: Yes     Comment: Alcoholic Drinks/day: 1-2 drinks per day, whiskey     Drug use: No     Sexual activity: Not Currently   Social History Narrative    Quit smoking .  Occ alcohol.  .   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    Stays active, owns tree farm.     FAMILY HISTORY:   - Heart disease in Father    ALLERGIES:    No Known Allergies    MEDICATIONS:  No current facility-administered medications on file prior to encounter.  sildenafil (REVATIO) 20 MG tablet, TAKE 4-5 TABLETS BY MOUTH ONCE DAILY IF NEEDED FOR OTHER (SPECIFY).TAKE 30 MINUTES BEFORE SEXUAL ACTIVITY.  amoxicillin (AMOXIL) 500 MG capsule, Take 4 capsules (2,000 mg) by mouth once as needed (30-60 minutes prior to dental procedures/cleanings.) (Patient not taking: Reported on 2022)  aspirin (ASA) 81 MG chewable tablet, Take 1 tablet (81 mg) by mouth daily  cholecalciferol (VITAMIN D3) 25 mcg (1000 units) capsule, Take 1 capsule by mouth daily  lisinopril (ZESTRIL) 20 MG tablet, Take 1 tablet (20 mg) by mouth daily  melatonin 3 MG tablet, Take 3 mg by mouth nightly as needed for sleep  nystatin (MYCOSTATIN) 626868 UNIT/GM external cream, APPLY TOPICALLY TO AFFECTEDAREA(S) TWICE DAILY (Patient taking differently: No sig reported)  rosuvastatin (CRESTOR) 40 MG tablet, Take 1 tablet (40 mg) by mouth daily  torsemide (DEMADEX) 10 MG tablet, Take 1 tablet (10 mg) by mouth daily  triamcinolone (KENALOG) 0.1 % external cream, Apply topically 2 times daily (Patient taking differently: Apply topically 2 times daily as needed for irritation)      PHYSICAL EXAMINATION:  Temp:  [97  F (36.1  C)] 97  F (36.1  C)  Pulse:  [53-62] 59  Resp:  [12-46] 18  BP: (100-186)/() 100/59  MAP:  [55 mmHg-76 mmHg] 55 mmHg  Arterial Line BP: ()/(47-64) 78/47  FiO2 (%):  [60 %] 60 %  SpO2:  [90 %-98 %]  90 %    General: intubated, sedated  Neuro: sedated  Resp: mechanically ventilated, synchronous with the vent  CV: RRR, chest tubes in place with sanguinous drainage  Abdomen: Soft, Non-distended, Non-tender  Incisions: sternotomy, covered with black sponge  Extremities: warm and well perfused    LABS: Reviewed.   Arterial Blood Gases   Recent Labs   Lab 05/31/22  1422 05/31/22  1326 05/31/22  1254 05/31/22  1233   PH 7.34* 7.37 7.39 7.38   PCO2 50* 46* 44 48*   PO2 157* 118* 497* 283*   HCO3 26 27 27 29*     Complete Blood Count   Recent Labs   Lab 05/31/22 1420 05/31/22  1326 05/31/22  1255 05/31/22  1254   WBC 12.6*  --  11.5*  --    HGB 12.5* 12.0* 11.1* 11.5*   *  --  109*  --      Basic Metabolic Panel  Recent Labs   Lab 05/31/22  1423 05/31/22  1420 05/31/22  1326 05/31/22  1254 05/31/22  1233   NA  --  143 140 139 139   POTASSIUM  --  4.2  4.2 3.8 4.1 4.6   CHLORIDE  --  111*  --   --   --    CO2  --  24  --   --   --    BUN  --  18  --   --   --    CR  --  0.85  --   --   --    * 126* 113* 118* 116*     Liver Function Tests  Recent Labs   Lab 05/31/22 1420 05/31/22  1255   AST 34  --    ALT 21  --    ALKPHOS 35*  --    BILITOTAL 0.6  --    ALBUMIN 2.6*  --    INR 1.37* 1.58*     Pancreatic Enzymes  No lab results found in last 7 days.  Coagulation Profile  Recent Labs   Lab 05/31/22 1420 05/31/22  1255   INR 1.37* 1.58*   * 66*     IMAGING:  Recent Results (from the past 24 hour(s))   POC US Guidance Needle Placement    Impression    Bilateral erector spinae catheters

## 2022-05-31 NOTE — ANESTHESIA PROCEDURE NOTES
Arterial Line Procedure Note    Pre-Procedure   Staff -        Anesthesiologist:  Timothy Silva MD       Resident/Fellow: David Horton MD       Performed By: resident       Location: OR       Pre-Anesthestic Checklist: patient identified, IV checked, risks and benefits discussed, informed consent, monitors and equipment checked, pre-op evaluation and at physician/surgeon's request  Timeout:       Correct Patient: Yes        Correct Procedure: Yes        Correct Site: Yes        Correct Position: Yes   Line Placement:   This line was placed Pre Induction starting at 5/31/2022 8:10 AM and ending at 5/31/2022 8:20 AM  Procedure   Procedure: arterial line and new line       Insertion Site: radial.  Sterile Prep        Standard elements of sterile barrier followed       Skin prep: Chloraprep  Insertion/Injection        Technique: ultrasound guided and Seldinger Technique        1. Ultrasound was used to evaluate the access site.       2. Artery evaluated via ultrasound for patency/adequacy.       3. Using real-time ultrasound the needle/catheter was observed entering the artery/vein.       5. The visualized structures were anatomically normal.       6. There were no apparent abnormal pathologic findings.       Catheter Type/Size: 20 G, 12 cm  Narrative        Tegaderm dressing used.       Complications: None apparent,        Arterial waveform: Yes        IBP within 10% of NIBP: Yes

## 2022-05-31 NOTE — ANESTHESIA PROCEDURE NOTES
Perioperative BREANNE Procedure Note    Staff -        Anesthesiologist:  Timothy Silva MD       Resident/Fellow: Pepe Ham MD       Performed By: anesthesiologist and fellow  Preanesthesia Checklist:  Patient identified, IV assessed, risks and benefits discussed, monitors and equipment assessed, procedure being performed at surgeon's request and anesthesia consent obtained.    BREANNE Probe Insertion    Probe Status PRE Insertion: NO obvious damage  Probe type:  Adult 3D  Bite block used:   Oral Airway  Insertion Technique: Easy, no oropharyngeal manipulation  Insertion complications: None obvious  Billing Report:BREANNE report by Anesthesiologist (See Separate Report note)  Probe Status POST Removal: NO obvious damage    BREANNE Report  General Procedure Information  Images for this study have been archived.  Modalities: 3D, CW Doppler, PW Doppler, Color flow mapping and 2D  Diagnostic indications for BREANNE:   Aortic stenosis + Aortic insufficiency  Echocardiographic and Doppler Measurements  Right Ventricle:  Cavity size normal.    Hypertrophy not present.   Thrombus not present.    Global function normal.     Left Ventricle:  Cavity size normal.    Hypertrophy present.   Thrombus not present.   Global Function normal.   Ejection Fraction 55%.      Ventricular Regional Function:  1- Basal Anteroseptal:  normal  2- Basal Anterior:  normal  3- Basal Anterolateral:  normal  4- Basal Inferolateral:  normal  5- Basal Inferior:  normal  6- Basal Inferoseptal:  normal  7- Mid Anteroseptal:  normal  8- Mid Anterior:  normal  9- Mid Anterolateral:  normal  10- Mid Inferolateral:  normal  11- Mid Inferior:  normal  12- Mid Inferoseptal:  normal  13- Apical Anterior:  normal  14- Apical Lateral:  normal  15- Apical Inferior:  normal  16- Apical Septal:  normal  17- [place]:  normal    Valves  Aortic Valve: Annulus calcified.  Stenosis severe.  Mean Gradient: 53 mmHg.  Regurgitation +1.  Leaflets calcified.  Leaflet motions  restricted.    Mitral Valve: Annulus normal.  Stenosis not present.  Regurgitation +1.  Leaflets normal.  Leaflet motions normal.    Tricuspid Valve: Annulus normal.  Stenosis not present.  Regurgitation absent.  Leaflets normal.  Leaflet motions normal.    Pulmonic Valve: Annulus normal.  Stenosis not present.  Regurgitation absent.      Aorta: Ascending Aorta: Size dilated.  Diameter 4 cm.  Dissection not present.  Plaque thickness less than 3 mm.  Mobile plaque not present.    Aortic Arch: Size normal.    Dissection not present.   Plaque thickness less than 3 mm.   Mobile plaque not present.    Descending Aorta: Size normal.    Dissection not present.   Plaque thickness less than 3 mm.   Mobile plaque not present.      Right Atrium:  Size normal.   Spontaneous echo contrast not present.   Thrombus not present.   Tumor not present.   Device not present.     Left Atrium: Size dilated.  Spontaneous echo contrast not present.  Thrombus not present.  Tumor not present.  Device not present.     Other Atria Findings:  Concern for old thrombus in the ERNA  Atrial Septum: Intra-atrial septal morphology normal.       Ventricular Septum: Intra-ventricular septum morphology normal.      Diastolic Function Measurements:  Diastolic Dysfunction Grade= II.  E=  ms.  A=  ms.  E/A Ratio= .  DT=  ms.  S/D= .  IVRT= ms.  Other Findings:   Pericardium:  normal. Pleural Effusion:  none. Pulmonary Arteries:  normal. Pulmonary Venous Flow:  normal. Cornoary sinus catheter present.    Post Intervention Findings  Procedure(s) performed:  CABG and Aortic Valve Repair/Replace. Global function:  Unchanged. Regional wall motion: Unchanged. Surgeon(s) notified of all postintervention findings: Yes.   Aortic Valve: Valve replaced with bioprosthetic valve. No paravalvular leak.              Echocardiogram Comments  Echocardiogram comments: PRE-CPB:  Normal LV systolic function w/ LVEF 55-60%. Normal RV systolic function. Grade II diastolic  dysfunction. Trace to Mild MR. Dilated left atrium; echogenic density appreciated within the ERNA with low exit velocities, suspicious for old thrombus. Aortic valve with severe stenosis and extensive calcific disease, mild AI. Unable to assess hepatic venous flow. No pericardial effusion. No aortic dissection. All findings communicated with surgical team.    POST-CPB:  S/p CABGx3 and AVR USING 25MM INSPIRIS RESILIA  AORTIC VALVE. Global LV function unchanged. Anteroseptal wall with depressed function, recovered nicely with epinephrine. AV well seated with no apparent PVL; gradient 9mmHg. No pericardial effusion. No aortic dissection. All findings communicated with surgical team.    .

## 2022-05-31 NOTE — BRIEF OP NOTE
[facility]    Brief Operative Note    Pre-operative diagnosis: CAD (coronary artery disease) [I25.10]  Aortic stenosis [I35.0]  Post-operative diagnosis Same as pre-operative diagnosis    Procedure: Procedure(s):  MEDIAN STERNOTOMY. CARDIOPULMONARY BYPASS. ENDOSCOPIC HARVEST OF LEFT GREATER SAPHENOUS VEIN. CORONARY ARTERY BYPASS GRAFT (CABG) X2 . AORTIC VALVE REPLACEMENT USING 25MM INSPIRIS RESILIA  AORTIC VALVE. TRANSESOPHAGEAL ECHOCARDIOGRAM (BREANNE) PER ANESTHESIA.  Surgeon: Surgeon(s) and Role:     * Sai Benítez MD - Primary     * Yong Lala PA-C - Assisting  Anesthesia: Combined General with Block   Estimated Blood Loss: 1000cc    Drains: Mediastinal x2  Specimens:   ID Type Source Tests Collected by Time Destination   1 : Aortic Valve leaflets Tissue Heart Valve, Aortic SURGICAL PATHOLOGY EXAM Sai Benítez MD 5/31/2022 12:08 PM      Findings:   calcified aortic valve. OM too small to bypass.  Complications: None.  Implants:   Implant Name Type Inv. Item Serial No.  Lot No. LRB No. Used Action   VALVE AORTIC INSPIRIS RESILLA [id number] SZ 25MM - [id number] Valve VALVE AORTIC INSPIRIS RESILLA [id number] SZ 25MM [id number] [employer] N/A N/A 1 Implanted   IMP KIT SUTURE COR-KNOT MINI 4X14MM [id number] - [id number] Metallic Hardware/[place] IMP KIT SUTURE COR-KNOT MINI 4X14MM [id number]  [employer] [id number] N/A 1 Implanted   DEVICE TOMAS ENDO COR KNOT QUICK LOAD [id number] - [id number] Wire DEVICE TOMAS ENDO COR KNOT QUICK LOAD [id number]  [employer] [id number] N/A 2 Implanted   DEVICE TOMAS ENDO COR KNOT QUICK LOAD [id number] - [id number] Wire DEVICE TOMAS ENDO COR KNOT QUICK LOAD [id number]  [employer] [id number] N/A 2 Wasted

## 2022-05-31 NOTE — PROGRESS NOTES
Admitted/transferred from: OR  Reason for admission/transfer: CbgX2 & AVR  2 RN skin assessment: completed by Rolanda PATHAK  Result of skin assessment and interventions/actions: WDL  Height, weight, drug calc weight: Done  Patient belongings (see Flowsheet)  MDRO education added to care plan NA  ?

## 2022-05-31 NOTE — ANESTHESIA PROCEDURE NOTES
Airway       Patient location during procedure: OR       Procedure Start/Stop Times: 5/31/2022 8:32 AM  Staff -        Anesthesiologist:  Timothy Silva MD       Resident/Fellow: David Horton MD       Performed By: residentIndications and Patient Condition       Indications for airway management: renan-procedural       Induction type:intravenous       Mask difficulty assessment: 2 - vent by mask + OA or adjuvant +/- NMBA    Final Airway Details       Final airway type: endotracheal airway       Successful airway: ETT - single  Endotracheal Airway Details        ETT size (mm): 8.0       Cuffed: yes       Successful intubation technique: direct laryngoscopy       DL Blade Type: MAC 4       Grade View of Cords: 3 (Poor neck mobility)       Adjucts: stylet       Position: Right       Measured from: gums/teeth       Secured at (cm): 23       Bite block used: None    Post intubation assessment        Number of attempts at approach: 1       Number of other approaches attempted: 0       Secured with: plastic tape and pink tape       Ease of procedure: easy       Dentition: Intact and Unchanged    Medication(s) Administered   Medication Administration Time: 5/31/2022 8:32 AM

## 2022-05-31 NOTE — ANESTHESIA PROCEDURE NOTES
Erector spinae Procedure Note    Pre-Procedure   Staff -        Anesthesiologist:  Ian Briggs MD       Resident/Fellow: Craig Davenport MD       Performed By: resident       Location: pre-op       Procedure Start/Stop Times: 5/31/2022 7:20 AM and 5/31/2022 7:43 AM       Pre-Anesthestic Checklist: patient identified, IV checked, site marked, risks and benefits discussed, informed consent, monitors and equipment checked, pre-op evaluation, at physician/surgeon's request and post-op pain management  Timeout:       Correct Patient: Yes        Correct Procedure: Yes        Correct Site: Yes        Correct Position: Yes        Correct Laterality: Yes        Site Marked: Yes  Procedure Documentation  Procedure: Erector spinae       Diagnosis: POST OPERATIVE PAIN       Laterality: bilateral       Patient Position: prone       Patient Prep/Sterile Barriers: sterile gloves, mask       Skin prep: Chloraprep       Insertion Site: T5-6.       Needle Type: short bevel       Needle Gauge: 21.        Needle Length (millimeters): 110        Ultrasound guided       1. Ultrasound was used to identify targeted nerve, plexus, vascular marker, or fascial plane and place a needle adjacent to it in real-time.       2. Ultrasound was used to visualize the spread of anesthetic in close proximity to the above referenced structure.       3. A permanent image is entered into the patient's record.    Assessment/Narrative         The placement was negative for: blood aspirated, painful injection and site bleeding       Paresthesias: No.       Bolus given via needle..        Secured via.        Insertion/Infusion Method: Single Shot       Complications: none       Injection made incrementally with aspirations every 5 mL.    Medication(s) Administered   Bupivacaine 0.25% PF (Infiltration) - Infiltration   20 mL - 5/31/2022 7:45:00 AM  Medication Administration Time: 5/31/2022 7:20 AM     Comments:  Bilateral erector spinae catheters

## 2022-05-31 NOTE — ANESTHESIA PROCEDURE NOTES
Central Line/PA Catheter Placement    Pre-Procedure   Staff -        Anesthesiologist:  Timothy Silva MD       Resident/Fellow: David Horton MD       Performed By: resident       Location: OR       Pre-Anesthestic Checklist: patient identified, IV checked, site marked, risks and benefits discussed, informed consent, monitors and equipment checked, pre-op evaluation and at physician/surgeon's request  Timeout:       Correct Patient: Yes        Correct Procedure: Yes        Correct Site: Yes        Correct Position: Yes        Correct Laterality: Yes   Line Placement:   This line was placed Post Induction    Procedure   Procedure: central line       Laterality: right       Insertion Site: internal jugular.       Patient Position: Trendelenburg  Sterile Prep        All elements of maximal sterile barrier technique followed       Patient Prep/Sterile Barriers: draped, hand hygiene, gloves , hat , mask , draped, gown, sterile gel and probe cover       Skin prep: Chloraprep  Insertion/Injection        Technique: ultrasound guided and Seldinger Technique        2. Vein evaluated via ultrasound for patency/adequacy.       3. Using real-time ultrasound the needle/catheter was observed entering the artery/vein.       5. The visualized structures were anatomically normal.       6. There were no apparent abnormal pathologic findings.       Introducer Type: 9 Fr, 2-lumen MAC        Type: Introducer  Narrative         Secured by: suture       Tegaderm and Biopatch dressing used.       Complications: None apparent,        blood aspirated from all lumens,

## 2022-05-31 NOTE — ANESTHESIA PROCEDURE NOTES
Central Line/PA Catheter Placement    Pre-Procedure   Staff -        Anesthesiologist:  Timothy Silva MD       Resident/Fellow: David Horton MD       Performed By: resident       Location: OR       Pre-Anesthestic Checklist: patient identified, IV checked, site marked, risks and benefits discussed, informed consent, monitors and equipment checked, pre-op evaluation and at physician/surgeon's request  Timeout:       Correct Patient: Yes        Correct Procedure: Yes        Correct Site: Yes        Correct Position: Yes        Correct Laterality: Yes   Line Placement:   This line was placed Post Induction    Procedure   Procedure: central line and elective       Laterality: right       Insertion Site: internal jugular.  Sterile Prep        All elements of maximal sterile barrier technique followed       Patient Prep/Sterile Barriers: draped, hand hygiene, gloves , hat , mask , draped, gown, sterile gel and probe cover       Skin prep: Chloraprep  Insertion/Injection        Technique: ultrasound guided and Seldinger Technique        1. Ultrasound was used to evaluate the access site.       2. Vein evaluated via ultrasound for patency/adequacy.       3. Using real-time ultrasound the needle/catheter was observed entering the artery/vein.       5. The visualized structures were anatomically normal.       6. There were no apparent abnormal pathologic findings.       Introducer Type: 9 Fr, 2-lumen MAC        Type: PA/CVC with Introducer       Number of Lumens: double lumen       PA Catheter Type: CCO         Appropriate RV, RA and PA waveforms noted:  Yes            Distance to Wedge Position cm: 55

## 2022-05-31 NOTE — ANESTHESIA CARE TRANSFER NOTE
Patient: Arun Wren    Procedure: Procedure(s):  MEDIAN STERNOTOMY. CARDIOPULMONARY BYPASS. ENDOSCOPIC HARVEST OF LEFT GREATER SAPHENOUS VEIN. CORONARY ARTERY BYPASS GRAFT (CABG) X2 . AORTIC VALVE REPLACEMENT USING 25MM INSPIRIS RESILIA  AORTIC VALVE. TRANSESOPHAGEAL ECHOCARDIOGRAM (BREANNE) PER ANESTHESIA.       Diagnosis: CAD (coronary artery disease) [I25.10]  Aortic stenosis [I35.0]  Diagnosis Additional Information: No value filed.    Anesthesia Type:   General     Note:    Oropharynx: endotracheal tube in place  Level of Consciousness: iatrogenic sedation      Independent Airway: airway patency not satisfactory and stable  Dentition: dentition unchanged  Vital Signs Stable: post-procedure vital signs reviewed and stable  Report to RN Given: handoff report given  Patient transferred to: ICU    ICU Handoff: Call for PAUSE to initiate/utilize ICU HANDOFF, Identified Patient, Identified Responsible Provider, Reviewed the Pertinent Medical History, Discussed Surgical Course, Reviewed Intra-OP Anesthesia Management and Issues during Anesthesia, Set Expectations for Post Procedure Period and Allowed Opportunity for Questions and Acknowledgement of Understanding      Vitals:  Vitals Value Taken Time   /59 05/31/22 1420   Temp 36.1  C (96.98  F) 05/31/22 1444   Pulse 61 05/31/22 1444   Resp 26 05/31/22 1444   SpO2 90 % 05/31/22 1444   Vitals shown include unvalidated device data.    Electronically Signed By: David Horton MD  May 31, 2022  2:45 PM

## 2022-06-01 ENCOUNTER — APPOINTMENT (OUTPATIENT)
Dept: OCCUPATIONAL THERAPY | Facility: CLINIC | Age: 77
DRG: 220 | End: 2022-06-01
Attending: PHYSICIAN ASSISTANT
Payer: COMMERCIAL

## 2022-06-01 ENCOUNTER — APPOINTMENT (OUTPATIENT)
Dept: GENERAL RADIOLOGY | Facility: CLINIC | Age: 77
DRG: 220 | End: 2022-06-01
Attending: PHYSICIAN ASSISTANT
Payer: COMMERCIAL

## 2022-06-01 LAB
ALBUMIN SERPL-MCNC: 3 G/DL (ref 3.4–5)
ALBUMIN SERPL-MCNC: 3.1 G/DL (ref 3.4–5)
ALP SERPL-CCNC: 32 U/L (ref 40–150)
ALP SERPL-CCNC: 36 U/L (ref 40–150)
ALT SERPL W P-5'-P-CCNC: 22 U/L (ref 0–70)
ALT SERPL W P-5'-P-CCNC: 23 U/L (ref 0–70)
ANION GAP SERPL CALCULATED.3IONS-SCNC: 7 MMOL/L (ref 3–14)
ANION GAP SERPL CALCULATED.3IONS-SCNC: 8 MMOL/L (ref 3–14)
APTT PPP: 39 SECONDS (ref 22–38)
AST SERPL W P-5'-P-CCNC: 45 U/L (ref 0–45)
AST SERPL W P-5'-P-CCNC: 49 U/L (ref 0–45)
ATRIAL RATE - MUSE: 63 BPM
ATRIAL RATE - MUSE: 68 BPM
BASE EXCESS BLDA CALC-SCNC: -1 MMOL/L (ref -9–1.8)
BASE EXCESS BLDA CALC-SCNC: 0.4 MMOL/L (ref -9–1.8)
BASE EXCESS BLDA CALC-SCNC: 1 MMOL/L (ref -9–1.8)
BASE EXCESS BLDV CALC-SCNC: 1.2 MMOL/L (ref -7.7–1.9)
BASE EXCESS BLDV CALC-SCNC: 1.6 MMOL/L (ref -7.7–1.9)
BASE EXCESS BLDV CALC-SCNC: 2.4 MMOL/L (ref -7.7–1.9)
BILIRUB SERPL-MCNC: 0.7 MG/DL (ref 0.2–1.3)
BILIRUB SERPL-MCNC: 0.7 MG/DL (ref 0.2–1.3)
BUN SERPL-MCNC: 17 MG/DL (ref 7–30)
BUN SERPL-MCNC: 18 MG/DL (ref 7–30)
CA-I BLD-MCNC: 4.3 MG/DL (ref 4.4–5.2)
CALCIUM SERPL-MCNC: 7.6 MG/DL (ref 8.5–10.1)
CALCIUM SERPL-MCNC: 7.7 MG/DL (ref 8.5–10.1)
CHLORIDE BLD-SCNC: 110 MMOL/L (ref 94–109)
CHLORIDE BLD-SCNC: 111 MMOL/L (ref 94–109)
CO2 SERPL-SCNC: 25 MMOL/L (ref 20–32)
CO2 SERPL-SCNC: 26 MMOL/L (ref 20–32)
CREAT SERPL-MCNC: 0.9 MG/DL (ref 0.66–1.25)
CREAT SERPL-MCNC: 0.91 MG/DL (ref 0.66–1.25)
DIASTOLIC BLOOD PRESSURE - MUSE: NORMAL MMHG
DIASTOLIC BLOOD PRESSURE - MUSE: NORMAL MMHG
ERYTHROCYTE [DISTWIDTH] IN BLOOD BY AUTOMATED COUNT: 13.6 % (ref 10–15)
GFR SERPL CREATININE-BSD FRML MDRD: 87 ML/MIN/1.73M2
GFR SERPL CREATININE-BSD FRML MDRD: 89 ML/MIN/1.73M2
GLUCOSE BLD-MCNC: 132 MG/DL (ref 70–99)
GLUCOSE BLD-MCNC: 146 MG/DL (ref 70–99)
GLUCOSE BLDC GLUCOMTR-MCNC: 103 MG/DL (ref 70–99)
GLUCOSE BLDC GLUCOMTR-MCNC: 125 MG/DL (ref 70–99)
GLUCOSE BLDC GLUCOMTR-MCNC: 129 MG/DL (ref 70–99)
GLUCOSE BLDC GLUCOMTR-MCNC: 133 MG/DL (ref 70–99)
GLUCOSE BLDC GLUCOMTR-MCNC: 135 MG/DL (ref 70–99)
GLUCOSE BLDC GLUCOMTR-MCNC: 136 MG/DL (ref 70–99)
GLUCOSE BLDC GLUCOMTR-MCNC: 148 MG/DL (ref 70–99)
HCO3 BLD-SCNC: 25 MMOL/L (ref 21–28)
HCO3 BLD-SCNC: 26 MMOL/L (ref 21–28)
HCO3 BLD-SCNC: 26 MMOL/L (ref 21–28)
HCO3 BLDV-SCNC: 28 MMOL/L (ref 21–28)
HCO3 BLDV-SCNC: 28 MMOL/L (ref 21–28)
HCO3 BLDV-SCNC: 29 MMOL/L (ref 21–28)
HCT VFR BLD AUTO: 33.9 % (ref 40–53)
HGB BLD-MCNC: 11.3 G/DL (ref 13.3–17.7)
HGB BLD-MCNC: 11.8 G/DL (ref 13.3–17.7)
INR PPP: 1.08 (ref 0.85–1.15)
INTERPRETATION ECG - MUSE: NORMAL
INTERPRETATION ECG - MUSE: NORMAL
MAGNESIUM SERPL-MCNC: 2.4 MG/DL (ref 1.6–2.3)
MCH RBC QN AUTO: 32.3 PG (ref 26.5–33)
MCHC RBC AUTO-ENTMCNC: 33.3 G/DL (ref 31.5–36.5)
MCV RBC AUTO: 97 FL (ref 78–100)
O2/TOTAL GAS SETTING VFR VENT: 40 %
OXYHGB MFR BLD: 95 % (ref 92–100)
OXYHGB MFR BLD: 95 % (ref 92–100)
OXYHGB MFR BLD: 96 % (ref 92–100)
OXYHGB MFR BLDV: 61 % (ref 70–75)
OXYHGB MFR BLDV: 62 % (ref 70–75)
OXYHGB MFR BLDV: 63 % (ref 70–75)
P AXIS - MUSE: 15 DEGREES
P AXIS - MUSE: 23 DEGREES
PCO2 BLD: 42 MM HG (ref 35–45)
PCO2 BLD: 43 MM HG (ref 35–45)
PCO2 BLD: 45 MM HG (ref 35–45)
PCO2 BLDV: 50 MM HG (ref 40–50)
PCO2 BLDV: 50 MM HG (ref 40–50)
PCO2 BLDV: 51 MM HG (ref 40–50)
PH BLD: 7.35 [PH] (ref 7.35–7.45)
PH BLD: 7.39 [PH] (ref 7.35–7.45)
PH BLD: 7.4 [PH] (ref 7.35–7.45)
PH BLDV: 7.35 [PH] (ref 7.32–7.43)
PH BLDV: 7.35 [PH] (ref 7.32–7.43)
PH BLDV: 7.37 [PH] (ref 7.32–7.43)
PHOSPHATE SERPL-MCNC: 3.2 MG/DL (ref 2.5–4.5)
PLATELET # BLD AUTO: 110 10E3/UL (ref 150–450)
PO2 BLD: 78 MM HG (ref 80–105)
PO2 BLD: 81 MM HG (ref 80–105)
PO2 BLD: 83 MM HG (ref 80–105)
PO2 BLDV: 33 MM HG (ref 25–47)
PO2 BLDV: 34 MM HG (ref 25–47)
PO2 BLDV: 34 MM HG (ref 25–47)
POTASSIUM BLD-SCNC: 4 MMOL/L (ref 3.4–5.3)
POTASSIUM BLD-SCNC: 4.4 MMOL/L (ref 3.4–5.3)
PR INTERVAL - MUSE: 130 MS
PR INTERVAL - MUSE: 140 MS
PROT SERPL-MCNC: 5.5 G/DL (ref 6.8–8.8)
PROT SERPL-MCNC: 5.7 G/DL (ref 6.8–8.8)
QRS DURATION - MUSE: 94 MS
QRS DURATION - MUSE: 98 MS
QT - MUSE: 472 MS
QT - MUSE: 494 MS
QTC - MUSE: 501 MS
QTC - MUSE: 505 MS
R AXIS - MUSE: 38 DEGREES
R AXIS - MUSE: 49 DEGREES
RBC # BLD AUTO: 3.5 10E6/UL (ref 4.4–5.9)
SODIUM SERPL-SCNC: 143 MMOL/L (ref 133–144)
SODIUM SERPL-SCNC: 144 MMOL/L (ref 133–144)
SYSTOLIC BLOOD PRESSURE - MUSE: NORMAL MMHG
SYSTOLIC BLOOD PRESSURE - MUSE: NORMAL MMHG
T AXIS - MUSE: 121 DEGREES
T AXIS - MUSE: 21 DEGREES
VENTRICULAR RATE- MUSE: 63 BPM
VENTRICULAR RATE- MUSE: 68 BPM
WBC # BLD AUTO: 9.2 10E3/UL (ref 4–11)

## 2022-06-01 PROCEDURE — 271N000002 HC RX 271: Performed by: SURGERY

## 2022-06-01 PROCEDURE — 999N000157 HC STATISTIC RCP TIME EA 10 MIN

## 2022-06-01 PROCEDURE — 84155 ASSAY OF PROTEIN SERUM: CPT | Performed by: SURGERY

## 2022-06-01 PROCEDURE — 99231 SBSQ HOSP IP/OBS SF/LOW 25: CPT | Performed by: NURSE PRACTITIONER

## 2022-06-01 PROCEDURE — 999N000045 HC STATISTIC DAILY SWAN MONITORING

## 2022-06-01 PROCEDURE — 71045 X-RAY EXAM CHEST 1 VIEW: CPT

## 2022-06-01 PROCEDURE — 84450 TRANSFERASE (AST) (SGOT): CPT | Performed by: SURGERY

## 2022-06-01 PROCEDURE — 82330 ASSAY OF CALCIUM: CPT | Performed by: PHYSICIAN ASSISTANT

## 2022-06-01 PROCEDURE — 97110 THERAPEUTIC EXERCISES: CPT | Mod: GO | Performed by: OCCUPATIONAL THERAPIST

## 2022-06-01 PROCEDURE — 250N000011 HC RX IP 250 OP 636: Performed by: STUDENT IN AN ORGANIZED HEALTH CARE EDUCATION/TRAINING PROGRAM

## 2022-06-01 PROCEDURE — 85730 THROMBOPLASTIN TIME PARTIAL: CPT | Performed by: STUDENT IN AN ORGANIZED HEALTH CARE EDUCATION/TRAINING PROGRAM

## 2022-06-01 PROCEDURE — 82805 BLOOD GASES W/O2 SATURATION: CPT | Performed by: SURGERY

## 2022-06-01 PROCEDURE — 999N000015 HC STATISTIC ARTERIAL MONITORING DAILY

## 2022-06-01 PROCEDURE — 85018 HEMOGLOBIN: CPT | Performed by: SURGERY

## 2022-06-01 PROCEDURE — 214N000001 HC R&B CCU UMMC

## 2022-06-01 PROCEDURE — 258N000003 HC RX IP 258 OP 636: Performed by: PHYSICIAN ASSISTANT

## 2022-06-01 PROCEDURE — 97535 SELF CARE MNGMENT TRAINING: CPT | Mod: GO | Performed by: OCCUPATIONAL THERAPIST

## 2022-06-01 PROCEDURE — 250N000011 HC RX IP 250 OP 636: Performed by: SURGERY

## 2022-06-01 PROCEDURE — 250N000011 HC RX IP 250 OP 636: Performed by: PHYSICIAN ASSISTANT

## 2022-06-01 PROCEDURE — 84100 ASSAY OF PHOSPHORUS: CPT | Performed by: SURGERY

## 2022-06-01 PROCEDURE — 85610 PROTHROMBIN TIME: CPT | Performed by: STUDENT IN AN ORGANIZED HEALTH CARE EDUCATION/TRAINING PROGRAM

## 2022-06-01 PROCEDURE — 93005 ELECTROCARDIOGRAM TRACING: CPT

## 2022-06-01 PROCEDURE — 97165 OT EVAL LOW COMPLEX 30 MIN: CPT | Mod: GO | Performed by: OCCUPATIONAL THERAPIST

## 2022-06-01 PROCEDURE — 250N000013 HC RX MED GY IP 250 OP 250 PS 637: Performed by: PHYSICIAN ASSISTANT

## 2022-06-01 PROCEDURE — 258N000003 HC RX IP 258 OP 636: Performed by: STUDENT IN AN ORGANIZED HEALTH CARE EDUCATION/TRAINING PROGRAM

## 2022-06-01 PROCEDURE — 82805 BLOOD GASES W/O2 SATURATION: CPT | Performed by: PHYSICIAN ASSISTANT

## 2022-06-01 PROCEDURE — 999N000253 HC STATISTIC WEANING TRIALS

## 2022-06-01 PROCEDURE — 83735 ASSAY OF MAGNESIUM: CPT | Performed by: PHYSICIAN ASSISTANT

## 2022-06-01 PROCEDURE — 250N000013 HC RX MED GY IP 250 OP 250 PS 637: Performed by: STUDENT IN AN ORGANIZED HEALTH CARE EDUCATION/TRAINING PROGRAM

## 2022-06-01 PROCEDURE — 71045 X-RAY EXAM CHEST 1 VIEW: CPT | Mod: 26 | Performed by: RADIOLOGY

## 2022-06-01 PROCEDURE — 93010 ELECTROCARDIOGRAM REPORT: CPT | Mod: 59 | Performed by: INTERNAL MEDICINE

## 2022-06-01 PROCEDURE — 99291 CRITICAL CARE FIRST HOUR: CPT | Mod: 24 | Performed by: INTERNAL MEDICINE

## 2022-06-01 PROCEDURE — 85014 HEMATOCRIT: CPT | Performed by: STUDENT IN AN ORGANIZED HEALTH CARE EDUCATION/TRAINING PROGRAM

## 2022-06-01 PROCEDURE — 94003 VENT MGMT INPAT SUBQ DAY: CPT

## 2022-06-01 PROCEDURE — 999N000155 HC STATISTIC RAPCV CVP MONITORING

## 2022-06-01 PROCEDURE — 258N000003 HC RX IP 258 OP 636: Performed by: SURGERY

## 2022-06-01 RX ORDER — ROSUVASTATIN CALCIUM 20 MG/1
40 TABLET, COATED ORAL DAILY
Status: DISCONTINUED | OUTPATIENT
Start: 2022-06-01 | End: 2022-06-09 | Stop reason: HOSPADM

## 2022-06-01 RX ORDER — HEPARIN SODIUM 5000 [USP'U]/.5ML
5000 INJECTION, SOLUTION INTRAVENOUS; SUBCUTANEOUS EVERY 8 HOURS
Status: DISCONTINUED | OUTPATIENT
Start: 2022-06-01 | End: 2022-06-02

## 2022-06-01 RX ADMIN — SENNOSIDES AND DOCUSATE SODIUM 1 TABLET: 50; 8.6 TABLET ORAL at 19:49

## 2022-06-01 RX ADMIN — MUPIROCIN 0.5 G: 20 OINTMENT TOPICAL at 19:50

## 2022-06-01 RX ADMIN — ACETAMINOPHEN 975 MG: 325 TABLET, FILM COATED ORAL at 07:48

## 2022-06-01 RX ADMIN — OXYCODONE HYDROCHLORIDE 5 MG: 5 TABLET ORAL at 07:48

## 2022-06-01 RX ADMIN — ACETAMINOPHEN 975 MG: 325 TABLET, FILM COATED ORAL at 00:47

## 2022-06-01 RX ADMIN — POLYETHYLENE GLYCOL 3350 17 G: 17 POWDER, FOR SOLUTION ORAL at 07:48

## 2022-06-01 RX ADMIN — HEPARIN SODIUM 5000 UNITS: 5000 INJECTION, SOLUTION INTRAVENOUS; SUBCUTANEOUS at 19:48

## 2022-06-01 RX ADMIN — VANCOMYCIN HYDROCHLORIDE 1500 MG: 10 INJECTION, POWDER, LYOPHILIZED, FOR SOLUTION INTRAVENOUS at 08:39

## 2022-06-01 RX ADMIN — METHOCARBAMOL 500 MG: 500 TABLET ORAL at 04:04

## 2022-06-01 RX ADMIN — Medication 500 MG: at 22:10

## 2022-06-01 RX ADMIN — MUPIROCIN 0.5 G: 20 OINTMENT TOPICAL at 07:50

## 2022-06-01 RX ADMIN — Medication 500 MG: at 22:11

## 2022-06-01 RX ADMIN — SENNOSIDES AND DOCUSATE SODIUM 1 TABLET: 50; 8.6 TABLET ORAL at 07:48

## 2022-06-01 RX ADMIN — ACETAMINOPHEN 975 MG: 325 TABLET, FILM COATED ORAL at 18:47

## 2022-06-01 RX ADMIN — OXYCODONE HYDROCHLORIDE 10 MG: 10 TABLET ORAL at 04:04

## 2022-06-01 RX ADMIN — HEPARIN SODIUM 5000 UNITS: 5000 INJECTION, SOLUTION INTRAVENOUS; SUBCUTANEOUS at 12:12

## 2022-06-01 RX ADMIN — ROSUVASTATIN CALCIUM 40 MG: 20 TABLET, FILM COATED ORAL at 12:12

## 2022-06-01 RX ADMIN — ACETAMINOPHEN 975 MG: 325 TABLET, FILM COATED ORAL at 23:43

## 2022-06-01 RX ADMIN — EPINEPHRINE 0.01 MCG/KG/MIN: 1 INJECTION INTRAMUSCULAR; INTRAVENOUS; SUBCUTANEOUS at 00:43

## 2022-06-01 RX ADMIN — Medication 40 MG: at 07:50

## 2022-06-01 RX ADMIN — HYDROMORPHONE HYDROCHLORIDE 0.4 MG: 0.2 INJECTION, SOLUTION INTRAMUSCULAR; INTRAVENOUS; SUBCUTANEOUS at 02:24

## 2022-06-01 RX ADMIN — HYDRALAZINE HYDROCHLORIDE 10 MG: 20 INJECTION INTRAMUSCULAR; INTRAVENOUS at 23:50

## 2022-06-01 RX ADMIN — CEFAZOLIN SODIUM 2 G: 2 INJECTION, SOLUTION INTRAVENOUS at 04:04

## 2022-06-01 RX ADMIN — CALCIUM GLUCONATE 1 G: 20 INJECTION, SOLUTION INTRAVENOUS at 06:05

## 2022-06-01 RX ADMIN — HYDROMORPHONE HYDROCHLORIDE 0.4 MG: 0.2 INJECTION, SOLUTION INTRAMUSCULAR; INTRAVENOUS; SUBCUTANEOUS at 06:05

## 2022-06-01 RX ADMIN — CEFAZOLIN SODIUM 2 G: 2 INJECTION, SOLUTION INTRAVENOUS at 12:12

## 2022-06-01 RX ADMIN — ASPIRIN 81 MG 81 MG: 81 TABLET ORAL at 07:48

## 2022-06-01 ASSESSMENT — ACTIVITIES OF DAILY LIVING (ADL)
ADLS_ACUITY_SCORE: 28
ADLS_ACUITY_SCORE: 29
PREVIOUS_RESPONSIBILITIES: MEAL PREP;HOUSEKEEPING;LAUNDRY;SHOPPING;YARDWORK;MEDICATION MANAGEMENT;FINANCES;DRIVING;WORK
ADLS_ACUITY_SCORE: 28

## 2022-06-01 NOTE — PROGRESS NOTES
CLINICAL NUTRITION SERVICES - BRIEF NOTE    Received provider consult for nutrition education with comments post op cardiovascular surgery (automatic consult on post-op order set). S/p median sternotomy, CABG x2, and AVR with bioprosthetic valve on 5/31. Nutrition education to be completed as able/appropriate (as pt s/p CABG and/or initial VAD).    RD will follow per LOS protocol or if re-consulted.     Sheela Will MS, RD, LD  4E (CVICU) RD pager: [phone]  Ascom: [id number]  Weekend/Holiday RD pager: [phone]

## 2022-06-01 NOTE — PROGRESS NOTES
Major Shift Events:    Pt alert and able to follow commands. Neuros seem intact. TPM set to 50 VVI. SR own native rhythm. MAP >65 and SBP <130. Palpable and doppler pulses. Pt tolerating PS this AM and likely extubation. OGT in place. Zhnag patent. Chest tube x2 on place.     Plan:  Monitor coags  Extubate  Continue POC    For vital signs and complete assessments, please see documentation flowsheets.

## 2022-06-01 NOTE — PROGRESS NOTES
"Pain Service Progress Note  [facility]  Date: 06/01/2022       Patient Name: Arun Wren  MRN: [id number]  Age: 77 year old  Sex: male      Assessment:  Arun Wren is a 77 year old male with PMH significant for HTN, severe symptomatic aortic stenosis, and severe multivessel coronary artery disease    Procedure: median sternotomy, CABG x 2, AV with bioprosthetic valve     Date of Surgery: 5/31/22     Date of Catheter Placement: 5/31/22     Plan/Recommendations:  1. Regional Anesthesia/Analgesia  -Continuous Catheter Type/Site: bilateral erector spinae (ES) T5-6  Continue 0.2% ropivacaine  Programmed Intermittent Bolus (PIB) at 7 mL Q60 min via each catheter, total infusion rate of 14 mL/hr    Plan to maintain catheter, max of 7 days    2. Anticoagulation  Heparin 5,000 units SC Q 8 hrs per primary service orders  -Please contact Inpatient Pain Service before ordering or making any anticoagulation changes       3. Multimodal Analgesia  - per primary service    Pain Service will continue to follow.    Discussed with attending anesthesiologist      Overnight Events: remained intubated, extubated this AM    Tubes/Drains: Yes  CT x 2    Subjective: Recently extubated. States  I want to go home  Denies pain.   Nausea: No  Vomiting: No  Symptoms of LAST: No    Pain Location:  Denies pain    Pain Intensity:    Denies pain    Diet: Advance Diet as Tolerated: Clear Liquid Diet    Relevant Labs:  Recent Labs   Lab Test 06/01/22  0508 06/01/22  0405   INR  --  1.08   *  --    PTT  --  39*   BUN  --  18       Physical Exam:  Vitals: /57   Pulse 79   Temp 99.5  F (37.5  C)   Resp 23   Ht 1.74 m (5' 8.5\")   Wt 111.5 kg (245 lb 13 oz)   SpO2 98%   BMI 36.83 kg/m      Physical Exam:   Orientation:  Alert, oriented, and in no acute distress: Yes  Sedation: No    Motor Examination:  5/5 Strength in lower extremities: Yes      Catheter Site:   Catheter entry site is " "clean/dry/intact: Yes    Tender: No      Relevant Medications:  Current Pain Medications:  Medications related to Pain Management (From now, onward)    Start     Dose/Rate Route Frequency Ordered Stop    06/03/22 0000  acetaminophen (TYLENOL) tablet 650 mg         650 mg Oral EVERY 4 HOURS PRN 05/31/22 1409      06/01/22 0800  polyethylene glycol (MIRALAX) Packet 17 g         17 g Oral DAILY 05/31/22 1410 06/01/22 0800  aspirin (ASA) chewable tablet 81 mg         81 mg Oral or NG Tube DAILY 05/31/22 1410 05/31/22 2100  ropivacaine 0.2% in NS perineural infusion simple          Perineural Continuous Nerve Block 05/31/22 2033 05/31/22 2100  ropivacaine 0.2% in NS perineural infusion simple          Perineural Continuous Nerve Block 05/31/22 2033 05/31/22 2000  senna-docusate (SENOKOT-S/PERICOLACE) 8.6-50 MG per tablet 1 tablet         1 tablet Oral 2 TIMES DAILY 05/31/22 1410 05/31/22 1430  acetaminophen (TYLENOL) tablet 975 mg         975 mg Oral EVERY 8 HOURS 05/31/22 1409 06/03/22 1559    05/31/22 1410  lidocaine 1 % 0.1-1 mL         0.1-1 mL Other EVERY 1 HOUR PRN 05/31/22 1410      05/31/22 1410  lidocaine (LMX4) cream          Topical EVERY 1 HOUR PRN 05/31/22 1410      05/31/22 1409  HYDROmorphone (DILAUDID) injection 0.2 mg        \"Or\" Linked Group Details    0.2 mg Intravenous EVERY 2 HOURS PRN 05/31/22 1410      05/31/22 1409  HYDROmorphone (DILAUDID) injection 0.4 mg        \"Or\" Linked Group Details    0.4 mg Intravenous EVERY 2 HOURS PRN 05/31/22 1410      05/31/22 1409  oxyCODONE (ROXICODONE) tablet 5 mg        \"Or\" Linked Group Details    5 mg Oral EVERY 4 HOURS PRN 05/31/22 1410      05/31/22 1409  oxyCODONE IR (ROXICODONE) tablet 10 mg        \"Or\" Linked Group Details    10 mg Oral EVERY 4 HOURS PRN 05/31/22 1410      05/31/22 1409  methocarbamol (ROBAXIN) tablet 500 mg         500 mg Oral EVERY 6 HOURS PRN 05/31/22 1409      05/31/22 1409  magnesium hydroxide (MILK OF " "MAGNESIA) suspension 30 mL         30 mL Oral DAILY PRN 05/31/22 1410      05/31/22 1409  bisacodyl (DULCOLAX) Suppository 10 mg         10 mg Rectal DAILY PRN 05/31/22 1410            Primary Service Contacted with Recommendations? No    Ines Diaz, DUSTIN CNP  06/01/2022     Time/Communication:  I personally spent 20 minutes with greater than 50% in consultation, education, counseling and coordination of care.  See note above for details on conversation.      Contact Info Please Page the Pain Team Via Am[employer]: \"Adult acute inpatient pain management/[facility]\"    "

## 2022-06-01 NOTE — PROGRESS NOTES
06/01/22 1400   Quick Adds   Type of Visit Initial Occupational Therapy Evaluation   Living Environment   People in Home spouse   Current Living Arrangements house   Home Accessibility stairs to enter home;stairs within home   Number of Stairs, Main Entrance 4   Stair Railings, Main Entrance railings on both sides of stairs   Number of Stairs, Within Home, Primary greater than 10 stairs   Stair Railings, Within Home, Primary railing on left side (ascending)   Transportation Anticipated car, drives self;family or friend will provide   Self-Care   Usual Activity Tolerance good   Current Activity Tolerance fair   Regular Exercise Other (see comments)  (works on his own [employer])   Equipment Currently Used at Home grab bar, tub/shower   Fall history within last six months no   Instrumental Activities of Daily Living (IADL)   Previous Responsibilities meal prep;housekeeping;laundry;shopping;yardwork;medication management;finances;driving;work   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 Benítez on 5/31/2022.   Existing Precautions/Restrictions cardiac;fall;sternal   General Observations and Info pt motivated in therapy, [facility]   Cognitive Status Examination   Orientation Status orientation to person, place and time   Memory Deficit moderate deficit   Cognitive Status Comments pt albe to participate in POC development, forgetful throughout. further testing required.   Visual Perception   Visual Impairment/Limitations WFL   Sensory   Sensory Quick  Adds No deficits were identified   Range of Motion Comprehensive   General Range of Motion no range of motion deficits identified   Strength Comprehensive (MMT)   General Manual Muscle Testing (MMT) Assessment other (see comments)   Comment, General Manual Muscle Testing (MMT) Assessment overall deconditioning   Coordination   Coordination Comments overall coordination deficits likely 2/2 deconditioning   Transfers   Transfers sit-stand transfer   Transfer Comments min assist   Sit-Stand Transfer   Sit-Stand [place] (Transfers) minimum assist (75% patient effort)   Balance   Balance Assessment standing balance: dynamic   Balance Comments fair balance   Activities of Daily Living   BADL Assessment/Intervention lower body dressing   Lower Body Dressing Assessment/Training   [place] Level (Lower Body Dressing) maximum assist (25% patient effort)   Clinical Impression   Criteria for Skilled Therapeutic Interventions Met (OT) Yes, treatment indicated   OT Diagnosis decreased ADL I   Assessment of Occupational Performance 5 or more Performance Deficits   Identified Performance Deficits dressing, bathing, toileting, G/H, work, leisure.   Planned Therapy Interventions (OT) ADL retraining;IADL retraining;bed mobility training;cognition;ROM;strengthening;transfer training;home program guidelines;progressive activity/exercise;risk factor education   Clinical Decision Making Complexity (OT) low complexity   Risk & Benefits of therapy have been explained evaluation/treatment results reviewed;care plan/treatment goals reviewed;risks/benefits reviewed;current/potential barriers reviewed;participants voiced agreement with care plan;participants included;patient;spouse/significant other   Clinical Impression Comments pt presents to OT with post surgical precautions and general deconditioning leading to decreased ADL I.   OT Discharge Planning   OT Discharge Recommendation (DC Rec) Transitional Care Facility   OT Rationale  for DC Rec pt below baseline in ADL I   OT Brief overview of current status pt min to mod assist sit to stand   Total Evaluation Time (Minutes)   Total Evaluation Time (Minutes) 5   OT Goals   Therapy Frequency (OT) 5 times/wk   OT Predicted Duration/Target Date for Goal Attainment 06/14/22   OT Goals Hygiene/Grooming;Upper Body Dressing;Lower Body Dressing;Toilet Transfer/Toileting;Cardiac Phase 1   OT: Hygiene/Grooming independent;while standing;within precautions   OT: Upper Body Dressing Modified independent;using adaptive equipment;within precautions   OT: Lower Body Dressing Modified independent;using adaptive equipment;within precautions   OT: Toilet Transfer/Toileting Modified independent;toilet transfer;cleaning and garment management;within precautions   OT: Understanding of cardiac education to maximize quality of life, condition management, and health outcomes Patient   OT: Perform aerobic activity with stable cardiovascular response continuous;15 minutes;ambulation;NuStep   OT: Functional/aerobic ambulation tolerance with stable cardiovascular response in order to return to home and community environment Modified independent;Greater than 300 feet   OT: Navigation of stairs simulating home set up with stable cardiovascular response in order to return to home and community environment Modified independent;Greater than 10 stairs

## 2022-06-01 NOTE — PROGRESS NOTES
[facility], Procedure Note          Extubation:       Arun Wren  MRN# [id number]   June 1, 2022         Patient extubated at: June 1, 2022, 8:30 AM   Supplemental Oxygen: Via nasal cannula at 5 liters per minute   Cough: The cough is good   Secretion Mode: Able to clear   Secretion Amount: Moderate amount, moderately thin and clear in color   Respiratory Exam:: Breath sounds: good aeration     Location: all lobes   Skin Exam:: Patient color: pink   Patient Status: Currently appears comfortable   Arterial Blood Gasses: pH: 7.39     pO2: 43     pCO2: 81     HOC3: 21         Recorded by Gibson Grover, RT

## 2022-06-01 NOTE — PHARMACY-ADMISSION MEDICATION HISTORY
Admission Medication History Completed by Pharmacy    See [facility] Admission Navigator for allergy information, preferred outpatient pharmacy, prior to admission medications and immunization status.     Medication History Sources:     PAC pharmacist med rec, see note by Yuri Moyer [facility] dated 5/18/22 for details    Surescripts    Changes made to PTA medication list (reason):    Added: None    Deleted: None    Changed: None    Additional Information:    No new meds since previous med rec per dispense report.    Last dose information was added per pre-op RN.    Prior to Admission medications    Medication Sig Last Dose Taking? Auth Provider   amoxicillin (AMOXIL) 500 MG capsule Take 4 capsules (2,000 mg) by mouth once as needed (30-60 minutes prior to dental procedures/cleanings.) Unknown at Unknown time Yes Jann Ulloa, DO   aspirin (ASA) 81 MG chewable tablet Take 1 tablet (81 mg) by mouth daily 5/28/2022 Yes Jann Ulloa, DO   cholecalciferol (VITAMIN D3) 25 mcg (1000 units) capsule Take 1 capsule by mouth daily 5/28/2022 Yes Reported, Patient   lisinopril (ZESTRIL) 20 MG tablet Take 1 tablet (20 mg) by mouth daily 5/28/2022 Yes Richard Arita MD   melatonin 3 MG tablet Take 3 mg by mouth nightly as needed for sleep 5/28/2022 Yes Reported, Patient   nystatin (MYCOSTATIN) 309231 UNIT/GM external cream APPLY TOPICALLY TO AFFECTEDAREA(S) TWICE DAILY  Patient taking differently: No sig reported Past Month at Unknown time Yes Richard Arita MD   rosuvastatin (CRESTOR) 40 MG tablet Take 1 tablet (40 mg) by mouth daily 5/28/2022 Yes Richard Arita MD   sildenafil (REVATIO) 20 MG tablet TAKE 4-5 TABLETS BY MOUTH ONCE DAILY IF NEEDED FOR OTHER (SPECIFY).TAKE 30 MINUTES BEFORE SEXUAL ACTIVITY. Past Month at Unknown time Yes Richard Arita MD   triamcinolone (KENALOG) 0.1 % external cream Apply topically 2 times daily  Patient taking differently: Apply topically 2 times daily as needed for irritation Past Month at  Unknown time Yes Richard Arita MD   torsemide (DEMADEX) 10 MG tablet Take 1 tablet (10 mg) by mouth daily 5/28/2022  Richard Arita MD        Date completed: 06/01/22    Medication history completed by:   Rm Luis, PharmD, BCPS

## 2022-06-01 NOTE — PLAN OF CARE
Major Shift Events:  Extubated @ 0830 to 5L NC, oxygenating on RA this afternoon. Epi off @ 1130, pt systolic fluctuates with movement/fluid shifting. Able to compensate without medication. Productive cough. Up in chair, No BM. Leatha pulled @ 1500. Wife, Rajiv, at bedside.     Plan: Plan to transfer to , report given to JP Estes    For vital signs and complete assessments, please see documentation flowsheets.      Goal Outcome Evaluation:    Plan of Care Reviewed With: patient, spouse     Overall Patient Progress: improving    Outcome Evaluation: Extubated to 5L NC @ 0830, Epi stopped @ 1130, up in chair. Plan to transfer to 6C

## 2022-06-01 NOTE — OP NOTE
Procedure Date: 05/31/2022    REFERRING CARDIOLOGIST:  Jorge Phillips MD    PREOPERATIVE DIAGNOSIS:  Severe symptomatic aortic stenosis and severe multivessel coronary artery disease.    POSTOPERATIVE DIAGNOSES:  Severe symptomatic aortic stenosis and severe multivessel coronary artery disease.    SURGEON:  Sai Benítez MD    ASSISTANT:  RAFAEL Mullen    NAME OF OPERATION:  Aortic valve replacement with a 25 mm [place]itt Inspiris Resilia bovine pericardial valve, coronary artery bypass grafting x2 with reverse saphenous vein graft to the ramus intermedius artery, reverse saphenous vein graft to the posterior descending artery, the endoscopic vein harvest from the right lower extremity, intraoperative BREANNE.    ANESTHESIA:  General endotracheal.    INDICATIONS FOR PROCEDURE:  Mr. Wren is a very pleasant 77-year-old gentleman who was recently seen in the [facility] for severe symptomatic aortic stenosis.  Coronary angiogram demonstrated severe multivessel coronary artery disease.  He was recently referred to me for surgical evaluation for aortic valve replacement and coronary artery bypass surgery.  He was taken to the operating room today for a bioprosthetic AVR and concomitant coronary artery bypass grafting.    OPERATIVE FINDINGS:  The patient had an overall normal LV systolic size and function.  He had a severely calcified trileaflet aortic valve that was severely stenotic.  The right greater saphenous vein was a good quality conduit, measuring 4 mm in diameter.  The circumflex coronary artery was quite small and we therefore decided not to graft it.  The ramus intermedius artery was heavily diseased and severely calcified and required a coronary endarterectomy to graft it.  The probe size was 1.5 mm.  The posterior descending artery had moderate to severe disease and the probe size was 1.5 mm as well.     DESCRIPTION OF PROCEDURE:  After informed consent was obtained, the patient was  brought down to the operating room and was placed on the OR table in a supine position.  Intravenous and intra-arterial lines were begun.  While monitoring his blood pressure and EKG tracing, he was anesthetized and intubated using a single lumen endotracheal tube.  A Clarissa-Hadley catheter was also placed.  His entire chest, abdomen, both groins and legs were prepped down to the toes using multiple layers of DuraPrep.  He was draped in a sterile field.  Median sternotomy was performed.  In the meantime, the right greater saphenous vein was harvested endoscopically.  The sternal edges were retracted laterally, and the pericardium was opened to suspend the heart in a pericardial cradle.  The patient was fully heparinized.  The ascending aorta and the right atrial appendage were cannulated.  A retrograde cardioplegia catheter was placed into the coronary sinus without difficulty.  An antegrade needle/aortic root vent was placed in the ascending aorta as well.  After appropriate ACT level was achieved, cardiopulmonary bypass was established and the patient was cooled down to 34 degrees Celsius.  Carbon dioxide was flooded into the chest to prevent air embolism.  An LV vent was placed via the right superior pulmonary vein.  The aorta was then cross-clamped and antegrade with cold blood cardioplegia was given to fully arrest the heart.  The patient went into good diastolic arrest without any LV distention.  Following this, intermittent retrograde cardioplegia doses were given on average every 15 minutes for myocardial protection while the aorta was cross-clamped.    The first coronary vessel grafted was the posterior descending artery.  Conduit used was a saphenous vein.  This anastomosis was performed in an end-to-side fashion using running 7-0 Prolene.  Next, the ramus intermedius artery was grafted. As mentioned above, a coronary endarterectomy was performed. Another saphenous vein was used as a conduit.  This anastomosis  was performed in an end-to-side fashion using running 7-0 Prolene.    Next, a transverse aortotomy was made and the aortic valve was exposed.  Findings are noted above.  All 3 leaflets were excised and the annulus was meticulously debrided and irrigated out.  The annulus was sized to a 25 mm Resilia valve.  Annular sutures were placed using horizontal mattress sutures with 2-0 Ethibond with subannular pledgets.  The valve was brought to the field and all sutures were brought through the sewing ring and the valve seated down without difficulty.  All sutures were tied down securely using the Cor-Knot device.  The aortotomy was then closed in 2 layers of running 4-0 Prolene.     Next, two 4 mm aortotomies were created in the ascending aorta distal to the aortotomy closure line and the 2 proximal vein anastomosis was performed in an end-to-side fashion using running 6-0 Prolene.  Retrograde hot shot was given and the aortic cross-clamp was removed.  Aortic cross-clamp time was 91 minutes.  The left ventricle and the ascending aorta were continuously vented to deair the heart.  The patient was then weaned off cardiopulmonary bypass with low-dose epinephrine and Edmundo-Synephrine drips.  LV function was good.  The heart was adequately deaired.  The prosthetic aortic valve was seated down well with no paravalvular leak.  Mean gradient across the new valve was 9 mmHg.  Once the patient remained stable off bypass, the venous cannula was removed and protamine was given.  The aortic cannula was subsequently removed as well.  Temporary ventricular pacer wire was placed in the RV muscle.  32-Grenadian straight chest tubes were placed x2 in the mediastinum as well.  These were all brought out percutaneously below the sternotomy incision and secured to the skin using 2-0 Ethibond.  Both pleural spaces are slightly opened.  The mediastinum was irrigated out with antibiotic saline and hemostasis was confirmed.  The sternum was  reapproximated using multiple interrupted single and double wires.  The incision was closed in layers of running Vicryl suture.  The skin was closed using 3-0 Vicryl and was sealed using Dermabond.    There were no intraoperative complications and the patient tolerated the operation well.  No blood products were given intraoperatively.  All sponge counts, needle counts and instrument counts were correct x2 at the end of the operation.    ESTIMATED BLOOD LOSS:  Unknown.    SPECIMEN REMOVED:  Aortic valve leaflets.    Total cardiopulmonary bypass time was 107 minutes.    The patient was brought to the ICU in hemodynamically stable condition.    Sai Benítez MD        D: 2022   T: 2022   MT: md/RAMINA    Name:     IVELISSE MONTEJO  MRN:      [id number]        Account:        [id number]   :      1945           Procedure Date: 2022     Document: [id number]

## 2022-06-01 NOTE — PROGRESS NOTES
CV ICU PROGRESS NOTE  June 1, 2022   Arun Wren  [id number]  Admitted: 5/31/2022  5:35 AM      ASSESSMENT: Arun Wren is a 78 yo M with a PMH significant for hypertension, severe symptomatic aortic stenosis, and severe multivessel coronary artery disease who is admitted to the CVICU after median sternotomy, CABG x2, and AVR with bioprosthetic valve with Dr. Benítez on 5/31/2022.     TODAY'S PROGRESS:   - Extubate this AM  - Wean pressors as tolerated  - transition to sliding scale insulin  - start SQ heparin  - remove lines  - potential for transfer to the floor this evening    -----------------------------------------------------------------------PLAN-----------------------------------------------------------------------------    ASSESSMENT: Arun Wren is a 78 yo M with a PMH significant for hypertension, severe symptomatic aortic stenosis, and severe multivessel coronary artery disease who is admitted to the CVICU after median sternotomy, CABG x2, and AVR with bioprosthetic valve with Dr. Benítez on 5/31/2022.      PLAN:   Neuro/ pain/ sedation:  Acute Post-op Pain  Bilateral ES catheters in place  - Monitor neurological status. Notify the MD for any acute changes in exam.  - Pain: Fentanyl gtt (d/c following extubation). Bilateral ES catheters. Dania Tylenol. PRN tylenol, oxy, robaxin, and  dilaudid  - Sedation: Dexmedetomidine gtt, wean for extubation (d/c following extubation)      Pulmonary care:  Post-op Ventilatory Management   Post-op: Intubated, CMV.   - PST to extubation today   - Supplemental oxygen to keep saturation above 92% when extubated  - When extubated: OOB, incentive spirometry, flutter valve, chest physio as needed      Cardiovascular:  Hx Hypertension  Hx Severe Symptomatic Aortic Stenosis  Hx Severe Multivessel CAD  S/p CABG x2 and bioprosthetic AVR  PTA meds: ASA 81 daily, Rosuvastatin 40 daily, Lisinopril 20 daily, Torsemide 10 daily.   Pre-op ECHO with LVEF 55 - 60%  Post-op  BREANNE: LVEF 55%, LV hypertrophy present, normal global function of both the RV and LV.   - Monitor hemodynamic status  - Goal MAP > 65 and SBP < 130               - Epi;  wean as tolerated               - PRN hydralazine  - Temp pacing wires, VVI @ 50   - Start ASA 81 mg daily on 6/1   - BB; initiate when off vasopressors for > 12 hours   - Statin; restart PTA rosuvastatin today     GI care:   - Advance diet as tolerated  - Bowel regimen: Miralax, Senna. PRN suppository      Renal/Fluids/Electrolytes/Nutrition:   Baseline Cr ~ 1.00. adequate UOP at this time  - PTA on Torsemide, monitoring I&O today will consider diuresis if UOP declines  - ICU electrolyte replacement protocol  - Will monitor intake and output      Endocrine:  Stress induced hyperglycemia    - Insulin gtt; transition to sliding scale insulin when able  - Goal BG < 180       ID/ Antibiotics:  Stress leukocytosis  - Perioperative Ancef and Vancomycin  - Monitor fever curve  - Trend WBC      Heme:  Acute blood loss anemia  Acute blood loss thrombocytopenia  Pre-op Hgb ~15.5 Plts ~ 200. Post op hgb 11.1, plts 116.   - Monitor for signs of active bleeding  - Monitor chest tube output   - CBC daily      Prophylaxis:    - Mechanical prophylaxis for DVT.  - Chemical DVT prophylaxis: Start SQH on 6/1  - PPI daily       MSK:    - Sternal precautions  - PT/OT/CR      Lines/ tubes/ drains:  - RIJ CVC - remove if stable and ready for transfer  - PA catheter - remove today  - Radial arterial line - remove if stable and ready for transfer  - ETT 8.0 - remove today   - CT x2 mediastinal   - Zhang - remove today  - Temp pacing wires      Disposition:  - CV ICU     Patient seen, findings and plan discussed with CV ICU staff, Dr. Sotomayor.      Maira Delgado MD  Anesthesiology Resident, CA2  [id number]      ====================================    TODAY'S PROGRESS:   SUBJECTIVE:   - Extubated early this morning. Feeling well. Denies pain or nausea.    OBJECTIVE:   1. VITAL  SIGNS:   Temp:  [97  F (36.1  C)-99.5  F (37.5  C)] 99.5  F (37.5  C)  Pulse:  [50-73] 73  Resp:  [0-46] 18  BP: ()/() 120/56  MAP:  [44 mmHg-132 mmHg] 70 mmHg  Arterial Line BP: ()/() 112/53  FiO2 (%):  [40 %-60 %] 40 %  SpO2:  [84 %-100 %] 97 %  Vent Mode: (S) CPAP/PS  (Continuous positive airway pressure with Pressure Support)  FiO2 (%): 40 %  Resp Rate (Set): 18 breaths/min  Tidal Volume (Set, mL): 500 mL  PEEP (cm H2O): 5 cmH2O  Pressure Support (cm H2O): 7 cmH2O  Resp: 18      2. INTAKE/ OUTPUT:   I/O last 3 completed shifts:  In: 3392.78 [I.V.:2167.78; Other:500; NG/GT:75; IV Piggyback:150]  Out: 1940 [Urine:1370; Chest Tube:570]    3. PHYSICAL EXAMINATION:   General: intubated and sedated  Neuro: SANFORD with sedation wean  Resp: mechanically ventilated, pulling TV ~500 on 5/5 PS   CV: RRR. Pacer on backup.   Abdomen: Soft, Non-distended, non-tender  Incisions: C/D/I with dressing in place  Extremities: warm and well perfused    4. INVESTIGATIONS:   Arterial Blood Gases   Recent Labs   Lab 06/01/22  0508 06/01/22  0006 05/31/22  1945 05/31/22  1543   PH 7.40 7.35 7.43 7.39   PCO2 42 45 38 41   PO2 83 78* 85 93   HCO3 26 25 25 24     Complete Blood Count   Recent Labs   Lab 06/01/22  0508 06/01/22  0007 05/31/22  1420 05/31/22  1326 05/31/22  1255   WBC 9.2  --  12.6*  --  11.5*   HGB 11.3* 11.8* 12.5* 12.0* 11.1*   *  --  116*  --  109*     Basic Metabolic Panel  Recent Labs   Lab 06/01/22  0429 06/01/22  0405 06/01/22  0016 06/01/22  0015 05/31/22  1423 05/31/22  1420 05/31/22  1326   0000   NA  --  143  --  144  --  143 140  --    POTASSIUM  --  4.0  --  4.4  --  4.2  4.2 3.8  --    CHLORIDE  --  110*  --  111*  --  111*  --   --    CO2  --  25  --  26  --  24  --   --    BUN  --  18  --  17  --  18  --   --    CR  --  0.90  --  0.91  --  0.85  --   --    * 146* 133* 132*   < > 126* 113*   < >    < > = values in this interval not displayed.     Liver Function  Tests  Recent Labs   Lab 06/01/22  0405 06/01/22  0015 05/31/22  1944 05/31/22  1420 05/31/22  1255   AST 45 49*  --  34  --    ALT 22 23  --  21  --    ALKPHOS 32* 36*  --  35*  --    BILITOTAL 0.7 0.7  --  0.6  --    ALBUMIN 3.0* 3.1*  --  2.6*  --    INR 1.08  --  1.24* 1.37* 1.58*     Pancreatic Enzymes  No lab results found in last 7 days.  Coagulation Profile  Recent Labs   Lab 06/01/22  0405 05/31/22  1944 05/31/22  1627 05/31/22  1420 05/31/22  1255   INR 1.08 1.24*  --  1.37* 1.58*   PTT 39* 103* 108* 124* 66*     Lactate  Invalid input(s): LACTATE    5. RADIOLOGY:   Recent Results (from the past 24 hour(s))   XR Chest Port 1 View    Narrative    EXAM: XR Chest 1 view 5/31/2022 3:30 PM      HISTORY: Post Op CVTS Surgery.    COMPARISON: Cardiac CT dated 4/21/2022.     TECHNIQUE: Frontal view of the chest.    FINDINGS: ET tube is in the low thoracic trachea. Mediastinal drain.  Postsurgical changes of the chest with median sternotomy wires intact.  Trachea is midline. Cardiac and mediastinal silhouette is partially  obscured. Right perihilar dense pulmonary opacities. Patchy left  basilar opacities. Small bilateral pleural effusions. No appreciable  pneumothoraces.      Impression    IMPRESSION:   1. Right perihilar and left basilar pulmonary opacities which may  represent atelectasis, infection, or edema.  2. Small bilateral pleural effusions.  3. ET tube is in the low thoracic trachea.    I have personally reviewed the examination and initial interpretation  and I agree with the findings.    SUDHAKAR XIE MD         SYSTEM ID:  [id number]   XR Abdomen 1 View    Narrative    EXAMINATION: XR ABDOMEN 1 VIEW 5/31/2022 3:31 PM     COMPARISON: Chest x-ray same day.    HISTORY: OG    TECHNIQUE: Frontal view of the abdomen.    FINDINGS:   Portable AP supine abdominal x-ray. Gastric tube tip and sidehole  projecting over the stomach. Partially visualized chest tubes. Lower  quadrants are partially collimated out of  view. Nonobstructive bowel  gas pattern. No pneumatosis. No abnormal soft tissue densities.         Impression    IMPRESSION:   Gastric tube tip and sidehole project over the stomach.    I have personally reviewed the examination and initial interpretation  and I agree with the findings.    SHELBY OLIVEIRA MD         SYSTEM ID:  [id number]   XR Chest Port 1 View    Narrative    Exam: XR CHEST PORT 1 VIEW, 6/1/2022 1:05 AM    Indication: Post Op CVTS Surgery    Comparison: 5/31/2022    Findings:   Endotracheal tube tip over the mid thoracic trachea. Pulmonary artery  catheter tip over the proximal right pulmonary artery. Mediastinal  drains. Enteric tube tip is collimated off the field-of-view.  Continued cardiomegaly, trace right and increased small left pleural  effusions. Retrocardiac atelectasis. No appreciable pneumothorax.      Impression    Impression:   1. Cardiomegaly, unchanged trace right and increased small left  pleural effusions.  2. Surgical changes of the chest with support devices in places above.    I have personally reviewed the examination and initial interpretation  and I agree with the findings.    NICOLÁS ARANDA MD         SYSTEM ID:  [id number]       =========================================

## 2022-06-02 ENCOUNTER — APPOINTMENT (OUTPATIENT)
Dept: OCCUPATIONAL THERAPY | Facility: CLINIC | Age: 77
DRG: 220 | End: 2022-06-02
Attending: SURGERY
Payer: COMMERCIAL

## 2022-06-02 ENCOUNTER — APPOINTMENT (OUTPATIENT)
Dept: GENERAL RADIOLOGY | Facility: CLINIC | Age: 77
DRG: 220 | End: 2022-06-02
Attending: PHYSICIAN ASSISTANT
Payer: COMMERCIAL

## 2022-06-02 LAB
ANION GAP SERPL CALCULATED.3IONS-SCNC: 4 MMOL/L (ref 3–14)
ANION GAP SERPL CALCULATED.3IONS-SCNC: 5 MMOL/L (ref 3–14)
BUN SERPL-MCNC: 17 MG/DL (ref 7–30)
BUN SERPL-MCNC: 24 MG/DL (ref 7–30)
CALCIUM SERPL-MCNC: 8.1 MG/DL (ref 8.5–10.1)
CALCIUM SERPL-MCNC: 8.2 MG/DL (ref 8.5–10.1)
CHLORIDE BLD-SCNC: 109 MMOL/L (ref 94–109)
CHLORIDE BLD-SCNC: 109 MMOL/L (ref 94–109)
CO2 SERPL-SCNC: 28 MMOL/L (ref 20–32)
CO2 SERPL-SCNC: 28 MMOL/L (ref 20–32)
CREAT SERPL-MCNC: 0.84 MG/DL (ref 0.66–1.25)
CREAT SERPL-MCNC: 0.94 MG/DL (ref 0.66–1.25)
ERYTHROCYTE [DISTWIDTH] IN BLOOD BY AUTOMATED COUNT: 13.7 % (ref 10–15)
GFR SERPL CREATININE-BSD FRML MDRD: 84 ML/MIN/1.73M2
GFR SERPL CREATININE-BSD FRML MDRD: 90 ML/MIN/1.73M2
GLUCOSE BLD-MCNC: 104 MG/DL (ref 70–99)
GLUCOSE BLD-MCNC: 106 MG/DL (ref 70–99)
GLUCOSE BLDC GLUCOMTR-MCNC: 107 MG/DL (ref 70–99)
GLUCOSE BLDC GLUCOMTR-MCNC: 174 MG/DL (ref 70–99)
GLUCOSE BLDC GLUCOMTR-MCNC: 98 MG/DL (ref 70–99)
HCT VFR BLD AUTO: 33.4 % (ref 40–53)
HGB BLD-MCNC: 11 G/DL (ref 13.3–17.7)
INR PPP: 1.19 (ref 0.85–1.15)
MAGNESIUM SERPL-MCNC: 2.4 MG/DL (ref 1.6–2.3)
MCH RBC QN AUTO: 32.5 PG (ref 26.5–33)
MCHC RBC AUTO-ENTMCNC: 32.9 G/DL (ref 31.5–36.5)
MCV RBC AUTO: 99 FL (ref 78–100)
PHOSPHATE SERPL-MCNC: 1.5 MG/DL (ref 2.5–4.5)
PLATELET # BLD AUTO: 88 10E3/UL (ref 150–450)
POTASSIUM BLD-SCNC: 3.8 MMOL/L (ref 3.4–5.3)
POTASSIUM BLD-SCNC: 4.1 MMOL/L (ref 3.4–5.3)
RADIOLOGIST FLAGS: NORMAL
RBC # BLD AUTO: 3.38 10E6/UL (ref 4.4–5.9)
SODIUM SERPL-SCNC: 141 MMOL/L (ref 133–144)
SODIUM SERPL-SCNC: 142 MMOL/L (ref 133–144)
WBC # BLD AUTO: 11.9 10E3/UL (ref 4–11)

## 2022-06-02 PROCEDURE — 99231 SBSQ HOSP IP/OBS SF/LOW 25: CPT | Performed by: NURSE PRACTITIONER

## 2022-06-02 PROCEDURE — 36415 COLL VENOUS BLD VENIPUNCTURE: CPT | Performed by: STUDENT IN AN ORGANIZED HEALTH CARE EDUCATION/TRAINING PROGRAM

## 2022-06-02 PROCEDURE — 83735 ASSAY OF MAGNESIUM: CPT | Performed by: STUDENT IN AN ORGANIZED HEALTH CARE EDUCATION/TRAINING PROGRAM

## 2022-06-02 PROCEDURE — 999N000248 HC STATISTIC IV INSERT WITH US BY RN

## 2022-06-02 PROCEDURE — 71046 X-RAY EXAM CHEST 2 VIEWS: CPT | Mod: 26 | Performed by: RADIOLOGY

## 2022-06-02 PROCEDURE — 97530 THERAPEUTIC ACTIVITIES: CPT | Mod: GO

## 2022-06-02 PROCEDURE — 250N000013 HC RX MED GY IP 250 OP 250 PS 637: Performed by: PHYSICIAN ASSISTANT

## 2022-06-02 PROCEDURE — 250N000009 HC RX 250

## 2022-06-02 PROCEDURE — 250N000013 HC RX MED GY IP 250 OP 250 PS 637: Performed by: STUDENT IN AN ORGANIZED HEALTH CARE EDUCATION/TRAINING PROGRAM

## 2022-06-02 PROCEDURE — 80048 BASIC METABOLIC PNL TOTAL CA: CPT

## 2022-06-02 PROCEDURE — 93010 ELECTROCARDIOGRAM REPORT: CPT | Performed by: INTERNAL MEDICINE

## 2022-06-02 PROCEDURE — 250N000011 HC RX IP 250 OP 636: Performed by: PHYSICIAN ASSISTANT

## 2022-06-02 PROCEDURE — 84100 ASSAY OF PHOSPHORUS: CPT | Performed by: STUDENT IN AN ORGANIZED HEALTH CARE EDUCATION/TRAINING PROGRAM

## 2022-06-02 PROCEDURE — 214N000001 HC R&B CCU UMMC

## 2022-06-02 PROCEDURE — 36415 COLL VENOUS BLD VENIPUNCTURE: CPT

## 2022-06-02 PROCEDURE — 93005 ELECTROCARDIOGRAM TRACING: CPT

## 2022-06-02 PROCEDURE — 71046 X-RAY EXAM CHEST 2 VIEWS: CPT

## 2022-06-02 PROCEDURE — 97535 SELF CARE MNGMENT TRAINING: CPT | Mod: GO

## 2022-06-02 PROCEDURE — 85610 PROTHROMBIN TIME: CPT | Performed by: STUDENT IN AN ORGANIZED HEALTH CARE EDUCATION/TRAINING PROGRAM

## 2022-06-02 PROCEDURE — 80048 BASIC METABOLIC PNL TOTAL CA: CPT | Performed by: STUDENT IN AN ORGANIZED HEALTH CARE EDUCATION/TRAINING PROGRAM

## 2022-06-02 PROCEDURE — 250N000013 HC RX MED GY IP 250 OP 250 PS 637: Performed by: SURGERY

## 2022-06-02 PROCEDURE — 85018 HEMOGLOBIN: CPT | Performed by: STUDENT IN AN ORGANIZED HEALTH CARE EDUCATION/TRAINING PROGRAM

## 2022-06-02 PROCEDURE — 85041 AUTOMATED RBC COUNT: CPT | Performed by: STUDENT IN AN ORGANIZED HEALTH CARE EDUCATION/TRAINING PROGRAM

## 2022-06-02 PROCEDURE — 250N000013 HC RX MED GY IP 250 OP 250 PS 637

## 2022-06-02 RX ORDER — ASPIRIN 81 MG/1
162 TABLET, CHEWABLE ORAL DAILY
Status: DISCONTINUED | OUTPATIENT
Start: 2022-06-03 | End: 2022-06-06

## 2022-06-02 RX ORDER — METOPROLOL TARTRATE 1 MG/ML
5 INJECTION, SOLUTION INTRAVENOUS ONCE
Status: COMPLETED | OUTPATIENT
Start: 2022-06-02 | End: 2022-06-02

## 2022-06-02 RX ORDER — ASPIRIN 81 MG/1
81 TABLET, CHEWABLE ORAL DAILY
Status: DISCONTINUED | OUTPATIENT
Start: 2022-06-03 | End: 2022-06-02

## 2022-06-02 RX ORDER — POTASSIUM CHLORIDE 750 MG/1
20 TABLET, EXTENDED RELEASE ORAL ONCE
Status: COMPLETED | OUTPATIENT
Start: 2022-06-02 | End: 2022-06-02

## 2022-06-02 RX ORDER — LISINOPRIL 2.5 MG/1
2.5 TABLET ORAL DAILY
Status: DISCONTINUED | OUTPATIENT
Start: 2022-06-02 | End: 2022-06-04

## 2022-06-02 RX ORDER — FUROSEMIDE 20 MG
20 TABLET ORAL ONCE
Status: DISCONTINUED | OUTPATIENT
Start: 2022-06-02 | End: 2022-06-02

## 2022-06-02 RX ORDER — FUROSEMIDE 40 MG
40 TABLET ORAL ONCE
Status: COMPLETED | OUTPATIENT
Start: 2022-06-02 | End: 2022-06-02

## 2022-06-02 RX ORDER — LANOLIN ALCOHOL/MO/W.PET/CERES
3 CREAM (GRAM) TOPICAL AT BEDTIME
Status: DISCONTINUED | OUTPATIENT
Start: 2022-06-02 | End: 2022-06-09 | Stop reason: HOSPADM

## 2022-06-02 RX ORDER — METOPROLOL TARTRATE 1 MG/ML
INJECTION, SOLUTION INTRAVENOUS
Status: COMPLETED
Start: 2022-06-02 | End: 2022-06-02

## 2022-06-02 RX ADMIN — SENNOSIDES AND DOCUSATE SODIUM 1 TABLET: 50; 8.6 TABLET ORAL at 08:19

## 2022-06-02 RX ADMIN — METOPROLOL TARTRATE 5 MG: 1 INJECTION, SOLUTION INTRAVENOUS at 22:57

## 2022-06-02 RX ADMIN — PANTOPRAZOLE SODIUM 40 MG: 40 TABLET, DELAYED RELEASE ORAL at 08:19

## 2022-06-02 RX ADMIN — ACETAMINOPHEN 975 MG: 325 TABLET, FILM COATED ORAL at 08:19

## 2022-06-02 RX ADMIN — HEPARIN SODIUM 5000 UNITS: 5000 INJECTION, SOLUTION INTRAVENOUS; SUBCUTANEOUS at 12:41

## 2022-06-02 RX ADMIN — POLYETHYLENE GLYCOL 3350 17 G: 17 POWDER, FOR SOLUTION ORAL at 08:19

## 2022-06-02 RX ADMIN — POTASSIUM & SODIUM PHOSPHATES POWDER PACK 280-160-250 MG 2 PACKET: 280-160-250 PACK at 19:29

## 2022-06-02 RX ADMIN — HEPARIN SODIUM 5000 UNITS: 5000 INJECTION, SOLUTION INTRAVENOUS; SUBCUTANEOUS at 04:55

## 2022-06-02 RX ADMIN — MUPIROCIN 0.5 G: 20 OINTMENT TOPICAL at 19:30

## 2022-06-02 RX ADMIN — FUROSEMIDE 40 MG: 40 TABLET ORAL at 10:19

## 2022-06-02 RX ADMIN — ASPIRIN 81 MG 81 MG: 81 TABLET ORAL at 08:19

## 2022-06-02 RX ADMIN — Medication 12.5 MG: at 19:29

## 2022-06-02 RX ADMIN — POTASSIUM & SODIUM PHOSPHATES POWDER PACK 280-160-250 MG 2 PACKET: 280-160-250 PACK at 14:34

## 2022-06-02 RX ADMIN — ACETAMINOPHEN 975 MG: 325 TABLET, FILM COATED ORAL at 16:41

## 2022-06-02 RX ADMIN — POTASSIUM & SODIUM PHOSPHATES POWDER PACK 280-160-250 MG 2 PACKET: 280-160-250 PACK at 10:19

## 2022-06-02 RX ADMIN — HEPARIN SODIUM 5000 UNITS: 5000 INJECTION, SOLUTION INTRAVENOUS; SUBCUTANEOUS at 19:30

## 2022-06-02 RX ADMIN — POTASSIUM CHLORIDE 20 MEQ: 750 TABLET, EXTENDED RELEASE ORAL at 10:20

## 2022-06-02 RX ADMIN — ROSUVASTATIN CALCIUM 40 MG: 20 TABLET, FILM COATED ORAL at 08:19

## 2022-06-02 RX ADMIN — METOPROLOL TARTRATE 5 MG: 5 INJECTION INTRAVENOUS at 22:57

## 2022-06-02 RX ADMIN — Medication 12.5 MG: at 20:37

## 2022-06-02 RX ADMIN — METHOCARBAMOL 500 MG: 500 TABLET ORAL at 23:18

## 2022-06-02 RX ADMIN — Medication 12.5 MG: at 10:19

## 2022-06-02 RX ADMIN — Medication 3 MG: at 21:42

## 2022-06-02 RX ADMIN — ACETAMINOPHEN 975 MG: 325 TABLET, FILM COATED ORAL at 23:06

## 2022-06-02 RX ADMIN — LISINOPRIL 2.5 MG: 2.5 TABLET ORAL at 10:19

## 2022-06-02 RX ADMIN — HYDRALAZINE HYDROCHLORIDE 10 MG: 20 INJECTION INTRAMUSCULAR; INTRAVENOUS at 08:20

## 2022-06-02 RX ADMIN — OXYCODONE HYDROCHLORIDE 5 MG: 5 TABLET ORAL at 23:18

## 2022-06-02 ASSESSMENT — ACTIVITIES OF DAILY LIVING (ADL)
ADLS_ACUITY_SCORE: 26
ADLS_ACUITY_SCORE: 26
ADLS_ACUITY_SCORE: 28
ADLS_ACUITY_SCORE: 28
ADLS_ACUITY_SCORE: 27
ADLS_ACUITY_SCORE: 28
ADLS_ACUITY_SCORE: 26
ADLS_ACUITY_SCORE: 26
ADLS_ACUITY_SCORE: 28

## 2022-06-02 NOTE — PROGRESS NOTES
Cardiovascular Surgery Progress Note  06/02/2022         Assessment and Plan:     Arun Wren is a 77 year old male with a PMH of HTN, multivessel CAD, and severe aortic stenosis. He is now s/p elective tissue AVR and 2 vessel CAB on 5/31 with Dr. Benítez.    Cardiovascular:   Hx of HTN, CAD, aortic stenosis  S/p tissue AVR and 2 vessel CAB (vein to ramus and PDA)  Weaned pressors by POD #1 am. Had some orthostatic hypoTN, likely secondary to routine post-op vasoplegia.   Pre-op ECHO with LVEF 55 - 60%.  Post-op BREANNE: LVEF 55%, LV hypertrophy present, normal global function of both the RV and LV.   No arrhythmia, HD stable.   mg, rosuvastatin  Metoprolol 12.5 mg PO BID, Lisinopril 2.5 mg PO daily  Chest tubes: mediastinals to suction  TPW: capped 6/1 (await Plts >100 before removal)    Pulmonary:  - Extubated POD 1 to 5 lpm via NC. Now saturating well on RA.   - Supplemental O2 PRN to keep sats > 92%. Wean off as tolerated.  - Pulm toilet, IS, activity and deep breathing    Neurology / MSK:  - Neuro intact  - Acute post-operative pain well controlled with acetaminophen, PO robaxin and oxycodone PRN, IV dilaudid PRN     / Renal:  - Zhang out 6/1. No Hx of renal disease. Most recent creatinine WNL, adequate UOP.   - Pre-op weight 231 lbs.  Diuresis with Lasix 40 mg PO x 1 on 6/2 and assess response.    GI / FEN:   - Advance to Regular diet, continue bowel regimen  - Replace electrolytes as needed, hepatic enzymes WNL.    Endocrine:  Stress induced hyperglycemia initially managed on insulin drip postop, transitioned to sliding scale.    Infectious Disease:  Stress induced leukocytosis  - WBC 11.9, remains afebrile, no signs or symptoms of infection  - Completed perioperative antibiotics    Hematology:   Acute blood loss anemia and thrombocytopenia  Hgb 11.0; Plt 88, no signs or symptoms of active bleeding    Anticoagulation:   - Only  mg daily per Dr Benítez    Prophylaxis:   - Stress ulcer  "prophylaxis: Pantoprazole 40 mg daily for 30 days  - DVT prophylaxis: Subcutaneous heparin, SCD    Disposition:   - Transferred to  on 6/1  - Therapies recommending discharge to TCU vs Home    Discussed with Dr Benítez through both written and verbal communication.      Carlos Cote PA-C  Cardiothoracic Surgery  Pager [phone]    8:47 AM   June 2, 2022        Interval History:     No overnight events.  Up from ICU last night.   States pain is well managed on current regimen. Slept well overnight.  Tolerating diet, is passing flatus, no BM. No nausea or vomiting.  Breathing well without complaints.   Working with therapies and ambulating to bathroom with assistance.   Denies chest pain, palpitations, dizziness, syncopal symptoms, fevers, chills, myalgias, or sternal popping/clicking.         Physical Exam:   Blood pressure (!) 146/81, pulse 78, temperature 97.9  F (36.6  C), temperature source Oral, resp. rate 18, height 1.74 m (5' 8.5\"), weight 113.4 kg (250 lb), SpO2 96 %.  Vitals:    05/31/22 0658 06/01/22 0400 06/02/22 0445   Weight: 105 kg (231 lb 7.7 oz) 111.5 kg (245 lb 13 oz) 113.4 kg (250 lb)      Weight; + 19 lbs since admit and trending up.   24 hr Fluid status; net gain 152 mL.  mL  MAPs: 84 - 114     Gen: A&Ox4, NAD  Neuro: no focal deficits   CV: RRR, normal S1 S2, no murmurs, rubs or gallops.   Pulm: CTA, no wheezing or rhonchi, normal breathing on RA in chair  Abd: nondistended, normal BS, soft, nontender  Ext: no peripheral edema  Incision: bruising around incision, clean, dry, intact, no erythema, sternum stable  Tubes/drain sites: dressing clean and dry, serosanguinous output, no air leak. 24 hr output 550 mL.          Data:    Imaging:  reviewed recent imaging, no acute concerns    Labs:  BMP  Recent Labs   Lab 06/02/22  0738 06/02/22  0725 06/01/22  2141 06/01/22  1853 06/01/22  0429 06/01/22  0405 06/01/22  0016 06/01/22  0015 05/31/22  1423 05/31/22  1420     --  142  --   --   " --  143  --  144  --  143   POTASSIUM  --  3.8  --   --   --  4.0  --  4.4  --  4.2  4.2   CHLORIDE  --  109  --   --   --  110*  --  111*  --  111*   DEVORA  --  8.1*  --   --   --  7.7*  --  7.6*  --  7.5*   CO2  --  28  --   --   --  25  --  26  --  24   BUN  --  17  --   --   --  18  --  17  --  18   CR  --  0.84  --   --   --  0.90  --  0.91  --  0.85   GLC 98 106* 129* 103*   < > 146*   < > 132*   < > 126*    < > = values in this interval not displayed.     CBC  Recent Labs   Lab 06/02/22  0725 06/01/22  0508 06/01/22  0007 05/31/22  1420 05/31/22  1326 05/31/22  1255   WBC 11.9* 9.2  --  12.6*  --  11.5*   RBC 3.38* 3.50*  --  3.80*  --  3.43*   HGB 11.0* 11.3* 11.8* 12.5*   < > 11.1*   HCT 33.4* 33.9*  --  37.2*  --  33.9*   MCV 99 97  --  98  --  99   MCH 32.5 32.3  --  32.9  --  32.4   MCHC 32.9 33.3  --  33.6  --  32.7   RDW 13.7 13.6  --  13.4  --  13.4   PLT 88* 110*  --  116*  --  109*    < > = values in this interval not displayed.     INR  Recent Labs   Lab 06/02/22  0725 06/01/22  0405 05/31/22  1944 05/31/22  1420   INR 1.19* 1.08 1.24* 1.37*      Hepatic Panel  Recent Labs   Lab 06/01/22  0405 06/01/22  0015 05/31/22  1420   AST 45 49* 34   ALT 22 23 21   ALKPHOS 32* 36* 35*   BILITOTAL 0.7 0.7 0.6   ALBUMIN 3.0* 3.1* 2.6*     GLUCOSE:   Recent Labs   Lab 06/02/22  0738 06/02/22  0725 06/01/22  2141 06/01/22  1853 06/01/22  1213 06/01/22  0747   GLC 98 106* 129* 103* 136* 148*

## 2022-06-02 NOTE — PROGRESS NOTES
D/I/A: Pt roomed on 6C in 417-1.  Arrived via litter and accompanied by RN On/Off: On monitor.  VSSA.  Rhythm upon arrival SR on monitor.  Denies pain or sob.  Reviewed activity restrictions and when to notify RN, ie-changes to breathing or increased chest pressure or chest pain.  Sternal incision, GERALDINE, C/D/I; CT to sxn with no noted air leak; On Q infusing bilaterally. Family arrived shortly after patient's arrival to the unit; supportive of patient.    P: Continue to monitor status.  Discharge to home once meeting criteria.

## 2022-06-02 NOTE — PROGRESS NOTES
"Pain Service Progress Note  [facility]  Date: 06/02/2022       Patient Name: Arun Wren  MRN: [id number]  Age: 77 year old  Sex: male      Assessment:  Arun Wren is a 77 year old male with PMH significant for HTN, severe symptomatic aortic stenosis, and severe multivessel coronary artery disease    Procedure: median sternotomy, CABG x 2, tissue AVR    Date of Surgery: 5/31/22     Date of Catheter Placement: 5/31/22     Plan/Recommendations:  1. Regional Anesthesia/Analgesia  -Continuous Catheter Type/Site: bilateral erector spinae (ES) T5-6  Continue 0.2% ropivacaine  Programmed Intermittent Bolus (PIB) at 7 mL Q60 min via each catheter, total infusion rate of 14 mL/hr    Plan to maintain catheter, max of 7 days    2. Anticoagulation  Heparin 5,000 units SC Q 8 hrs and no plans for oral anticoagulation per primary service    -Please contact Inpatient Pain Service before ordering or making any anticoagulation changes       3. Multimodal Analgesia  - per primary service    Pain Service will continue to follow.    Discussed with attending anesthesiologist    Overnight Events: None    Tubes/Drains: Yes  CT x 2    Subjective:  my chest hurt more after I coughed up thick stuff\" Reports anterior lower chest pain resolving. He is sitting in chair, just ate breakfast and preparing to take morning meds.    Nausea: No  Symptoms of LAST: No    Pain Location:  Anterior chest wall    Pain Intensity:    Pain at Rest: 3/10   Pain with Activity: 4/10  Satisfied with your level of pain control: Yes    Diet: Advance Diet as Tolerated: Regular Diet Adult  Snacks/Supplements Adult: Ensure Enlive; Between Meals    Relevant Labs:  Recent Labs   Lab Test 06/02/22  0725 06/01/22  0508 06/01/22  0405   INR 1.19*  --  1.08   PLT 88*   < >  --    PTT  --   --  39*   BUN 17  --  18    < > = values in this interval not displayed.       Physical Exam:  Vitals: BP (!) 146/81 (BP Location: Right arm)   " "Pulse 78   Temp 97.9  F (36.6  C) (Oral)   Resp 18   Ht 1.74 m (5' 8.5\")   Wt 113.4 kg (250 lb)   SpO2 96%   BMI 37.46 kg/m      Physical Exam:   Orientation:  Alert, oriented, and in no acute distress: Yes  Sedation: No    Motor Examination:  5/5 Strength in lower extremities: Yes    Catheter Site:   Catheter entry site is clean/dry/intact: Yes    Tender: No      Relevant Medications:  Current Pain Medications:  Medications related to Pain Management (From now, onward)    Start     Dose/Rate Route Frequency Ordered Stop    06/03/22 0800  aspirin (ASA) chewable tablet 162 mg         162 mg Oral DAILY 06/02/22 0910      06/03/22 0000  acetaminophen (TYLENOL) tablet 650 mg         650 mg Oral EVERY 4 HOURS PRN 05/31/22 1409      06/01/22 0800  polyethylene glycol (MIRALAX) Packet 17 g         17 g Oral DAILY 05/31/22 1410 05/31/22 2100  ropivacaine 0.2% in NS perineural infusion simple          Perineural Continuous Nerve Block 05/31/22 2033 05/31/22 2100  ropivacaine 0.2% in NS perineural infusion simple          Perineural Continuous Nerve Block 05/31/22 2033 05/31/22 2000  senna-docusate (SENOKOT-S/PERICOLACE) 8.6-50 MG per tablet 1 tablet         1 tablet Oral 2 TIMES DAILY 05/31/22 1410 05/31/22 1430  acetaminophen (TYLENOL) tablet 975 mg         975 mg Oral EVERY 8 HOURS 05/31/22 1409 06/03/22 1559    05/31/22 1410  lidocaine 1 % 0.1-1 mL         0.1-1 mL Other EVERY 1 HOUR PRN 05/31/22 1410      05/31/22 1410  lidocaine (LMX4) cream          Topical EVERY 1 HOUR PRN 05/31/22 1410      05/31/22 1409  HYDROmorphone (DILAUDID) injection 0.2 mg        \"Or\" Linked Group Details    0.2 mg Intravenous EVERY 2 HOURS PRN 05/31/22 1410      05/31/22 1409  HYDROmorphone (DILAUDID) injection 0.4 mg        \"Or\" Linked Group Details    0.4 mg Intravenous EVERY 2 HOURS PRN 05/31/22 1410      05/31/22 1409  oxyCODONE (ROXICODONE) tablet 5 mg        \"Or\" Linked Group Details    5 mg Oral EVERY 4 HOURS " "PRN 05/31/22 1410      05/31/22 1409  oxyCODONE IR (ROXICODONE) tablet 10 mg        \"Or\" Linked Group Details    10 mg Oral EVERY 4 HOURS PRN 05/31/22 1410      05/31/22 1409  methocarbamol (ROBAXIN) tablet 500 mg         500 mg Oral EVERY 6 HOURS PRN 05/31/22 1409      05/31/22 1409  magnesium hydroxide (MILK OF MAGNESIA) suspension 30 mL         30 mL Oral DAILY PRN 05/31/22 1410      05/31/22 1409  bisacodyl (DULCOLAX) Suppository 10 mg         10 mg Rectal DAILY PRN 05/31/22 1410            Primary Service Contacted with Recommendations? Yes    Ines Diaz, DUSTIN CNP  06/02/2022     Time/Communication:  I personally spent 15 minutes with greater than 50% in consultation, education, counseling and coordination of care.  See note above for details on conversation.      Contact Info Please Page the Pain Team Via [employer]: \"Adult acute inpatient pain management/[facility]\"    "

## 2022-06-02 NOTE — PLAN OF CARE
Pt admitted 5/31 for CABG x 2 and AVR. PMH includes HTN, CAD, aortic stenosis, tobacco use, and HLD.    Neuro: A&O x 4. Forgetful at times per pt. Calls appropriately. Slept intermittently overnight.  Cardiac: SR 70s to 80s. SBP 100s to 150s. Gave PRN hydralazine per order parameters 1x. Denies dizziness, chest pain, or palpitations. Capped pacer wires.  Respiratory: Sating well on RA. Infrequent cough. 2 chest tubes to suction in one atrium.   GI/: Good UOP. Passing flatus. Denies nausea.  Endocrine: ACHS  Diet/Appetite: Regular  Skin: Sternal dressing removed per order. Incision GERALDINE and WNL.  LDA: L PIV SL  Activity: AO2 with walker and GB.  Pain: Well controlled under current regimen. Ropivicaine infusing at 14 mL/hr. Scheduled Tylenol given.     Plan: Continue to monitor and alert team with any changes or concerns.

## 2022-06-02 NOTE — PLAN OF CARE
Shift:   VS: Temp: 97.4  F (36.3  C) Temp src: Oral BP: 116/69 Pulse: 69   Resp: 18 SpO2: 96 %      Pain: Pain comfortably managed with ropivacaine 7cc/hr x2  Neuro: A&OX4, calls appropriately. Bed alarm on for safety. Intermittently forgetful and impulsive. At 1130, pt seemed confused, unable to follow directions to bathroom and reports that he sees wavy lines and thinks room ceiling is moving. Neuro assessments are intact, no deficit noted. Team notified and assessed pt at the bedside. Instructed to monitor closely for now and notify with any new changes.   Cardiac:   SR, denies chest pain/palpitations. Given hydralazine x1 prn for SBP>140.   Respiratory: RA, denies SOB  GI/Diet/Appetite: Ate 75% of breakfast, refused lunch. Need encouragement.   :  Strict I &O, unable to measure urine output as pt spilled on bathroom floor before getting to the toilet. +ve gas, no BM yet.   LDA's: PIV SL, CT x2 to suction in one atrium with moderate output.   Skin: No new deficit noted. Chest incision CDI  Activity: Up to bathroom with Ax1 and GB  Tests/Procedures: Potassium and phos replaced as per protocol.  Pertinent Labs/Lab Collection:      Plan: Continue with cares and update team with any changes.

## 2022-06-03 ENCOUNTER — APPOINTMENT (OUTPATIENT)
Dept: OCCUPATIONAL THERAPY | Facility: CLINIC | Age: 77
DRG: 220 | End: 2022-06-03
Attending: SURGERY
Payer: COMMERCIAL

## 2022-06-03 ENCOUNTER — APPOINTMENT (OUTPATIENT)
Dept: GENERAL RADIOLOGY | Facility: CLINIC | Age: 77
DRG: 220 | End: 2022-06-03
Attending: PHYSICIAN ASSISTANT
Payer: COMMERCIAL

## 2022-06-03 ENCOUNTER — APPOINTMENT (OUTPATIENT)
Dept: PHYSICAL THERAPY | Facility: CLINIC | Age: 77
DRG: 220 | End: 2022-06-03
Attending: PHYSICIAN ASSISTANT
Payer: COMMERCIAL

## 2022-06-03 LAB
ANION GAP SERPL CALCULATED.3IONS-SCNC: 5 MMOL/L (ref 3–14)
ATRIAL RATE - MUSE: 115 BPM
BUN SERPL-MCNC: 23 MG/DL (ref 7–30)
CALCIUM SERPL-MCNC: 8.1 MG/DL (ref 8.5–10.1)
CHLORIDE BLD-SCNC: 108 MMOL/L (ref 94–109)
CO2 SERPL-SCNC: 28 MMOL/L (ref 20–32)
CREAT SERPL-MCNC: 0.8 MG/DL (ref 0.66–1.25)
DIASTOLIC BLOOD PRESSURE - MUSE: NORMAL MMHG
ERYTHROCYTE [DISTWIDTH] IN BLOOD BY AUTOMATED COUNT: 13.8 % (ref 10–15)
GFR SERPL CREATININE-BSD FRML MDRD: >90 ML/MIN/1.73M2
GLUCOSE BLD-MCNC: 111 MG/DL (ref 70–99)
GLUCOSE BLDC GLUCOMTR-MCNC: 138 MG/DL (ref 70–99)
HCT VFR BLD AUTO: 29.9 % (ref 40–53)
HGB BLD-MCNC: 9.8 G/DL (ref 13.3–17.7)
INR PPP: 1.11 (ref 0.85–1.15)
INTERPRETATION ECG - MUSE: NORMAL
MAGNESIUM SERPL-MCNC: 2.3 MG/DL (ref 1.6–2.3)
MCH RBC QN AUTO: 32.7 PG (ref 26.5–33)
MCHC RBC AUTO-ENTMCNC: 32.8 G/DL (ref 31.5–36.5)
MCV RBC AUTO: 100 FL (ref 78–100)
P AXIS - MUSE: NORMAL DEGREES
PHOSPHATE SERPL-MCNC: 2.4 MG/DL (ref 2.5–4.5)
PLATELET # BLD AUTO: 83 10E3/UL (ref 150–450)
POTASSIUM BLD-SCNC: 4 MMOL/L (ref 3.4–5.3)
PR INTERVAL - MUSE: NORMAL MS
QRS DURATION - MUSE: 94 MS
QT - MUSE: 372 MS
QTC - MUSE: 518 MS
R AXIS - MUSE: 43 DEGREES
RBC # BLD AUTO: 3 10E6/UL (ref 4.4–5.9)
SODIUM SERPL-SCNC: 141 MMOL/L (ref 133–144)
SYSTOLIC BLOOD PRESSURE - MUSE: NORMAL MMHG
T AXIS - MUSE: -66 DEGREES
VENTRICULAR RATE- MUSE: 117 BPM
WBC # BLD AUTO: 11.2 10E3/UL (ref 4–11)

## 2022-06-03 PROCEDURE — 250N000011 HC RX IP 250 OP 636: Performed by: PHYSICIAN ASSISTANT

## 2022-06-03 PROCEDURE — 250N000011 HC RX IP 250 OP 636

## 2022-06-03 PROCEDURE — 97162 PT EVAL MOD COMPLEX 30 MIN: CPT | Mod: GP

## 2022-06-03 PROCEDURE — 250N000013 HC RX MED GY IP 250 OP 250 PS 637: Performed by: STUDENT IN AN ORGANIZED HEALTH CARE EDUCATION/TRAINING PROGRAM

## 2022-06-03 PROCEDURE — 93010 ELECTROCARDIOGRAM REPORT: CPT | Performed by: INTERNAL MEDICINE

## 2022-06-03 PROCEDURE — 250N000013 HC RX MED GY IP 250 OP 250 PS 637: Performed by: PHYSICIAN ASSISTANT

## 2022-06-03 PROCEDURE — 85027 COMPLETE CBC AUTOMATED: CPT | Performed by: STUDENT IN AN ORGANIZED HEALTH CARE EDUCATION/TRAINING PROGRAM

## 2022-06-03 PROCEDURE — 97116 GAIT TRAINING THERAPY: CPT | Mod: GP

## 2022-06-03 PROCEDURE — 271N000002 HC RX 271: Performed by: SURGERY

## 2022-06-03 PROCEDURE — 250N000011 HC RX IP 250 OP 636: Performed by: SURGERY

## 2022-06-03 PROCEDURE — 71046 X-RAY EXAM CHEST 2 VIEWS: CPT | Mod: 26 | Performed by: RADIOLOGY

## 2022-06-03 PROCEDURE — 93005 ELECTROCARDIOGRAM TRACING: CPT

## 2022-06-03 PROCEDURE — 71046 X-RAY EXAM CHEST 2 VIEWS: CPT

## 2022-06-03 PROCEDURE — 258N000003 HC RX IP 258 OP 636: Performed by: PHYSICIAN ASSISTANT

## 2022-06-03 PROCEDURE — 258N000003 HC RX IP 258 OP 636: Performed by: SURGERY

## 2022-06-03 PROCEDURE — 97530 THERAPEUTIC ACTIVITIES: CPT | Mod: GP

## 2022-06-03 PROCEDURE — 250N000013 HC RX MED GY IP 250 OP 250 PS 637: Performed by: SURGERY

## 2022-06-03 PROCEDURE — 250N000009 HC RX 250

## 2022-06-03 PROCEDURE — 214N000001 HC R&B CCU UMMC

## 2022-06-03 PROCEDURE — 97535 SELF CARE MNGMENT TRAINING: CPT | Mod: GO

## 2022-06-03 PROCEDURE — 83735 ASSAY OF MAGNESIUM: CPT | Performed by: STUDENT IN AN ORGANIZED HEALTH CARE EDUCATION/TRAINING PROGRAM

## 2022-06-03 PROCEDURE — 97530 THERAPEUTIC ACTIVITIES: CPT | Mod: GO

## 2022-06-03 PROCEDURE — 85610 PROTHROMBIN TIME: CPT | Performed by: STUDENT IN AN ORGANIZED HEALTH CARE EDUCATION/TRAINING PROGRAM

## 2022-06-03 PROCEDURE — 36415 COLL VENOUS BLD VENIPUNCTURE: CPT | Performed by: STUDENT IN AN ORGANIZED HEALTH CARE EDUCATION/TRAINING PROGRAM

## 2022-06-03 PROCEDURE — 80048 BASIC METABOLIC PNL TOTAL CA: CPT | Performed by: STUDENT IN AN ORGANIZED HEALTH CARE EDUCATION/TRAINING PROGRAM

## 2022-06-03 PROCEDURE — 99231 SBSQ HOSP IP/OBS SF/LOW 25: CPT | Performed by: PHYSICIAN ASSISTANT

## 2022-06-03 PROCEDURE — 84100 ASSAY OF PHOSPHORUS: CPT | Performed by: STUDENT IN AN ORGANIZED HEALTH CARE EDUCATION/TRAINING PROGRAM

## 2022-06-03 PROCEDURE — 258N000003 HC RX IP 258 OP 636

## 2022-06-03 RX ORDER — POLYETHYLENE GLYCOL 3350 17 G/17G
17 POWDER, FOR SOLUTION ORAL DAILY PRN
Status: DISCONTINUED | OUTPATIENT
Start: 2022-06-03 | End: 2022-06-09 | Stop reason: HOSPADM

## 2022-06-03 RX ORDER — POTASSIUM CHLORIDE 750 MG/1
20 TABLET, EXTENDED RELEASE ORAL DAILY
Status: DISCONTINUED | OUTPATIENT
Start: 2022-06-03 | End: 2022-06-09 | Stop reason: HOSPADM

## 2022-06-03 RX ORDER — METOPROLOL TARTRATE 1 MG/ML
5 INJECTION, SOLUTION INTRAVENOUS ONCE
Status: COMPLETED | OUTPATIENT
Start: 2022-06-03 | End: 2022-06-03

## 2022-06-03 RX ORDER — FUROSEMIDE 40 MG
40 TABLET ORAL ONCE
Status: COMPLETED | OUTPATIENT
Start: 2022-06-03 | End: 2022-06-03

## 2022-06-03 RX ORDER — METOPROLOL TARTRATE 25 MG/1
25 TABLET, FILM COATED ORAL 2 TIMES DAILY
Status: DISCONTINUED | OUTPATIENT
Start: 2022-06-03 | End: 2022-06-03

## 2022-06-03 RX ORDER — AMIODARONE HYDROCHLORIDE 200 MG/1
400 TABLET ORAL 2 TIMES DAILY
Status: DISCONTINUED | OUTPATIENT
Start: 2022-06-03 | End: 2022-06-04

## 2022-06-03 RX ADMIN — Medication: at 08:28

## 2022-06-03 RX ADMIN — HEPARIN SODIUM 5000 UNITS: 5000 INJECTION, SOLUTION INTRAVENOUS; SUBCUTANEOUS at 12:57

## 2022-06-03 RX ADMIN — ROSUVASTATIN CALCIUM 40 MG: 20 TABLET, FILM COATED ORAL at 08:47

## 2022-06-03 RX ADMIN — HEPARIN SODIUM 5000 UNITS: 5000 INJECTION, SOLUTION INTRAVENOUS; SUBCUTANEOUS at 19:58

## 2022-06-03 RX ADMIN — Medication 12.5 MG: at 19:58

## 2022-06-03 RX ADMIN — AMIODARONE HYDROCHLORIDE 400 MG: 200 TABLET ORAL at 19:58

## 2022-06-03 RX ADMIN — AMIODARONE HYDROCHLORIDE 1 MG/MIN: 50 INJECTION, SOLUTION INTRAVENOUS at 04:15

## 2022-06-03 RX ADMIN — POTASSIUM & SODIUM PHOSPHATES POWDER PACK 280-160-250 MG 1 PACKET: 280-160-250 PACK at 19:58

## 2022-06-03 RX ADMIN — PANTOPRAZOLE SODIUM 40 MG: 40 TABLET, DELAYED RELEASE ORAL at 08:48

## 2022-06-03 RX ADMIN — SENNOSIDES AND DOCUSATE SODIUM 1 TABLET: 50; 8.6 TABLET ORAL at 19:58

## 2022-06-03 RX ADMIN — POTASSIUM CHLORIDE 20 MEQ: 750 TABLET, EXTENDED RELEASE ORAL at 08:47

## 2022-06-03 RX ADMIN — METOPROLOL TARTRATE 5 MG: 1 INJECTION, SOLUTION INTRAVENOUS at 00:27

## 2022-06-03 RX ADMIN — SODIUM CHLORIDE 250 ML: 9 INJECTION, SOLUTION INTRAVENOUS at 17:24

## 2022-06-03 RX ADMIN — SODIUM CHLORIDE, POTASSIUM CHLORIDE, SODIUM LACTATE AND CALCIUM CHLORIDE 500 ML: 600; 310; 30; 20 INJECTION, SOLUTION INTRAVENOUS at 22:37

## 2022-06-03 RX ADMIN — ACETAMINOPHEN 975 MG: 325 TABLET, FILM COATED ORAL at 08:47

## 2022-06-03 RX ADMIN — POTASSIUM & SODIUM PHOSPHATES POWDER PACK 280-160-250 MG 1 PACKET: 280-160-250 PACK at 08:47

## 2022-06-03 RX ADMIN — ASPIRIN 81 MG 162 MG: 81 TABLET ORAL at 08:48

## 2022-06-03 RX ADMIN — FUROSEMIDE 40 MG: 40 TABLET ORAL at 09:06

## 2022-06-03 RX ADMIN — METOPROLOL TARTRATE 25 MG: 25 TABLET, FILM COATED ORAL at 11:09

## 2022-06-03 RX ADMIN — HEPARIN SODIUM 5000 UNITS: 5000 INJECTION, SOLUTION INTRAVENOUS; SUBCUTANEOUS at 06:10

## 2022-06-03 RX ADMIN — Medication 3 MG: at 21:14

## 2022-06-03 RX ADMIN — AMIODARONE HYDROCHLORIDE 150 MG: 1.5 INJECTION, SOLUTION INTRAVENOUS at 04:00

## 2022-06-03 RX ADMIN — POTASSIUM & SODIUM PHOSPHATES POWDER PACK 280-160-250 MG 1 PACKET: 280-160-250 PACK at 14:21

## 2022-06-03 ASSESSMENT — ACTIVITIES OF DAILY LIVING (ADL)
ADLS_ACUITY_SCORE: 26
ADLS_ACUITY_SCORE: 27
ADLS_ACUITY_SCORE: 26
ADLS_ACUITY_SCORE: 23
ADLS_ACUITY_SCORE: 27
ADLS_ACUITY_SCORE: 26

## 2022-06-03 NOTE — PROGRESS NOTES
"Pain Service Progress Note  [facility]  Date: 06/03/2022       Patient Name: Arun Wren  MRN: [id number]  Age: 77 year old  Sex: male      Assessment:  Arun Wren is a 77 year old male with PMH significant for HTN, severe symptomatic aortic stenosis, and severe multivessel coronary artery disease     Procedure: median sternotomy, CABG x 2, tissue AVR     Date of Surgery: 5/31/22      Date of Catheter Placement: 5/31/22      Plan/Recommendations:  1. Regional Anesthesia/Analgesia  -Continuous Catheter Type/Site: bilateral erector spinae (ES) T5-6  Continue 0.2% ropivacaine  Programmed Intermittent Bolus (PIB) at 7 mL Q60 min via each catheter, total infusion rate of 14 mL/hr     Plan to maintain catheter, max of 7 days     2. Anticoagulation  Heparin 5,000 units SC Q 8 hrs and no plans for oral anticoagulation per primary service     -Please contact Inpatient Pain Service before ordering or making any anticoagulation changes        3. Multimodal Analgesia  - per primary service     Pain Service will continue to follow.     Discussed with attending anesthesiologist    Overnight Events:  No major acute event     Tubes/Drains: Yes  Chest tubes    Subjective: states he has No pain. Has been walking to bathroom.    Nausea: No  Vomiting: No  Symptoms of LAST: No    Pain Location:  Denies pain    Pain Intensity:    Pain at Rest: 0/10     Satisfied with your level of pain control: Yes    Diet: Snacks/Supplements Adult: Ensure Enlive; Between Meals  Regular Diet Adult    Relevant Labs:  Recent Labs   Lab Test 06/03/22  0606 06/01/22  0508 06/01/22  0405   INR 1.11   < > 1.08   PLT 83*   < >  --    PTT  --   --  39*   BUN 23   < > 18    < > = values in this interval not displayed.       Physical Exam:  Vitals: /84 (BP Location: Right arm)   Pulse 104   Temp 98.3  F (36.8  C) (Oral)   Resp 18   Ht 1.74 m (5' 8.5\")   Wt 108.5 kg (239 lb 4.8 oz)   SpO2 97%   BMI 35.86 kg/m  " "    Physical Exam:   Orientation:  Alert, oriented, and in no acute distress: Yes  Sedation: No    Motor Examination:  5/5 Strength in lower extremities: Yes      Catheter Site:   Catheter entry site is clean/dry/intact: Yes    Tender: No      Relevant Medications:  Current Pain Medications:  Medications related to Pain Management (From now, onward)    Start     Dose/Rate Route Frequency Ordered Stop    06/03/22 0830  polyethylene glycol (MIRALAX) Packet 17 g         17 g Oral DAILY PRN 06/03/22 0820      06/03/22 0800  aspirin (ASA) chewable tablet 162 mg         162 mg Oral DAILY 06/02/22 0910      06/03/22 0000  acetaminophen (TYLENOL) tablet 650 mg         650 mg Oral EVERY 4 HOURS PRN 05/31/22 1409      05/31/22 2100  ropivacaine 0.2% in NS perineural infusion simple          Perineural Continuous Nerve Block 05/31/22 2033 05/31/22 2100  ropivacaine 0.2% in NS perineural infusion simple          Perineural Continuous Nerve Block 05/31/22 2033 05/31/22 2000  senna-docusate (SENOKOT-S/PERICOLACE) 8.6-50 MG per tablet 1 tablet         1 tablet Oral 2 TIMES DAILY 05/31/22 1410      05/31/22 1410  lidocaine 1 % 0.1-1 mL         0.1-1 mL Other EVERY 1 HOUR PRN 05/31/22 1410      05/31/22 1410  lidocaine (LMX4) cream          Topical EVERY 1 HOUR PRN 05/31/22 1410      05/31/22 1409  HYDROmorphone (DILAUDID) injection 0.2 mg        \"Or\" Linked Group Details    0.2 mg Intravenous EVERY 2 HOURS PRN 05/31/22 1410      05/31/22 1409  HYDROmorphone (DILAUDID) injection 0.4 mg        \"Or\" Linked Group Details    0.4 mg Intravenous EVERY 2 HOURS PRN 05/31/22 1410      05/31/22 1409  oxyCODONE (ROXICODONE) tablet 5 mg        \"Or\" Linked Group Details    5 mg Oral EVERY 4 HOURS PRN 05/31/22 1410      05/31/22 1409  methocarbamol (ROBAXIN) tablet 500 mg         500 mg Oral EVERY 6 HOURS PRN 05/31/22 1409      05/31/22 1409  magnesium hydroxide (MILK OF MAGNESIA) suspension 30 mL         30 mL Oral DAILY PRN 05/31/22 " "1410      05/31/22 1409  bisacodyl (DULCOLAX) Suppository 10 mg         10 mg Rectal DAILY PRN 05/31/22 1410                Selma Mckeon PA-C  06/03/2022     Time/Communication:  I personally spent 16 minutes with greater than 50% in consultation, education, counseling and coordination of care.  See note above for details on conversation.      Contact Info Please Page the Pain Team [facility] [place]: \"Adult acute inpatient pain management/[facility]\"  "

## 2022-06-03 NOTE — PROGRESS NOTES
Surgery Cross Cover Brief Progress Note    Paged regarding patient converted into afib.     Went to assess patient. Denies lightheadedness, dizziness, palpitations, chest pain, shortness of breath, nausea, vomiting.     BP 120s/70s.  HR 90s-120s, mostly in 90s-100s at start of night, then later going to 100s mostly with some in 120s.BP softer with  More tachycardia, 97/74. Patient remained asymptomatic.     BMP wnl.    Gave metop 12.5 oral, IV metop 5 x2, patient remained in afib.   Ordered amio bolus and infusion when patient still in afib with HR mostly in 110s with some into 120s, softer Bps 90s/70s.     Will continue to monitor.     Jonas Cohen MD   Surgery Resident

## 2022-06-03 NOTE — PROVIDER NOTIFICATION
Pt converted to sinus but is carlitos in 50-53 bpm. BP also low at 87/54. Pt asymptomatic. Carlos, PA notified. Per PA, continue to run amiodarone drip. Bolus started. Will continue to monitor.

## 2022-06-03 NOTE — PLAN OF CARE
Doing well post-op. Up in chair most of day; walking with therapies and ambulating to BR w/staff.   Continues on amio gtt @ 0.5mg/min until later this morales when he will transition to oral. Maintaining Afib 100s. Pt asymptomatic.   Metoprolol dose increased to 25mg. BPs in 80s/50s this morning; CVTS aware of lisinopril held due to soft BPs.   Denies pain; declining need for tylenol during day.  Replacing K & Phos. PLT 83-plan to keep pacer wire in until PLT>100.   CT drainage too high to remove today.  Sternal & RLE graft site incisions healing.   Continues on oral lasix-voiding independently. Large BMs yest; +gas today. Fair appetite; magic cup supplements preferred.

## 2022-06-03 NOTE — PROGRESS NOTES
Cardiovascular Surgery Progress Note  06/03/2022         Assessment and Plan:     Arun Wren is a 77 year old male with a PMH of HTN, multivessel CAD, and severe aortic stenosis. He is now s/p elective tissue AVR and 2 vessel CAB on 5/31 with Dr. Benítez.    Cardiovascular:   Hx of HTN, CAD, aortic stenosis  S/p tissue AVR and 2 vessel CAB (vein to ramus and PDA)  Weaned pressors by POD #1 am. Had some orthostatic hypoTN, likely secondary to routine post-op vasoplegia.   Pre-op ECHO with LVEF 55 - 60%.  Post-op BREANNE: LVEF 55%, LV hypertrophy present, normal global function of both the RV and LV.   No arrhythmia, HD stable.   mg, rosuvastatin  Metoprolol 25 mg PO BID, Lisinopril 2.5 mg PO daily (held 6/3 am)  Atrial fibrillation w RVR  - A-fib with 's  - started IV Amio gtt overnight 6/2-6/3, will transition to Amio 400 mg PO BID 6/3 pm.    Chest tubes: mediastinals to suction  TPW: capped 6/1 (await Plts >100 before removal)    Pulmonary:  - Extubated POD 1 to 5 lpm via NC. Now saturating well on RA.   - Supplemental O2 PRN to keep sats > 92%. Wean off as tolerated.  - Pulm toilet, IS, activity and deep breathing    Neurology / MSK:  - Neuro intact  - Acute post-operative pain well controlled with acetaminophen, PO robaxin and oxycodone PRN, IV dilaudid PRN     / Renal:  - Zhang out 6/1. No Hx of renal disease. Most recent creatinine 0.80, adequate UOP.   - Pre-op weight 231 lbs, most recent weight 239 lbs.  Diuresed with Lasix 40 mg PO x 1 on 6/2 and 6/3 with weight reduction.     GI / FEN:   - Advance to Regular diet, continue bowel regimen  - Replace electrolytes as needed, hepatic enzymes WNL.    Endocrine:  Stress induced hyperglycemia initially managed on insulin drip postop, transitioned to sliding scale.    Infectious Disease:  Stress induced leukocytosis  - WBC 11.2, remains afebrile, no signs or symptoms of infection  - Completed perioperative antibiotics    Hematology:   Acute blood  "loss anemia   Hgb 9.8; no signs or symptoms of active bleeding  Thrombocytopenia  Plt 83    Anticoagulation:   - Only  mg daily  - OK to start anticoagulation on 6/4 if still in A-fib per Dr Benítez    Prophylaxis:   - Stress ulcer prophylaxis: Pantoprazole 40 mg daily for 30 days  - DVT prophylaxis: Subcutaneous heparin, SCD    Disposition:   - Transferred to  on 6/1  - Therapies recommending discharge to TCU vs Home    Mason Hightower PA-Student     I have seen and examined this patient with the PA student, I agree with the above findings.  Patient discussed with staff.   Carlos Cote PA-C  Cardiothoracic Surgery  Pager [phone]    4:15 PM   Delores 3, 2022        Interval History:     No overnight events. Slept well, woke up only once with chest pain that resolved without medication.  Blood pressures were soft with A-fib this morning 6/3, Amiodarone decreased to 0.5 mg/hr, Lisinopril held, metoprolol postponed until late morning.  States pain is well managed on current regimen.   Tolerating diet, is passing flatus, no BM. No nausea or vomiting.  Breathing well on room air without complaints.   Working with therapies and ambulating to bathroom with assistance.   Denies chest pain, palpitations, dizziness, syncopal symptoms, fevers, chills, myalgias, or sternal popping/clicking.         Physical Exam:   Blood pressure 98/73, pulse 99, temperature 98.3  F (36.8  C), temperature source Oral, resp. rate 18, height 1.74 m (5' 8.5\"), weight 108.5 kg (239 lb 4.8 oz), SpO2 97 %.  Vitals:    06/01/22 0400 06/02/22 0445 06/03/22 0700   Weight: 111.5 kg (245 lb 13 oz) 113.4 kg (250 lb) 108.5 kg (239 lb 4.8 oz)      Weight; + 8 lbs since admit and trending down.   24 hr Fluid status; net loss 267 mL.  mL  MAPs: 65 - 114     Gen: A&Ox4, NAD  Neuro: no focal deficits   CV: RRR, normal S1 S2, no murmurs, rubs or gallops.   Pulm: CTA, no wheezing or rhonchi, normal breathing on RA in chair  Abd: nondistended, " normal BS, soft, nontender  Ext: no peripheral edema  Incision: bruising around incision, clean, dry, intact, no erythema, sternum stable  Tubes/drain sites: dressing clean and dry, serosanguinous output, no air leak. 24 hr output 120 mL.          Data:    Imaging:  reviewed recent imaging, no acute concerns    EXAM: XR CHEST 2 VW 6/2/2022 9:52 AM     HISTORY: s/p AVR and CAB.     COMPARISON: 6/1/2010.     TECHNIQUE: Upright frontal and lateral views of the chest.     FINDINGS:      Prior sternotomy, mediastinal drains. Removal [place] catheter and  endotracheal tube. Continued cardiomegaly, trace right, small left  pleural effusions. New small right apical pneumothorax. No left  pneumothorax. Unremarkable upper abdomen.                                                                    IMPRESSION:   1. New small right apical pneumothorax.  2. Stable postsurgical changes, remaining mediastinal drains.  3. Cardiomegaly with still small pleural effusions.     [Consider Follow Up: New small right apical pneumothorax.]     This report will be copied to the [facility] to ensure a  provider acknowledges the finding. Access Center is available Monday  through Friday 8am-3:30 pm.     I have personally reviewed the examination and initial interpretation  and I agree with the findings.     CHRIS GARRIDO MD     EKG 12-lead, complete  Order: [id number]   Status: Edited Result - FINAL     Visible to patient: Yes (not seen)     Next appt: Today at 10:30 AM in Occupational Therapy (Taty Goins, MYLA)     0 Result Notes      Component Ref Range & Units 6/2/22  7:26 PM 6/1/22 12:49 AM    Systolic Blood Pressure mmHg       Diastolic Blood Pressure mmHg       Ventricular Rate   68     Atrial Rate   68     FL Interval ms  130     QRS Duration ms 94  94     QT ms 372  472     QTc ms 518  501     P [place] degrees  23     R AXIS degrees 43  38     T Axis degrees -66  21     Interpretation ECG  Atrial  fibrillation with rapid ventricular response   ST & T wave abnormality, consider inferior ischemia   Abnormal ECG   When compared with ECG of 01-JUN-2022 00:49,   Atrial fibrillation has replaced Sinus rhythm   Vent. rate has increased BY  49 BPM   T wave inversion now evident in Inferior leads   Confirmed by MD EMMA, ARLET ([id number]) on 6/3/2022 6:42:18 AM  Sinus rhythm   Nonspecific T wave abnormality   Prolonged QT   Abnormal ECG   When compared with ECG of 31-MAY-2022 14:39, (unconfirmed)   ST elevation now present in Lateral leads   Nonspecific T wave abnormality now evident in Inferior leads          Labs:  BMP  Recent Labs   Lab 06/03/22  0606 06/02/22 2022 06/02/22  1702 06/02/22  1245 06/02/22  0738 06/02/22  0725 06/01/22  0429 06/01/22  0405    141  --   --   --  142  --  143   POTASSIUM 4.0 4.1  --   --   --  3.8  --  4.0   CHLORIDE 108 109  --   --   --  109  --  110*   DEVORA 8.1* 8.2*  --   --   --  8.1*  --  7.7*   CO2 28 28  --   --   --  28  --  25   BUN 23 24  --   --   --  17  --  18   CR 0.80 0.94  --   --   --  0.84  --  0.90   * 104* 107* 174*   < > 106*   < > 146*    < > = values in this interval not displayed.     CBC  Recent Labs   Lab 06/03/22  0606 06/02/22  0725 06/01/22  0508 06/01/22  0007 05/31/22  1420   WBC 11.2* 11.9* 9.2  --  12.6*   RBC 3.00* 3.38* 3.50*  --  3.80*   HGB 9.8* 11.0* 11.3* 11.8* 12.5*   HCT 29.9* 33.4* 33.9*  --  37.2*    99 97  --  98   MCH 32.7 32.5 32.3  --  32.9   MCHC 32.8 32.9 33.3  --  33.6   RDW 13.8 13.7 13.6  --  13.4   PLT 83* 88* 110*  --  116*     INR  Recent Labs   Lab 06/03/22  0606 06/02/22  0725 06/01/22  0405 05/31/22  1944   INR 1.11 1.19* 1.08 1.24*      Hepatic Panel  Recent Labs   Lab 06/01/22  0405 06/01/22  0015 05/31/22  1420   AST 45 49* 34   ALT 22 23 21   ALKPHOS 32* 36* 35*   BILITOTAL 0.7 0.7 0.6   ALBUMIN 3.0* 3.1* 2.6*     GLUCOSE:   Recent Labs   Lab 06/03/22  0606 06/02/22  2022 06/02/22  1702 06/02/22  1245  06/02/22  0738 06/02/22  0725   * 104* 107* 174* 98 106*

## 2022-06-03 NOTE — PROVIDER NOTIFICATION
Called by NST who reported pt was not responding, staring blankly for less than a minute while she was helping him in the bathroom. When RN went in to assess, pt was back to baseline. Assisted pt back to bed, bp stable. Blood sugar checked: 138. Pt denies any new symptoms. Wife also reported pt doesn't look right especially his eyes, she was unable to specify when asked for detail. Pupils equal and reactive. Surgical resident updated, MD will see pt shortly.

## 2022-06-03 NOTE — PROGRESS NOTES
06/03/22 1200   Quick Adds   Type of Visit Initial PT Evaluation   Living Environment   People in Home spouse   Current Living Arrangements house   Home Accessibility stairs to enter home;stairs within home   Number of Stairs, Main Entrance 4   Stair Railings, Main Entrance railings on both sides of stairs   Number of Stairs, Within Home, Primary greater than 10 stairs  (flight to basement, does not need to access)   Stair Railings, Within Home, Primary railing on left side (ascending)   Transportation Anticipated car, drives self;family or friend will provide   Living Environment Comments Patient lives with wife in house with 4 PATRICK and all needs on main level. 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   Self-Care   Usual Activity Tolerance good   Current Activity Tolerance fair   Equipment Currently Used at Home grab bar, tub/shower   Fall history within last six months no   Activity/Exercise/Self-Care Comment Patient is Independent with all mobility and self cares at baseline. Remains active at baseline working on his Alminder and cutting grass.   General Information   Onset of Illness/Injury or Date of Surgery 05/31/22   Referring Physician Yong Lala PA-C   Patient/Family Therapy Goals Statement (PT) to return home   Pertinent History of Current Problem (include personal factors and/or comorbidities that impact the POC) Arun Wren is a 77 year old male with a PMH of HTN, multivessel CAD, and severe aortic stenosis. He is now s/p elective tissue AVR and 2 vessel CAB on 5/31 with Dr. Benítez.   Existing Precautions/Restrictions sternal;cardiac;fall   Cognition   Affect/Mental Status (Cognition) WFL   Orientation Status (Cognition) oriented x 4   Cognitive Status Comments Appears to have some delayed processing and forgetfulness, mildly impulsive but very redirectible.   Pain Assessment   Patient Currently in Pain No   Integumentary/Edema    Integumentary/Edema Comments sternal incision   Posture    Posture Protracted shoulders   Range of Motion (ROM)   ROM Comment BLE WFL   Strength Comprehensive (MMT)   Comment, General Manual Muscle Testing (MMT) Assessment generalized post-op deconditioning, BLE WFL with mobility   Bed Mobility   Comment, (Bed Mobility) Not assessed, pt greeted sitting up in chair   Transfers   Comment, (Transfers) sit<>stand with CGA-min A   Gait/Stairs (Locomotion)   Comment, (Gait/Stairs) Ambulates with FWW and CGA, significantly decreased gait speed, step length and foot clearance   Balance   Balance Comments impaired dynamic balance, requiring FWW or other UE support for stability during standing and ambulation   Sensory Examination   Sensory Perception patient reports no sensory changes   Clinical Impression   Criteria for Skilled Therapeutic Intervention Yes, treatment indicated   PT Diagnosis (PT) impaired functional mobility   Influenced by the following impairments strength, balance, activity tolerance, sternal precautions   Functional limitations due to impairments bed mobility, transfers, gait, stairs   Clinical Presentation (PT Evaluation Complexity) Evolving/Changing   Clinical Presentation Rationale PMH/comorbidities, clinical judgement   Clinical Decision Making (Complexity) moderate complexity   Planned Therapy Interventions (PT) balance training;bed mobility training;gait training;patient/family education;postural re-education;strengthening;transfer training;progressive activity/exercise;risk factor education   Risk & Benefits of therapy have been explained evaluation/treatment results reviewed;care plan/treatment goals reviewed;risks/benefits reviewed;current/potential barriers reviewed;participants voiced agreement with care plan;participants included;patient;spouse/significant other   PT Discharge Planning   PT Discharge Recommendation (DC Rec) Transitional Care Facility;home with assist;home with outpatient  cardiac rehab   PT Rationale for DC Rec Patient below IND baseline, currently would benefit from continued therapy to maximize safety/independence with mobility. Pending LOS and progress in therapy anticipate patient may progress toward discharge home with spouse who is able to provide 24 hour assist as needed. WIll monitor and update recs as appropriate.   PT Brief overview of current status Ax1 with FWW   Plan of Care Review   Plan of Care Reviewed With patient;spouse   Total Evaluation Time   Total Evaluation Time (Minutes) 8   Physical Therapy Goals   PT Frequency 6x/week   PT Predicted Duration/Target Date for Goal Attainment 06/17/22   PT Goals Bed Mobility;Transfers;Gait;Stairs   PT: Bed Mobility Modified independent;Supine to/from sit;Within precautions   PT: Transfers Modified independent;Sit to/from stand;Within precautions   PT: Gait Independent;Greater than 200 feet   PT: Stairs Modified independent;4 stairs;Rail on both sides

## 2022-06-03 NOTE — PROVIDER NOTIFICATION
D/I/A: Pt noted to convert from SR to A-fib with a rate of 90s-120s.  MD Cohen notified and orders obtained.  Additional Metoprolol given.  BP stable; patient A+Ox4 and denies symptoms.  Denies pain or sob.    P: Continue to monitor status.

## 2022-06-03 NOTE — PLAN OF CARE
Pt admitted 5/31 for CABG x 2 and AVR. PMH includes HTN, CAD, aortic stenosis, tobacco use, and HLD.     Neuro: A&O x 4. Forgetful at times per pt. Calls appropriately. Slept intermittently overnight.  Cardiac: Afib 90s-120s. SBP 90s-120s. IV metoprolol given 1x. Amiodarone started. Denies dizziness, chest pain, or palpitations. Capped pacer wires.  Respiratory: Sating well on RA. Infrequent cough. 2 chest tubes to suction in one atrium.   GI/: Good UOP. Passing flatus. Denies nausea.  Endocrine: ACHS  Diet/Appetite: Regular  Skin: Sternal dressing removed per order. Incision GERALDINE and WNL.  LDA: BL PIV. Amiodarone running at 1 mg/min (33.33 mL/hr).  Activity: AO1 with walker and GB.  Pain: Well controlled under current regimen. Ropivicaine infusing at 14 mL/hr. Scheduled Tylenol given. PRN Oxycodone and Robaxin given 1x.      Plan: Continue to monitor and alert team with any changes or concerns.

## 2022-06-04 ENCOUNTER — APPOINTMENT (OUTPATIENT)
Dept: CARDIOLOGY | Facility: CLINIC | Age: 77
DRG: 220 | End: 2022-06-04
Attending: PHYSICIAN ASSISTANT
Payer: COMMERCIAL

## 2022-06-04 ENCOUNTER — APPOINTMENT (OUTPATIENT)
Dept: GENERAL RADIOLOGY | Facility: CLINIC | Age: 77
DRG: 220 | End: 2022-06-04
Attending: PHYSICIAN ASSISTANT
Payer: COMMERCIAL

## 2022-06-04 ENCOUNTER — APPOINTMENT (OUTPATIENT)
Dept: CT IMAGING | Facility: CLINIC | Age: 77
DRG: 220 | End: 2022-06-04
Attending: PHYSICIAN ASSISTANT
Payer: COMMERCIAL

## 2022-06-04 ENCOUNTER — APPOINTMENT (OUTPATIENT)
Dept: PHYSICAL THERAPY | Facility: CLINIC | Age: 77
DRG: 220 | End: 2022-06-04
Attending: SURGERY
Payer: COMMERCIAL

## 2022-06-04 LAB
ALBUMIN SERPL-MCNC: 3.1 G/DL (ref 3.4–5)
ALP SERPL-CCNC: 49 U/L (ref 40–150)
ALT SERPL W P-5'-P-CCNC: 48 U/L (ref 0–70)
ANION GAP SERPL CALCULATED.3IONS-SCNC: 7 MMOL/L (ref 3–14)
AST SERPL W P-5'-P-CCNC: 62 U/L (ref 0–45)
BILIRUB DIRECT SERPL-MCNC: 0.2 MG/DL (ref 0–0.2)
BILIRUB SERPL-MCNC: 0.7 MG/DL (ref 0.2–1.3)
BUN SERPL-MCNC: 24 MG/DL (ref 7–30)
CALCIUM SERPL-MCNC: 8.3 MG/DL (ref 8.5–10.1)
CHLORIDE BLD-SCNC: 107 MMOL/L (ref 94–109)
CO2 SERPL-SCNC: 28 MMOL/L (ref 20–32)
CREAT SERPL-MCNC: 0.94 MG/DL (ref 0.66–1.25)
ERYTHROCYTE [DISTWIDTH] IN BLOOD BY AUTOMATED COUNT: 14 % (ref 10–15)
GFR SERPL CREATININE-BSD FRML MDRD: 84 ML/MIN/1.73M2
GLUCOSE BLD-MCNC: 110 MG/DL (ref 70–99)
HCT VFR BLD AUTO: 32 % (ref 40–53)
HGB BLD-MCNC: 10.3 G/DL (ref 13.3–17.7)
INR PPP: 1.05 (ref 0.85–1.15)
LVEF ECHO: NORMAL
MAGNESIUM SERPL-MCNC: 2.3 MG/DL (ref 1.6–2.3)
MCH RBC QN AUTO: 32.4 PG (ref 26.5–33)
MCHC RBC AUTO-ENTMCNC: 32.2 G/DL (ref 31.5–36.5)
MCV RBC AUTO: 101 FL (ref 78–100)
PHOSPHATE SERPL-MCNC: 2.9 MG/DL (ref 2.5–4.5)
PLATELET # BLD AUTO: 141 10E3/UL (ref 150–450)
POTASSIUM BLD-SCNC: 4.2 MMOL/L (ref 3.4–5.3)
PROT SERPL-MCNC: 6.6 G/DL (ref 6.8–8.8)
RBC # BLD AUTO: 3.18 10E6/UL (ref 4.4–5.9)
SODIUM SERPL-SCNC: 142 MMOL/L (ref 133–144)
WBC # BLD AUTO: 11.1 10E3/UL (ref 4–11)

## 2022-06-04 PROCEDURE — 70450 CT HEAD/BRAIN W/O DYE: CPT | Mod: 26 | Performed by: RADIOLOGY

## 2022-06-04 PROCEDURE — 250N000011 HC RX IP 250 OP 636: Performed by: SURGERY

## 2022-06-04 PROCEDURE — 250N000013 HC RX MED GY IP 250 OP 250 PS 637: Performed by: PHYSICIAN ASSISTANT

## 2022-06-04 PROCEDURE — 250N000011 HC RX IP 250 OP 636: Performed by: PHYSICIAN ASSISTANT

## 2022-06-04 PROCEDURE — 999N000208 ECHOCARDIOGRAM COMPLETE

## 2022-06-04 PROCEDURE — 97530 THERAPEUTIC ACTIVITIES: CPT | Mod: GP

## 2022-06-04 PROCEDURE — 93306 TTE W/DOPPLER COMPLETE: CPT | Mod: 26 | Performed by: INTERNAL MEDICINE

## 2022-06-04 PROCEDURE — 999N000248 HC STATISTIC IV INSERT WITH US BY RN

## 2022-06-04 PROCEDURE — 85027 COMPLETE CBC AUTOMATED: CPT | Performed by: SURGERY

## 2022-06-04 PROCEDURE — 82248 BILIRUBIN DIRECT: CPT | Performed by: PHYSICIAN ASSISTANT

## 2022-06-04 PROCEDURE — 250N000013 HC RX MED GY IP 250 OP 250 PS 637: Performed by: STUDENT IN AN ORGANIZED HEALTH CARE EDUCATION/TRAINING PROGRAM

## 2022-06-04 PROCEDURE — 258N000003 HC RX IP 258 OP 636: Performed by: SURGERY

## 2022-06-04 PROCEDURE — 99231 SBSQ HOSP IP/OBS SF/LOW 25: CPT | Mod: GC | Performed by: ANESTHESIOLOGY

## 2022-06-04 PROCEDURE — 271N000002 HC RX 271: Performed by: SURGERY

## 2022-06-04 PROCEDURE — 99221 1ST HOSP IP/OBS SF/LOW 40: CPT | Mod: GC | Performed by: PSYCHIATRY & NEUROLOGY

## 2022-06-04 PROCEDURE — 85610 PROTHROMBIN TIME: CPT | Performed by: SURGERY

## 2022-06-04 PROCEDURE — 84100 ASSAY OF PHOSPHORUS: CPT | Performed by: SURGERY

## 2022-06-04 PROCEDURE — 80053 COMPREHEN METABOLIC PANEL: CPT | Performed by: SURGERY

## 2022-06-04 PROCEDURE — 36415 COLL VENOUS BLD VENIPUNCTURE: CPT | Performed by: SURGERY

## 2022-06-04 PROCEDURE — 255N000002 HC RX 255 OP 636: Performed by: INTERNAL MEDICINE

## 2022-06-04 PROCEDURE — 70450 CT HEAD/BRAIN W/O DYE: CPT

## 2022-06-04 PROCEDURE — 83735 ASSAY OF MAGNESIUM: CPT | Performed by: SURGERY

## 2022-06-04 PROCEDURE — 71046 X-RAY EXAM CHEST 2 VIEWS: CPT

## 2022-06-04 PROCEDURE — 71046 X-RAY EXAM CHEST 2 VIEWS: CPT | Mod: 26 | Performed by: RADIOLOGY

## 2022-06-04 PROCEDURE — 214N000001 HC R&B CCU UMMC

## 2022-06-04 PROCEDURE — 99222 1ST HOSP IP/OBS MODERATE 55: CPT | Mod: GC | Performed by: INTERNAL MEDICINE

## 2022-06-04 PROCEDURE — 97116 GAIT TRAINING THERAPY: CPT | Mod: GP

## 2022-06-04 RX ORDER — AMIODARONE HYDROCHLORIDE 200 MG/1
200 TABLET ORAL DAILY
Status: DISCONTINUED | OUTPATIENT
Start: 2022-06-05 | End: 2022-06-06

## 2022-06-04 RX ORDER — AMIODARONE HYDROCHLORIDE 200 MG/1
200 TABLET ORAL 2 TIMES DAILY
Status: DISCONTINUED | OUTPATIENT
Start: 2022-06-04 | End: 2022-06-04

## 2022-06-04 RX ORDER — FUROSEMIDE 10 MG/ML
40 INJECTION INTRAMUSCULAR; INTRAVENOUS ONCE
Status: COMPLETED | OUTPATIENT
Start: 2022-06-04 | End: 2022-06-04

## 2022-06-04 RX ORDER — POTASSIUM CHLORIDE 750 MG/1
20 TABLET, EXTENDED RELEASE ORAL ONCE
Status: COMPLETED | OUTPATIENT
Start: 2022-06-04 | End: 2022-06-04

## 2022-06-04 RX ORDER — LISINOPRIL 2.5 MG/1
2.5 TABLET ORAL DAILY
Status: DISCONTINUED | OUTPATIENT
Start: 2022-06-04 | End: 2022-06-09 | Stop reason: HOSPADM

## 2022-06-04 RX ADMIN — PANTOPRAZOLE SODIUM 40 MG: 40 TABLET, DELAYED RELEASE ORAL at 08:36

## 2022-06-04 RX ADMIN — Medication: at 05:15

## 2022-06-04 RX ADMIN — POTASSIUM CHLORIDE 20 MEQ: 750 TABLET, EXTENDED RELEASE ORAL at 14:35

## 2022-06-04 RX ADMIN — Medication 3 MG: at 23:16

## 2022-06-04 RX ADMIN — HUMAN ALBUMIN MICROSPHERES AND PERFLUTREN 5 ML: 10; .22 INJECTION, SOLUTION INTRAVENOUS at 11:43

## 2022-06-04 RX ADMIN — MUPIROCIN 0.5 G: 20 OINTMENT TOPICAL at 20:15

## 2022-06-04 RX ADMIN — HEPARIN SODIUM 5000 UNITS: 5000 INJECTION, SOLUTION INTRAVENOUS; SUBCUTANEOUS at 20:15

## 2022-06-04 RX ADMIN — LISINOPRIL 2.5 MG: 2.5 TABLET ORAL at 13:33

## 2022-06-04 RX ADMIN — HEPARIN SODIUM 5000 UNITS: 5000 INJECTION, SOLUTION INTRAVENOUS; SUBCUTANEOUS at 05:03

## 2022-06-04 RX ADMIN — ASPIRIN 81 MG 162 MG: 81 TABLET ORAL at 08:36

## 2022-06-04 RX ADMIN — ACETAMINOPHEN 650 MG: 325 TABLET, FILM COATED ORAL at 13:16

## 2022-06-04 RX ADMIN — HYDRALAZINE HYDROCHLORIDE 10 MG: 20 INJECTION INTRAMUSCULAR; INTRAVENOUS at 20:15

## 2022-06-04 RX ADMIN — HYDRALAZINE HYDROCHLORIDE 10 MG: 20 INJECTION INTRAMUSCULAR; INTRAVENOUS at 23:28

## 2022-06-04 RX ADMIN — ROSUVASTATIN CALCIUM 40 MG: 20 TABLET, FILM COATED ORAL at 08:36

## 2022-06-04 RX ADMIN — SENNOSIDES AND DOCUSATE SODIUM 1 TABLET: 50; 8.6 TABLET ORAL at 08:36

## 2022-06-04 RX ADMIN — AMIODARONE HYDROCHLORIDE 400 MG: 200 TABLET ORAL at 08:36

## 2022-06-04 RX ADMIN — POTASSIUM CHLORIDE 20 MEQ: 750 TABLET, EXTENDED RELEASE ORAL at 08:36

## 2022-06-04 RX ADMIN — FUROSEMIDE 40 MG: 10 INJECTION, SOLUTION INTRAVENOUS at 14:35

## 2022-06-04 RX ADMIN — Medication 12.5 MG: at 08:36

## 2022-06-04 RX ADMIN — HEPARIN SODIUM 5000 UNITS: 5000 INJECTION, SOLUTION INTRAVENOUS; SUBCUTANEOUS at 13:34

## 2022-06-04 ASSESSMENT — ACTIVITIES OF DAILY LIVING (ADL)
ADLS_ACUITY_SCORE: 26

## 2022-06-04 NOTE — CONSULTS
"[facility]  Neurology Consultation    Patient Name:  Arun Wren  MRN:  [id number]    :  1945  Date of Service:  2022  Primary care provider:  Richard Arita      Neurology consultation service was asked to see Arun Wren by Dr. Benítez to evaluate for spells of decreased awareness    Chief Complaint: Scheduled CABG + AVR    History of Present Illness:   Arun Wren is a 77 year old male w/ PMHx HTN, severely symptomatic aortic stenosis, and severe multivessel CAD who recently underwent a scheduled 2-vessel CABG and Aortic Valve replacement w/ bioprosthetic valve on 2022. His post-op course has since been complicated by Afib and bradycardia with conversion pauses that have accompanied at least 2 spells of decreased awareness, for which Neurology is now consulted. Per chart review, these spells occurred yesterday and earlier today, the pt being noted on heart monitor to exhibit worsened bradycardia. He would then \"glaze over\" and not respond when people spoke to him for a few moments before returning to normal. One of these occurred with activity as well with the pt working with OT. They noted that the pt's HR failed to increase with his increased exertion and that while he was glazed over, his steps became slow and shuffling. The pt was sat down and soon came to and was able to resume walking around the room. The pt then underwent a CTH which was largely unremarkable and Neurology was subsequently consulted along with Electrophysiology. Per EP, this tachy-carlitos arrhythmia is likely medication (Amiodarone, Metoprolol) related    Speaking with the pt's wife, she has witnessed at least one of these spells. She describes the pt \"starring into space\" for ~5s before coming back to himself. She has also witnessed L forearm twitches during this hospitalization. She does not recall him having similar episodes at home prior to his surgery. It is unclear " "it the pt can truly remember these episodes. He initially affirms that he can remember them entirely, equating them to when his BP gets too low at home after taking too many Viagra. However, he does not recall anyone trying to speak with him when he feels lightheaded on this admission. He denies any lost time as well as personal/familial hx of seizure, CNS surgery, CNS infection, or need for special education in school. He has suffered a notable head hit in the past after a slip-and-fall where he lost consciousness and \"blood came out of my L ear\". He recalls being seen for this and told that they could either do surgery or it would heal on its own in 1mo, so he elected to forego surgery. He feels he recovered from that completely and it was many years ago    Otherwise, the pt denies any neurologic history in his family including seizure, HA's, stroke, movement disorders, or dementias. He himself is able to ambulate without assistive devices and believes his balance is quite good, though his wife does believe he's \"slowed down\" prior to admission. He denies any difficulty performing small motor activities (ie buttons, zippers, etc) and has never noticed a tremor in any of his extremities. He denies any history of constipation, anosmia, bowel/bladder incontinence, or concern for hallucinations. However, he has hurt his wife due to his movements while asleep, saying he often has vivid dreams.    ROS  A comprehensive ROS was performed and pertinent findings were included in HPI.     PMH  Past Medical History:   Diagnosis Date     Actinic keratosis     05/19/09,Keratosis left external ear, treated with cryocautery, recheck if recurs or persists     Allergic contact dermatitis due to plants, except food     recurrent     Basal cell carcinoma of skin     No Comments Provided     Carpal tunnel syndrome     right greater than left     Closed fracture of base of skull (H)     No Comments Provided     Essential (primary) " hypertension     No Comments Provided     Ganglion     No Comments Provided     Hydrocele     No Comments Provided     Male erectile dysfunction     decreased libido     Personal history of nicotine dependence     No Comments Provided     Pure hypercholesterolemia     No Comments Provided     Residual hemorrhoidal skin tags     Past external hemorrhoid     Tinea unguium     No Comments Provided     Past Surgical History:   Procedure Laterality Date     BYPASS GRAFT ARTERY CORONARY, REPAIR VALVE AORTIC, COMBINED N/A 5/31/2022    Procedure: MEDIAN STERNOTOMY. CARDIOPULMONARY BYPASS. ENDOSCOPIC HARVEST OF LEFT GREATER SAPHENOUS VEIN. CORONARY ARTERY BYPASS GRAFT (CABG) X2 . AORTIC VALVE REPLACEMENT USING 25MM INSPIRIS RESILIA  AORTIC VALVE. TRANSESOPHAGEAL ECHOCARDIOGRAM (BREANNE) PER ANESTHESIA.;  Surgeon: Sai Benítez MD;  Location:  OR     BYPASS GRAFT ARTERY CORONARY, REPAIR VALVE AORTIC, COMBINED  5/31/2022    Procedure: ;  Surgeon: Sai Benítez MD;  Location:  OR     COLONOSCOPY  01/01/1998 1998,at [facility], which was normal     COLONOSCOPY  03/03/2005 03/03/05,at [facility]     [facility]  03/02/2015    [facility]     COLONOSCOPY  03/16/2020    F/U N/A hyperplastic     CV CORONARY ANGIOGRAM N/A 4/22/2022    Procedure: Coronary Angiogram with possible intervention;  Surgeon: Saulo Mccarthy MD;  Location:  HEART CARDIAC CATH LAB     CV RIGHT HEART CATH MEASUREMENTS RECORDED N/A 4/22/2022    Procedure: Right Heart Cath;  Surgeon: Saulo Mccarthy MD;  Location:  HEART CARDIAC CATH LAB     OTHER SURGICAL HISTORY      ,HERNIA REPAIR     OTHER SURGICAL HISTORY      05/24/16,[id number],OTHER,Left,Dr. Jeremiah GIBSON     OTHER SURGICAL HISTORY      07/28/16,[id number],ORCHIECTOMY SIMPLE / PARTIAL / RADICAL W/ SCROTAL / INGUINAL APPROACH,Left,Dr. Jeremiah GIBSON       Medications   I have personally reviewed the patient's medication list.     Allergies  I have  "personally reviewed the patient's allergy list.     Social History  Social History     Socioeconomic History     Marital status:      Spouse name: Not on file     Number of children: Not on file     Years of education: Not on file     Highest education level: Not on file   Occupational History     Not on file   Tobacco Use     Smoking status: Former Smoker     Years: 27.00     Types: Cigarettes     Quit date: 1988     Years since quittin.4     Smokeless tobacco: Never Used   Vaping Use     Vaping Use: Never used   Substance and Sexual Activity     Alcohol use: Yes     Comment: Alcoholic Drinks/day: 1-2 drinks per day, whiskey     Drug use: No     Sexual activity: Not Currently   Other Topics Concern     Parent/sibling w/ CABG, MI or angioplasty before 65F 55M? Not Asked   Social History Narrative    Quit smoking .  Occ alcohol.  .   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    Stays active, owns tree farm.     Social Determinants of Health     Financial Resource Strain: Not on file   Food Insecurity: Not on file   Transportation Needs: Not on file   Physical Activity: Not on file   Stress: Not on file   Social Connections: Not on file   Intimate Partner Violence: Not on file   Housing Stability: Not on file       Family History    Family History   Problem Relation Age of Onset     Diabetes Mother         Diabetes     Other - See Comments Mother         joint disease     Heart Disease Mother 70        Heart Disease,CABG     Other - See Comments Father         Stroke,stroke/heavy smoker     Diabetes Paternal Grandmother         Diabetes,?GMother       Physical Examination   Vitals: BP (!) 146/88 (BP Location: Left arm)   Pulse 60   Temp 98  F (36.7  C) (Oral)   Resp 18   Ht 1.74 m (5' 8.5\")   Wt 109.6 kg (241 lb 11.2 oz)   SpO2 94%   BMI 36.22 kg/m    General: Sitting in chair, NAD  Head: NC/AT  Eyes: no icterus, op pink and moist  Cardiac: Appears " well-perfused  Respiratory: No increased work of breathing on RA  GI: S/ND  Skin: No rash or lesion on exposed skin  Psych: Mood pleasant, affect congruent  Neuro:  Mental status: Awake, alert, attentive, oriented to self, time, place, and circumstance. Language is fluent and coherent with intact comprehension of complex commands, naming and repetition. Able to spell WORLD forwards but not backwards. Registration is 3/3 but recall is 1/3 (can get the others with categorical clues)  Cranial nerves: VFF, PERRL, conjugate gaze, EOMI, facial sensation intact, face symmetric, shoulder shrug strong, tongue/uvula midline, no dysarthria.   Motor: Normal bulk. Paratonia present w/ questionable mild cogwheeling. Masked facies present but has no tremor, micrographia, or decrement. Strength is 5/5 throughout  Reflexes: Brisk but symmetric reflexes throughout. Down-going toes, no clonus  Sensory: Intact to light touch in all extremities. Reduced vibration to BLE to the knees. Mild sway on Romberg  Coordination: FNF and HS without ataxia or dysmetria  Gait: Space was limited so full assessment of gait could not be obtained    Investigations   I have personally reviewed pertinent labs, tests, and radiological imaging. Discussion of notable findings is included under Impression.     Was patient transferred from outside hospital?   No    Impression  Arun Wren is a 77 year old male w/ PMHx HTN, severely symptomatic aortic stenosis, and severe multivessel CAD who recently underwent a scheduled 2-vessel CABG and Aortic Valve replacement w/ bioprosthetic valve on 5/31/2022. His post-op course has since been complicated by Afib and bradycardia with conversion pauses that have accompanied at least 2 spells of decreased awareness, for which Neurology is now consulted. Overall, the greatest likelihood is that the pt is suffering from pre-syncopal spells brought on by symptomatic bradycardia which can cause decreased awareness for a  small amount of time. I would also expect gait to suffer somewhat if the BP were to drop. Still, seizure cannot be ruled out. Though, it should be noted that bradycardia would be a very atypical symptom for a seizure though it could suggest localization to the insula. Still, his recent cardiac surgery and medications are more likely to blame. Finally, could consider dysautonomia as can be seen in Parkinson's disease. While the pt does have some parkinsonisms on exam, I am doubtful this is the cause of the pt's symptoms. Will proceed with a seizure work-up for now    Recommendations  - MRI Brain   - vEEG tomorrow after MRI to attempt to cancel a spell  - Neurology will continue to follow    Thank you for involving Neurology in the care of Arun Wren.  Please do not hesitate to call with questions/concerns (consult pager [phone]).      Patient was seen and discussed with Dr. Davidson.    Surekha Valente MD  Neurology PGY2

## 2022-06-04 NOTE — PROGRESS NOTES
"Pain Service Progress Note  [facility]  Date: 06/04/2022       Patient Name: Arun Wren  MRN: [id number]  Age: 77 year old  Sex: male      Assessment:  Arun Wren is a 77 year old male with PMH significant for HTN, severe symptomatic aortic stenosis, and severe multivessel coronary artery disease     Procedure: median sternotomy, CABG x 2, tissue AVR     Date of Surgery: 5/31/22      Date of Catheter Placement: 5/31/22      Plan/Recommendations:  1. Regional Anesthesia/Analgesia  - Continuous Catheter Type/Site: bilateral erector spinae (ES) T5-6  - Continue 0.2% ropivacaine programmed Intermittent Bolus (PIB) at 7 mL Q60 min via each catheter, total infusion rate of 14 mL/hr     Plan to maintain catheter, max of 7 days     2. Anticoagulation  - Heparin 5,000 units SC Q 8 hrs and no plans for oral anticoagulation per primary service  - Please contact Inpatient Pain Service before ordering or making any anticoagulation changes        3. Multimodal Analgesia  - Per primary service     Pain Service will continue to follow.     Discussed with attending anesthesiologist    Overnight Events:  No major acute event     Tubes/Drains: Yes  Chest tubes    Subjective: \"I have no pain at all!\"    Nausea: No  Vomiting: No  Symptoms of LAST: No    Pain Location:  Denies pain    Pain Intensity:    Pain at Rest: 0/10     Satisfied with your level of pain control: Yes    Diet: Snacks/Supplements Adult: Ensure Enlive; Between Meals  Regular Diet Adult    Relevant Labs:  Recent Labs   Lab Test 06/03/22  0606 06/01/22  0508 06/01/22  0405   INR 1.11   < > 1.08   PLT 83*   < >  --    PTT  --   --  39*   BUN 23   < > 18    < > = values in this interval not displayed.       Physical Exam:  Vitals: /69 (BP Location: Left arm)   Pulse 65   Temp 36.7  C (98.1  F) (Oral)   Resp 16   Ht 1.74 m (5' 8.5\")   Wt 109.6 kg (241 lb 11.2 oz)   SpO2 96%   BMI 36.22 kg/m      Physical Exam: " "  Orientation:  Alert, oriented, and in no acute distress: Yes  Sedation: No    Motor Examination:  5/5 Strength in lower extremities: Yes      Catheter Site:   Catheter entry site is clean/dry/intact: Yes    Tender: No      Relevant Medications:  Current Pain Medications:  Medications related to Pain Management (From now, onward)    Start     Dose/Rate Route Frequency Ordered Stop    06/03/22 0830  polyethylene glycol (MIRALAX) Packet 17 g         17 g Oral DAILY PRN 06/03/22 0820      06/03/22 0800  aspirin (ASA) chewable tablet 162 mg         162 mg Oral DAILY 06/02/22 0910      06/03/22 0000  acetaminophen (TYLENOL) tablet 650 mg         650 mg Oral EVERY 4 HOURS PRN 05/31/22 1409      05/31/22 2100  ropivacaine 0.2% in NS perineural infusion simple          Perineural Continuous Nerve Block 05/31/22 2033 05/31/22 2100  ropivacaine 0.2% in NS perineural infusion simple          Perineural Continuous Nerve Block 05/31/22 2033 05/31/22 2000  senna-docusate (SENOKOT-S/PERICOLACE) 8.6-50 MG per tablet 1 tablet         1 tablet Oral 2 TIMES DAILY 05/31/22 1410      05/31/22 1410  lidocaine 1 % 0.1-1 mL         0.1-1 mL Other EVERY 1 HOUR PRN 05/31/22 1410      05/31/22 1410  lidocaine (LMX4) cream          Topical EVERY 1 HOUR PRN 05/31/22 1410      05/31/22 1409  HYDROmorphone (DILAUDID) injection 0.2 mg        \"Or\" Linked Group Details    0.2 mg Intravenous EVERY 2 HOURS PRN 05/31/22 1410      05/31/22 1409  HYDROmorphone (DILAUDID) injection 0.4 mg        \"Or\" Linked Group Details    0.4 mg Intravenous EVERY 2 HOURS PRN 05/31/22 1410      05/31/22 1409  oxyCODONE (ROXICODONE) tablet 5 mg        \"Or\" Linked Group Details    5 mg Oral EVERY 4 HOURS PRN 05/31/22 1410      05/31/22 1409  methocarbamol (ROBAXIN) tablet 500 mg         500 mg Oral EVERY 6 HOURS PRN 05/31/22 1409      05/31/22 1409  magnesium hydroxide (MILK OF MAGNESIA) suspension 30 mL         30 mL Oral DAILY PRN 05/31/22 1410      05/31/22 " "1409  bisacodyl (DULCOLAX) Suppository 10 mg         10 mg Rectal DAILY PRN 05/31/22 1410            David Horton MD  Anesthesiology, CA-2, PGY3    06/04/2022     Time/Communication:  I personally spent 16 minutes with greater than 50% in consultation, education, counseling and coordination of care.  See note above for details on conversation.      Contact Info Please Page the Pain Team [facility]: \"Adult acute inpatient pain management/[facility]\"  "

## 2022-06-04 NOTE — PLAN OF CARE
Pt has maintained SR/SB throughout day with HR primarily in 50s. Echo completed. Seen by CVTS and decision made to re-attach pt's ventricular pacer wires to temp PM set at rate of 50 to avoid any syncopal episodes related to HR. However, while pt was walking with therapy in halls this afternoon, he again had fleeting episode where he was not responding to questions and had vacant stare. He was assisted to sit down and shortly after was able to resume walk back to room. It was noted at the time that his HR while walking never went above 58. Metoprolol held and lisinopril restarted as BPs now elevated this afternoon. EP consult requested as well.   Head CT completed: no acute changes noted. Neuro consult ordered to further evaluate.  Up in chair most of day. Family at bedside.  Denies pain most of the time but agreed to take tylenol this afternoon after a coughing spell. Good productive cough.   New piv placed. CXR showed fluid volume up-given 40mg IV lasix x1 w/good urine output. Pt often forgets to save urine despite repeated reminders.   Poor appetite-reports not feeling hungry most of the day; only eating bites of meals.  Continue to monitor CT output and BPs.

## 2022-06-04 NOTE — CONSULTS
"    Cardiac Electrophysiology consultation      Reason For Consultation: Tachy carlitos     HPI:    78 y/o gentleman POD 4 bioprosthetic AVR and CABG x 2 (SVG-ramus, SVG-PDA)    Hx significant for:  Severe aortic stenosis  MVD  HTN    Accompanied by his wife. Around 5pm yesterday evening he appeared \"glazed over\" briefly but was immediately responsive. States he has never had any syncopal/pre syncopal episodes. Pain is well controlled. Was unaware of his palpitations. Feels \"fine\" now and that his recovery is going well.    Past Medical History:      Past Medical History:   Diagnosis Date     Actinic keratosis     05/19/09,Keratosis left external ear, treated with cryocautery, recheck if recurs or persists     Allergic contact dermatitis due to plants, except food     recurrent     Basal cell carcinoma of skin     No Comments Provided     Carpal tunnel syndrome     right greater than left     Closed fracture of base of skull (H)     No Comments Provided     Essential (primary) hypertension     No Comments Provided     Ganglion     No Comments Provided     Hydrocele     No Comments Provided     Male erectile dysfunction     decreased libido     Personal history of nicotine dependence     No Comments Provided     Pure hypercholesterolemia     No Comments Provided     Residual hemorrhoidal skin tags     Past external hemorrhoid     Tinea unguium     No Comments Provided       Past Surgical  History:      Past Surgical History:   Procedure Laterality Date     BYPASS GRAFT ARTERY CORONARY, REPAIR VALVE AORTIC, COMBINED N/A 5/31/2022    Procedure: MEDIAN STERNOTOMY. CARDIOPULMONARY BYPASS. ENDOSCOPIC HARVEST OF LEFT GREATER SAPHENOUS VEIN. CORONARY ARTERY BYPASS GRAFT (CABG) X2 . AORTIC VALVE REPLACEMENT USING 25MM INSPIRIS RESILIA  AORTIC VALVE. TRANSESOPHAGEAL ECHOCARDIOGRAM (BREANNE) PER ANESTHESIA.;  Surgeon: Sai Benítez MD;  Location: [facility] OR     BYPASS GRAFT ARTERY CORONARY, REPAIR VALVE AORTIC, COMBINED  " 2022    Procedure: ;  Surgeon: Sai Benítez MD;  Location: [facility] OR     COLONOSCOPY  1998,at [facility], which was normal     COLONOSCOPY  2005,at [facility]     COLONOSCOPY  2015    3/2/15,F/U   tubular adnomas     COLONOSCOPY  2020    F/U N/A hyperplastic     CV CORONARY ANGIOGRAM N/A 2022    Procedure: Coronary Angiogram with possible intervention;  Surgeon: Saulo Mccarthy MD;  Location:  HEART CARDIAC CATH LAB     CV RIGHT HEART CATH MEASUREMENTS RECORDED N/A 2022    Procedure: Right Heart Cath;  Surgeon: Saulo Mccarthy MD;  Location:  HEART CARDIAC CATH LAB     OTHER SURGICAL HISTORY      ,HERNIA REPAIR     OTHER SURGICAL HISTORY      [id number],OTHER,Left,Dr. Jeremiah GIBSON     OTHER SURGICAL HISTORY      16,RAI162,ORCHIECTOMY SIMPLE / PARTIAL / RADICAL W/ SCROTAL / INGUINAL APPROACH,Left,Dr. Jeremiah GIBSON       Family History:      Family History   Problem Relation Age of Onset     Diabetes Mother         Diabetes     Other - See Comments Mother         joint disease     Heart Disease Mother 70        Heart Disease,CABG     Other - See Comments Father         Stroke,stroke/heavy smoker     Diabetes Paternal Grandmother         Diabetes,?GMother       Social History:      Social History     Socioeconomic History     Marital status:      Spouse name: Not on file     Number of children: Not on file     Years of education: Not on file     Highest education level: Not on file   Occupational History     Not on file   Tobacco Use     Smoking status: Former Smoker     Years: 27.00     Types: Cigarettes     Quit date: 1988     Years since quittin.4     Smokeless tobacco: Never Used   Vaping Use     Vaping Use: Never used   Substance and Sexual Activity     Alcohol use: Yes     Comment: Alcoholic Drinks/day: 1-2 drinks per day, whiskey     Drug use: No     Sexual activity: Not Currently   Other Topics  "Concern     Parent/sibling w/ CABG, MI or angioplasty before 65F 55M? Not Asked   Social History Narrative    Quit smoking 1990.  Occ alcohol.  .   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     Social Determinants of Health     Financial Resource Strain: Not on file   Food Insecurity: Not on file   Transportation Needs: Not on file   Physical Activity: Not on file   Stress: Not on file   Social Connections: Not on file   Intimate Partner Violence: Not on file   Housing Stability: Not on file       ROS:    A complete review of systems is negative except as noted above in the HPI.    Physical Exam:   Vitals: /89 (BP Location: Left arm)   Pulse 56   Temp 98.4  F (36.9  C) (Oral)   Resp 18   Ht 1.74 m (5' 8.5\")   Wt 109.6 kg (241 lb 11.2 oz)   SpO2 95%   BMI 36.22 kg/m    Gen: NAD  Neck: No JVD  Chest: CTAB  Cardiovascular: RRR, no M/R/G   Abd: soft, NT/ND  Neuro: grossly intact  Extremities: trace LE pitting edema         Relevant labs, imaging, medications and procedures were reviewed.  Hb 10.1  Plts 141  BUN/Cr 24/0.94  K 4.2    TTE: 55%, LVH    EKG: sinus carlitos at 58 bpm    Tele: 6/3/22: had 2 conversion pauses up to 5 seconds. Has been in sinus rhythm with rates 50s to 70s since then.    Assessment and Recommendations:   78 y/o gentleman POD 4 bioprosthetic AVR and CABG x 2 (SVG-ramus, SVG-PDA)    Hx significant for:  Severe aortic stenosis  MVD  HTN    EP was consulted for bradycardia and conversion pauses. No prior hx of Afib. Hemodynamically stable. Had received amio 150mg bolus, was on an amio gtt and received lopressor 25mg PO x 1, 12.5mg PO x 1 and 5mg IV x 1 yesterday. Tele did reveal that he had 2 conversion pauses up to 5 seconds. Has been in sinus rhythm with rates 50s to 70s since then. His post op AF persisted intermitted beyond 48 hours. His tachy carlitos picture was likely medication related.     #PAF ([id number])  We would " recommend observing on telemetry, reducing amio to 200mg daily, stop metoprolol, start OAC when deemed safe per primary.    I discussed the patient with Dr. Brian.    Kendell Alarcon MD   Cardiac electrophysiology fellow    I very much appreciated the opportunity to see and assess Arun Wren in the hospital with CV EP Fellow Dr Alarcon.  The arrhythmia service was consulted regarding pauses in the cardiac rhythm which were likely in part related to recent drug therapy.  I agree with the note above summarizes my findings and current recommendations.  Continued monitoring is appropriate.  Likely as the drug infusion subsides the problem should diminish.  No immediate intervention required.  Please do not hesitate to contact my office if you have any questions or concerns.      Marques Brian MD  Cardiac Arrhythmia Service  [facility]  [phone]

## 2022-06-04 NOTE — PROVIDER NOTIFICATION
"RN was helping pt go to the BR, pt took couple steps and started backing up, when asked if he was okay, pt stated \"No\". Assisted pt back to the bed and then as soon as pt sat down, he went unresponsive for nearly 2 minutes. Rapid response called, pt repositioned in bed and vitals taken. Charge Rn notified surgery team.   "

## 2022-06-04 NOTE — PROGRESS NOTES
Neuro: A&Ox4. [place], occasionally slow to respond.   Cardiac: SB in 50s. Orthostatic hypotension. Pt denies any symptoms. Switched to po amiodarone.   Respiratory: RA. No c/o shortness of breath.   GI/: Voiding spontaneously. Has urge to have bm, but only passed gas.   Diet/appetite: Poor appetite, declined supplement..   Activity: Up with 1 assist and walker, GB.   Pain: . Denies   Skin: Sternal incision clean and open to air, R lower leg graft site intact. CT site covered, dsg c/d/i. Pacer wire capped.   Lines: PIV x 2 SL'd.   Drains:CT to suction-with 50cc out this shift.   Replacements:Phos replaced, recheck in am.

## 2022-06-04 NOTE — PROGRESS NOTES
Surgery Cross Cover Brief Note    Paged regarding patient with 1 minute episode of unresponsiveness while in restroom.   Nursing was assisting patient in restroom when patient had a 1 minute episode of staring off into the distance.  This was around 630pm.  He then became responsive again and was mentating appropriately without any deficits.   Per patient, he felt he had fallen asleep in the bathroom or possibly passed out. He did not remember any symptoms prior. He did not remember the episode.     Of note: patient had been in a fib for <24 hours last night and today, however converted into NSR around 5pm with a 5 second pause in heart beats.     Vitals: BP: 120s-130s/80s HR: 50s RR: 18  Gen: NAD, AAOx3  Neuro: NIH Stroke Scale 0. No gross focal deficits. CN II-XII in tact.   CV: RR   Pulm: nonlabored breathing room air     Concern for possible neurologic event given recent afib without a/c and conversion into NS, however relatively low given <24 hours in afib. NIH stroke scale score of 0. May be vasovagal episode.   Will continue to closely monitor  - q1 neuro checks for 2 hours           Paged again regarding patient with second unresponsive episode, this time while patient was standing up to go to bathroom. He stated to nurse he wasn't feeling well and then stood up stared off into distance and per nursing appeared to have locking of his muscles. Nurse got him down into bed, this episode lasted a few minutes. Rapid response was called.   Rapid team acquired orthostatics, SBP laying down was in 144, SBP standing up in 89.     Patient with same exam as above on assessment    Patient likely having orthostatic hypotension.     - Given 500cc LR bolus

## 2022-06-04 NOTE — PROVIDER NOTIFICATION
06/03/22 2200   Call Information   Date of Call 06/03/22   Time of Call 2210   Name of person requesting the team Gibson   Title of person requesting team RN   RRT Arrival time 2210   Time RRT ended 2234   Reason for call   Type of RRT Adult   Primary reason for call Sudden or unanticipated change in patient condition   Was patient transferred from the ED, ICU, or PACU within last 24 hours prior to RRT call? No   SBAR   Situation Pt got up to go to commode w/ RN when he started backing up and sat on the bed and went unresponsive   Background a 78 yo M with a PMH significant for hypertension, severe symptomatic aortic stenosis, and severe multivessel coronary artery disease who is admitted to the CVICU after median sternotomy, CABG x2, and AVR with bioprosthetic valve with Dr. Benítez on 5/31/2022. per H/P note   Notable History/Conditions Cancer;Cardiac;Hypertension   Assessment Pt states he feel fine now, denies lightheadness or dizziness, when standing doing orthostatics states he feels warmer than when he was sitting   Interventions Fluid bolus   Patient Outcome   Patient Outcome Stabilized on unit   RRT Team   Attending/Primary/Covering Physician CVTS   Physician(s) Ruth Winters PA-C   Lead RN Day Sauceda   RT NA   Post RRT Intervention Assessment   Post RRT Assessment Stable/Improved   Date Follow Up Done 06/04/22   Time Follow Up Done 0030   Comments /92, had been up to commode no more syncope episodes

## 2022-06-04 NOTE — PROVIDER NOTIFICATION
Monitor tech called at 20:18-pt had 2 episodes of pause lasting 5 seconds each earlier when pt converted to sinus. Dr Cohen updated.

## 2022-06-04 NOTE — PROGRESS NOTES
Cardiovascular Surgery Progress Note  06/04/2022         Assessment and Plan:     Arun Wren is a 77 year old male with a PMH of HTN, multivessel CAD, and severe aortic stenosis. He is now s/p elective tissue AVR and 2 vessel CAB on 5/31 with Dr. Benítez.    Cardiovascular:   Hx of HTN, CAD, aortic stenosis  S/p tissue AVR and 2 vessel CAB (vein to ramus and PDA)  Weaned pressors by POD #1 am. Had some orthostatic hypoTN, likely secondary to routine post-op vasoplegia.   Pre-op ECHO with LVEF 55 - 60%.  Post-op BREANNE: LVEF 55%, LV hypertrophy present, normal global function of both the RV and LV.   - No arrhythmia, HD stable.  -  mg, rosuvastatin  - Metoprolol 25 mg PO BID, Lisinopril 2.5 mg PO daily (held 6/3 am)  Chest tubes: mediastinals to suction  TPW: capped 6/1 (await Plts >100 before removal)  Atrial fibrillation w RVR  - A-fib with 's, converted to NSR 6/3  - started IV Amio gtt overnight 6/2-6/3, transitioned to Amio 400 mg PO BID 6/3 pm, decrease to 200 mg BID given bradycardia  - EP consult as below  Syncopal episode   Two episodes of syncope 6/3-6/4. Was given fluid for orthostatic hypotension. One occurred with HR in 40s. Also had two conversion pauses lasting 5 seconds. HR now improved into 50s. Likely secondary to amio/metop  - Decrease amio to 200 mg BID  - Echo with dilated IVC, awaiting official read.   - Consult EP given long pauses and significant bradycardia, however likely due to amio.     Pulmonary:  - Extubated POD 1 to 5 lpm via NC. Now saturating well on RA.   - Supplemental O2 PRN to keep sats > 92%. Wean off as tolerated.  - Pulm toilet, IS, activity and deep breathing    Neurology / MSK:  - Neuro intact  - Acute post-operative pain well controlled with acetaminophen, PO robaxin and oxycodone PRN, IV dilaudid PRN     / Renal:  - Zhang out 6/1. No Hx of renal disease. Most recent creatinine 0.80, adequate UOP.   - Pre-op weight 231 lbs, most recent weight 241 lbs.  Diuresed with Lasix 40 mg PO x 1 on 6/2 and 6/3 with weight reduction, but still volume up with dilated IVC, LE edema. Will give lasix 40 mg IV x1. Watch closely for orthostasis, however this was likely due to his bradycardia.     GI / FEN:   - Advance to Regular diet, continue bowel regimen  - Replace electrolytes as needed, hepatic enzymes WNL.    Endocrine:  Stress induced hyperglycemia initially managed on insulin drip postop, transitioned to sliding scale.    Infectious Disease:  Stress induced leukocytosis  - WBC 11.1, remains afebrile, no signs or symptoms of infection  - Completed perioperative antibiotics    Hematology:   Acute blood loss anemia   Hgb stable, no signs or symptoms of active bleeding  Thrombocytopenia  Plt 141    Anticoagulation:   - Only  mg daily  - OK to start anticoagulation on 6/4 if still in A-fib per Dr Benítez, was not in afib, holding on anticoag, EP evaluating.     Prophylaxis:   - Stress ulcer prophylaxis: Pantoprazole 40 mg daily for 30 days  - DVT prophylaxis: Subcutaneous heparin, SCD    Disposition:   - Transferred to  on 6/1  - Therapies recommending discharge to TCU vs Home    Discussed with Dr. Sandy Lala PA-C  Cardiothoracic Surgery  p: [phone]        Interval History:   Two episodes of syncope overnight. Looked to be orthostatic hypotension. Upon reviewing tele, did have significant bradycardia into 40s during one sycopal episode. Received fluid bolus with improvement of symptoms. Also had two 5 second pauses yesterday evening when converting to sinus carlitos.   States pain is well managed on current regimen.   Tolerating diet, is passing flatus, no BM. No nausea or vomiting.  Breathing well on room air without complaints.   Working with therapies and ambulating to bathroom with assistance.   Denies chest pain, palpitations, dizziness, syncopal symptoms, fevers, chills, myalgias, or sternal popping/clicking.         Physical Exam:   Blood pressure  "(!) 148/91, pulse 59, temperature 98  F (36.7  C), temperature source Oral, resp. rate 16, height 1.74 m (5' 8.5\"), weight 109.6 kg (241 lb 11.2 oz), SpO2 95 %.  Vitals:    06/02/22 0445 06/03/22 0700 06/04/22 0500   Weight: 113.4 kg (250 lb) 108.5 kg (239 lb 4.8 oz) 109.6 kg (241 lb 11.2 oz)        Gen: A&Ox4, NAD  Neuro: no focal deficits   CV: RRR, normal S1 S2, no murmurs, rubs or gallops.   Pulm: CTA, no wheezing or rhonchi, normal breathing on RA in chair  Abd: nondistended, normal BS, soft, nontender  Ext: 1+ bilateral LE peripheral edema  Incision: bruising around incision, clean, dry, intact, no erythema, sternum stable  Tubes/drain sites: dressing clean and dry, serosanguinous output, no air leak. 24 hr output 120 mL.          Data:    Imaging:  reviewed recent imaging, no acute concerns  No results found for this or any previous visit (from the past 24 hour(s)).  Labs:  BMP  Recent Labs   Lab 06/04/22  0628 06/03/22  1818 06/03/22  0606 06/02/22 2022 06/02/22  0738 06/02/22  0725     --  141 141  --  142   POTASSIUM 4.2  --  4.0 4.1  --  3.8   CHLORIDE 107  --  108 109  --  109   DEVORA 8.3*  --  8.1* 8.2*  --  8.1*   CO2 28  --  28 28  --  28   BUN 24  --  23 24  --  17   CR 0.94  --  0.80 0.94  --  0.84   * 138* 111* 104*   < > 106*    < > = values in this interval not displayed.     CBC  Recent Labs   Lab 06/04/22  0628 06/03/22  0606 06/02/22  0725 06/01/22  0508   WBC 11.1* 11.2* 11.9* 9.2   RBC 3.18* 3.00* 3.38* 3.50*   HGB 10.3* 9.8* 11.0* 11.3*   HCT 32.0* 29.9* 33.4* 33.9*   * 100 99 97   MCH 32.4 32.7 32.5 32.3   MCHC 32.2 32.8 32.9 33.3   RDW 14.0 13.8 13.7 13.6   * 83* 88* 110*     INR  Recent Labs   Lab 06/04/22  0732 06/03/22  0606 06/02/22  0725 06/01/22  0405   INR 1.05 1.11 1.19* 1.08      Hepatic Panel  Recent Labs   Lab 06/04/22  0628 06/01/22  0405 06/01/22  0015 05/31/22  1420   AST 62* 45 49* 34   ALT 48 22 23 21   ALKPHOS 49 32* 36* 35*   BILITOTAL 0.7 0.7 " 0.7 0.6   ALBUMIN 3.1* 3.0* 3.1* 2.6*     GLUCOSE:   Recent Labs   Lab 06/04/22  0628 06/03/22  1818 06/03/22  0606 06/02/22 2022 06/02/22  1702 06/02/22  1245   * 138* 111* 104* 107* 174*

## 2022-06-04 NOTE — PLAN OF CARE
Pt admitted 5/31 for CABG x 2 and AVR. PMH includes HTN, CAD, aortic stenosis, tobacco use, and HLD.     Neuro: A&O x 4. Forgetful at times per pt. Calls appropriately. Slept intermittently overnight. Bed alarm on for safety.   Cardiac: SR 60s. SBP 130s-160s. Pt has PRN hydralazine orders for SBP >90. Called cross cover to verify if dose should be given since pt received a bolus for positive orthostatic BPs. MD okay to hold hydralazine at this time. Denies dizziness, chest pain, or palpitations. Capped pacer wires.  Respiratory: Sating well on RA. Infrequent cough. 2 chest tubes to suction in one atrium.   GI/: Good UOP. BM x 2 overnight. Pt complaining of a lot of gas and up to the commode or bathroom frequently. Denies nausea.  Diet/Appetite: Regular  Skin: Sternal dressing removed per order. Incision GERALDINE and WNL.  LDA: BL PIV SL.  Activity: AO1 with walker and GB.  Pain: Ropivacaine infusing through CADD pump x 2 at 7 mL/hr in each. Pt denies pain.     Plan: Continue to monitor and alert team with any changes or concerns.

## 2022-06-04 NOTE — CODE/RAPID RESPONSE
"Rapid Response Team Note    Assessment   In assessment a rapid response was called on Arun Wren due to syncope. This presentation is likely due to orthostatic hypotension, and less likely arrhythmia (none seen on telemetry during event), or seizure (no tongue bite, jerking, or incontinence).      Plan   -  Orthostatic VS, which were positive with drop from 144/94 to 89/60.    - Will give gentle fluid bolus 500 ml bolus. Compression stockings  -  EKG   -  Recommend titrating down on blood pressure medications as able   -  The CVTS primary team was able to be reached and they are in agreement with the above plan.  -  Disposition: The patient will remain on the current unit. We will continue to monitor this patient closely.  -  Reassessment and plan follow-up will be performed by the primary team    Ruth Winters PA-C  [facility] RRT [facility] Job Code Contact #[phone]  [facility] Paging/Directory    Hospital Course   Brief Summary of events leading to rapid response:   RRT was called for syncopal event.     Per nursing, patient got up to use the bathroom. Upon standing and taking a few steps nursing noted patient to start falling backwards to the bed. When asked if patient was okay, he responded \"no\", and then became unresponsive for nearly 2 minutes. During unresponsiveness, telemetry was reviewed and negative for arrhythmia per tele nurses. No jerking or incontinence during episode. Patient does not remember events leading to this event. Denies any hx of syncope in the past. Denies any current CP, SOB, palpitations, or lightheadednness, dizziness. States he needs to use the bathroom.     Admission Diagnosis:   CAD (coronary artery disease) [I25.10]  Aortic stenosis [I35.0]  S/P AVR [Z95.2]    Physical Exam   Temp: 97.9  F (36.6  C) Temp  Min: 97.8  F (36.6  C)  Max: 98.3  F (36.8  C)  Resp: 18 Resp  Min: 18  Max: 20  SpO2: 95 % SpO2  Min: 95 %  Max: 97 %  Pulse: 56 Pulse  Min: 56  Max: 137    No data " recorded  BP: 132/82 Systolic (24hrs), Av , Min:82 , Max:132   Diastolic (24hrs), Av, Min:50, Max:97     I/Os: I/O last 3 completed shifts:  In: 1191.33 [P.O.:1020; I.V.:171.33]  Out: 1400 [Urine:1200; Chest Tube:200]     Exam:   General: in no acute distress  Mental Status: baseline mental status.  HEENT: MMM. No tongue bite.   CV: RRR. Sternotomy incision C/D/I. Chest tubes in place.  Resp: Lungs CTA bilaterally. Non-labored breathing on RA.   Extremities: Trace LE edema bilaterally. Incision to R lower leg C/D/I.   Neuro: CN II-XII intact and symmetric. Speech clear. Strength 5/5 in all extremities. Moving extremities symmetrically.     Significant Results and Procedures   Lactic Acid:   Recent Labs   Lab Test 22  1944 22  1422 22  1326   LACT 1.3 2.2* 1.6     CBC:   Recent Labs   Lab Test 22  0606 22  0725 22  0508   WBC 11.2* 11.9* 9.2   HGB 9.8* 11.0* 11.3*   HCT 29.9* 33.4* 33.9*   PLT 83* 88* 110*        Sepsis Evaluation   The patient is not known to have an infection.  NO EVIDENCE OF SEPSIS at this time.  Vital sign, physical exam, and lab findings are due to Intravascular depletion from dehydration due to diuresis and acute blood loss anemia. .

## 2022-06-05 ENCOUNTER — APPOINTMENT (OUTPATIENT)
Dept: PHYSICAL THERAPY | Facility: CLINIC | Age: 77
DRG: 220 | End: 2022-06-05
Attending: SURGERY
Payer: COMMERCIAL

## 2022-06-05 ENCOUNTER — APPOINTMENT (OUTPATIENT)
Dept: GENERAL RADIOLOGY | Facility: CLINIC | Age: 77
DRG: 220 | End: 2022-06-05
Attending: PHYSICIAN ASSISTANT
Payer: COMMERCIAL

## 2022-06-05 LAB
ANION GAP SERPL CALCULATED.3IONS-SCNC: 6 MMOL/L (ref 3–14)
BUN SERPL-MCNC: 21 MG/DL (ref 7–30)
CALCIUM SERPL-MCNC: 8.4 MG/DL (ref 8.5–10.1)
CHLORIDE BLD-SCNC: 106 MMOL/L (ref 94–109)
CO2 SERPL-SCNC: 28 MMOL/L (ref 20–32)
CREAT SERPL-MCNC: 0.89 MG/DL (ref 0.66–1.25)
ERYTHROCYTE [DISTWIDTH] IN BLOOD BY AUTOMATED COUNT: 13.8 % (ref 10–15)
GFR SERPL CREATININE-BSD FRML MDRD: 89 ML/MIN/1.73M2
GLUCOSE BLD-MCNC: 96 MG/DL (ref 70–99)
HCT VFR BLD AUTO: 31.8 % (ref 40–53)
HGB BLD-MCNC: 10.4 G/DL (ref 13.3–17.7)
INR PPP: 1.11 (ref 0.85–1.15)
MAGNESIUM SERPL-MCNC: 2.3 MG/DL (ref 1.6–2.3)
MCH RBC QN AUTO: 32.3 PG (ref 26.5–33)
MCHC RBC AUTO-ENTMCNC: 32.7 G/DL (ref 31.5–36.5)
MCV RBC AUTO: 99 FL (ref 78–100)
PHOSPHATE SERPL-MCNC: 2.6 MG/DL (ref 2.5–4.5)
PLATELET # BLD AUTO: 177 10E3/UL (ref 150–450)
POTASSIUM BLD-SCNC: 3.9 MMOL/L (ref 3.4–5.3)
RBC # BLD AUTO: 3.22 10E6/UL (ref 4.4–5.9)
SODIUM SERPL-SCNC: 140 MMOL/L (ref 133–144)
WBC # BLD AUTO: 9.8 10E3/UL (ref 4–11)

## 2022-06-05 PROCEDURE — 71046 X-RAY EXAM CHEST 2 VIEWS: CPT | Mod: 26 | Performed by: RADIOLOGY

## 2022-06-05 PROCEDURE — 250N000013 HC RX MED GY IP 250 OP 250 PS 637: Performed by: PHYSICIAN ASSISTANT

## 2022-06-05 PROCEDURE — 250N000013 HC RX MED GY IP 250 OP 250 PS 637: Performed by: STUDENT IN AN ORGANIZED HEALTH CARE EDUCATION/TRAINING PROGRAM

## 2022-06-05 PROCEDURE — 85027 COMPLETE CBC AUTOMATED: CPT | Performed by: SURGERY

## 2022-06-05 PROCEDURE — 99231 SBSQ HOSP IP/OBS SF/LOW 25: CPT | Mod: GC | Performed by: ANESTHESIOLOGY

## 2022-06-05 PROCEDURE — 71045 X-RAY EXAM CHEST 1 VIEW: CPT | Mod: 26 | Performed by: RADIOLOGY

## 2022-06-05 PROCEDURE — 71046 X-RAY EXAM CHEST 2 VIEWS: CPT

## 2022-06-05 PROCEDURE — 84100 ASSAY OF PHOSPHORUS: CPT | Performed by: SURGERY

## 2022-06-05 PROCEDURE — 71045 X-RAY EXAM CHEST 1 VIEW: CPT

## 2022-06-05 PROCEDURE — 97116 GAIT TRAINING THERAPY: CPT | Mod: GP

## 2022-06-05 PROCEDURE — 85610 PROTHROMBIN TIME: CPT | Performed by: SURGERY

## 2022-06-05 PROCEDURE — 97530 THERAPEUTIC ACTIVITIES: CPT | Mod: GP

## 2022-06-05 PROCEDURE — 80048 BASIC METABOLIC PNL TOTAL CA: CPT | Performed by: SURGERY

## 2022-06-05 PROCEDURE — 250N000011 HC RX IP 250 OP 636: Performed by: PHYSICIAN ASSISTANT

## 2022-06-05 PROCEDURE — 36415 COLL VENOUS BLD VENIPUNCTURE: CPT | Performed by: SURGERY

## 2022-06-05 PROCEDURE — 214N000001 HC R&B CCU UMMC

## 2022-06-05 PROCEDURE — 83735 ASSAY OF MAGNESIUM: CPT | Performed by: SURGERY

## 2022-06-05 RX ORDER — FUROSEMIDE 10 MG/ML
40 INJECTION INTRAMUSCULAR; INTRAVENOUS ONCE
Status: COMPLETED | OUTPATIENT
Start: 2022-06-05 | End: 2022-06-05

## 2022-06-05 RX ORDER — POTASSIUM CHLORIDE 750 MG/1
20 TABLET, EXTENDED RELEASE ORAL ONCE
Status: COMPLETED | OUTPATIENT
Start: 2022-06-05 | End: 2022-06-05

## 2022-06-05 RX ADMIN — AMIODARONE HYDROCHLORIDE 200 MG: 200 TABLET ORAL at 07:42

## 2022-06-05 RX ADMIN — HEPARIN SODIUM 5000 UNITS: 5000 INJECTION, SOLUTION INTRAVENOUS; SUBCUTANEOUS at 03:58

## 2022-06-05 RX ADMIN — ASPIRIN 81 MG 162 MG: 81 TABLET ORAL at 07:42

## 2022-06-05 RX ADMIN — ROSUVASTATIN CALCIUM 40 MG: 20 TABLET, FILM COATED ORAL at 07:42

## 2022-06-05 RX ADMIN — POTASSIUM CHLORIDE 20 MEQ: 750 TABLET, EXTENDED RELEASE ORAL at 07:52

## 2022-06-05 RX ADMIN — HYDRALAZINE HYDROCHLORIDE 10 MG: 20 INJECTION INTRAMUSCULAR; INTRAVENOUS at 08:33

## 2022-06-05 RX ADMIN — Medication 3 MG: at 22:04

## 2022-06-05 RX ADMIN — POTASSIUM CHLORIDE 20 MEQ: 750 TABLET, EXTENDED RELEASE ORAL at 07:42

## 2022-06-05 RX ADMIN — FUROSEMIDE 40 MG: 10 INJECTION, SOLUTION INTRAMUSCULAR; INTRAVENOUS at 07:51

## 2022-06-05 RX ADMIN — HEPARIN SODIUM 5000 UNITS: 5000 INJECTION, SOLUTION INTRAVENOUS; SUBCUTANEOUS at 20:38

## 2022-06-05 RX ADMIN — PANTOPRAZOLE SODIUM 40 MG: 40 TABLET, DELAYED RELEASE ORAL at 07:42

## 2022-06-05 RX ADMIN — HYDRALAZINE HYDROCHLORIDE 10 MG: 20 INJECTION INTRAMUSCULAR; INTRAVENOUS at 23:05

## 2022-06-05 RX ADMIN — LISINOPRIL 2.5 MG: 2.5 TABLET ORAL at 07:42

## 2022-06-05 ASSESSMENT — ACTIVITIES OF DAILY LIVING (ADL)
ADLS_ACUITY_SCORE: 26
ADLS_ACUITY_SCORE: 25
ADLS_ACUITY_SCORE: 26
ADLS_ACUITY_SCORE: 26
ADLS_ACUITY_SCORE: 25
ADLS_ACUITY_SCORE: 26
ADLS_ACUITY_SCORE: 26
ADLS_ACUITY_SCORE: 25

## 2022-06-05 NOTE — PLAN OF CARE
Pt admitted 5/31 for CABG x 2 and AVR. PMH includes HTN, CAD, aortic stenosis, tobacco use, and HLD.    Neuro: AOx4, can be forgetful and slow to respond. Denies headache, lightheadedness and dizziness. No reports of numbness/tingling. No episodes of unresponsiveness, however; pt was in bed most of night.  Resp: Sating >92% on RA, denies SOB. PHELPS.   Cardiac: SR overnight, TPM connected and set to 50 BPM, did not require pacing overnight, HR stayed in 60s. Denies chest pain and palpitations, Hydralazine given 2x for SBP >140.  GI/: Regular diet, denies N/V. Voiding adequately in bedside urinal, requires reminders to save urine. Per pt, multiple loose BM's during day, Senna held.  Skin: Generalized bruising, incisions and dressings WDL.  Pain: Denies  Activity: Assist of 1-2. Bed alarm on overnight for pt safety  LDAs: 2x CT -20 suction to one atrium. L PIV SL and WDL    Will continue to monitor and report changes to team.

## 2022-06-05 NOTE — PLAN OF CARE
Care of pt from 11:00-19:30    Shift changes - chest tubes, temporary pacer wires, nerve block catheters removed. Ambulated in hallway with RN and wife x2. with PT x1, . Small amount of serous drainage from RLE ankle incision - CVTS aware, cover with dry gauze and keep clean/dry.    Plan - continue to diurese (strict I&O), encourage activity and PO intake.

## 2022-06-05 NOTE — PROGRESS NOTES
Cardiovascular Surgery Progress Note  06/05/2022         Assessment and Plan:     Arun Wren is a 77 year old male with a PMH of HTN, multivessel CAD, and severe aortic stenosis. He is now s/p elective tissue AVR and 2 vessel CAB on 5/31 with Dr. Benítez.    Cardiovascular:   Hx of HTN, CAD, aortic stenosis  S/p tissue AVR and 2 vessel CAB (vein to ramus and PDA)  Weaned pressors by POD #1 am. Had some orthostatic hypoTN, likely secondary to routine post-op vasoplegia.   Pre-op ECHO with LVEF 55 - 60%.  Post-op BREANNE: LVEF 55%, LV hypertrophy present, normal global function of both the RV and LV.   - No arrhythmia, HD stable.  -  mg, rosuvastatin  - Metoprolol 25 mg PO BID, Lisinopril 2.5 mg PO daily (held 6/3 am)  Chest tubes: mediastinals to suction, minimal output, removed today 6/5  Atrial fibrillation w RVR  - A-fib with 's, converted to NSR 6/3  - started IV Amio gtt overnight 6/2-6/3, transitioned to Amio 400 mg PO BID 6/3 pm, decrease to 200 mg BID given bradycardia  - EP consult as below  Syncopal episode   Two episodes of syncope 6/3-6/4. Was given fluid for orthostatic hypotension. One occurred with HR in 40s. Also had two conversion pauses lasting 5 seconds. HR now improved into 50s. Likely secondary to amio/metop  - Decrease amio to 200 mg daily, hold metoprolol  - Echo without effusion  - Consult EP given long pauses and significant bradycardia, likely due to medications. Okay with removing TPW, no pacing requirements, HR improved, Removed today 6/5    Pulmonary:  - Extubated POD 1 to 5 lpm via NC. Now saturating well on RA.   - Supplemental O2 PRN to keep sats > 92%. Wean off as tolerated.  - Pulm toilet, IS, activity and deep breathing    Neurology / MSK:  Acute post-operative   - pain well controlled with acetaminophen, PO robaxin and oxycodone PRN, IV dilaudid PRN  Pre-syncopal spells/shuffled gait  - Over past 2 days has had syncopal episodes, however for the past 3 days therapy  has noted changes in his gait with activity (shuffling), along with lack of responsiveness and following commands during activity. Wife has noted episodes of staring into space for 5 seconds. Has otherwise remained neurologically intact  - CT head w/o contrast negative  - Neurology consulted - recommend MRI/ vEEG initially, but electing to monitor as these neuro events are likely secondary to his bradycardia. See their note from 6/4.   - There is some concern for Lewy Body disease - neurology recommended outpatient neuro follow-up for this (message sent and referral placed in discharge orders)     / Renal:  - Zhang out 6/1. No Hx of renal disease. Most recent creatinine WNL, adequate UOP.   - Pre-op weight 231 lbs, most recent weight 239 lbs. Diuresed with Lasix 40 mg PO x 1 on 6/2 and 6/3 with weight reduction, but still volume up with dilated IVC, LE edema. Will give lasix 40 mg IV daily. Watch closely for orthostasis, however this was likely due to his bradycardia.     GI / FEN:   - Advance to Regular diet, continue bowel regimen  - Replace electrolytes as needed, hepatic enzymes WNL.    Endocrine:  Stress induced hyperglycemia initially managed on insulin drip postop, transitioned to sliding scale.    Infectious Disease:  Stress induced leukocytosis  - WBC WNL, remains afebrile, no signs or symptoms of infection  - Completed perioperative antibiotics    Hematology:   Acute blood loss anemia   Hgb stable, no signs or symptoms of active bleeding  Thrombocytopenia  Plt WNL    Anticoagulation:   - Only  mg daily  - OK to start anticoagulation on 6/4 if still in A-fib per Dr Benítez, was not in afib, holding on anticoag, EP recommended anticoag when okay with CV Surg. Will need to discuss with Dr. Benítez Monday.     Prophylaxis:   - Stress ulcer prophylaxis: Pantoprazole 40 mg daily for 30 days  - DVT prophylaxis: Subcutaneous heparin, SCD    Disposition:   - Transferred to  on 6/1  - Therapies  "recommending discharge to TCU vs Home    Discussed with Dr. Sandy Lala PA-C  Cardiothoracic Surgery  p: [phone]        Interval History:   No episodes of syncope/significant carlitos overnight.   States pain is well managed on current regimen.   Tolerating diet, is passing flatus, + BM. No nausea or vomiting.  Breathing well on room air without complaints.   Working with therapies and ambulating to bathroom with assistance.   Denies chest pain, palpitations, dizziness, syncopal symptoms, fevers, chills, myalgias, or sternal popping/clicking.         Physical Exam:   Blood pressure 114/68, pulse 74, temperature 97.9  F (36.6  C), temperature source Axillary, resp. rate 18, height 1.74 m (5' 8.5\"), weight 108.5 kg (239 lb 4.8 oz), SpO2 96 %.  Vitals:    06/03/22 0700 06/04/22 0500 06/05/22 0600   Weight: 108.5 kg (239 lb 4.8 oz) 109.6 kg (241 lb 11.2 oz) 108.5 kg (239 lb 4.8 oz)        Gen: A&Ox4, NAD  Neuro: no focal deficits   CV: RRR, normal S1 S2, no murmurs, rubs or gallops.   Pulm: CTA, no wheezing or rhonchi, normal breathing on RA in chair  Abd: nondistended, normal BS, soft, nontender  Ext: 1+ bilateral LE peripheral edema  Incision: bruising around incision, clean, dry, intact, no erythema, sternum stable  Tubes/drain sites: dressing clean and dry, serosanguinous output, no air leak. 24 hr output 100 mL.          Data:    Imaging:  reviewed recent imaging, no acute concerns  Recent Results (from the past 24 hour(s))   CT Head w/o Contrast    Narrative    CT HEAD W/O CONTRAST 6/4/2022 4:41 PM    History: brief episodes of unresponsiveness to commands, shuffled gait  and syncope   ICD-10:    Comparison: None    Technique: Using multidetector thin collimation helical acquisition  technique, axial, coronal and sagittal CT images from the skull base  to the vertex were obtained without intravenous contrast.   (topogram) image(s) also obtained and reviewed.    Findings: Mild generalized " parenchymal volume loss. There is no  intracranial hemorrhage, mass effect, or midline shift. Gray/white  matter differentiation in both cerebral hemispheres is preserved.  Ventricles are proportionate to the cerebral sulci. The basal cisterns  are clear.    The bony calvaria and the bones of the skull base are normal. Minimal  paranasal sinus mucosal thickening. Mastoid air cells are clear.      Impression    Impression:  No acute intracranial pathology.     I have personally reviewed the examination and initial interpretation  and I agree with the findings.    SHELBY PAREKH MD         SYSTEM ID:  [id number]   XR Chest 2 Views    Narrative    Exam: XR CHEST 2 VW, 6/5/2022 9:23 AM    Comparison: 6/4/2022    History: s/p CAB and AVR    Findings:  Upright PA and lateral views of the chest are obtained. Postoperative  changes of coronary artery bypass grafting and aortic valve  replacement. Median sternotomy wires are intact. Percutaneous pacer  wires. Mediastinal drains in place.    Trachea is midline. Calcification of the aortic knob. Cardiopulmonary  silhouette is within normal limits. Trace effusions with associated  atelectasis. Trace right apical pneumothorax. The upper abdomen is  unremarkable.      Impression    Impression:   1. Trace effusions with associated atelectasis.  2. Trace right apical pneumothorax.    I have personally reviewed the examination and initial interpretation  and I agree with the findings.    NICOLÁS ARANDA MD         SYSTEM ID:  [id number]     Labs:  BMP  Recent Labs   Lab 06/05/22  0720 06/04/22 0628 06/03/22 1818 06/03/22  0606 06/02/22 2022    142  --  141 141   POTASSIUM 3.9 4.2  --  4.0 4.1   CHLORIDE 106 107  --  108 109   DEVORA 8.4* 8.3*  --  8.1* 8.2*   CO2 28 28  --  28 28   BUN 21 24  --  23 24   CR 0.89 0.94  --  0.80 0.94   GLC 96 110* 138* 111* 104*     CBC  Recent Labs   Lab 06/05/22  0720 06/04/22 0628 06/03/22  0606 06/02/22 0725   WBC 9.8 11.1* 11.2* 11.9*    RBC 3.22* 3.18* 3.00* 3.38*   HGB 10.4* 10.3* 9.8* 11.0*   HCT 31.8* 32.0* 29.9* 33.4*   MCV 99 101* 100 99   MCH 32.3 32.4 32.7 32.5   MCHC 32.7 32.2 32.8 32.9   RDW 13.8 14.0 13.8 13.7    141* 83* 88*     INR  Recent Labs   Lab 06/05/22  0720 06/04/22  0732 06/03/22  0606 06/02/22  0725   INR 1.11 1.05 1.11 1.19*      Hepatic Panel  Recent Labs   Lab 06/04/22  0628 06/01/22  0405 06/01/22  0015 05/31/22  1420   AST 62* 45 49* 34   ALT 48 22 23 21   ALKPHOS 49 32* 36* 35*   BILITOTAL 0.7 0.7 0.7 0.6   ALBUMIN 3.1* 3.0* 3.1* 2.6*     GLUCOSE:   Recent Labs   Lab 06/05/22  0720 06/04/22  0628 06/03/22  1818 06/03/22  0606 06/02/22  2022 06/02/22  1702   GLC 96 110* 138* 111* 104* 107*

## 2022-06-05 NOTE — PROGRESS NOTES
"Pain Service Progress Note  [facility]  Date: 06/05/2022       Patient Name: Arnu Wren  MRN: [id number]  Age: 77 year old  Sex: male      Assessment:  Arun Wren is a 77 year old male with PMH significant for HTN, severe symptomatic aortic stenosis, and severe multivessel coronary artery disease     Procedure: median sternotomy, CABG x 2, tissue AVR     Date of Surgery: 5/31/22      Date of Catheter Placement: 5/31/22      Plan/Recommendations:  1. Regional Anesthesia/Analgesia  - TUCKER catheters removed at 3:30pm. Blue tips intact     Plan to maintain catheter, max of 7 days     2. Anticoagulation  - Can resume Heparin 5,000 units SC Q 8 hrs anytime          3. Multimodal Analgesia  - Per primary service        Discussed with attending anesthesiologist    Pasquale Knight  PGY-3 Anesthesiology  Pager [phone]      Overnight Events:  No major acute event     Tubes/Drains: Yes  Chest tubes    Subjective: \"no pain\"    Nausea: No  Vomiting: No  Symptoms of LAST: No    Pain Location:  Denies pain    Pain Intensity:    Pain at Rest: 0/10     Satisfied with your level of pain control: Yes    Diet: Snacks/Supplements Adult: Ensure Enlive; Between Meals  Regular Diet Adult    Relevant Labs:  Recent Labs   Lab Test 06/03/22  0606 06/01/22  0508 06/01/22  0405   INR 1.11   < > 1.08   PLT 83*   < >  --    PTT  --   --  39*   BUN 23   < > 18    < > = values in this interval not displayed.       Physical Exam:  Vitals: BP (!) 157/92 (BP Location: Right arm)   Pulse 68   Temp 36.7  C (98.1  F) (Oral)   Resp 18   Ht 1.74 m (5' 8.5\")   Wt 108.5 kg (239 lb 4.8 oz)   SpO2 95%   BMI 35.86 kg/m      Physical Exam:   Orientation:  Alert, oriented, and in no acute distress: Yes  Sedation: No    Motor Examination:  5/5 Strength in lower extremities: Yes      Catheter Site:   Catheter entry site is clean/dry/intact: Yes    Tender: No      Relevant Medications:  Current Pain " "Medications:  Medications related to Pain Management (From now, onward)    Start     Dose/Rate Route Frequency Ordered Stop    06/03/22 0830  polyethylene glycol (MIRALAX) Packet 17 g         17 g Oral DAILY PRN 06/03/22 0820      06/03/22 0800  aspirin (ASA) chewable tablet 162 mg         162 mg Oral DAILY 06/02/22 0910      06/03/22 0000  acetaminophen (TYLENOL) tablet 650 mg         650 mg Oral EVERY 4 HOURS PRN 05/31/22 1409 05/31/22 2100  ropivacaine 0.2% in NS perineural infusion simple          Perineural Continuous Nerve Block 05/31/22 2033 05/31/22 2100  ropivacaine 0.2% in NS perineural infusion simple          Perineural Continuous Nerve Block 05/31/22 2033 05/31/22 2000  senna-docusate (SENOKOT-S/PERICOLACE) 8.6-50 MG per tablet 1 tablet         1 tablet Oral 2 TIMES DAILY 05/31/22 1410      05/31/22 1410  lidocaine 1 % 0.1-1 mL         0.1-1 mL Other EVERY 1 HOUR PRN 05/31/22 1410      05/31/22 1410  lidocaine (LMX4) cream          Topical EVERY 1 HOUR PRN 05/31/22 1410      05/31/22 1409  HYDROmorphone (DILAUDID) injection 0.2 mg        \"Or\" Linked Group Details    0.2 mg Intravenous EVERY 2 HOURS PRN 05/31/22 1410      05/31/22 1409  HYDROmorphone (DILAUDID) injection 0.4 mg        \"Or\" Linked Group Details    0.4 mg Intravenous EVERY 2 HOURS PRN 05/31/22 1410      05/31/22 1409  oxyCODONE (ROXICODONE) tablet 5 mg        \"Or\" Linked Group Details    5 mg Oral EVERY 4 HOURS PRN 05/31/22 1410      05/31/22 1409  methocarbamol (ROBAXIN) tablet 500 mg         500 mg Oral EVERY 6 HOURS PRN 05/31/22 1409      05/31/22 1409  magnesium hydroxide (MILK OF MAGNESIA) suspension 30 mL         30 mL Oral DAILY PRN 05/31/22 1410      05/31/22 1409  bisacodyl (DULCOLAX) Suppository 10 mg         10 mg Rectal DAILY PRN 05/31/22 1410              Contact Info Please Page the Pain Team Via [facility] [place] [facility]: \"Adult acute inpatient pain management/[facility]\"  "

## 2022-06-06 ENCOUNTER — APPOINTMENT (OUTPATIENT)
Dept: OCCUPATIONAL THERAPY | Facility: CLINIC | Age: 77
DRG: 220 | End: 2022-06-06
Attending: SURGERY
Payer: COMMERCIAL

## 2022-06-06 ENCOUNTER — APPOINTMENT (OUTPATIENT)
Dept: PHYSICAL THERAPY | Facility: CLINIC | Age: 77
DRG: 220 | End: 2022-06-06
Attending: SURGERY
Payer: COMMERCIAL

## 2022-06-06 ENCOUNTER — APPOINTMENT (OUTPATIENT)
Dept: GENERAL RADIOLOGY | Facility: CLINIC | Age: 77
DRG: 220 | End: 2022-06-06
Attending: PHYSICIAN ASSISTANT
Payer: COMMERCIAL

## 2022-06-06 ENCOUNTER — APPOINTMENT (OUTPATIENT)
Dept: GENERAL RADIOLOGY | Facility: CLINIC | Age: 77
DRG: 220 | End: 2022-06-06
Attending: FAMILY MEDICINE
Payer: COMMERCIAL

## 2022-06-06 LAB
ANION GAP SERPL CALCULATED.3IONS-SCNC: 6 MMOL/L (ref 3–14)
ATRIAL RATE - MUSE: 136 BPM
ATRIAL RATE - MUSE: 58 BPM
BUN SERPL-MCNC: 19 MG/DL (ref 7–30)
CALCIUM SERPL-MCNC: 8.3 MG/DL (ref 8.5–10.1)
CHLORIDE BLD-SCNC: 106 MMOL/L (ref 94–109)
CO2 SERPL-SCNC: 27 MMOL/L (ref 20–32)
CREAT SERPL-MCNC: 0.89 MG/DL (ref 0.66–1.25)
DIASTOLIC BLOOD PRESSURE - MUSE: NORMAL MMHG
DIASTOLIC BLOOD PRESSURE - MUSE: NORMAL MMHG
ERYTHROCYTE [DISTWIDTH] IN BLOOD BY AUTOMATED COUNT: 14 % (ref 10–15)
GFR SERPL CREATININE-BSD FRML MDRD: 89 ML/MIN/1.73M2
GLUCOSE BLD-MCNC: 121 MG/DL (ref 70–99)
HCT VFR BLD AUTO: 31.4 % (ref 40–53)
HGB BLD-MCNC: 10.2 G/DL (ref 13.3–17.7)
INR PPP: 1.16 (ref 0.85–1.15)
INTERPRETATION ECG - MUSE: NORMAL
INTERPRETATION ECG - MUSE: NORMAL
MAGNESIUM SERPL-MCNC: 2.2 MG/DL (ref 1.6–2.3)
MCH RBC QN AUTO: 31.7 PG (ref 26.5–33)
MCHC RBC AUTO-ENTMCNC: 32.5 G/DL (ref 31.5–36.5)
MCV RBC AUTO: 98 FL (ref 78–100)
P AXIS - MUSE: 10 DEGREES
P AXIS - MUSE: NORMAL DEGREES
PATH REPORT.COMMENTS IMP SPEC: NORMAL
PATH REPORT.COMMENTS IMP SPEC: NORMAL
PATH REPORT.FINAL DX SPEC: NORMAL
PATH REPORT.GROSS SPEC: NORMAL
PATH REPORT.MICROSCOPIC SPEC OTHER STN: NORMAL
PATH REPORT.RELEVANT HX SPEC: NORMAL
PHOSPHATE SERPL-MCNC: 3.1 MG/DL (ref 2.5–4.5)
PHOTO IMAGE: NORMAL
PLATELET # BLD AUTO: 200 10E3/UL (ref 150–450)
POTASSIUM BLD-SCNC: 3.5 MMOL/L (ref 3.4–5.3)
PR INTERVAL - MUSE: 160 MS
PR INTERVAL - MUSE: NORMAL MS
QRS DURATION - MUSE: 100 MS
QRS DURATION - MUSE: 102 MS
QT - MUSE: 378 MS
QT - MUSE: 464 MS
QTC - MUSE: 455 MS
QTC - MUSE: 538 MS
R AXIS - MUSE: 34 DEGREES
R AXIS - MUSE: 67 DEGREES
RBC # BLD AUTO: 3.22 10E6/UL (ref 4.4–5.9)
SODIUM SERPL-SCNC: 139 MMOL/L (ref 133–144)
SYSTOLIC BLOOD PRESSURE - MUSE: NORMAL MMHG
SYSTOLIC BLOOD PRESSURE - MUSE: NORMAL MMHG
T AXIS - MUSE: -15 DEGREES
T AXIS - MUSE: 25 DEGREES
UFH PPP CHRO-ACNC: 0.26 IU/ML
VENTRICULAR RATE- MUSE: 122 BPM
VENTRICULAR RATE- MUSE: 58 BPM
WBC # BLD AUTO: 7.6 10E3/UL (ref 4–11)

## 2022-06-06 PROCEDURE — 71045 X-RAY EXAM CHEST 1 VIEW: CPT | Mod: 26 | Performed by: RADIOLOGY

## 2022-06-06 PROCEDURE — 258N000003 HC RX IP 258 OP 636

## 2022-06-06 PROCEDURE — 250N000009 HC RX 250: Performed by: PHYSICIAN ASSISTANT

## 2022-06-06 PROCEDURE — 97530 THERAPEUTIC ACTIVITIES: CPT | Mod: GO

## 2022-06-06 PROCEDURE — 250N000011 HC RX IP 250 OP 636: Performed by: PHYSICIAN ASSISTANT

## 2022-06-06 PROCEDURE — 84100 ASSAY OF PHOSPHORUS: CPT

## 2022-06-06 PROCEDURE — 82310 ASSAY OF CALCIUM: CPT

## 2022-06-06 PROCEDURE — 71046 X-RAY EXAM CHEST 2 VIEWS: CPT | Mod: 26 | Performed by: RADIOLOGY

## 2022-06-06 PROCEDURE — 250N000013 HC RX MED GY IP 250 OP 250 PS 637: Performed by: SURGERY

## 2022-06-06 PROCEDURE — 97535 SELF CARE MNGMENT TRAINING: CPT | Mod: GO

## 2022-06-06 PROCEDURE — 88311 DECALCIFY TISSUE: CPT | Mod: 26 | Performed by: PATHOLOGY

## 2022-06-06 PROCEDURE — 85610 PROTHROMBIN TIME: CPT | Performed by: SURGERY

## 2022-06-06 PROCEDURE — 250N000013 HC RX MED GY IP 250 OP 250 PS 637: Performed by: PHYSICIAN ASSISTANT

## 2022-06-06 PROCEDURE — 250N000009 HC RX 250

## 2022-06-06 PROCEDURE — 250N000013 HC RX MED GY IP 250 OP 250 PS 637: Performed by: STUDENT IN AN ORGANIZED HEALTH CARE EDUCATION/TRAINING PROGRAM

## 2022-06-06 PROCEDURE — 83735 ASSAY OF MAGNESIUM: CPT

## 2022-06-06 PROCEDURE — 93005 ELECTROCARDIOGRAM TRACING: CPT

## 2022-06-06 PROCEDURE — 999N000065 XR CHEST PORT 1 VIEW

## 2022-06-06 PROCEDURE — 85027 COMPLETE CBC AUTOMATED: CPT | Performed by: SURGERY

## 2022-06-06 PROCEDURE — 85520 HEPARIN ASSAY: CPT | Performed by: SURGERY

## 2022-06-06 PROCEDURE — 36569 INSJ PICC 5 YR+ W/O IMAGING: CPT

## 2022-06-06 PROCEDURE — 36415 COLL VENOUS BLD VENIPUNCTURE: CPT

## 2022-06-06 PROCEDURE — 97116 GAIT TRAINING THERAPY: CPT | Mod: GP

## 2022-06-06 PROCEDURE — 93010 ELECTROCARDIOGRAM REPORT: CPT | Performed by: INTERNAL MEDICINE

## 2022-06-06 PROCEDURE — 71046 X-RAY EXAM CHEST 2 VIEWS: CPT

## 2022-06-06 PROCEDURE — 88305 TISSUE EXAM BY PATHOLOGIST: CPT | Mod: 26 | Performed by: PATHOLOGY

## 2022-06-06 PROCEDURE — 214N000001 HC R&B CCU UMMC

## 2022-06-06 PROCEDURE — 272N000451 HC KIT SHRLOCK 5FR POWER PICC DOUBLE LUMEN

## 2022-06-06 PROCEDURE — 36415 COLL VENOUS BLD VENIPUNCTURE: CPT | Performed by: SURGERY

## 2022-06-06 PROCEDURE — 250N000011 HC RX IP 250 OP 636

## 2022-06-06 PROCEDURE — 97530 THERAPEUTIC ACTIVITIES: CPT | Mod: GP

## 2022-06-06 RX ORDER — METOPROLOL TARTRATE 1 MG/ML
5 INJECTION, SOLUTION INTRAVENOUS EVERY 5 MIN PRN
Status: DISCONTINUED | OUTPATIENT
Start: 2022-06-06 | End: 2022-06-06

## 2022-06-06 RX ORDER — POTASSIUM CHLORIDE 750 MG/1
20 TABLET, EXTENDED RELEASE ORAL ONCE
Status: COMPLETED | OUTPATIENT
Start: 2022-06-06 | End: 2022-06-06

## 2022-06-06 RX ORDER — FUROSEMIDE 20 MG
20 TABLET ORAL
Status: COMPLETED | OUTPATIENT
Start: 2022-06-06 | End: 2022-06-06

## 2022-06-06 RX ORDER — METOPROLOL TARTRATE 1 MG/ML
5 INJECTION, SOLUTION INTRAVENOUS EVERY 5 MIN PRN
Status: COMPLETED | OUTPATIENT
Start: 2022-06-06 | End: 2022-06-06

## 2022-06-06 RX ORDER — LIDOCAINE 40 MG/G
CREAM TOPICAL
Status: ACTIVE | OUTPATIENT
Start: 2022-06-06 | End: 2022-06-09

## 2022-06-06 RX ORDER — ASPIRIN 81 MG/1
81 TABLET, CHEWABLE ORAL DAILY
Status: DISCONTINUED | OUTPATIENT
Start: 2022-06-07 | End: 2022-06-09 | Stop reason: HOSPADM

## 2022-06-06 RX ORDER — POTASSIUM CHLORIDE 750 MG/1
20 TABLET, EXTENDED RELEASE ORAL DAILY
Status: DISCONTINUED | OUTPATIENT
Start: 2022-06-06 | End: 2022-06-06

## 2022-06-06 RX ORDER — HEPARIN SODIUM 10000 [USP'U]/100ML
0-5000 INJECTION, SOLUTION INTRAVENOUS CONTINUOUS
Status: DISCONTINUED | OUTPATIENT
Start: 2022-06-06 | End: 2022-06-08

## 2022-06-06 RX ORDER — WARFARIN SODIUM 1 MG/1
2 TABLET ORAL
Status: COMPLETED | OUTPATIENT
Start: 2022-06-06 | End: 2022-06-06

## 2022-06-06 RX ORDER — MAGNESIUM SULFATE HEPTAHYDRATE 40 MG/ML
2 INJECTION, SOLUTION INTRAVENOUS ONCE
Status: COMPLETED | OUTPATIENT
Start: 2022-06-06 | End: 2022-06-06

## 2022-06-06 RX ORDER — AMIODARONE HYDROCHLORIDE 200 MG/1
200 TABLET ORAL DAILY
Status: DISCONTINUED | OUTPATIENT
Start: 2022-06-06 | End: 2022-06-09 | Stop reason: HOSPADM

## 2022-06-06 RX ADMIN — FUROSEMIDE 20 MG: 20 TABLET ORAL at 17:39

## 2022-06-06 RX ADMIN — HEPARIN SODIUM AND DEXTROSE 1200 UNITS/HR: 10000; 5 INJECTION INTRAVENOUS at 13:53

## 2022-06-06 RX ADMIN — FUROSEMIDE 20 MG: 20 TABLET ORAL at 10:35

## 2022-06-06 RX ADMIN — AMIODARONE HYDROCHLORIDE 1 MG/MIN: 50 INJECTION, SOLUTION INTRAVENOUS at 05:35

## 2022-06-06 RX ADMIN — LIDOCAINE HYDROCHLORIDE ANHYDROUS 3 ML: 10 INJECTION, SOLUTION INFILTRATION at 12:27

## 2022-06-06 RX ADMIN — POTASSIUM CHLORIDE 20 MEQ: 750 TABLET, EXTENDED RELEASE ORAL at 09:19

## 2022-06-06 RX ADMIN — Medication 12.5 MG: at 09:30

## 2022-06-06 RX ADMIN — Medication 3 MG: at 20:31

## 2022-06-06 RX ADMIN — ROSUVASTATIN CALCIUM 40 MG: 20 TABLET, FILM COATED ORAL at 09:18

## 2022-06-06 RX ADMIN — Medication 12.5 MG: at 20:31

## 2022-06-06 RX ADMIN — AMIODARONE HYDROCHLORIDE 150 MG: 1.5 INJECTION, SOLUTION INTRAVENOUS at 05:21

## 2022-06-06 RX ADMIN — AMIODARONE HYDROCHLORIDE 200 MG: 200 TABLET ORAL at 20:31

## 2022-06-06 RX ADMIN — PANTOPRAZOLE SODIUM 40 MG: 40 TABLET, DELAYED RELEASE ORAL at 09:18

## 2022-06-06 RX ADMIN — HEPARIN SODIUM 5000 UNITS: 5000 INJECTION, SOLUTION INTRAVENOUS; SUBCUTANEOUS at 04:57

## 2022-06-06 RX ADMIN — METOPROLOL TARTRATE 5 MG: 5 INJECTION INTRAVENOUS at 04:42

## 2022-06-06 RX ADMIN — POTASSIUM CHLORIDE 20 MEQ: 750 TABLET, EXTENDED RELEASE ORAL at 17:39

## 2022-06-06 RX ADMIN — WARFARIN SODIUM 2 MG: 1 TABLET ORAL at 17:39

## 2022-06-06 RX ADMIN — METOPROLOL TARTRATE 5 MG: 1 INJECTION, SOLUTION INTRAVENOUS at 04:28

## 2022-06-06 RX ADMIN — ASPIRIN 81 MG 162 MG: 81 TABLET ORAL at 09:18

## 2022-06-06 RX ADMIN — MAGNESIUM SULFATE IN WATER 2 G: 40 INJECTION, SOLUTION INTRAVENOUS at 13:50

## 2022-06-06 RX ADMIN — POTASSIUM CHLORIDE 20 MEQ: 750 TABLET, EXTENDED RELEASE ORAL at 09:18

## 2022-06-06 RX ADMIN — LISINOPRIL 2.5 MG: 2.5 TABLET ORAL at 09:18

## 2022-06-06 ASSESSMENT — ACTIVITIES OF DAILY LIVING (ADL)
ADLS_ACUITY_SCORE: 25
ADLS_ACUITY_SCORE: 23
ADLS_ACUITY_SCORE: 25

## 2022-06-06 NOTE — PROGRESS NOTES
Cardiovascular Surgery Progress Note  06/06/2022         Assessment and Plan:     Arun Wren is a 77 year old male with a PMH of HTN, multivessel CAD, and severe aortic stenosis. He is now s/p elective tissue AVR and 2 vessel CAB on 5/31 with Dr. Benítez.    Cardiovascular:   Hx of HTN, CAD, aortic stenosis  S/p tissue AVR and 2 vessel CAB (vein to ramus and PDA)  Weaned pressors by POD #1 am. Had some orthostatic hypoTN, likely secondary to routine post-op vasoplegia.  Chest tubes removed 6/5.   Pre-op ECHO with LVEF 55 - 60%.  Post-op BREANNE: LVEF 55%, LV hypertrophy present, normal global function of both the RV and LV.   -  mg (decrease to 81 mg on 6/6), rosuvastatin  - Metoprolol 25 mg PO BID, Lisinopril 2.5 mg PO daily (held 6/3 am)  - Restart Metoprolol 12.5 mg PO BID am 6/6. Monitor for bradycardia.  Atrial fibrillation w RVR  - A-fib with 's, converted to NSR 6/3  - started IV Amio gtt overnight 6/2-6/3, transitioned to Amio 400 mg PO BID 6/3 pm, decreased to 200 mg BID given bradycardia and then held.   - EP consult as below  - Went back into A-fib around ~0400 on 6/6.  HR in 120-140s.  EKG showing afib with RVR. BP 110s/70s. Restarted Amio gtt 1 mg/min on 6/6.  Syncopal episode   Two episodes of syncope 6/3-6/4. Was given fluid for orthostatic hypotension. One occurred with HR in 40s. Also had two conversion pauses lasting 5 seconds. HR now improved into 50s. Likely secondary to amio/metop  - Decrease amio to 200 mg daily, hold metoprolol  - Echo without effusion  - Consult EP given long pauses and significant bradycardia, likely due to medications. Okay with removing TPW, no pacing requirements, HR improved, Removed 6/5.     Pulmonary:  - Extubated POD 1 to 5 lpm via NC. Now saturating well on RA.   - Supplemental O2 PRN to keep sats > 92%. Wean off as tolerated.  - Pulm toilet, IS, activity and deep breathing    Neurology / MSK:  Acute post-operative   - pain well controlled with  acetaminophen, PO robaxin and oxycodone PRN, IV dilaudid PRN  Pre-syncopal spells/shuffled gait  - has had syncopal episodes 6/3 and 6/4, however for the past 3 days therapy has noted changes in his gait with activity (shuffling), along with lack of responsiveness and following commands during activity. Wife has noted episodes of staring into space for 5 seconds. Has otherwise remained neurologically intact  - CT head w/o contrast negative  - Neurology consulted - recommend MRI/ vEEG initially, but electing to monitor as these neuro events are likely secondary to his bradycardia. See their note from 6/4.   - There is some concern for Lewy Body disease - neurology recommended outpatient neuro follow-up for this (message sent and referral placed in discharge orders)     / Renal:  - Zhang out 6/1. No Hx of renal disease. Most recent creatinine 0.89, adequate UOP.   - Pre-op weight 231 lbs, most recent weight 239 lbs. Diuresed with Lasix 40 mg PO x 1 on 6/2 and 6/3 with weight reduction, but still volume up with dilated IVC, LE edema. Will give lasix 40 mg IV daily. Watch closely for orthostasis, however this was likely due to his bradycardia.     GI / FEN:   - Advance to Regular diet, continue bowel regimen  - Replace electrolytes as needed, hepatic enzymes WNL.    Endocrine:  Stress induced hyperglycemia initially managed on insulin drip postop, transitioned to sliding scale.    Infectious Disease:  Stress induced leukocytosis  - WBC WNL, remains afebrile, no signs or symptoms of infection  - Completed perioperative antibiotics    Hematology:   Acute blood loss anemia   Hgb stable, no signs or symptoms of active bleeding  Plt WNL    Anticoagulation:   - Only  mg daily. Decrease dose to 81 mg daily on 6/6.  - EP recommended anticoag when okay with CV Surg.   - Started anticoagulation per Dr. Benítez Monday 6/6;       - Low intensity heparin gtt bridging to therapeutic INR.       - Coumadin for A-fib, INR goal  "2-3.  INR 1.16.     Prophylaxis:   - Stress ulcer prophylaxis: Pantoprazole 40 mg daily for 30 days  - DVT prophylaxis: Subcutaneous heparin, SCD    Disposition:   - Transferred to  on 6/1  - Therapies recommending discharge to TCU vs Home  - Await therapeutic INR    Mason HALL-Student    I have seen and examined this patient with the PA student, I agree with the above findings.  Patient discussed with staff.   Carlos Cote PA-C  Cardiothoracic Surgery  Pager [phone]    9:35 AM   June 6, 2022        Interval History:   6/6 Went into afib around ~0400.  HR in 120-140s.  EKG showed afib with RVR. BP 110s/70s. Given Metop 5 mg IV x2 with no response. Currently on Amio gtt 1 mg/min. This morning at 0800 /90 and -120s.   States pain is well managed on current regimen.   Tolerating diet, is passing flatus, + BM. No nausea or vomiting.  Breathing well on room air without complaints.   Working with therapies and ambulating to bathroom with assistance.   Denies chest pain, palpitations, dizziness, syncopal symptoms, fevers, chills, myalgias, or sternal popping/clicking.         Physical Exam:   Blood pressure (!) 130/101, pulse (!) 130, temperature 98.2  F (36.8  C), temperature source Oral, resp. rate 16, height 1.74 m (5' 8.5\"), weight 108.5 kg (239 lb 4.8 oz), SpO2 95 %.  Vitals:    06/03/22 0700 06/04/22 0500 06/05/22 0600   Weight: 108.5 kg (239 lb 4.8 oz) 109.6 kg (241 lb 11.2 oz) 108.5 kg (239 lb 4.8 oz)        Gen: A&Ox4, NAD  Neuro: no focal deficits   CV: A-fib, normal S1 S2, no murmurs, rubs or gallops.   Pulm: CTA, no wheezing or rhonchi, normal breathing on RA in chair  Abd: nondistended, normal BS, soft, nontender  Ext: 1+ bilateral LE peripheral edema. Forearm bruising bilaterally.   Incision: bruising around incision, clean, dry, intact, no erythema, sternum stable  Tubes/drain sites: dressing clean and dry.         Data:    Imaging:  reviewed recent imaging, no acute " concerns  Recent Results (from the past 24 hour(s))   XR Chest 2 Views    Narrative    Exam: XR CHEST 2 VW, 6/5/2022 9:23 AM    Comparison: 6/4/2022    History: s/p CAB and AVR    Findings:  Upright PA and lateral views of the chest are obtained. Postoperative  changes of coronary artery bypass grafting and aortic valve  replacement. Median sternotomy wires are intact. Percutaneous pacer  wires. Mediastinal drains in place.    Trachea is midline. Calcification of the aortic knob. Cardiopulmonary  silhouette is within normal limits. Trace effusions with associated  atelectasis. Trace right apical pneumothorax. The upper abdomen is  unremarkable.      Impression    Impression:   1. Trace effusions with associated atelectasis.  2. Trace right apical pneumothorax.    I have personally reviewed the examination and initial interpretation  and I agree with the findings.    NICOLÁS ARANDA MD         SYSTEM ID:  [id number]   XR Chest Port 1 View    Narrative    EXAM: XR CHEST PORT 1 VIEW  6/5/2022 3:59 PM     HISTORY:  chest tube removal       COMPARISON:  Earlier today 6/5/2022    FINDINGS:     AP portable semiupright view the chest. Postoperative changes chest  status post bypass grafting and aortic valve replacement. Intact  median sternotomy wires. Interval removal of mediastinal drain.     Trachea is midline. Cardiomediastinal silhouette and pulmonary  vasculature are within normal limits. Dystrophic calcifications aortic  knob. No focal consolidative airspace opacities. Previous identified  trace right apical pneumothorax is not well appreciated on this  examination. Blunting of bilateral costophrenic angles, not  significant change compared to prior. Streaky bibasilar pulmonary  opacities.    No acute osseous abnormality. Visualized upper abdomen is  unremarkable.        Impression    IMPRESSION:  1. Interval removal of mediastinal drains. Previously identified trace  right apical pneumothorax is not well  appreciated on this examination.  2. Stable trace bilateral pleural effusions with associated bibasilar  atelectasis.    I have personally reviewed the examination and initial interpretation  and I agree with the findings.    BLANCA CORTES MD         SYSTEM ID:  [id number]     Labs:  BMP  Recent Labs   Lab 06/06/22  0532 06/05/22  0720 06/04/22  0628 06/03/22 1818 06/03/22  0606    140 142  --  141   POTASSIUM 3.5 3.9 4.2  --  4.0   CHLORIDE 106 106 107  --  108   DEVORA 8.3* 8.4* 8.3*  --  8.1*   CO2 27 28 28  --  28   BUN 19 21 24  --  23   CR 0.89 0.89 0.94  --  0.80   * 96 110* 138* 111*     CBC  Recent Labs   Lab 06/06/22  0532 06/05/22  0720 06/04/22 0628 06/03/22  0606   WBC 7.6 9.8 11.1* 11.2*   RBC 3.22* 3.22* 3.18* 3.00*   HGB 10.2* 10.4* 10.3* 9.8*   HCT 31.4* 31.8* 32.0* 29.9*   MCV 98 99 101* 100   MCH 31.7 32.3 32.4 32.7   MCHC 32.5 32.7 32.2 32.8   RDW 14.0 13.8 14.0 13.8    177 141* 83*     INR  Recent Labs   Lab 06/06/22  0532 06/05/22  0720 06/04/22  0732 06/03/22  0606   INR 1.16* 1.11 1.05 1.11      Hepatic Panel  Recent Labs   Lab 06/04/22  0628 06/01/22  0405 06/01/22  0015 05/31/22  1420   AST 62* 45 49* 34   ALT 48 22 23 21   ALKPHOS 49 32* 36* 35*   BILITOTAL 0.7 0.7 0.7 0.6   ALBUMIN 3.1* 3.0* 3.1* 2.6*     GLUCOSE:   Recent Labs   Lab 06/06/22  0532 06/05/22  0720 06/04/22  0628 06/03/22  1818 06/03/22  0606 06/02/22 2022   * 96 110* 138* 111* 104*

## 2022-06-06 NOTE — PROGRESS NOTES
"Surgery Cross Cover Brief Progress Note    Paged regarding patient went into afib around ~0400.  HR in 120-140s.  EKG showing afib with RVR. BP 110s/70s  Went to assess patient, asx, denies lightheadedness, dizziness, palpitations, chest pain, fatigue. Reports he is feeling \"great.\"     Ordered stat BMP, Mg, Phos, pending.     Gave metoprolol 5mg IV x2, no response.     Spoke with cardiology regarding amiodarone bolus vs. Giving oral early.   They recommended giving amio bolus and starting him on a drip again, which was ordered.     Jonas Cohen MD   Surgery resident  "

## 2022-06-06 NOTE — PROGRESS NOTES
CLINICAL NUTRITION SERVICES    Reason for Assessment:  Heart-healthy nutrition education, pt s/p CABG    Diet History:  Pt reports no history of receiving heart-healthy nutrition education in the past. States his wife does the grocery shopping in their household. Typically drinking 2% milk, but occasionally 1% milk. Likes fish. Agreeable to nutrition education     Nutrition Diagnosis:  Food- and nutrition-related knowledge deficit r/t no previous knowledge of heart-healthy diet AEB pt report of no previous formal heart-healthy nutrition education.    Nutrition Prescription/Recs:  Continue heart-healthy diet.      Interventions:  Nutrition Education: Provided verbal instruction on a heart-healthy diet. Discussed the various types of fats, limiting sodium, eating adequate fruits and vegetables, limiting simple carbohydrate (and sweets), choosing whole grains, and overall healthy lifestyle. Provided handouts: How to Read Nutrition Labels and Nutrition Care Manual Handouts Heart Healthy - Reduced Sodium Nutrition Therapy. Encouraged pt to share these handouts with his wife.    Goals:    Pt will list at least two interventions to make current meal plan more heart-healthy.     Follow-up:   Patient to ask any further nutrition-related questions before discharge. In addition, pt may request outpatient RD appointment.    Chula Toledo, MS, RD, LD, CNSC   6C Pgr: [phone]

## 2022-06-06 NOTE — PLAN OF CARE
D: S/p elective tissue AVR and 2 vessel CAB on 5/31.    I: Monitored vitals and assessed pt status.   Changed: Started back on Metoprolol PO, Started Heparin gtt, Started Coumadin, DL PICC placed R Basilic   Running: Amiodarone @0.5 mg/min, Heparin @1200 units/hr  Tele: Afib, -120's  Mobility: SBA w/ walker     A: AOx4, forgetful at times. VSS. Afebrile. Urinating adequately. LBM today. Denies any pain. Had PICC placed to RUE.     P: Continue to monitor Pt status and report changes to CVTS.

## 2022-06-06 NOTE — PLAN OF CARE
D: s/p elective tissue AVR and 2 vessel CAB on 5/31 with Dr. Benítez. Two episodes of syncope 6/3-6/4. Was given fluid for orthostatic hypotension. One occurred with HR in 40s. Also had two conversion pauses lasting 5 seconds. HR now improved into 50s. long pauses and significant bradycardia, likely due to medications. Temp pacemaker remved 6/5. Neurology consulted - recommend MRI/ vEEG initially, but electing to monitor as these neuro events are likely secondary to his bradycardia. There is some concern for Lewy Body disease - neurology recommended outpatient neuro follow-up     PMHx: HTN, multivessel CAD, and severe aortic stenosis    I: Monitored vitals and assessed pt status. Encouraged activity.      Changed: Flipped into a-fib at 0400. Released EKG from conditional orders. VSS. No dizzy or SOB. Notified MD. Gave 5 mg q Five min x2 of metoprolol. Pt did not convert. amio bolus and restart amio gtt.  IV Access: PIV  Running: Amio at 1 mg until 1135 then decrease to 0.5 see MAR for details.   PRN: hydralazine x1. Metoprolol x2.   Tele: SR most of shift; 0400 a-fib  O2: RA  Mobility: Assist 1 walker  Skin: RLE graft site ooze preplaced bandage. Sternal site WDL. Chest tube site reinforced with gauze and tape.      A: A&Ox4. Patient can be impulsive at times and does not always listen to instruction. Alarms are on patient. VSS other than episode of high blood pressure (see flowsheets) treated with hydralazine x1. Afebrile. RA. No pain reported. Patient slept most of the night.      P: Continue to monitor pt status and report changes to treatment team.

## 2022-06-06 NOTE — PROCEDURES
[facility]    Double Lumen PICC Placement    Date/Time: 6/6/2022 12:48 PM  Performed by: Ascencion Cabrera RN  Authorized by: Sai Benítez MD   Indications: vascular access      UNIVERSAL PROTOCOL   Site Marked: Yes  Prior Images Obtained and Reviewed:  Yes  Required items: Required blood products, implants, devices and special equipment available    Patient identity confirmed:  Verbally with patient and arm band  NA - No sedation, light sedation, or local anesthesia  Confirmation Checklist:  Patient's identity using two indicators, relevant allergies, procedure was appropriate and matched the consent or emergent situation and correct equipment/implants were available  Time out: Immediately prior to the procedure a time out was called    Universal Protocol: the Joint Commission Universal Protocol was followed    Preparation: Patient was prepped and draped in usual sterile fashion       ANESTHESIA    Anesthesia: See MAR for details  Local Anesthetic:  Lidocaine 1% without epinephrine  Anesthetic Total (mL):  3      SEDATION    Patient Sedated: No        Preparation: skin prepped with ChloraPrep  Skin prep agent: skin prep agent completely dried prior to procedure  Sterile barriers: maximum sterile barriers were used: cap, mask, sterile gown, sterile gloves, and large sterile sheet  Hand hygiene: hand hygiene performed prior to central venous catheter insertion  Type of line used: PICC  Catheter type: double lumen  Lumen type: non-valved  Catheter size: 5 Fr  Brand: Bard  Lot number: [id number]  Placement method: ultrasound, venipuncture, MST, tip navigation system and other (see comment) (3CG)  Number of attempts: 1  Difficulty threading catheter: no  Successful placement: yes  Orientation: right    Location: basilic vein  Arm circumference: adults 10 cm  Extremity circumference: 34  Internal length: 43 cm  Dressing and securement: adhesive securement device,  chlorhexidine disc applied and transparent dressing  Post procedure assessment: blood return through all ports, placement verified by 3CG technology, free fluid flow and placement verified by x-ray  PROCEDURE   Patient Tolerance:  Patient tolerated the procedure well with no immediate complications

## 2022-06-07 ENCOUNTER — APPOINTMENT (OUTPATIENT)
Dept: PHYSICAL THERAPY | Facility: CLINIC | Age: 77
DRG: 220 | End: 2022-06-07
Attending: SURGERY
Payer: COMMERCIAL

## 2022-06-07 LAB
ANION GAP SERPL CALCULATED.3IONS-SCNC: 5 MMOL/L (ref 3–14)
BUN SERPL-MCNC: 15 MG/DL (ref 7–30)
CALCIUM SERPL-MCNC: 8.1 MG/DL (ref 8.5–10.1)
CHLORIDE BLD-SCNC: 106 MMOL/L (ref 94–109)
CO2 SERPL-SCNC: 28 MMOL/L (ref 20–32)
CREAT SERPL-MCNC: 0.91 MG/DL (ref 0.66–1.25)
ERYTHROCYTE [DISTWIDTH] IN BLOOD BY AUTOMATED COUNT: 14.3 % (ref 10–15)
GFR SERPL CREATININE-BSD FRML MDRD: 87 ML/MIN/1.73M2
GLUCOSE BLD-MCNC: 102 MG/DL (ref 70–99)
HCT VFR BLD AUTO: 31 % (ref 40–53)
HGB BLD-MCNC: 10.3 G/DL (ref 13.3–17.7)
INR PPP: 1.16 (ref 0.85–1.15)
MAGNESIUM SERPL-MCNC: 2.3 MG/DL (ref 1.6–2.3)
MCH RBC QN AUTO: 32.8 PG (ref 26.5–33)
MCHC RBC AUTO-ENTMCNC: 33.2 G/DL (ref 31.5–36.5)
MCV RBC AUTO: 99 FL (ref 78–100)
PHOSPHATE SERPL-MCNC: 3.6 MG/DL (ref 2.5–4.5)
PLATELET # BLD AUTO: 218 10E3/UL (ref 150–450)
POTASSIUM BLD-SCNC: 4 MMOL/L (ref 3.4–5.3)
RBC # BLD AUTO: 3.14 10E6/UL (ref 4.4–5.9)
SODIUM SERPL-SCNC: 139 MMOL/L (ref 133–144)
UFH PPP CHRO-ACNC: 0.49 IU/ML
WBC # BLD AUTO: 9.5 10E3/UL (ref 4–11)

## 2022-06-07 PROCEDURE — 250N000013 HC RX MED GY IP 250 OP 250 PS 637: Performed by: STUDENT IN AN ORGANIZED HEALTH CARE EDUCATION/TRAINING PROGRAM

## 2022-06-07 PROCEDURE — 93005 ELECTROCARDIOGRAM TRACING: CPT

## 2022-06-07 PROCEDURE — 85610 PROTHROMBIN TIME: CPT | Performed by: SURGERY

## 2022-06-07 PROCEDURE — 97530 THERAPEUTIC ACTIVITIES: CPT | Mod: GP

## 2022-06-07 PROCEDURE — 97116 GAIT TRAINING THERAPY: CPT | Mod: GP

## 2022-06-07 PROCEDURE — 999N000007 HC SITE CHECK

## 2022-06-07 PROCEDURE — 250N000013 HC RX MED GY IP 250 OP 250 PS 637: Performed by: PHYSICIAN ASSISTANT

## 2022-06-07 PROCEDURE — 84100 ASSAY OF PHOSPHORUS: CPT | Performed by: SURGERY

## 2022-06-07 PROCEDURE — 250N000011 HC RX IP 250 OP 636: Performed by: PHYSICIAN ASSISTANT

## 2022-06-07 PROCEDURE — 83735 ASSAY OF MAGNESIUM: CPT | Performed by: SURGERY

## 2022-06-07 PROCEDURE — 36592 COLLECT BLOOD FROM PICC: CPT | Performed by: SURGERY

## 2022-06-07 PROCEDURE — 85027 COMPLETE CBC AUTOMATED: CPT | Performed by: SURGERY

## 2022-06-07 PROCEDURE — 250N000013 HC RX MED GY IP 250 OP 250 PS 637: Performed by: SURGERY

## 2022-06-07 PROCEDURE — 82310 ASSAY OF CALCIUM: CPT | Performed by: SURGERY

## 2022-06-07 PROCEDURE — 214N000001 HC R&B CCU UMMC

## 2022-06-07 PROCEDURE — 85520 HEPARIN ASSAY: CPT | Performed by: SURGERY

## 2022-06-07 PROCEDURE — 93010 ELECTROCARDIOGRAM REPORT: CPT | Performed by: INTERNAL MEDICINE

## 2022-06-07 RX ORDER — WARFARIN SODIUM 2.5 MG/1
2.5 TABLET ORAL
Status: COMPLETED | OUTPATIENT
Start: 2022-06-07 | End: 2022-06-07

## 2022-06-07 RX ADMIN — AMIODARONE HYDROCHLORIDE 200 MG: 200 TABLET ORAL at 08:58

## 2022-06-07 RX ADMIN — SENNOSIDES AND DOCUSATE SODIUM 1 TABLET: 50; 8.6 TABLET ORAL at 20:14

## 2022-06-07 RX ADMIN — ACETAMINOPHEN 650 MG: 325 TABLET, FILM COATED ORAL at 00:06

## 2022-06-07 RX ADMIN — HYDRALAZINE HYDROCHLORIDE 10 MG: 20 INJECTION INTRAMUSCULAR; INTRAVENOUS at 00:06

## 2022-06-07 RX ADMIN — POTASSIUM CHLORIDE 20 MEQ: 750 TABLET, EXTENDED RELEASE ORAL at 08:58

## 2022-06-07 RX ADMIN — WARFARIN SODIUM 2.5 MG: 2.5 TABLET ORAL at 17:31

## 2022-06-07 RX ADMIN — PANTOPRAZOLE SODIUM 40 MG: 40 TABLET, DELAYED RELEASE ORAL at 08:58

## 2022-06-07 RX ADMIN — ROSUVASTATIN CALCIUM 40 MG: 20 TABLET, FILM COATED ORAL at 08:58

## 2022-06-07 RX ADMIN — Medication 3 MG: at 20:25

## 2022-06-07 RX ADMIN — HEPARIN SODIUM AND DEXTROSE 1200 UNITS/HR: 10000; 5 INJECTION INTRAVENOUS at 11:17

## 2022-06-07 RX ADMIN — ACETAMINOPHEN 650 MG: 325 TABLET, FILM COATED ORAL at 18:56

## 2022-06-07 RX ADMIN — ASPIRIN 81 MG: 81 TABLET, CHEWABLE ORAL at 08:58

## 2022-06-07 RX ADMIN — Medication 12.5 MG: at 08:58

## 2022-06-07 RX ADMIN — ACETAMINOPHEN 650 MG: 325 TABLET, FILM COATED ORAL at 05:09

## 2022-06-07 RX ADMIN — LISINOPRIL 2.5 MG: 2.5 TABLET ORAL at 08:58

## 2022-06-07 ASSESSMENT — ACTIVITIES OF DAILY LIVING (ADL)
ADLS_ACUITY_SCORE: 23
ADLS_ACUITY_SCORE: 23
DEPENDENT_IADLS:: INDEPENDENT
ADLS_ACUITY_SCORE: 23

## 2022-06-07 NOTE — PROGRESS NOTES
Cardiovascular Surgery Progress Note  06/07/2022         Assessment and Plan:     Arun Wren is a 77 year old male with a PMH of HTN, multivessel CAD, and severe aortic stenosis. He is now s/p elective tissue AVR and 2 vessel CAB on 5/31 with Dr. Benítez.     Cardiovascular:   Hx of HTN, CAD, aortic stenosis  S/p tissue AVR and 2 vessel CAB (vein to ramus and PDA)  Weaned pressors by POD #1 am. Had some orthostatic hypoTN, likely secondary to routine post-op vasoplegia.  Chest tubes removed 6/5.   Pre-op ECHO with LVEF 55 - 60%.  Post-op BREANNE: LVEF 55%, LV hypertrophy present, normal global function of both the RV and LV.   -  mg (decrease to 81 mg on 6/6), rosuvastatin  - Metoprolol 25 mg PO BID, Lisinopril 2.5 mg PO daily (held 6/3 am)  - Metoprolol XL 12.5 mg q evening. Monitor for bradycardia.  Atrial fibrillation w RVR  - A-fib with 's, converted to NSR 6/3  - started IV Amio gtt overnight 6/2-6/3, transitioned to Amio 400 mg PO BID 6/3 pm, decreased to 200 mg BID given bradycardia and then held.   - EP consult as below  - Went back into A-fib around ~0400 on 6/6.  HR in 120-140s.  EKG showing afib with RVR. BP 110s/70s. Restarted Amio gtt 1 mg/min on 6/6 and transitioned back to PO 6/6 pm. Converted back to sinus bradycardia at 5 am on 6/7.  Syncopal episode   Two episodes of syncope 6/3-6/4. Was given fluid for orthostatic hypotension. One occurred with HR in 40s. Also had two conversion pauses lasting 5 seconds. HR now improved into 50s. Likely secondary to amio/metop.   - Decrease amio to 200 mg daily, held metoprolol for a few days until A-fib returned.  - Echo without effusion  - Consult EP given long pauses and significant bradycardia, likely due to medications. Okay with removing TPW, no pacing requirements, HR improved, Removed 6/5.      Pulmonary:  - Extubated POD 1 to 5 lpm via NC. Now saturating well on RA.   - Supplemental O2 PRN to keep sats > 92%. Wean off as tolerated.  -  Pulm toilet, IS, activity and deep breathing     Neurology / MSK:  Acute post-operative   - pain well controlled with acetaminophen, PO robaxin and oxycodone PRN, IV dilaudid PRN  Pre-syncopal spells/shuffled gait  - has had syncopal episodes 6/3 and 6/4, however for the past 3 days therapy has noted changes in his gait with activity (shuffling), along with lack of responsiveness and following commands during activity. Wife has noted episodes of staring into space for 5 seconds. Has otherwise remained neurologically intact  - CT head w/o contrast negative  - Neurology consulted - recommend MRI/ vEEG initially, but electing to monitor as these neuro events are likely secondary to his bradycardia. See their note from 6/4.   - There is some concern for Lewy Body disease - neurology recommended outpatient neuro follow-up for this (message sent and referral placed in discharge orders)      / Renal:  - Zhang out 6/1. No Hx of renal disease. Most recent creatinine 0.89, adequate UOP.   - Pre-op weight 231 lbs, most recent weight 239 lbs. Diuresed with Lasix 40 mg PO x 1 on 6/2 and 6/3 with weight reduction, but still volume up with dilated IVC, LE edema. Will give lasix 40 mg IV daily. Watch closely for orthostasis, however this was likely due to his bradycardia.      GI / FEN:   - Advance to Regular diet, continue bowel regimen  - Replace electrolytes as needed, hepatic enzymes WNL.     Endocrine:  Stress induced hyperglycemia initially managed on insulin drip postop, transitioned to sliding scale.     Infectious Disease:  Stress induced leukocytosis  - WBC WNL, remains afebrile, no signs or symptoms of infection  - Completed perioperative antibiotics     Hematology:   Acute blood loss anemia   Hgb stable, no signs or symptoms of active bleeding  Plt WNL     Anticoagulation:   - Only  mg daily. Decreased dose to 81 mg daily on 6/6 with starting warfarin.  - EP recommended anticoag when okay with CV Surg.   -  "Started anticoagulation per Dr. Benítez Monday 6/6;       - Low intensity heparin gtt bridging to therapeutic INR.       - Coumadin for A-fib, INR goal 2-3.  INR 1.16.          Prophylaxis:   - Stress ulcer prophylaxis: Pantoprazole 40 mg daily for 30 days  - DVT prophylaxis: Subcutaneous heparin, SCD     Disposition:   - Transferred to  on 6/1  - Therapies recommending discharge to Home  - Await therapeutic INR  - Has Right Arm PICC line (remove at discharge)    Discussed with Dr Benítez through both written and verbal communication.      Carlos Cote PA-C  Cardiothoracic Surgery  Pager [phone]    12:09 PM   June 7, 2022        Interval History:     Overnight events- converted to sinus carlitos today at 5 am.     States pain is well managed on current regimen. Slept well overnight.  Tolerating diet, is passing flatus, + BM. No nausea or vomiting.  Breathing well without complaints.   Working with therapies and ambulating in halls without assistance.   Denies chest pain, palpitations, dizziness, syncopal symptoms, fevers, chills, myalgias, or sternal popping/clicking.         Physical Exam:   Blood pressure 115/71, pulse 63, temperature 98.2  F (36.8  C), temperature source Oral, resp. rate 16, height 1.74 m (5' 8.5\"), weight 107.1 kg (236 lb 3.2 oz), SpO2 98 %.  Vitals:    06/04/22 0500 06/05/22 0600 06/07/22 0508   Weight: 109.6 kg (241 lb 11.2 oz) 108.5 kg (239 lb 4.8 oz) 107.1 kg (236 lb 3.2 oz)      Weight; + 5 lbs since admit and trending down.   24 hr Fluid status; about net even but some unrecorded urine.  mL  MAPs: 77 - 100     Gen: A&Ox4, NAD  Neuro: no focal deficits   CV: sinus carlitos high 50's, normal S1 S2, no murmurs, rubs or gallops.   Pulm: CTA, no wheezing or rhonchi, normal breathing on RA  Abd: nondistended, normal BS, soft, nontender  Ext: moderate peripheral edema, 2+ pitting  Incision: clean, dry, intact, no erythema, sternum stable  Tubes/drain sites: dressing clean and dry        "  Data:    Imaging:  reviewed recent imaging, no acute concerns    Labs:  BMP  Recent Labs   Lab 06/07/22  0326 06/06/22  0532 06/05/22  0720 06/04/22  0628    139 140 142   POTASSIUM 4.0 3.5 3.9 4.2   CHLORIDE 106 106 106 107   DEVORA 8.1* 8.3* 8.4* 8.3*   CO2 28 27 28 28   BUN 15 19 21 24   CR 0.91 0.89 0.89 0.94   * 121* 96 110*     CBC  Recent Labs   Lab 06/07/22  0326 06/06/22  0532 06/05/22  0720 06/04/22  0628   WBC 9.5 7.6 9.8 11.1*   RBC 3.14* 3.22* 3.22* 3.18*   HGB 10.3* 10.2* 10.4* 10.3*   HCT 31.0* 31.4* 31.8* 32.0*   MCV 99 98 99 101*   MCH 32.8 31.7 32.3 32.4   MCHC 33.2 32.5 32.7 32.2   RDW 14.3 14.0 13.8 14.0    200 177 141*     INR  Recent Labs   Lab 06/07/22  0326 06/06/22  0532 06/05/22  0720 06/04/22  0732   INR 1.16* 1.16* 1.11 1.05      Hepatic Panel  Recent Labs   Lab 06/04/22  0628 06/01/22  0405 06/01/22  0015 05/31/22  1420   AST 62* 45 49* 34   ALT 48 22 23 21   ALKPHOS 49 32* 36* 35*   BILITOTAL 0.7 0.7 0.7 0.6   ALBUMIN 3.1* 3.0* 3.1* 2.6*     GLUCOSE:   Recent Labs   Lab 06/07/22  0326 06/06/22  0532 06/05/22  0720 06/04/22  0628 06/03/22  1818 06/03/22  0606   * 121* 96 110* 138* 111*

## 2022-06-07 NOTE — PLAN OF CARE
D: Admitted 5/31 for elective tissue AVR and 2 vessel CABG. Post op c/b atrial fibrillation.   PMH: HTN, multivessel CAD, and severe aortic stenosis.      I: Monitored vitals and assessed pt status. Encouraged activity.   Changed:   - stopped amiodarone drip ~2030  - pt converted to sinus bradycardia at 0511 after 3.4 sec pause   Running: heparin 1200 units/hr  PRN: tylenol x2, IV hydralazine x1     A: A&Ox4, forgetful at times. [place], wears bilateral hearing aids. VSS on RA. Pt was in atrial fibrillation beginning of this shift with rates . SB since 0511 with rate 40-50s.  Afebrile. Reported back pain and pain at PICC insertion site, site WNL, tylenol given with some relief. Pt also reported tenderness at RLE graft site; site looks reddened, marked with skin marker, no change overnight. Urinating adequately. LBM 6/06. Up with SBA and walker. Appeared to rest comfortably between cares.      P: Continue to monitor pt status and report changes to CVTS treatment team.      [id number]  Luz Marina Tristan RN on 6/7/2022 at 6:18 AM

## 2022-06-07 NOTE — CONSULTS
Care Management Initial Consult    General Information  Assessment completed with: Patient, spouse  Type of CM/SW Visit: Initial Assessment  Primary Care Provider verified and updated as needed: Yes   Readmission within the last 30 days: no previous admission in last 30 days   Reason for Consult: discharge planning  Advance Care Planning: Has ACP documents on file.      Communication Assessment  Patient's communication style: spoken language (English or Bilingual)    Hearing Difficulty or Deaf: no   Wear Glasses or Blind: no    Cognitive  Cognitive/Neuro/Behavioral: WDL      Living Environment:   People in home: spouseRajiv  Current living Arrangements: house      Able to return to prior arrangements: yes     Family/Social Support:  Care provided by: self, spouse/significant other  Provides care for: no one  Marital Status:   Support System: Wife, Children   Description of Support System: Supportive, Involved       Current Resources:   Patient receiving home care services: No  Community Resources: None  Equipment currently used at home: grab bar, tub/shower  Supplies currently used at home: None    Employment/Financial:  Employment Status: retired     Financial Concerns: No concerns identified      Functional Status:  Prior to admission patient needed assistance: Independent with all ADLs at baseline.     Mental Health Status:  Mental Health Status: No Current Concerns       Chemical Dependency Status:  Chemical Dependency Status: No Current Concerns           Values/Beliefs:  Spiritual, Cultural Beliefs, Anglican Practices, Values that affect care:           Additional Information:  Care management assessment completed at the bedside with patient and his spouse. Patient lives in [place], [place], independently w/his spouse. Patient does not use any adaptive equipment at baseline, does not have any current in home services. Patient is aware of the recommendation for home w/outpatient cardiac rehab, referral  placed and the  affiliated hospital/clinic in [place], [place] will contact the patient to schedule. Patient briefly educated on warfarin, PLC order placed. Patient's spouse will be able to transport patient to lab and medical appointments. No additional concerns or needs noted a this time. Care coordination will continue to follow for discharge planning.     Michelle Noguera, RNCC, BSN    [facility]    [address]  [address] [place] [address]    [email or web address]    Office: [phone] Pager: [phone]    To contact the weekend RNCC  [place] (0800 - 1630) Saturday and Sunday    Units: 4A, 4C, 4E, 5A and 5B- Pager 1: [phone]    Units: 6A, 6B, 6C, 6D- Pager 2: [phone]    Units: 7A, 7B, 7C, 7D, and 5C-Pager 3: [phone]

## 2022-06-07 NOTE — PLAN OF CARE
D: S/p elective tissue AVR and 2 vessel CAB on 5/31.    I: Monitored vitals and assessed pt status.   Changed: Metoprolol now 1x daily   Running: Heparin @1200 units/hr  Tele: SR/SB, HR 50-60's  Mobility: SBA (okay to ambulate in halls with wife)    A: AOx4, forgetful at times. VSS. Afebrile. Urinating adequately. No BM during shift, requesting his PM dose of stool softener tonight. Pain well controlled during shift. Pt care order put in today for okay to only do HRs from 10p-5a to minimize interruptions for pt.      P: Continue to monitor Pt status and report changes to CVTS.

## 2022-06-08 ENCOUNTER — APPOINTMENT (OUTPATIENT)
Dept: EDUCATION SERVICES | Facility: CLINIC | Age: 77
DRG: 220 | End: 2022-06-08
Attending: SURGERY
Payer: COMMERCIAL

## 2022-06-08 ENCOUNTER — APPOINTMENT (OUTPATIENT)
Dept: PHYSICAL THERAPY | Facility: CLINIC | Age: 77
DRG: 220 | End: 2022-06-08
Attending: SURGERY
Payer: COMMERCIAL

## 2022-06-08 DIAGNOSIS — I97.89 POSTOPERATIVE ATRIAL FIBRILLATION (H): Primary | ICD-10-CM

## 2022-06-08 DIAGNOSIS — I48.91 POSTOPERATIVE ATRIAL FIBRILLATION (H): Primary | ICD-10-CM

## 2022-06-08 PROBLEM — I48.0 PAROXYSMAL ATRIAL FIBRILLATION (H): Status: ACTIVE | Noted: 2022-06-08

## 2022-06-08 LAB
ANION GAP SERPL CALCULATED.3IONS-SCNC: 5 MMOL/L (ref 3–14)
ATRIAL RATE - MUSE: 58 BPM
BUN SERPL-MCNC: 13 MG/DL (ref 7–30)
CALCIUM SERPL-MCNC: 8.5 MG/DL (ref 8.5–10.1)
CHLORIDE BLD-SCNC: 106 MMOL/L (ref 94–109)
CO2 SERPL-SCNC: 29 MMOL/L (ref 20–32)
CREAT SERPL-MCNC: 0.97 MG/DL (ref 0.66–1.25)
DIASTOLIC BLOOD PRESSURE - MUSE: NORMAL MMHG
ERYTHROCYTE [DISTWIDTH] IN BLOOD BY AUTOMATED COUNT: 14.6 % (ref 10–15)
GFR SERPL CREATININE-BSD FRML MDRD: 81 ML/MIN/1.73M2
GLUCOSE BLD-MCNC: 99 MG/DL (ref 70–99)
HCT VFR BLD AUTO: 32.2 % (ref 40–53)
HGB BLD-MCNC: 10.8 G/DL (ref 13.3–17.7)
INR PPP: 1.25 (ref 0.85–1.15)
INTERPRETATION ECG - MUSE: NORMAL
MAGNESIUM SERPL-MCNC: 2.4 MG/DL (ref 1.6–2.3)
MCH RBC QN AUTO: 33.1 PG (ref 26.5–33)
MCHC RBC AUTO-ENTMCNC: 33.5 G/DL (ref 31.5–36.5)
MCV RBC AUTO: 99 FL (ref 78–100)
P AXIS - MUSE: 54 DEGREES
PLATELET # BLD AUTO: 245 10E3/UL (ref 150–450)
POTASSIUM BLD-SCNC: 4 MMOL/L (ref 3.4–5.3)
PR INTERVAL - MUSE: 194 MS
QRS DURATION - MUSE: 98 MS
QT - MUSE: 486 MS
QTC - MUSE: 477 MS
R AXIS - MUSE: 42 DEGREES
RBC # BLD AUTO: 3.26 10E6/UL (ref 4.4–5.9)
SODIUM SERPL-SCNC: 140 MMOL/L (ref 133–144)
SYSTOLIC BLOOD PRESSURE - MUSE: NORMAL MMHG
T AXIS - MUSE: 26 DEGREES
UFH PPP CHRO-ACNC: 0.47 IU/ML
VENTRICULAR RATE- MUSE: 58 BPM
WBC # BLD AUTO: 10.8 10E3/UL (ref 4–11)

## 2022-06-08 PROCEDURE — 250N000011 HC RX IP 250 OP 636: Performed by: PHYSICIAN ASSISTANT

## 2022-06-08 PROCEDURE — 85027 COMPLETE CBC AUTOMATED: CPT | Performed by: PHYSICIAN ASSISTANT

## 2022-06-08 PROCEDURE — 36592 COLLECT BLOOD FROM PICC: CPT | Performed by: PHYSICIAN ASSISTANT

## 2022-06-08 PROCEDURE — 214N000001 HC R&B CCU UMMC

## 2022-06-08 PROCEDURE — 250N000013 HC RX MED GY IP 250 OP 250 PS 637: Performed by: PHYSICIAN ASSISTANT

## 2022-06-08 PROCEDURE — 83735 ASSAY OF MAGNESIUM: CPT | Performed by: PHYSICIAN ASSISTANT

## 2022-06-08 PROCEDURE — 250N000013 HC RX MED GY IP 250 OP 250 PS 637: Performed by: SURGERY

## 2022-06-08 PROCEDURE — 85610 PROTHROMBIN TIME: CPT | Performed by: PHYSICIAN ASSISTANT

## 2022-06-08 PROCEDURE — 82310 ASSAY OF CALCIUM: CPT | Performed by: PHYSICIAN ASSISTANT

## 2022-06-08 PROCEDURE — 85520 HEPARIN ASSAY: CPT | Performed by: SURGERY

## 2022-06-08 PROCEDURE — 97116 GAIT TRAINING THERAPY: CPT | Mod: GP

## 2022-06-08 PROCEDURE — 250N000013 HC RX MED GY IP 250 OP 250 PS 637: Performed by: STUDENT IN AN ORGANIZED HEALTH CARE EDUCATION/TRAINING PROGRAM

## 2022-06-08 RX ORDER — WARFARIN SODIUM 5 MG/1
5 TABLET ORAL
Status: COMPLETED | OUTPATIENT
Start: 2022-06-08 | End: 2022-06-08

## 2022-06-08 RX ADMIN — HYDRALAZINE HYDROCHLORIDE 10 MG: 20 INJECTION INTRAMUSCULAR; INTRAVENOUS at 16:14

## 2022-06-08 RX ADMIN — WARFARIN SODIUM 5 MG: 5 TABLET ORAL at 18:06

## 2022-06-08 RX ADMIN — Medication 3 MG: at 20:37

## 2022-06-08 RX ADMIN — ROSUVASTATIN CALCIUM 40 MG: 20 TABLET, FILM COATED ORAL at 08:39

## 2022-06-08 RX ADMIN — PANTOPRAZOLE SODIUM 40 MG: 40 TABLET, DELAYED RELEASE ORAL at 08:40

## 2022-06-08 RX ADMIN — ASPIRIN 81 MG: 81 TABLET, CHEWABLE ORAL at 08:41

## 2022-06-08 RX ADMIN — POTASSIUM CHLORIDE 20 MEQ: 750 TABLET, EXTENDED RELEASE ORAL at 08:40

## 2022-06-08 RX ADMIN — HYDRALAZINE HYDROCHLORIDE 10 MG: 20 INJECTION INTRAMUSCULAR; INTRAVENOUS at 03:47

## 2022-06-08 RX ADMIN — Medication 12.5 MG: at 19:36

## 2022-06-08 RX ADMIN — LISINOPRIL 2.5 MG: 2.5 TABLET ORAL at 08:39

## 2022-06-08 RX ADMIN — AMIODARONE HYDROCHLORIDE 200 MG: 200 TABLET ORAL at 08:40

## 2022-06-08 RX ADMIN — HEPARIN SODIUM AND DEXTROSE 1200 UNITS/HR: 10000; 5 INJECTION INTRAVENOUS at 08:34

## 2022-06-08 ASSESSMENT — ACTIVITIES OF DAILY LIVING (ADL)
ADLS_ACUITY_SCORE: 23
ADLS_ACUITY_SCORE: 26
ADLS_ACUITY_SCORE: 26
ADLS_ACUITY_SCORE: 23
ADLS_ACUITY_SCORE: 23
ADLS_ACUITY_SCORE: 26
ADLS_ACUITY_SCORE: 23

## 2022-06-08 NOTE — DISCHARGE INSTRUCTIONS
AFTER YOU GO HOME FROM YOUR HEART SURGERY  (Tissue Aortic valve replacement and 2 vessel coronary artery bypass on 5/31/22 with Dr Sai Benítez)    You had a sternotomy, avoid lifting anything greater than ten pounds for 6 weeks after surgery and then less than 20 pounds for an additional 6 weeks. Do not reach backwards or use arms to push out of chair. Do not let people pull on your arms to assist with standing. Avoid twisting or reaching too far across your body.  Avoid strenuous activities such as bowling, vacuuming, raking, shoveling, golf or tennis for 12 weeks after your surgery. It is okay to resume sex if you feel comfortable in doing so but DO NOT take SILDENAFIL until approval your Cardiologist (risk of low blood pressure). You may have to try different positions with your partner.  Splint your chest incision by hugging a pillow or bringing your arms across your chest when coughing or sneezing. Please try to sleep on your back for the first 4-6 weeks to avoid extra stress on your sternum (breastbone) while it is healing.     No driving for 4 weeks after surgery or while on pain medication.     Shower or wash your incisions twice daily with soap and water (or as instructed), pat dry. Keep wound clean and dry, showers are okay after discharge, but don't let spray hit directly on incision. No baths or swimming for 1 month. Cover chest tube sites with gauze until they stop draining, then leave open to air. It is not abnormal for chest tube sites to drain yellowish/clear fluid for up to 2-3 weeks after surgery.   Watch for signs of infection: increased redness, tenderness, warmth or any drainage from sternum incision.  Also a temperature > 100.5 F or chills. Call your surgeon or primary care provider's office immediately. Remove any skin glue left on incisions after 10-14 days. This will not affect your incision and can speed up healing.    Exercise is very important in your recovery. Please follow the  guidelines set up for you in your cardiac rehab classes at the hospital. If outpatient cardiac rehab was ordered for you, we highly recommend you participate. If you have problems arranging your cardiac rehab, please call [phone] for all locations, with the exception of [place], please call [phone] and [place], please call [phone].    Avoid sitting for prolonged periods of time, try to walk every hour during the day. If you have a leg incision, elevate your leg often when you are not walking.    Check your weight when you get home from the hospital and continue to check it daily through your recovery for at least a month. If you notice a weight gain of 2-3 pounds in a week, notify your primary care physician, cardiologist or surgeon.    Bowel activity may be slow after surgery. If necessary, you may take an over the counter laxative such as Milk of Magnesia or Miralax. You may have stool softeners prescribed (docusate sodium, Senokot). We recommend using stool softeners while using narcotics for pain (oxycodone/percocet, hydrocodone/vicodin, hydromorphone/dilaudid).      Wean OFF of narcotics (oxycodone, dilaudid, hydrocodone) as soon as possible. You should continue taking acetaminophen as long as you have any surgical pain as the first choice for pain control and add narcotics as necessary for pain to be tolerable.      DENTAL VISITS AFTER SURGERY  You have had your aortic heart valve replaced, we do not recommend having any dental work done for 6 months and you will need to take an antibiotic prior to dental visits from now on.  Please notify your dentist before any procedure for the proper treatment needed. The antibiotic is taken by mouth one hour prior to visit. This includes routine cleanings.    DO NOT SMOKE.  IF YOU NEED HELP QUITTING, PLEASE TALK WITH YOUR CARDIOLOGIST OR PRIMARY DOCTOR.    You are on a blood thinner (coumadin/warfarin/jantoven), follow the instructions you were given in  the hospital and DO NOT SKIP this medication. Try and take it the same time everyday. Your primary care physician or coumadin clinic will manage the dosing. INR goal is 2-3 for Atrial Fibrillation.    REGARDING PRESCRIPTION REFILLS.  If you need a refill on your pain medication contact us to discuss your pain and a possible one time refill.   All other medications will be adjusted, discontinued and re-filled by your primary care physician and/or your cardiologist as they were prior to your surgery. We have given you enough for one to three month with possibly one refill.    POST-OPERATIVE CLINIC VISITS  You will now return to the care of your primary provider and your cardiologist. Future medication refills should come from your PCP or Cardiologist.   You should see your primary care provider in 1-3 days for INR checks after discharge.   It is important to see your cardiologist (Dr Mynor Ulloa) about 3-4 weeks after discharge.    If you do not hear from a  in 7 days, please call [phone] (choose option 1) and request to be seen with a general cardiologist or someone that you have seen in the past.   If there is a need to return to see CT Surgery, please call our  Danisha Proctor at [phone].     SURGICAL QUESTIONS  Please call Paulino Fermin or Lola Morrison with surgical recovery and medication questions, their phone numbers are listed below.  They will assist you with your needs and contact other surgery care team members as indicated.    On weekends or after hours, please call [phone] and ask the  to page the Cardiothoracic Surgery fellow on call.      Thank you,    Your Cardiothoracic Surgery Team  Paulino Fermin RN Care Coordinator-  [phone]   Lola Morrison RN Care Coordinator- [phone]

## 2022-06-08 NOTE — CONSULTS
Warfarin education completed with Gene and wife Rajiv.    Literature given: Guide to Warfarin Therapy, Warfarin Therapy Calendar,

## 2022-06-08 NOTE — DISCHARGE SUMMARY
[facility], [place]   Cardiothoracic Surgery Hospital Discharge Summary     Arun Wren MRN# [id number]   Age: 77 year old YOB: 1945     Admitting Physician:  Sai Benítez MD  Discharge Physician:  RAFAEL Saab  Primary Care Physician:        Richard Arita     DATE OF ADMISSION: 5/31/2022      DATE OF DISCHARGE: June 9, 2022     Admit Wt: 231 lbs   Discharge Wt: 236 lbs          Primary Diagnoses:   1. Multivessel Coronary artery disease, s/p 2 vessel CAB  2. Severe aortic stenosis, s/p tissue AVR  3. Post-op paroxysmal atrial fibrillation, resolved  4. Anticoagulation for A-fib, INR goal 2-3 for 3 months  5. Syncopal spells likely secondary to bradycardia episodes, resolved. Needs Neurology follow up.          Secondary Diagnoses:   1. HTN  2. HLD    PROCEDURES PERFORMED:   Date: 5/31/22.  Surgeon: Dr. Sai Benítez  1. Aortic valve replacement with a 25 mm Merritt Inspiris Resilia bovine pericardial valve  2. Coronary artery bypass grafting x2 with reverse saphenous vein graft to the ramus intermedius artery, reverse saphenous vein graft to the posterior descending artery  3. Endoscopic vein harvest from the right lower extremity  4. Intraoperative BREANNE.    OPERATIVE FINDINGS:    The patient had an overall normal LV systolic size and function.  He had a severely calcified trileaflet aortic valve that was severely stenotic.  The right greater saphenous vein was a good quality conduit, measuring 4 mm in diameter.  The circumflex coronary artery was quite small and we therefore decided not to graft it.  The ramus intermedius artery was heavily diseased and severely calcified and required a coronary endarterectomy to graft it.  The probe size was 1.5 mm.  The posterior descending artery had moderate to severe disease and the probe size was 1.5 mm as well.     INTRAOPERATIVE COMPLICATIONS:  none    PATHOLOGY RESULTS:    Surgical Pathology  5/31/22-  AORTIC VALVE LEAFLETS; VALVE REPLACEMENT,   Calcified aortic valve leaflets; no vegetations     CULTURE RESUTS:  none    CONSULTS:    1. PT/OT  2. Neurology   3. Electrophysiology     BRIEF HISTORY OF ILLNESS:  Mr. Wren is a very pleasant 77-year-old gentleman who was recently seen in the [facility] for severe symptomatic aortic stenosis.  Coronary angiogram demonstrated severe multivessel coronary artery disease.  He was recently referred to Dr Benítez for surgical evaluation for aortic valve replacement and coronary artery bypass surgery.  He was deemed an appropriate candidate for a bioprosthetic AVR and concomitant coronary artery bypass grafting.    HOSPITAL COURSE:   Arun Wren is a 77 year old male who on 5/31/2022 underwent the above-named procedures.  He tolerated the operation well and postoperatively was admitted to the CVICU.  He was extubated on POD # 1 to 5 lpm via NC with neuro status intact.  His ICU stay was complicated by some delirium after extubation but was HD stable.    He was transferred to the post-surgical telemetry unit on POD # 1.   He gradually cleared concerning mental status, but then had a few near-syncopal episodes 6/3 and 6/4. Therapy teams noted gait changes and more shuffling of his feet, less responsive to commands. Neurology was consulted, felt episodes were related to medication induced bradycardia but couldn't rule out Lewy Body disease. Head CT 6/4 showed no intracranial pathology, symptoms resolved with less metoprolol. Neuro recommended outpatient follow up.   He also had paroxysmal atrial fibrillation, started 6/2 overnight. He was started on Amio and increased his BB (which likely contributed to near-syncopal episodes). He went in and out of A-fib as medications were reduced/held. Was started on coumadin and Hep gtt bridging to therapeutic INR, but heparin was stopped 6/8 after self-conversion to NSR on 6/7/22 at 5 am. He remained in sinus  "bradycardia during the remainder of his stay. Discharged on Metop, lisinopril, and Amio taper. INR 1.58 at discharge.   He tolerated diuresis well during his stay and was trending towards pre-op weight.    Prior to discharge, his pain was controlled well, he was able to perform most ADLs and ambulate without difficulty, and had full return of bowel and bladder function.  On June 9, 2022, he was discharged to home in stable condition.    Patient discharged on aspirin:  Yes 81 mg  Patient discharged on a statin: Yes  Patient discharged on beta blocker: Yes  Patient discharged on ACE Inhibitor/ARB: Yes         Discharge Disposition:     Discharged to home            Condition on Discharge:     Discharge condition: Stable   Discharge vitals: Blood pressure (!) 147/83, pulse 66, temperature 98.6  F (37  C), temperature source Oral, resp. rate 16, height 1.74 m (5' 8.5\"), weight 107.4 kg (236 lb 11.2 oz), SpO2 97 %.     Code status on discharge: Full Code     Vitals:    06/07/22 0508 06/08/22 0346 06/09/22 0347   Weight: 107.1 kg (236 lb 3.2 oz) 107.9 kg (237 lb 12.8 oz) 107.4 kg (236 lb 11.2 oz)       DAY OF DISCHARGE PHYSICAL EXAM:    Blood pressure (!) 147/83, pulse 66, temperature 98.6  F (37  C), temperature source Oral, resp. rate 16, height 1.74 m (5' 8.5\"), weight 107.4 kg (236 lb 11.2 oz), SpO2 97 %.  Vitals:    06/07/22 0508 06/08/22 0346 06/09/22 0347   Weight: 107.1 kg (236 lb 3.2 oz) 107.9 kg (237 lb 12.8 oz) 107.4 kg (236 lb 11.2 oz)      Weight; + 5 lbs since admit and trending down.   MAPs: 80 - 111 (high was before AM meds)    Gen: A&Ox4, NAD  Neuro: no focal deficits   CV: RRR, normal S1 S2, no murmurs, rubs or gallops.   Pulm: CTA, no wheezing or rhonchi, normal breathing on RA  Abd: nondistended, normal BS, soft, nontender  Ext: no peripheral edema  Incision: clean, dry, intact, no erythema, sternum stable  Tubes/drain sites: dressing clean and dry     [facility]  Recent Labs   Lab 06/08/22  0619 " 06/07/22 0326 06/06/22  0532 06/05/22  0720    139 139 140   POTASSIUM 4.0 4.0 3.5 3.9   CHLORIDE 106 106 106 106   DEVORA 8.5 8.1* 8.3* 8.4*   CO2 29 28 27 28   BUN 13 15 19 21   CR 0.97 0.91 0.89 0.89   GLC 99 102* 121* 96     CBC  Recent Labs   Lab 06/08/22  0619 06/07/22 0326 06/06/22  0532 06/05/22  0720   WBC 10.8 9.5 7.6 9.8   RBC 3.26* 3.14* 3.22* 3.22*   HGB 10.8* 10.3* 10.2* 10.4*   HCT 32.2* 31.0* 31.4* 31.8*   MCV 99 99 98 99   MCH 33.1* 32.8 31.7 32.3   MCHC 33.5 33.2 32.5 32.7   RDW 14.6 14.3 14.0 13.8    218 200 177     INR  Recent Labs   Lab 06/09/22  0628 06/08/22  0619 06/07/22  0326 06/06/22  0532   INR 1.58* 1.25* 1.16* 1.16*      Hepatic Panel  Recent Labs   Lab 06/04/22  0628   AST 62*   ALT 48   ALKPHOS 49   BILITOTAL 0.7   ALBUMIN 3.1*     Recent Labs   Lab 06/08/22 0619 06/07/22 0326 06/06/22  0532 06/05/22  0720 06/04/22  0628 06/03/22  1818   GLC 99 102* 121* 96 110* 138*       ECHOCARDIOGRAM, 6/4/2022-   Technically difficult study.  S/p CABG+AVR 25MM INSPIRIS RESILIA AORTIC VALVE.  AVR valve leaflets not well seen. Normal Doppler interrogation of the valve.  Mean gradient is 5 mmHg. No AI.  Global and regional left ventricular function is normal with an EF of 55-60%.  RV is not seen well. From limited views appear normal size and function.  This study was compared with the study from 2/15/2022 .Pateint is now s/p AVR for severe AS.  ______________________________________________________________________________  Left Ventricle  Left ventricular size is normal. Global and regional left ventricular function is normal with an EF of  55-60%. Left ventricular wall thickness cannot evaluate. Left ventricular diastolic function is indeterminate.     Right Ventricle-  The right ventricle is normal size. Global right ventricular function is normal.     Atria-  The right atria appears normal. Severe left atrial enlargement is present.     Mitral Valve-  Mild mitral annular  calcification is present.     Aortic Valve-  The mean AoV pressure gradient is 5.0 mmHg. 25MM INSPIRIS RESILIA AORTIC VALVE.     Tricuspid Valve-  The tricuspid valve cannot be assessed.     Pulmonic Valve-  The pulmonic valve cannot be assessed.     Vessels-  The inferior vena cava cannot be assessed. The aorta root is normal.     Pericardium-  No pericardial effusion is present.     Compared to Previous Study  This study was compared with the study from 2/15/2022. Patient is now s/p AVR for severe AS.  ______________________________________________________________________________  MMode/2D Measurements & Calculations  IVSd: 1.2 cm  LVIDd: 4.3 cm  LVPWd: 1.3 cm  LV mass(C)d: 193.7 grams  LV mass(C)dI: 87.9 grams/m2  LA Volume (BP): 127.0 ml  LA Volume Index (BP): 57.7 ml/m2  RWT: 0.60     Doppler Measurements & Calculations  MV E max stef: 109.0 cm/sec  MV A max stef: 61.1 cm/sec  MV E/A: 1.8     MV dec slope: 603.0 cm/sec2  Ao V2 max: 146.7 cm/sec  Ao max P.0 mmHg  Ao V2 mean: 104.0 cm/sec  Ao mean P.0 mmHg  Ao V2 VTI: 32.1 cm  LV V1 max P.2 mmHg  LV V1 max: 103.0 cm/sec  LV V1 VTI: 21.2 cm  AV Stef Ratio (DI): 0.70  Lateral E/e': 12.8    CXR 2022-   New right arm PICC line tip projects over the low/mid SVC, no pneumothorax.  Unchanged chronic small left pleural effusion versus  lung base scarring. Aortic valve replacement. CABG. Atherosclerosis.    DISCHARGE INSTRUCTIONS:  You had a sternotomy, avoid lifting anything greater than ten pounds for 6 weeks after surgery and then less than 20 pounds for an additional 6 weeks. Do not reach backwards or use arms to push out of chair. Do not let people pull on your arms to assist with standing. Avoid twisting or reaching too far across your body.  Avoid strenuous activities such as bowling, vacuuming, raking, shoveling, golf or tennis for 12 weeks after your surgery. It is okay to resume sex if you feel comfortable in doing so. You may have to try different  positions with your partner.  Splint your chest incision by hugging a pillow or bringing your arms across your chest when coughing or sneezing. Please try to sleep on your back for the first 4-6 weeks to avoid extra stress on your sternum (breastbone) while it is healing.     No driving for 4 weeks after surgery or while on pain medication.     Shower or wash your incisions twice daily with soap and water (or as instructed), pat dry. Keep wound clean and dry, showers are okay after discharge, but don't let spray hit directly on incision. No baths or swimming for 1 month. Cover chest tube sites with gauze until they stop draining, then leave open to air. It is not abnormal for chest tube sites to drain yellowish/clear fluid for up to 2-3 weeks after surgery.   Watch for signs of infection: increased redness, tenderness, warmth or any drainage from sternum incision.  Also a temperature > 100.5 F or chills. Call your surgeon or primary care provider's office immediately. Remove any skin glue left on incisions after 10-14 days. This will not affect your incision and can speed up healing.    Exercise is very important in your recovery. Please follow the guidelines set up for you in your cardiac rehab classes at the hospital. If outpatient cardiac rehab was ordered for you, we highly recommend you participate. If you have problems arranging your cardiac rehab, please call [phone] for all locations, with the exception of [place], please call [phone] and [facility], please call [phone].    Avoid sitting for prolonged periods of time, try to walk every hour during the day. If you have a leg incision, elevate your leg often when you are not walking.    Check your weight when you get home from the hospital and continue to check it daily through your recovery for at least a month. If you notice a weight gain of 2-3 pounds in a week, notify your primary care physician, cardiologist or surgeon.    Bowel activity  may be slow after surgery. If necessary, you may take an over the counter laxative such as Milk of Magnesia or Miralax. You may have stool softeners prescribed (docusate sodium, Senokot). We recommend using stool softeners while using narcotics for pain (oxycodone/percocet, hydrocodone/vicodin, hydromorphone/dilaudid).      Wean OFF of narcotics (oxycodone, dilaudid, hydrocodone) as soon as possible. You should continue taking acetaminophen as long as you have any surgical pain as the first choice for pain control and add narcotics as necessary for pain to be tolerable.      DENTAL VISITS AFTER SURGERY  You have had your aortic heart valve replaced, we do not recommend having any dental work done for 6 months and you will need to take an antibiotic prior to dental visits from now on.  Please notify your dentist before any procedure for the proper treatment needed. The antibiotic is taken by mouth one hour prior to visit. This includes routine cleanings.    DO NOT SMOKE.  IF YOU NEED HELP QUITTING, PLEASE TALK WITH YOUR CARDIOLOGIST OR PRIMARY DOCTOR.    You are on a blood thinner (coumadin/warfarin/jantoven), follow the instructions you were given in the hospital and DO NOT SKIP this medication. Try and take it the same time everyday. Your primary care physician or coumadin clinic will manage the dosing. INR goal is 2-3 for Atrial Fibrillation.    REGARDING PRESCRIPTION REFILLS.  If you need a refill on your pain medication contact us to discuss your pain and a possible one time refill.   All other medications will be adjusted, discontinued and re-filled by your primary care physician and/or your cardiologist as they were prior to your surgery. We have given you enough for one to three month with possibly one refill.    POST-OPERATIVE CLINIC VISITS  You will now return to the care of your primary provider and your cardiologist. Future medication refills should come from your PCP or Cardiologist.   You should see  your primary care provider in 1-3 days for INR checks after discharge.   It is important to see your cardiologist (Dr Mynor Ulloa) about 3-4 weeks after discharge.      PRE-ADMISSION MEDICATIONS:  No current facility-administered medications on file prior to encounter.  amoxicillin (AMOXIL) 500 MG capsule, Take 4 capsules (2,000 mg) by mouth once as needed (30-60 minutes prior to dental procedures/cleanings.)  aspirin (ASA) 81 MG chewable tablet, Take 1 tablet (81 mg) by mouth daily  cholecalciferol (VITAMIN D3) 25 mcg (1000 units) capsule, Take 1 capsule by mouth daily  melatonin 3 MG tablet, Take 3 mg by mouth nightly as needed for sleep  nystatin (MYCOSTATIN) 949670 UNIT/GM external cream, APPLY TOPICALLY TO AFFECTEDAREA(S) TWICE DAILY (Patient taking differently: No sig reported)  rosuvastatin (CRESTOR) 40 MG tablet, Take 1 tablet (40 mg) by mouth daily  sildenafil (REVATIO) 20 MG tablet, TAKE 4-5 TABLETS BY MOUTH ONCE DAILY IF NEEDED FOR OTHER (SPECIFY).TAKE 30 MINUTES BEFORE SEXUAL ACTIVITY.  triamcinolone (KENALOG) 0.1 % external cream, Apply topically 2 times daily (Patient taking differently: Apply topically 2 times daily as needed for irritation)  [DISCONTINUED] torsemide (DEMADEX) 10 MG tablet, Take 1 tablet (10 mg) by mouth daily       DISCHARGE MEDICATIONS:      Review of your medicines      START taking      Dose / Directions   acetaminophen 325 MG tablet  Commonly known as: TYLENOL  Used for: Bioprosthetic aortic valve replacement during current hospitalization, S/P CABG (coronary artery bypass graft)      Dose: 650 mg  Take 2 tablets (650 mg) by mouth every 6 hours as needed for other (For optimal non-opioid multimodal pain management to improve pain control.)  Quantity: 50 tablet  Refills: 0     amiodarone 200 MG tablet  Commonly known as: PACERONE  Used for: Bioprosthetic aortic valve replacement during current hospitalization, S/P CABG (coronary artery bypass graft)      Dose: 200 mg  Take 1 tablet  (200 mg) by mouth daily for 14 days then stop  Quantity: 14 tablet  Refills: 0     methocarbamol 500 MG tablet  Commonly known as: ROBAXIN  Used for: Bioprosthetic aortic valve replacement during current hospitalization, S/P CABG (coronary artery bypass graft)      Dose: 500 mg  Take 1 tablet (500 mg) by mouth 3 times daily as needed for muscle spasms (sternal pain)  Quantity: 25 tablet  Refills: 0     metoprolol succinate ER 25 MG 24 hr tablet  Commonly known as: TOPROL XL  Used for: Bioprosthetic aortic valve replacement during current hospitalization, S/P CABG (coronary artery bypass graft)      Dose: 12.5 mg  Take 0.5 tablets (12.5 mg) by mouth every evening  Quantity: 30 tablet  Refills: 0     senna-docusate 8.6-50 MG tablet  Commonly known as: SENOKOT-S/PERICOLACE  Used for: Bioprosthetic aortic valve replacement during current hospitalization, S/P CABG (coronary artery bypass graft)      Dose: 1-2 tablet  Take 1-2 tablets by mouth 2 times daily as needed for constipation  Quantity: 30 tablet  Refills: 0     warfarin ANTICOAGULANT 2.5 MG tablet  Commonly known as: COUMADIN      Take 5 mg PO daily at 6 pm until next INR check, then dose per INR goal 2-3 for Atrial fibrillation  Quantity: 100 tablet  Refills: 0        CONTINUE these medicines which may have CHANGED, or have new prescriptions. If we are uncertain of the size of tablets/capsules you have at home, strength may be listed as something that might have changed.      Dose / Directions   lisinopril 2.5 MG tablet  Commonly known as: ZESTRIL  This may have changed:     medication strength    how much to take  Used for: Bioprosthetic aortic valve replacement during current hospitalization, S/P CABG (coronary artery bypass graft)      Dose: 2.5 mg  Take 1 tablet (2.5 mg) by mouth daily  Quantity: 45 tablet  Refills: 0     nystatin 043503 UNIT/GM external cream  Commonly known as: MYCOSTATIN  This may have changed: See the new instructions.  Used for: Actinic  keratosis      APPLY TOPICALLY TO AFFECTEDAREA(S) TWICE DAILY  Quantity: 60 g  Refills: 4     triamcinolone 0.1 % external cream  Commonly known as: KENALOG  This may have changed:     when to take this    reasons to take this  Used for: Herpes zoster without complication      Apply topically 2 times daily  Quantity: 80 g  Refills: 4        CONTINUE these medicines which have NOT CHANGED      Dose / Directions   amoxicillin 500 MG capsule  Commonly known as: AMOXIL  Used for: Aortic stenosis, severe, Aortic valve sclerosis      Dose: 2,000 mg  Take 4 capsules (2,000 mg) by mouth once as needed (30-60 minutes prior to dental procedures/cleanings.)  Quantity: 4 capsule  Refills: 3     aspirin 81 MG chewable tablet  Commonly known as: ASA  Used for: Bilateral carotid artery stenosis      Dose: 81 mg  Take 1 tablet (81 mg) by mouth daily  Quantity: 90 tablet  Refills: 3     cholecalciferol 25 mcg (1000 units) capsule  Commonly known as: VITAMIN D3      Dose: 1 capsule  Take 1 capsule by mouth daily  Refills: 0     melatonin 3 MG tablet      Dose: 3 mg  Take 3 mg by mouth nightly as needed for sleep  Refills: 0     rosuvastatin 40 MG tablet  Commonly known as: CRESTOR  Used for: Hypercholesterolemia      Dose: 40 mg  Take 1 tablet (40 mg) by mouth daily  Quantity: 90 tablet  Refills: 4     sildenafil 20 MG tablet  Commonly known as: REVATIO  Used for: Impotence of organic origin      TAKE 4-5 TABLETS BY MOUTH ONCE DAILY IF NEEDED FOR OTHER (SPECIFY).TAKE 30 MINUTES BEFORE SEXUAL ACTIVITY.  Quantity: 80 tablet  Refills: 1        STOP taking    torsemide 10 MG tablet  Commonly known as: DEMADEX              Where to get your medicines      These medications were sent to [facility] - [place], [place] - [address] [facility] [address]  [address], [facility] [address]    Phone: [phone]     acetaminophen 325 MG tablet    amiodarone 200 MG tablet    lisinopril 2.5 MG tablet    methocarbamol 500 MG  tablet    metoprolol succinate ER 25 MG 24 hr tablet    senna-docusate 8.6-50 MG tablet    warfarin ANTICOAGULANT 2.5 MG tablet         CC:cece Arita, Richard Ulloa, Lynn Mcnamara      [facility]   Cardiothoracic Surgery  Office phone: [phone]  Office fax: [phone]     * a total of 45 minutes was spent on this discharge, including coordination of care, review of lab and imaging results,  patient communication and education, and discussion of care plan with consulting teams and surgeon.

## 2022-06-08 NOTE — PROGRESS NOTES
Cardiovascular Surgery Progress Note  06/08/2022         Assessment and Plan:     Arun Wren is a 77 year old male with a PMH of HTN, multivessel CAD, and severe aortic stenosis. He is now s/p elective tissue AVR and 2 vessel CAB on 5/31 with Dr. Benítez.     Cardiovascular:   Hx of HTN, CAD, aortic stenosis  S/p tissue AVR and 2 vessel CAB (vein to ramus and PDA)  Weaned pressors by POD #1 am. Had some orthostatic hypoTN, likely secondary to routine post-op vasoplegia.  Chest tubes removed 6/5.   Pre-op ECHO with LVEF 55 - 60%.  Post-op BREANNE: LVEF 55%, LV hypertrophy present, normal global function of both the RV and LV.   -  mg (decrease to 81 mg on 6/6), rosuvastatin  - Metoprolol 25 mg PO BID, Lisinopril 2.5 mg PO daily (held 6/3 am)  - Metoprolol XL 12.5 mg q evening. Monitor for bradycardia.  Atrial fibrillation w RVR  - A-fib with 's, converted to NSR 6/3  - started IV Amio gtt overnight 6/2-6/3, transitioned to Amio 400 mg PO BID 6/3 pm, decreased to 200 mg BID given bradycardia and then held.   - EP consult as below  - Went back into A-fib around ~0400 on 6/6.  HR in 120-140s.  EKG showing afib with RVR. BP 110s/70s. Restarted Amio gtt 1 mg/min on 6/6 and transitioned back to PO 6/6 pm. Converted back to sinus bradycardia at 5 am on 6/7. Stopped heparin gtt 6/8.  Syncopal episode   Two episodes of syncope 6/3-6/4. Was given fluid for orthostatic hypotension. One occurred with HR in 40s. Also had two conversion pauses lasting 5 seconds. HR now improved into 50s. Likely secondary to amio/metop.   - Decrease amio to 200 mg daily, held metoprolol for a few days until A-fib returned.  - Echo without effusion  - Consult EP given long pauses and significant bradycardia, likely due to medications. Okay with removing TPW, no pacing requirements, HR improved, Removed 6/5.      Pulmonary:  - Extubated POD 1 to 5 lpm via NC. Now saturating well on RA.   - Supplemental O2 PRN to keep sats > 92%. Wean  off as tolerated.  - Pulm toilet, IS, activity and deep breathing     Neurology / MSK:  Acute post-operative   - pain well controlled with acetaminophen, PO robaxin and oxycodone PRN, IV dilaudid PRN  Pre-syncopal spells/shuffled gait  - has had syncopal episodes 6/3 and 6/4, however for the past 3 days therapy has noted changes in his gait with activity (shuffling), along with lack of responsiveness and following commands during activity. Wife has noted episodes of staring into space for 5 seconds. Has otherwise remained neurologically intact  - CT head w/o contrast negative  - Neurology consulted - recommend MRI/ vEEG initially, but electing to monitor as these neuro events are likely secondary to his bradycardia. See their note from 6/4.   - There is some concern for Lewy Body disease - neurology recommended outpatient neuro follow-up for this (message sent and referral placed in discharge orders)      / Renal:  - Zhang out 6/1. No Hx of renal disease. Most recent creatinine WNL, adequate UOP.   - Pre-op weight 231 lbs, most recent weight 239 lbs. Diuresed with Lasix 40 mg PO x 1 on 6/2 and 6/3 with weight reduction, but still volume up with dilated IVC, LE edema. Will give lasix 40 mg IV daily. Watch closely for orthostasis, however this was likely due to his bradycardia.      GI / FEN:   - Advance to Regular diet, continue bowel regimen  - Replace electrolytes as needed, hepatic enzymes WNL.     Endocrine:  Stress induced hyperglycemia initially managed on insulin drip postop, transitioned to sliding scale.     Infectious Disease:  Stress induced leukocytosis  - WBC WNL, remains afebrile, no signs or symptoms of infection  - Completed perioperative antibiotics     Hematology:   Acute blood loss anemia   Hgb stable, no signs or symptoms of active bleeding  Plt WNL     Anticoagulation:   - Only  mg daily. Decreased dose to 81 mg daily on 6/6 with starting warfarin.  - EP recommended anticoag when okay  "with CV Surg.   - Started anticoagulation per Dr. Benítez Monday 6/6;       - Low intensity heparin gtt bridging to therapeutic INR. STOPPED 6/8 since he had been in NSR for > 30 hrs.        - Coumadin for A-fib, INR goal 2-3.  INR 1.25.         - Warfarin education completed 6/8       Prophylaxis:   - Stress ulcer prophylaxis: Pantoprazole 40 mg daily for 30 days  - DVT prophylaxis: Subcutaneous heparin, SCD     Disposition:   - Transferred to  on 6/1  - Therapies recommending discharge to Home, likely 6/9  - Await therapeutic INR (no hep gtt bridging if remains in NSR)  - Has Right Arm PICC line (remove at discharge)  - Discharge summary to Dr Jann Ulloa     Discussed with Dr Benítez through both written and verbal communication.      Carlos Cote PA-C  Cardiothoracic Surgery  Pager [phone]    12:25 PM   June 8, 2022        Interval History:     No overnight events.   States pain is well managed on current regimen. Slept well overnight.  Tolerating diet, is passing flatus, + BM. No nausea or vomiting.  Breathing well without complaints.   Working with therapies and ambulating in halls without assistance.   Denies chest pain, palpitations, dizziness, syncopal symptoms, fevers, chills, myalgias, or sternal popping/clicking.         Physical Exam:   Blood pressure 139/84, pulse 69, temperature 98.2  F (36.8  C), temperature source Oral, resp. rate 18, height 1.74 m (5' 8.5\"), weight 107.9 kg (237 lb 12.8 oz), SpO2 96 %.  Vitals:    06/05/22 0600 06/07/22 0508 06/08/22 0346   Weight: 108.5 kg (239 lb 4.8 oz) 107.1 kg (236 lb 3.2 oz) 107.9 kg (237 lb 12.8 oz)      Weight; + 6 lbs since admit and trending up and down.   MAPs: 90 - 113     Gen: A&Ox4, NAD  Neuro: no focal deficits   CV: RRR, normal S1 S2, no murmurs, rubs or gallops.   Pulm: CTA, no wheezing or rhonchi, normal breathing on RA  Abd: nondistended, normal BS, soft, nontender  Ext: trace peripheral edema  Incision: clean, dry, intact, no erythema, " sternum stable  Tubes/drain sites: dressing clean and dry         Data:    Imaging:  reviewed recent imaging, no acute concerns    Labs:  BMP  Recent Labs   Lab 06/08/22  0619 06/07/22 0326 06/06/22  0532 06/05/22  0720    139 139 140   POTASSIUM 4.0 4.0 3.5 3.9   CHLORIDE 106 106 106 106   DEVORA 8.5 8.1* 8.3* 8.4*   CO2 29 28 27 28   BUN 13 15 19 21   CR 0.97 0.91 0.89 0.89   GLC 99 102* 121* 96     CBC  Recent Labs   Lab 06/08/22  0619 06/07/22 0326 06/06/22  0532 06/05/22  0720   WBC 10.8 9.5 7.6 9.8   RBC 3.26* 3.14* 3.22* 3.22*   HGB 10.8* 10.3* 10.2* 10.4*   HCT 32.2* 31.0* 31.4* 31.8*   MCV 99 99 98 99   MCH 33.1* 32.8 31.7 32.3   MCHC 33.5 33.2 32.5 32.7   RDW 14.6 14.3 14.0 13.8    218 200 177     INR  Recent Labs   Lab 06/08/22 0619 06/07/22 0326 06/06/22  0532 06/05/22  0720   INR 1.25* 1.16* 1.16* 1.11      Hepatic Panel  Recent Labs   Lab 06/04/22  0628   AST 62*   ALT 48   ALKPHOS 49   BILITOTAL 0.7   ALBUMIN 3.1*     GLUCOSE:   Recent Labs   Lab 06/08/22  0619 06/07/22  0326 06/06/22  0532 06/05/22  0720 06/04/22  0628 06/03/22  1818   GLC 99 102* 121* 96 110* 138*      Cryotherapy Text: The wound bed was treated with cryotherapy after the biopsy was performed.

## 2022-06-08 NOTE — PROGRESS NOTES
Care Management Follow Up    Length of Stay (days): 8    Expected Discharge Date: 06/10/2022    Concerns to be Addressed: Discharge planning, outpatient anticoagulation follow up.       Patient plan of care discussed at interdisciplinary rounds: Yes    Anticipated Discharge Disposition: Home     Anticipated Discharge Services: OP CR, Outpatient anticoagulation  Anticipated Discharge DME: None    Education Provided on the Discharge Plan: Yes  Patient/Family in Agreement with the Plan: Yes    Referrals Placed by CM/SW: [facility]  Private pay costs discussed: Not applicable    Additional Information:  Pt had PLC for warfarin education this morning. This writer placed referral for  [facility] to monitor his INRs outpatient. Pt scheduled for INR lab draw and initial appointment with  [facility] [facility] on 6/13 at 1:15pm. Pt and spouse updated.     CC will continue to monitor patient's medical condition and progress towards discharge.  Mariel Harp RN BSN  6C Unit Care Coordinator  Phone number: [phone]  Pager: [phone]

## 2022-06-08 NOTE — SUMMARY OF CARE
-Pt up ad-vikki with stand-by assist and uses safe judgement.  -Pt tolerates all his meals and eliminates adequately.  -Pt reports being pain free.  -Pt visited extensively with his wife.  -Pt had his heparin gtt discontinued per orders.

## 2022-06-08 NOTE — PLAN OF CARE
D: Admitted 5/31 for elective tissue AVR and 2 vessel CABG. Post op c/b atrial fibrillation.   PMH: HTN, multivessel CAD, and severe aortic stenosis.      I: Monitored vitals and assessed pt status. Encouraged activity.   Changed: held evening dose of metoprolol for HR 56  Running: heparin 1200 units/hr  PRN: IV hydralazine x1     A: A&Ox4, forgetful at times. [place], wears bilateral hearing aids. VSS on RA. Tele shows SB/SR, rate 50-60s. Afebrile. Reported back pain managed by prn tylenol. RLE graft site ecchymotic and with erythema (marked with skin marker, no change overnight). Dressing over old CT sites changed, scant amount of sanguinous drainage was present. Dressing on R ankle incision changed, small amount of serous drainage was present. Sternal incision GERALDINE and WNL. Urinating adequately. LBM 6/07. Up with SBA. Do not disturb orders observed overnight.      P: Continue to monitor pt status and report changes to CVTS treatment team.      [id number]  Luz Marina Tristan RN on 6/8/2022 at 6:23 AM

## 2022-06-09 ENCOUNTER — TELEPHONE (OUTPATIENT)
Dept: CARDIOLOGY | Facility: OTHER | Age: 77
End: 2022-06-09

## 2022-06-09 VITALS
RESPIRATION RATE: 16 BRPM | HEART RATE: 66 BPM | TEMPERATURE: 98.6 F | BODY MASS INDEX: 35.06 KG/M2 | WEIGHT: 236.7 LBS | SYSTOLIC BLOOD PRESSURE: 147 MMHG | HEIGHT: 69 IN | DIASTOLIC BLOOD PRESSURE: 83 MMHG | OXYGEN SATURATION: 97 %

## 2022-06-09 LAB
HOLD SPECIMEN: NORMAL
INR PPP: 1.58 (ref 0.85–1.15)
MAGNESIUM SERPL-MCNC: 2.2 MG/DL (ref 1.6–2.3)
UFH PPP CHRO-ACNC: <0.1 IU/ML

## 2022-06-09 PROCEDURE — 250N000013 HC RX MED GY IP 250 OP 250 PS 637: Performed by: STUDENT IN AN ORGANIZED HEALTH CARE EDUCATION/TRAINING PROGRAM

## 2022-06-09 PROCEDURE — 250N000013 HC RX MED GY IP 250 OP 250 PS 637: Performed by: PHYSICIAN ASSISTANT

## 2022-06-09 PROCEDURE — 83735 ASSAY OF MAGNESIUM: CPT | Performed by: PHYSICIAN ASSISTANT

## 2022-06-09 PROCEDURE — 85610 PROTHROMBIN TIME: CPT | Performed by: PHYSICIAN ASSISTANT

## 2022-06-09 PROCEDURE — 36592 COLLECT BLOOD FROM PICC: CPT | Performed by: PHYSICIAN ASSISTANT

## 2022-06-09 PROCEDURE — 85520 HEPARIN ASSAY: CPT | Performed by: SURGERY

## 2022-06-09 RX ORDER — WARFARIN SODIUM 2.5 MG/1
TABLET ORAL
Qty: 100 TABLET | Refills: 0 | Status: SHIPPED | OUTPATIENT
Start: 2022-06-09 | End: 2022-08-23

## 2022-06-09 RX ORDER — LISINOPRIL 2.5 MG/1
2.5 TABLET ORAL DAILY
Qty: 45 TABLET | Refills: 0 | Status: SHIPPED | OUTPATIENT
Start: 2022-06-09 | End: 2022-06-28

## 2022-06-09 RX ORDER — AMIODARONE HYDROCHLORIDE 200 MG/1
200 TABLET ORAL DAILY
Qty: 14 TABLET | Refills: 0 | Status: SHIPPED | OUTPATIENT
Start: 2022-06-09 | End: 2022-06-28

## 2022-06-09 RX ORDER — AMOXICILLIN 250 MG
1-2 CAPSULE ORAL 2 TIMES DAILY PRN
Qty: 30 TABLET | Refills: 0 | Status: SHIPPED | OUTPATIENT
Start: 2022-06-09 | End: 2022-07-22

## 2022-06-09 RX ORDER — METOPROLOL SUCCINATE 25 MG/1
12.5 TABLET, EXTENDED RELEASE ORAL EVERY EVENING
Qty: 30 TABLET | Refills: 0 | Status: SHIPPED | OUTPATIENT
Start: 2022-06-09 | End: 2022-07-22

## 2022-06-09 RX ORDER — ACETAMINOPHEN 325 MG/1
650 TABLET ORAL EVERY 6 HOURS PRN
Qty: 50 TABLET | Refills: 0 | Status: SHIPPED | OUTPATIENT
Start: 2022-06-09

## 2022-06-09 RX ORDER — METHOCARBAMOL 500 MG/1
500 TABLET, FILM COATED ORAL 3 TIMES DAILY PRN
Qty: 25 TABLET | Refills: 0 | Status: SHIPPED | OUTPATIENT
Start: 2022-06-09 | End: 2022-06-29

## 2022-06-09 RX ADMIN — ROSUVASTATIN CALCIUM 40 MG: 20 TABLET, FILM COATED ORAL at 08:07

## 2022-06-09 RX ADMIN — SENNOSIDES AND DOCUSATE SODIUM 1 TABLET: 50; 8.6 TABLET ORAL at 08:04

## 2022-06-09 RX ADMIN — POLYETHYLENE GLYCOL 3350 17 G: 17 POWDER, FOR SOLUTION ORAL at 08:04

## 2022-06-09 RX ADMIN — LISINOPRIL 2.5 MG: 2.5 TABLET ORAL at 08:07

## 2022-06-09 RX ADMIN — PANTOPRAZOLE SODIUM 40 MG: 40 TABLET, DELAYED RELEASE ORAL at 08:04

## 2022-06-09 RX ADMIN — ASPIRIN 81 MG: 81 TABLET, CHEWABLE ORAL at 08:04

## 2022-06-09 RX ADMIN — POTASSIUM CHLORIDE 20 MEQ: 750 TABLET, EXTENDED RELEASE ORAL at 08:07

## 2022-06-09 RX ADMIN — AMIODARONE HYDROCHLORIDE 200 MG: 200 TABLET ORAL at 08:07

## 2022-06-09 ASSESSMENT — ACTIVITIES OF DAILY LIVING (ADL)
ADLS_ACUITY_SCORE: 23

## 2022-06-09 NOTE — PROGRESS NOTES
D-S/p elective tissue AVR and 2 vessel CAB on 05/31/22, c/b afib.  See flow sheets for vs and assessments. Spouse at bedside. INR 1.25.  I-Old chest tube site dressing removed. Cleaned sites and left GERALDINE. 5 mg of coumadin per order.  A-Telemetry shows SR.  P-Anticipate discharge to home with spouse tomorrow if pt remains in SR. Pt and spouse are aware of the plan.

## 2022-06-09 NOTE — PLAN OF CARE
D: Admitted 5/31 for elective tissue AVR and 2 vessel CABG. Post op c/b atrial fibrillation.   PMH: HTN, multivessel CAD, and severe aortic stenosis.      I: Monitored vitals and assessed pt status. Do not disturb orders observed overnight.    A: A&Ox4, forgetful at times. [place], wears bilateral hearing aids. VSS on RA. Tele shows SB/SR, rate 50-60s. Afebrile. Denied pain this shift. RLE graft site ecchymotic. CT sites GERALDINE, WNL. Dressing on R ankle incision CDI. Sternal incision GERALDINE and WNL. Urinating adequately. LBM 6/08. Up with SBA. Appeared to rest comfortably overnight.      P: Discharge home today. Continue to monitor pt status and report changes to CVTS treatment team.      [phone]  Luz Marina Tristan RN on 6/9/2022 at 6:20 AM

## 2022-06-09 NOTE — TELEPHONE ENCOUNTER
"Per Jann Ulloa, DO: \"Patient is postop with aortic valve replacement.  Will need a Zio patch to determine ongoing issues for A. fib.  Order placed.  Can we schedule him with follow-up at the next available appointment?  Thanks in advance!     Dr. Ulloa\"    Left message to call back at both numbers on file.  Lety Wheeler RN......June 9, 2022...10:14 AM   "

## 2022-06-09 NOTE — PHARMACY-ANTICOAGULATION SERVICE
Clinical Pharmacy- Warfarin Discharge Note  This patient is currently on warfarin for the treatment of Atrial fibrillation.  INR Goal= 2-3  Expected length of therapy lifetime.           Anticoagulation Dose History     Recent Dosing and Labs Latest Ref Rng & Units 6/3/2022 6/4/2022 6/5/2022 6/6/2022 6/7/2022 6/8/2022 6/9/2022    Warfarin 1 mg - - - - 2 mg - - -    Warfarin 2.5 mg - - - - - 2.5 mg - -    Warfarin 5 mg - - - - - - 5 mg -    INR 0.85 - 1.15 1.11 1.05 1.11 1.16(H) 1.16(H) 1.25(H) 1.58(H)          Vitamin K doses administered during the last 7 days: none  FFP administered during the last 7 days: none  Recommend discharging the patient on a warfarin regimen of 5 mg daily with a prescription for warfarin 5mg tablets.      The patient should have an INR checked on Mpnday, 6/13/22.     Lyndsey Gallardo, GriseldaD

## 2022-06-09 NOTE — PLAN OF CARE
Physical Therapy Discharge Summary    Reason for therapy discharge:    Discharged to home with outpatient therapy.    Progress towards therapy goal(s). See goals on Care Plan in [facility] electronic health record for goal details.  Goals partially met.  Barriers to achieving goals:   limited tolerance for therapy.    Therapy recommendation(s):    Continued therapy is recommended.  Rationale/Recommendations:  OP PT .    Goal Outcome Evaluation:

## 2022-06-09 NOTE — CARE PLAN
Occupational Therapy and Cardiac Rehabiliation Discharge Summary    Reason for therapy discharge:    Discharged to home.    Progress towards therapy goal(s). See [facility] on Care Plan [facility] health record for goal details.  Goals partially met.  Barriers to achieving goals:   discharge from facility.    Therapy recommendation(s):    Continued therapy is recommended.  Rationale/Recommendations:  OP Cardiac Rehab to promote strength, endurance, and improved cardiovascular health to promote ADL/IADL IND..

## 2022-06-09 NOTE — PROGRESS NOTES
Care Management Discharge Note    Discharge Date: 06/09/2022       Discharge Disposition: Home    Discharge Services:  OP anticoagulation clinic    Discharge DME:  none    Discharge Transportation: family or friend will provide    Private pay costs discussed: Not applicable    PAS Confirmation Code:  n/a  Patient/family educated on Medicare website which has current facility and service quality ratings:  no    Education Provided on the Discharge Plan:  yes  Persons Notified of Discharge Plans: patient  Patient/Family in Agreement with the Plan:  yes    Handoff Referral Completed: Yes    Additional Information:  Patient discharged from hospital, going to anticoagulation clinic on June 13 at [facility], patient educated/reminded of appointment. RNCC met with patient to deliver and go over IMM for discharge, offered questions, obtained signature on copy and left a copy for patient at bedside, faxed to [facility] ([phone]). RNCC available for any further discharge planning needs.    JACKSON PinedaN, BA, RN, CMSRN, RNCC  Pager: [phone]  Phone: [phone]  6th floor Weekend/Holiday Pager: [phone]  Observation weekend/after hours: [phone]

## 2022-06-09 NOTE — PROGRESS NOTES
-Pt recieved orders that were carried out to discharge to home.  -Pt's wife reviewed and understood the Pt's discharge, instructions, orders, follow-up appointments and prescriptions.  -Pt is less involved with the details of his care and depends upon his wife to understand and monitor his well-being.

## 2022-06-10 ENCOUNTER — TELEPHONE (OUTPATIENT)
Dept: ANTICOAGULATION | Facility: OTHER | Age: 77
End: 2022-06-10

## 2022-06-10 ENCOUNTER — TELEPHONE (OUTPATIENT)
Dept: CARDIOLOGY | Facility: CLINIC | Age: 77
End: 2022-06-10
Payer: COMMERCIAL

## 2022-06-10 NOTE — TELEPHONE ENCOUNTER
----- Message from RAFAEL Saab sent at 6/8/2022  3:29 PM CDT -----  Surgeon: Jeyson at [facility]  Case: tissue AVR and 2 vessel CAB  Complications: atrial fibrillation, on coumadin (INR goal 2-3)  Anticipated Discharge Date: 6/9 to home    Please schedule:   CVTS [facility] clinic-  No, will follow up with PCP (INR checks) and Cardiology (Jann Ulloa)  Primary Care Provider follow up in 1-2 weeks.  Cardiologist follow up in 3-4 weeks       ThanksCarlos

## 2022-06-10 NOTE — TELEPHONE ENCOUNTER
Attempted to call Lola Back, Left her a message with our name and number. Sasha Brito RN on 6/10/2022 at 3:53 PM

## 2022-06-10 NOTE — TELEPHONE ENCOUNTER
CARDIOTHORACIC SURGERY  Discharge Follow Up Phone Call    POST OP MONITORING  How is your pain on a 0-10 scale, how are you managing your pain? Pt is not complaining of pain.    ACTIVITY  How is your activity tolerance? Walking and tolerating it well.  Are you still doing sternal precautions? Yes.  Do you hear any clicking when you are moving or taking a deep breath? No.    Are you weighing yourself daily? Pt is currently 238 lbs.    SIGNS AND SYMPTOMS OF INFECTION  1. INCREASE IN PAIN - No  2. FEVER - No  3. DRAINAGE - No If so, color: NA  4. REDNESS - No  5. SWELLING - No    ASSISTANCE  Do you have someone at home to assist you with your daily activities? Spouse is assisting pt at home.    MEDICATIONS  Is someone helping you to set up your medications? Spouse is helping pt with meds.  Do you have any questions about your medications? No.    Are you on a blood thinner? Warfarin.  Who is managing your INRs? [facility] coumadin clinic.    Called clinic and left message asking for a call back as spouse and patient are unclear about what to do with his warfarin and INR draws.    FOLLOW UP  You are scheduled to see your primary care physician on - pt will call to schedule.  You are scheduled to see our surgery advanced practive provider for post operative follow up on - not needed per CV team.    You are scheduled for cardiac rehab on Not yet scheduled.  You are scheduled to see your cardiologist on - pt would like to schedule with Dr. Ulloa, has a call into their office.    CONTACT INFORMATION  Please feel free to call us with any questions or symptoms that are concerning for you at [phone]. If it is after 4:00 in the afternoon, or a weekend please call [phone] and ask for the on call specialist. We want to do everything we can to help prevent you needing to return to the ED, so please do not hesitate to call us.    Lola Morrison, RNCC  Cardiothoracic Surgery  [phone]

## 2022-06-13 ENCOUNTER — TELEPHONE (OUTPATIENT)
Dept: CARDIAC REHAB | Facility: OTHER | Age: 77
End: 2022-06-13

## 2022-06-13 ENCOUNTER — ANTICOAGULATION THERAPY VISIT (OUTPATIENT)
Dept: ANTICOAGULATION | Facility: OTHER | Age: 77
End: 2022-06-13
Attending: FAMILY MEDICINE
Payer: COMMERCIAL

## 2022-06-13 DIAGNOSIS — I48.0 PAROXYSMAL ATRIAL FIBRILLATION (H): Primary | ICD-10-CM

## 2022-06-13 LAB — INR POINT OF CARE: 3.3 (ref 0.9–1.1)

## 2022-06-13 PROCEDURE — 85610 PROTHROMBIN TIME: CPT | Mod: ZL,QW

## 2022-06-13 PROCEDURE — 36416 COLLJ CAPILLARY BLOOD SPEC: CPT | Mod: ZL

## 2022-06-13 NOTE — ANESTHESIA POSTPROCEDURE EVALUATION
Patient: Arun Wren    Procedure: Procedure(s):  MEDIAN STERNOTOMY. CARDIOPULMONARY BYPASS. ENDOSCOPIC HARVEST OF LEFT GREATER SAPHENOUS VEIN. CORONARY ARTERY BYPASS GRAFT (CABG) X2 . AORTIC VALVE REPLACEMENT USING 25MM INSPIRIS RESILIA  AORTIC VALVE. TRANSESOPHAGEAL ECHOCARDIOGRAM (BREANNE) PER ANESTHESIA.       Anesthesia Type:  General    Note:  Disposition: ICU            ICU Sign Out: Anesthesiologist/ICU physician sign out WAS performed   Postop Pain Control: Uneventful            Sign Out: Well controlled pain   PONV: No   Neuro/Psych: Uneventful            Sign Out: Acceptable/Baseline neuro status   Airway/Respiratory: Uneventful            Sign Out: Acceptable/Baseline resp. status; AIRWAY IN SITU/Resp. Support               Airway in situ/Resp. Support: ETT                 Reason: Planned Pre-op   CV/Hemodynamics: Uneventful            Sign Out: Acceptable CV status; No obvious hypovolemia; No obvious fluid overload   Other NRE: NONE   DID A NON-ROUTINE EVENT OCCUR? No           Last vitals:  Vitals:    06/09/22 0014 06/09/22 0347 06/09/22 0732   BP:  126/77 (!) 147/83   Pulse: 63 64 66   Resp: 16 16 16   Temp:  36.7  C (98  F) 37  C (98.6  F)   SpO2:  95% 97%       Electronically Signed By: Timothy Silva MD  June 13, 2022  7:29 AM

## 2022-06-13 NOTE — TELEPHONE ENCOUNTER
Called pt left message for patient to return our call with our direct number given.  [phone].

## 2022-06-13 NOTE — PROGRESS NOTES
ANTICOAGULATION MANAGEMENT     Arun Wren 77 year old male is on warfarin with supratherapeutic INR result. (Goal INR 2.0-3.0)    Recent labs: (last 7 days)     06/13/22  1306   INR 3.3*       ASSESSMENT     Source(s): Chart Review and In person     Warfarin doses taken: Warfarin taken as instructed  Diet: No new diet changes identified  New illness, injury, or hospitalization: No  Medication/supplement changes: Using tylenol PRN for post procedure pain  Signs or symptoms of bleeding or clotting: No  Previous INR: Subtherapeutic  Additional findings: None       PLAN     Recommended plan for no diet, medication or health factor changes affecting INR     Dosing Instructions: partial hold then decrease your warfarin dose (15% change) with next INR in 3 days       Summary  As of 6/13/2022    Full warfarin instructions:  2.5 mg every Mon, Fri; 5 mg all other days   Next INR check:  6/16/2022             In person    Lab visit scheduled    Education provided: Please call back if any changes to your diet, medications or how you've been taking warfarin    Plan made per [facility] anticoagulation protocol    Izzy Weinberg RN  Anticoagulation Clinic  6/13/2022    _______________________________________________________________________     Anticoagulation Episode Summary     Current INR goal:  2.0-3.0   TTR:  --   Target end date:  9/8/2022   Send INR reminders to:  [facility]    Indications    Paroxysmal atrial fibrillation (H) [I48.0]           Comments:           Anticoagulation Care Providers     Provider Role Specialty Phone number    Carlos Cote PA Referring Cardiovascular & Thoracic Surgery [phone]

## 2022-06-15 NOTE — TELEPHONE ENCOUNTER
Spoke to patient and notified him of below information.  He already had an appointment with Jann Ulloa DO scheduled for 6/28/22.  Patient transferred to get zio patch placement scheduled and it is scheduled for 7/7/22.  To Jann Ulloa DO to decide if patient's follow up with him should be delayed until after the zio patch.  Lety Wheeler RN......Delores 15, 2022...9:15 AM

## 2022-06-16 ENCOUNTER — ANTICOAGULATION THERAPY VISIT (OUTPATIENT)
Dept: ANTICOAGULATION | Facility: OTHER | Age: 77
End: 2022-06-16
Attending: FAMILY MEDICINE
Payer: COMMERCIAL

## 2022-06-16 DIAGNOSIS — I48.0 PAROXYSMAL ATRIAL FIBRILLATION (H): Primary | ICD-10-CM

## 2022-06-16 LAB — INR POINT OF CARE: 2.7 (ref 0.9–1.1)

## 2022-06-16 PROCEDURE — 36416 COLLJ CAPILLARY BLOOD SPEC: CPT | Mod: ZL

## 2022-06-16 PROCEDURE — 85610 PROTHROMBIN TIME: CPT | Mod: ZL,QW

## 2022-06-16 NOTE — PROGRESS NOTES
ANTICOAGULATION MANAGEMENT     Arun Wren 77 year old male is on warfarin with therapeutic INR result. (Goal INR 2.0-3.0)    Recent labs: (last 7 days)     06/16/22  1308   INR 2.7*       ASSESSMENT     Source(s): Chart Review and In person     Warfarin doses taken: Less warfarin taken than planned which may be affecting INR  Diet: No new diet changes identified  New illness, injury, or hospitalization: No  Medication/supplement changes: None noted  Signs or symptoms of bleeding or clotting: No  Previous INR: Supratherapeutic  Additional findings: None       PLAN     Recommended plan for no diet, medication or health factor changes affecting INR     Dosing Instructions: continue your current warfarin dose with next INR in 4 days       Summary  As of 6/16/2022    Full warfarin instructions:  5 mg every Mon, Fri; 2.5 mg all other days   Next INR check:  6/20/2022             In person    Lab visit scheduled    Education provided: Please call back if any changes to your diet, medications or how you've been taking warfarin    Plan made per [facility] anticoagulation protocol    Izzy Weinberg RN  Anticoagulation Clinic  6/16/2022    _______________________________________________________________________     Anticoagulation Episode Summary     Current INR goal:  2.0-3.0   TTR:  --   Target end date:  9/8/2022   Send INR reminders to:  [facility]    Indications    Paroxysmal atrial fibrillation (H) [I48.0]           Comments:           Anticoagulation Care Providers     Provider Role Specialty Phone number    Carlos Cote PA Referring Cardiovascular & Thoracic Surgery [phone]

## 2022-06-20 ENCOUNTER — ANTICOAGULATION THERAPY VISIT (OUTPATIENT)
Dept: ANTICOAGULATION | Facility: OTHER | Age: 77
End: 2022-06-20
Attending: FAMILY MEDICINE
Payer: COMMERCIAL

## 2022-06-20 DIAGNOSIS — I48.0 PAROXYSMAL ATRIAL FIBRILLATION (H): Primary | ICD-10-CM

## 2022-06-20 LAB — INR POINT OF CARE: 2.9 (ref 0.9–1.1)

## 2022-06-20 PROCEDURE — 85610 PROTHROMBIN TIME: CPT | Mod: ZL,QW

## 2022-06-20 NOTE — PROGRESS NOTES
ANTICOAGULATION MANAGEMENT     Arun Wren 77 year old male is on warfarin with therapeutic INR result. (Goal INR 2.0-3.0)    Recent labs: (last 7 days)     06/20/22  1315   INR 2.9*       ASSESSMENT     Source(s): Chart Review and In person     Warfarin doses taken: Warfarin taken as instructed  Diet: No new diet changes identified  New illness, injury, or hospitalization: No  Medication/supplement changes: None noted  Signs or symptoms of bleeding or clotting: No  Previous INR: Therapeutic last 2(+) visits  Additional findings: Patient complained of some tenderness and redness at right leg angiogram site. Encouraged patient to talk with Dr. Arita or his nurse to see if he should be seen. Area is red and warm to the touch, Paitent has been afebrile.        PLAN     Recommended plan for no diet, medication or health factor changes affecting INR     Dosing Instructions: continue your current warfarin dose with next INR in 3 days       Summary  As of 6/20/2022    Full warfarin instructions:  5 mg every Mon, Fri; 2.5 mg all other days   Next INR check:  6/23/2022             In person    Lab visit scheduled    Education provided: Please call back if any changes to your diet, medications or how you've been taking warfarin    Plan made per [facility] anticoagulation protocol    Izzy Weinberg, RN  Anticoagulation Clinic  6/20/2022    _______________________________________________________________________     Anticoagulation Episode Summary     Current INR goal:  2.0-3.0   TTR:  100.0 % (2 d)   Target end date:  9/8/2022   Send INR reminders to:  [facility]    Indications    Paroxysmal atrial fibrillation (H) [I48.0]           Comments:           Anticoagulation Care Providers     Provider Role Specialty Phone number    Carlos Cote PA Referring Cardiovascular & Thoracic Surgery [phone]

## 2022-06-21 ENCOUNTER — TELEPHONE (OUTPATIENT)
Dept: CARDIAC REHAB | Facility: OTHER | Age: 77
End: 2022-06-21
Payer: COMMERCIAL

## 2022-06-22 ENCOUNTER — HOSPITAL ENCOUNTER (EMERGENCY)
Facility: OTHER | Age: 77
Discharge: HOME | End: 2022-06-22
Attending: PHYSICIAN ASSISTANT | Admitting: PHYSICIAN ASSISTANT
Payer: COMMERCIAL

## 2022-06-22 ENCOUNTER — TELEPHONE (OUTPATIENT)
Dept: CARDIAC REHAB | Facility: OTHER | Age: 77
End: 2022-06-22

## 2022-06-22 VITALS
WEIGHT: 242 LBS | DIASTOLIC BLOOD PRESSURE: 95 MMHG | HEIGHT: 69 IN | BODY MASS INDEX: 35.84 KG/M2 | RESPIRATION RATE: 18 BRPM | TEMPERATURE: 98.1 F | HEART RATE: 51 BPM | OXYGEN SATURATION: 97 % | SYSTOLIC BLOOD PRESSURE: 196 MMHG

## 2022-06-22 DIAGNOSIS — L03.115 CELLULITIS OF RIGHT LOWER LEG: ICD-10-CM

## 2022-06-22 DIAGNOSIS — T81.40XA POSTOPERATIVE INFECTION, INITIAL ENCOUNTER: ICD-10-CM

## 2022-06-22 DIAGNOSIS — Z95.2 S/P TAVR (TRANSCATHETER AORTIC VALVE REPLACEMENT): ICD-10-CM

## 2022-06-22 DIAGNOSIS — I25.810 CORONARY ARTERY DISEASE INVOLVING AUTOLOGOUS ARTERY CORONARY BYPASS GRAFT WITHOUT ANGINA PECTORIS: Primary | ICD-10-CM

## 2022-06-22 LAB
ALBUMIN SERPL-MCNC: 4 G/DL (ref 3.5–5.7)
ALP SERPL-CCNC: 75 U/L (ref 34–104)
ALT SERPL W P-5'-P-CCNC: 37 U/L (ref 7–52)
ANION GAP SERPL CALCULATED.3IONS-SCNC: 6 MMOL/L (ref 3–14)
AST SERPL W P-5'-P-CCNC: 30 U/L (ref 13–39)
BASOPHILS # BLD AUTO: 0 10E3/UL (ref 0–0.2)
BASOPHILS NFR BLD AUTO: 1 %
BILIRUB SERPL-MCNC: 0.4 MG/DL (ref 0.3–1)
BUN SERPL-MCNC: 14 MG/DL (ref 7–25)
CALCIUM SERPL-MCNC: 8.7 MG/DL (ref 8.6–10.3)
CHLORIDE BLD-SCNC: 102 MMOL/L (ref 98–107)
CO2 SERPL-SCNC: 29 MMOL/L (ref 21–31)
CREAT SERPL-MCNC: 1.03 MG/DL (ref 0.7–1.3)
CRP SERPL-MCNC: 9.9 MG/L
EOSINOPHIL # BLD AUTO: 0.3 10E3/UL (ref 0–0.7)
EOSINOPHIL NFR BLD AUTO: 4 %
ERYTHROCYTE [DISTWIDTH] IN BLOOD BY AUTOMATED COUNT: 14.7 % (ref 10–15)
ERYTHROCYTE [SEDIMENTATION RATE] IN BLOOD BY WESTERGREN METHOD: 16 MM/HR (ref 0–20)
GFR SERPL CREATININE-BSD FRML MDRD: 75 ML/MIN/1.73M2
GLUCOSE BLD-MCNC: 94 MG/DL (ref 70–105)
HCT VFR BLD AUTO: 37.8 % (ref 40–53)
HGB BLD-MCNC: 12.2 G/DL (ref 13.3–17.7)
IMM GRANULOCYTES # BLD: 0 10E3/UL
IMM GRANULOCYTES NFR BLD: 0 %
LACTATE SERPL-SCNC: 0.8 MMOL/L (ref 0.7–2)
LYMPHOCYTES # BLD AUTO: 1.2 10E3/UL (ref 0.8–5.3)
LYMPHOCYTES NFR BLD AUTO: 17 %
MCH RBC QN AUTO: 32.5 PG (ref 26.5–33)
MCHC RBC AUTO-ENTMCNC: 32.3 G/DL (ref 31.5–36.5)
MCV RBC AUTO: 101 FL (ref 78–100)
MONOCYTES # BLD AUTO: 0.8 10E3/UL (ref 0–1.3)
MONOCYTES NFR BLD AUTO: 12 %
NEUTROPHILS # BLD AUTO: 4.5 10E3/UL (ref 1.6–8.3)
NEUTROPHILS NFR BLD AUTO: 66 %
NRBC # BLD AUTO: 0 10E3/UL
NRBC BLD AUTO-RTO: 0 /100
PLATELET # BLD AUTO: 268 10E3/UL (ref 150–450)
POTASSIUM BLD-SCNC: 4.2 MMOL/L (ref 3.5–5.1)
PROT SERPL-MCNC: 6.6 G/DL (ref 6.4–8.9)
RBC # BLD AUTO: 3.75 10E6/UL (ref 4.4–5.9)
SODIUM SERPL-SCNC: 137 MMOL/L (ref 134–144)
WBC # BLD AUTO: 6.8 10E3/UL (ref 4–11)

## 2022-06-22 PROCEDURE — 85652 RBC SED RATE AUTOMATED: CPT | Performed by: PHYSICIAN ASSISTANT

## 2022-06-22 PROCEDURE — 36415 COLL VENOUS BLD VENIPUNCTURE: CPT | Performed by: PHYSICIAN ASSISTANT

## 2022-06-22 PROCEDURE — 99284 EMERGENCY DEPT VISIT MOD MDM: CPT | Performed by: PHYSICIAN ASSISTANT

## 2022-06-22 PROCEDURE — 96372 THER/PROPH/DIAG INJ SC/IM: CPT | Performed by: PHYSICIAN ASSISTANT

## 2022-06-22 PROCEDURE — 87040 BLOOD CULTURE FOR BACTERIA: CPT | Performed by: PHYSICIAN ASSISTANT

## 2022-06-22 PROCEDURE — 86140 C-REACTIVE PROTEIN: CPT | Performed by: PHYSICIAN ASSISTANT

## 2022-06-22 PROCEDURE — 83605 ASSAY OF LACTIC ACID: CPT | Performed by: PHYSICIAN ASSISTANT

## 2022-06-22 PROCEDURE — 99284 EMERGENCY DEPT VISIT MOD MDM: CPT | Mod: 25 | Performed by: PHYSICIAN ASSISTANT

## 2022-06-22 PROCEDURE — 85025 COMPLETE CBC W/AUTO DIFF WBC: CPT | Performed by: PHYSICIAN ASSISTANT

## 2022-06-22 PROCEDURE — 250N000011 HC RX IP 250 OP 636: Performed by: PHYSICIAN ASSISTANT

## 2022-06-22 PROCEDURE — 80053 COMPREHEN METABOLIC PANEL: CPT | Performed by: PHYSICIAN ASSISTANT

## 2022-06-22 RX ORDER — CEFTRIAXONE SODIUM 1 G
1 VIAL (EA) INJECTION ONCE
Status: COMPLETED | OUTPATIENT
Start: 2022-06-22 | End: 2022-06-22

## 2022-06-22 RX ADMIN — CEFTRIAXONE SODIUM 1 G: 1 INJECTION, POWDER, FOR SOLUTION INTRAMUSCULAR; INTRAVENOUS at 18:14

## 2022-06-22 ASSESSMENT — ENCOUNTER SYMPTOMS
FACIAL ASYMMETRY: 0
ABDOMINAL PAIN: 0
FACIAL SWELLING: 0
EYE PAIN: 0
BACK PAIN: 0
AGITATION: 0
FEVER: 0
STRIDOR: 0
SEIZURES: 0
TREMORS: 0
CONFUSION: 0
FLANK PAIN: 0

## 2022-06-22 NOTE — ED PROVIDER NOTES
History     Chief Complaint   Patient presents with     Wound Infection     HPI  Arun Wren is a 77 year old male who  Recently had a Median sternotomy with cardiopulmonary bypass and endoscopic harvest of the right greater saphenous vein.  He had coronary artery bypass grafting x2.  Aortic valve replacement as well.  At the [facility] on 5/31/2022 by Dr. Benítez.  The patient is here because he developed an irritation to his right harvesting site on his knee.  He thought this was from his jeans.  However he noticed some increasing redness and is here for further evaluation.  Denies any fever or chills.  Denies any other issues.        Allergies:  No Known Allergies    Problem List:    Patient Active Problem List    Diagnosis Date Noted     Paroxysmal atrial fibrillation (H) 06/08/2022     Priority: Medium     S/P AVR 05/31/2022     Priority: Medium     Status post coronary angiogram 04/22/2022     Priority: Medium     Bilateral carotid artery stenosis 04/21/2022     Priority: Medium     Aortic stenosis, severe 03/31/2022     Priority: Medium     Benign essential hypertension 03/31/2022     Priority: Medium     Diastolic dysfunction with chronic heart failure (H) 03/31/2022     Priority: Medium     Mixed hyperlipidemia 03/31/2022     Priority: Medium     Abscessed tooth 03/31/2022     Priority: Medium     History of tobacco abuse 03/31/2022     Priority: Medium     Morbid obesity (H) 01/19/2022     Priority: Medium     Aortic valve sclerosis 11/13/2020     Priority: Medium     Irritant contact dermatitis due to other agents 03/29/2018     Priority: Medium     Thoracic aortic aneurysm without rupture (H) 07/24/2017     Priority: Medium     Diverticulosis of sigmoid colon 03/02/2015     Priority: Medium     H/O adenomatous polyp of colon 03/02/2015     Priority: Medium     Basal cell carcinoma of skin 01/22/2015     Priority: Medium     Contact dermatitis and other eczema due to plants (except  food) 07/26/2011     Priority: Medium     Actinic keratosis 07/15/2010     Priority: Medium     Impotence of organic origin 07/15/2010     Priority: Medium     Essential hypertension 07/15/2010     Priority: Medium     Hypercholesterolemia 07/15/2010     Priority: Medium        Past Medical History:    Past Medical History:   Diagnosis Date     Actinic keratosis      Allergic contact dermatitis due to plants, except food      Basal cell carcinoma of skin      Carpal tunnel syndrome      Closed fracture of base of skull (H)      Essential (primary) hypertension      Ganglion      Hydrocele      Male erectile dysfunction      Personal history of nicotine dependence      Pure hypercholesterolemia      Residual hemorrhoidal skin tags      Tinea unguium        Past Surgical History:    Past Surgical History:   Procedure Laterality Date     BYPASS GRAFT ARTERY CORONARY, REPAIR VALVE AORTIC, COMBINED N/A 05/31/2022    Procedure: MEDIAN STERNOTOMY. CARDIOPULMONARY BYPASS. ENDOSCOPIC HARVEST OF LEFT GREATER SAPHENOUS VEIN. CORONARY ARTERY BYPASS GRAFT (CABG) X2 . AORTIC VALVE REPLACEMENT USING 25MM INSPIRIS RESILIA  AORTIC VALVE. TRANSESOPHAGEAL ECHOCARDIOGRAM (BREANNE) PER ANESTHESIA.;  Surgeon: Sai Benítez MD;  Location: [facility]     BYPASS GRAFT ARTERY CORONARY, REPAIR VALVE AORTIC, COMBINED  05/31/2022    Procedure: ;  Surgeon: Sai Benítez MD;  Location:  OR     COLONOSCOPY  01/01/1998    1998,at [facility], which was normal     COLONOSCOPY  03/03/2005 03/03/05,at [facility]     COLONOSCOPY  03/02/2015    3/2/15,F/U 2020  tubular adnomas     COLONOSCOPY  03/16/2020    F/U N/A hyperplastic     CV CORONARY ANGIOGRAM N/A 04/22/2022    Procedure: Coronary Angiogram with possible intervention;  Surgeon: Saulo Mccarthy MD;  Location:  HEART CARDIAC CATH LAB     CV RIGHT HEART CATH MEASUREMENTS RECORDED N/A 04/22/2022    Procedure: Right Heart Cath;  Surgeon: Saulo Mccarthy MD;  Location:   HEART CARDIAC CATH LAB     OTHER SURGICAL HISTORY      ,HERNIA REPAIR     OTHER SURGICAL HISTORY      [id number],OTHER,Left,Dr. Jeremiah GIBSON     OTHER SURGICAL HISTORY      16,[id number],ORCHIECTOMY SIMPLE / PARTIAL / RADICAL W/ SCROTAL / INGUINAL APPROACH,Left,Dr. Jeremiah GIBSON     PICC DOUBLE LUMEN PLACEMENT Right 2022    on Right Basilic, cut 43 cm, needs X-ray to verify tip due to A-fib       Family History:    Family History   Problem Relation Age of Onset     Diabetes Mother         Diabetes     Other - See Comments Mother         joint disease     Heart Disease Mother 70        Heart Disease,CABG     Other - See Comments Father         Stroke,stroke/heavy smoker     Diabetes Paternal Grandmother         Diabetes,?GMother       Social History:  Marital Status:   [2]  Social History     Tobacco Use     Smoking status: Former Smoker     Years: 27.00     Types: Cigarettes     Quit date: 1988     Years since quittin.4     Smokeless tobacco: Never Used   Vaping Use     Vaping Use: Never used   Substance Use Topics     Alcohol use: Yes     Comment: Alcoholic Drinks/day: 1-2 drinks per day, whiskey     Drug use: No        Medications:    acetaminophen (TYLENOL) 325 MG tablet  amiodarone (PACERONE) 200 MG tablet  amoxicillin (AMOXIL) 500 MG capsule  aspirin (ASA) 81 MG chewable tablet  cholecalciferol (VITAMIN D3) 25 mcg (1000 units) capsule  lisinopril (ZESTRIL) 2.5 MG tablet  melatonin 3 MG tablet  methocarbamol (ROBAXIN) 500 MG tablet  metoprolol succinate ER (TOPROL XL) 25 MG 24 hr tablet  nystatin (MYCOSTATIN) 059349 UNIT/GM external cream  rosuvastatin (CRESTOR) 40 MG tablet  senna-docusate (SENOKOT-S/PERICOLACE) 8.6-50 MG tablet  sildenafil (REVATIO) 20 MG tablet  triamcinolone (KENALOG) 0.1 % external cream  warfarin ANTICOAGULANT (COUMADIN) 2.5 MG tablet          Review of Systems   Constitutional: Negative for fever.   HENT: Negative for drooling and facial swelling.   "  Eyes: Negative for pain and visual disturbance.   Respiratory: Negative for stridor.    Cardiovascular: Negative for chest pain.   Gastrointestinal: Negative for abdominal pain.   Genitourinary: Negative for flank pain.   Musculoskeletal: Negative for back pain.        Right lower leg with cellulitis.  No appreciable drainage   Skin: Negative for pallor.   Neurological: Negative for tremors, seizures and facial asymmetry.   Psychiatric/Behavioral: Negative for agitation and confusion.   All other systems reviewed and are negative.      Physical Exam   BP: (!) 179/98  Pulse: 51  Temp: 98.1  F (36.7  C)  Resp: 20  Height: 174 cm (5' 8.5\")  Weight: 109.8 kg (242 lb)  SpO2: 97 %      Physical Exam  Vitals and nursing note reviewed.   Constitutional:       General: He is not in acute distress.     Appearance: Normal appearance. He is not ill-appearing or toxic-appearing.   HENT:      Head: Normocephalic. No raccoon eyes, right periorbital erythema or left periorbital erythema.      Right Ear: No drainage or tenderness.      Left Ear: No drainage or tenderness.      Nose: Nose normal.   Eyes:      General: Lids are normal. Gaze aligned appropriately. No scleral icterus.     Extraocular Movements: Extraocular movements intact.   Neck:      Trachea: No tracheal deviation.   Cardiovascular:      Rate and Rhythm: Normal rate.   Pulmonary:      Effort: Pulmonary effort is normal. No respiratory distress.      Breath sounds: No stridor.   Abdominal:      Tenderness: There is no abdominal tenderness.   Musculoskeletal:         General: No deformity or signs of injury. Normal range of motion.      Cervical back: Normal range of motion. No signs of trauma.      Comments: Right lower with some cellulitis around his harvesting site.  This was demarcated with a skin marker.  I do not appreciate any significant drainage.   Skin:     General: Skin is warm and dry.      Capillary Refill: Capillary refill takes less than 2 seconds.     "  Coloration: Skin is not jaundiced or pale.   Neurological:      General: No focal deficit present.      Mental Status: He is alert and oriented to person, place, and time.      GCS: GCS eye subscore is 4. GCS verbal subscore is 5. GCS motor subscore is 6.      Motor: No tremor or seizure activity.   Psychiatric:         Attention and Perception: Attention normal.         Mood and Affect: Mood normal.         ED Course       Results for orders placed or performed during the hospital encounter of 06/22/22 (from the past 24 hour(s))   CBC with platelets differential    Narrative    The following orders were created for panel order CBC with platelets differential.  Procedure                               Abnormality         Status                     ---------                               -----------         ------                     CBC with platelets and d...[[id number]]  Abnormal            Final result                 Please view results for these tests on the individual orders.   Comprehensive metabolic panel   Result Value Ref Range    Sodium 137 134 - 144 mmol/L    Potassium 4.2 3.5 - 5.1 mmol/L    Chloride 102 98 - 107 mmol/L    Carbon Dioxide (CO2) 29 21 - 31 mmol/L    Anion Gap 6 3 - 14 mmol/L    Urea Nitrogen 14 7 - 25 mg/dL    Creatinine 1.03 0.70 - 1.30 mg/dL    Calcium 8.7 8.6 - 10.3 mg/dL    Glucose 94 70 - 105 mg/dL    Alkaline Phosphatase 75 34 - 104 U/L    AST 30 13 - 39 U/L    ALT 37 7 - 52 U/L    Protein Total 6.6 6.4 - 8.9 g/dL    Albumin 4.0 3.5 - 5.7 g/dL    Bilirubin Total 0.4 0.3 - 1.0 mg/dL    GFR Estimate 75 >60 mL/min/1.73m2   CRP inflammation   Result Value Ref Range    CRP Inflammation 9.9 <10.0 mg/L   Erythrocyte sedimentation rate auto   Result Value Ref Range    Erythrocyte Sedimentation Rate 16 0 - 20 mm/hr   Lactic acid whole blood   Result Value Ref Range    Lactic Acid 0.8 0.7 - 2.0 mmol/L   CBC with platelets and differential   Result Value Ref Range    WBC Count 6.8 4.0 - 11.0  10e3/uL    RBC Count 3.75 (L) 4.40 - 5.90 10e6/uL    Hemoglobin 12.2 (L) 13.3 - 17.7 g/dL    Hematocrit 37.8 (L) 40.0 - 53.0 %     (H) 78 - 100 fL    MCH 32.5 26.5 - 33.0 pg    MCHC 32.3 31.5 - 36.5 g/dL    RDW 14.7 10.0 - 15.0 %    Platelet Count 268 150 - 450 10e3/uL    % Neutrophils 66 %    % Lymphocytes 17 %    % Monocytes 12 %    % Eosinophils 4 %    % Basophils 1 %    % Immature Granulocytes 0 %    NRBCs per 100 WBC 0 <1 /100    Absolute Neutrophils 4.5 1.6 - 8.3 10e3/uL    Absolute Lymphocytes 1.2 0.8 - 5.3 10e3/uL    Absolute Monocytes 0.8 0.0 - 1.3 10e3/uL    Absolute Eosinophils 0.3 0.0 - 0.7 10e3/uL    Absolute Basophils 0.0 0.0 - 0.2 10e3/uL    Absolute Immature Granulocytes 0.0 <=0.4 10e3/uL    Absolute NRBCs 0.0 10e3/uL       Medications   cefTRIAXone (ROCEPHIN) in lidocaine 1% (PF) injection 1 g (has no administration in time range)       Assessments & Plan (with Medical Decision Making)     I have reviewed the nursing notes.    I have reviewed the findings, diagnosis, plan and need for follow up with the patient.      New Prescriptions    AMOXICILLIN-CLAVULANATE (AUGMENTIN) 875-125 MG TABLET    Take 1 tablet by mouth 2 times daily       Final diagnoses:   Postoperative infection, initial encounter   Cellulitis of right lower leg     Afebrile.  Vital signs stable.  Patient with recent CABG x2 and saphenous vein harvesting on his right.  The wound appears to be healing but he has developed some cellulitis to his right lower leg around the site.  This was demarcated with a skin marker.  CBC shows normal white blood cells no left shift.  CMP is unremarkable.  Lactic acid is normal.  CRP and ESR are normal.  Blood cultures are pending.  The patient was given Rocephin IM.  I did discuss this case with , his cardiothoracic surgeon who will coordinate close follow-up.  He suggested using Augmentin.  The patient will return if there is any concerns for further evaluation as needed.  I  explained my diagnostic considerations and recommendations and the patient voiced an understanding and was in agreement with the treatment plan. All questions were answered to the best of my ability.  We discussed potential side effects of any prescribed or recommended therapies, as well as expectations for response to treatments.    6/22/2022   [facility] HospitalAristeo PA-C  06/22/22 [id number]

## 2022-06-22 NOTE — TELEPHONE ENCOUNTER
Patient verified their .  Called patient re: referral to cardiac rehab received from Geraldo  Instructed on program and goals of cardiac rehab.  Appointment set for 22.  Patient verbalized understanding.

## 2022-06-22 NOTE — ED TRIAGE NOTES
"Pt arrived on unit with wife. Ambulated to room using FWW. Reports redness started about 3 days ago at RLE incision site. Recently had open heart surgery on May 31 at [facility] with Dr. Benítez. No numbness or tingling. Irritation at site with some clothing, jeans. Has become more sore over the last 3 days.      Triage Assessment     Row Name 06/22/22 0696       Triage Assessment (Adult)    Airway WDL X    Additional Documentation Edema (Group)       Respiratory WDL    Respiratory WDL X;rhythm/pattern    Rhythm/Pattern, Respiratory dyspnea on exertion  \"from my operation\"       Skin Circulation/Temperature WDL    Skin Circulation/Temperature WDL X  R posterior knee bright red, swollen, oozing at surgical site       Cardiac WDL    Cardiac WDL WDL       Peripheral/Neurovascular WDL    Peripheral Neurovascular WDL WDL       Edema    Edema leg, right    Leg, Right Edema 2+ (Mild)       Cognitive/Neuro/Behavioral WDL    Cognitive/Neuro/Behavioral WDL WDL              "

## 2022-06-23 ENCOUNTER — ANTICOAGULATION THERAPY VISIT (OUTPATIENT)
Dept: ANTICOAGULATION | Facility: OTHER | Age: 77
End: 2022-06-23
Attending: FAMILY MEDICINE
Payer: COMMERCIAL

## 2022-06-23 ENCOUNTER — HOSPITAL ENCOUNTER (OUTPATIENT)
Dept: CARDIAC REHAB | Facility: OTHER | Age: 77
Discharge: HOME | End: 2022-06-23
Attending: INTERNAL MEDICINE
Payer: COMMERCIAL

## 2022-06-23 DIAGNOSIS — Z95.2 S/P TAVR (TRANSCATHETER AORTIC VALVE REPLACEMENT): ICD-10-CM

## 2022-06-23 DIAGNOSIS — I48.0 PAROXYSMAL ATRIAL FIBRILLATION (H): Primary | ICD-10-CM

## 2022-06-23 DIAGNOSIS — I25.810 CORONARY ARTERY DISEASE INVOLVING AUTOLOGOUS ARTERY CORONARY BYPASS GRAFT WITHOUT ANGINA PECTORIS: ICD-10-CM

## 2022-06-23 LAB — INR POINT OF CARE: 3.6 (ref 0.9–1.1)

## 2022-06-23 PROCEDURE — 85610 PROTHROMBIN TIME: CPT | Mod: ZL,QW

## 2022-06-23 PROCEDURE — 93798 PHYS/QHP OP CAR RHAB W/ECG: CPT

## 2022-06-23 ASSESSMENT — PATIENT HEALTH QUESTIONNAIRE - PHQ9: SUM OF ALL RESPONSES TO PHQ QUESTIONS 1-9: 0

## 2022-06-23 NOTE — PROGRESS NOTES
ANTICOAGULATION FOLLOW-UP CLINIC VISIT    Patient Name:  Arun Wren  Date:  6/23/2022  Contact Type:  Face to Face     Patient requested to have INR done on the day that he had an appointment already scheduled.     SUBJECTIVE:  Patient Findings     Positives:  Emergency department visit, Change in medications, Other complaints    Comments:  Started on AUGMENTIN (06/23/22)  Seen in ED on (06/23/22)   Taken more tylenol then before.   Cellulitis of lower leg        Clinical Outcomes     Negatives:  Major bleeding event, Thromboembolic event, Anticoagulation-related hospital admission, Anticoagulation-related ED visit, Anticoagulation-related fatality    Comments:  Started on AUGMENTIN (06/23/22)  Seen in ED on (06/23/22)   Taken more tylenol then before.   Cellulitis of lower leg           OBJECTIVE    Recent labs: (last 7 days)     06/23/22  1304   INR 3.6*       ASSESSMENT / PLAN  INR assessment SUPRA    Recheck INR In: 5 DAYS    INR Location Clinic      Anticoagulation Summary  As of 6/23/2022    INR goal:  2.0-3.0   TTR:  53.7 % (5 d)   INR used for dosing:  3.6 (6/23/2022)   Warfarin maintenance plan:  5 mg (2.5 mg x 2) every Mon, Fri; 2.5 mg (2.5 mg x 1) all other days   Full warfarin instructions:  6/23: Hold; Otherwise 5 mg every Mon, Fri; 2.5 mg all other days   Weekly warfarin total:  22.5 mg   Plan last modified:  Izzy Weinberg RN (6/16/2022)   Next INR check:  6/28/2022   Target end date:  9/8/2022    Indications    Paroxysmal atrial fibrillation (H) [I48.0]             Anticoagulation Episode Summary     INR check location:      Preferred lab:      Send INR reminders to:  [facility]    Comments:        Anticoagulation Care Providers     Provider Role Specialty Phone number    Carlos Cote PA Referring Cardiovascular & Thoracic Surgery [phone]            See the Encounter Report to view Anticoagulation Flowsheet and Dosing Calendar (Go to Encounters tab in chart  review, and find the Anticoagulation Therapy Visit)      Rena Gutierrez RN

## 2022-06-24 ENCOUNTER — HOSPITAL ENCOUNTER (OUTPATIENT)
Dept: CARDIAC REHAB | Facility: OTHER | Age: 77
Discharge: HOME | End: 2022-06-24
Attending: INTERNAL MEDICINE
Payer: COMMERCIAL

## 2022-06-24 PROCEDURE — 93798 PHYS/QHP OP CAR RHAB W/ECG: CPT

## 2022-06-27 ENCOUNTER — HOSPITAL ENCOUNTER (OUTPATIENT)
Dept: CARDIAC REHAB | Facility: OTHER | Age: 77
Discharge: HOME | End: 2022-06-27
Attending: INTERNAL MEDICINE
Payer: COMMERCIAL

## 2022-06-27 ENCOUNTER — TELEPHONE (OUTPATIENT)
Dept: CARDIOLOGY | Facility: OTHER | Age: 77
End: 2022-06-27

## 2022-06-27 LAB
BACTERIA BLD CULT: NO GROWTH
BACTERIA BLD CULT: NO GROWTH

## 2022-06-27 PROCEDURE — 93798 PHYS/QHP OP CAR RHAB W/ECG: CPT

## 2022-06-27 NOTE — TELEPHONE ENCOUNTER
Received phone call message from Cardiac Rehab.    Patient has an appointment scheduled with Dr. Ulloa on    Future Office visit:    Next 5 appointments (look out 90 days)    Jun 28, 2022  9:45 AM  Return Visit with Jann Ulloa,   [facility] [address]   [place] [address]  [phone]        Looking to schedule INR closer to patients appointment so the patient does not have to come back at 1:45. Hoping for some time after Dr. Ulloa's appointment.     Sheila Quintero RN  ....................  6/27/2022   12:18 PM

## 2022-06-28 ENCOUNTER — ANTICOAGULATION THERAPY VISIT (OUTPATIENT)
Dept: ANTICOAGULATION | Facility: OTHER | Age: 77
End: 2022-06-28
Attending: FAMILY MEDICINE
Payer: COMMERCIAL

## 2022-06-28 ENCOUNTER — OFFICE VISIT (OUTPATIENT)
Dept: CARDIOLOGY | Facility: OTHER | Age: 77
End: 2022-06-28
Attending: INTERNAL MEDICINE
Payer: COMMERCIAL

## 2022-06-28 VITALS
OXYGEN SATURATION: 97 % | HEIGHT: 69 IN | HEART RATE: 48 BPM | WEIGHT: 238 LBS | BODY MASS INDEX: 35.25 KG/M2 | RESPIRATION RATE: 18 BRPM | DIASTOLIC BLOOD PRESSURE: 90 MMHG | TEMPERATURE: 96.3 F | SYSTOLIC BLOOD PRESSURE: 160 MMHG

## 2022-06-28 DIAGNOSIS — E66.01 MORBID OBESITY (H): ICD-10-CM

## 2022-06-28 DIAGNOSIS — I65.23 BILATERAL CAROTID ARTERY STENOSIS: ICD-10-CM

## 2022-06-28 DIAGNOSIS — Z87.891 HISTORY OF TOBACCO ABUSE: ICD-10-CM

## 2022-06-28 DIAGNOSIS — Z95.2 S/P AVR: ICD-10-CM

## 2022-06-28 DIAGNOSIS — I35.0 AORTIC STENOSIS, SEVERE: Primary | ICD-10-CM

## 2022-06-28 DIAGNOSIS — I50.32 DIASTOLIC DYSFUNCTION WITH CHRONIC HEART FAILURE (H): ICD-10-CM

## 2022-06-28 DIAGNOSIS — I71.40 ABDOMINAL AORTIC ANEURYSM (AAA) WITHOUT RUPTURE (H): Primary | ICD-10-CM

## 2022-06-28 DIAGNOSIS — I48.91 POSTOPERATIVE ATRIAL FIBRILLATION (H): Primary | ICD-10-CM

## 2022-06-28 DIAGNOSIS — Z95.1 HISTORY OF CORONARY ARTERY BYPASS GRAFT X 2: ICD-10-CM

## 2022-06-28 DIAGNOSIS — I97.89 POSTOPERATIVE ATRIAL FIBRILLATION (H): ICD-10-CM

## 2022-06-28 DIAGNOSIS — E78.2 MIXED HYPERLIPIDEMIA: ICD-10-CM

## 2022-06-28 DIAGNOSIS — Z98.890 STATUS POST CORONARY ANGIOGRAM: ICD-10-CM

## 2022-06-28 DIAGNOSIS — I48.91 POSTOPERATIVE ATRIAL FIBRILLATION (H): ICD-10-CM

## 2022-06-28 DIAGNOSIS — I10 ESSENTIAL HYPERTENSION: ICD-10-CM

## 2022-06-28 DIAGNOSIS — I25.10 CORONARY ARTERY DISEASE INVOLVING NATIVE CORONARY ARTERY OF NATIVE HEART WITHOUT ANGINA PECTORIS: ICD-10-CM

## 2022-06-28 DIAGNOSIS — I97.89 POSTOPERATIVE ATRIAL FIBRILLATION (H): Primary | ICD-10-CM

## 2022-06-28 DIAGNOSIS — I71.20 THORACIC AORTIC ANEURYSM WITHOUT RUPTURE (H): ICD-10-CM

## 2022-06-28 LAB — INR POINT OF CARE: 3.1 (ref 0.9–1.1)

## 2022-06-28 PROCEDURE — 36416 COLLJ CAPILLARY BLOOD SPEC: CPT | Mod: ZL

## 2022-06-28 PROCEDURE — 99215 OFFICE O/P EST HI 40 MIN: CPT | Performed by: INTERNAL MEDICINE

## 2022-06-28 PROCEDURE — 85610 PROTHROMBIN TIME: CPT | Mod: ZL,QW

## 2022-06-28 PROCEDURE — G0463 HOSPITAL OUTPT CLINIC VISIT: HCPCS

## 2022-06-28 RX ORDER — LISINOPRIL 10 MG/1
10 TABLET ORAL DAILY
Qty: 90 TABLET | Refills: 3 | Status: SHIPPED | OUTPATIENT
Start: 2022-06-28 | End: 2022-07-14

## 2022-06-28 RX ORDER — ASPIRIN 81 MG/1
81 TABLET, CHEWABLE ORAL DAILY
Qty: 90 TABLET | Refills: 3 | Status: SHIPPED | OUTPATIENT
Start: 2022-06-28

## 2022-06-28 RX ORDER — AMOXICILLIN 500 MG/1
2000 CAPSULE ORAL
Qty: 4 CAPSULE | Refills: 3 | Status: SHIPPED | OUTPATIENT
Start: 2022-06-28 | End: 2022-06-29

## 2022-06-28 RX ORDER — NITROGLYCERIN 0.4 MG/1
TABLET SUBLINGUAL
Qty: 25 TABLET | Refills: 3 | Status: SHIPPED | OUTPATIENT
Start: 2022-06-28

## 2022-06-28 ASSESSMENT — PAIN SCALES - GENERAL: PAINLEVEL: NO PAIN (0)

## 2022-06-28 NOTE — PATIENT INSTRUCTIONS
1.  Will be given a prescription for sublingual nitro as needed for chest pain.  This medication is not to be taken under any circumstances with in 24 hours of taking Revatio/sildenafil/Viagra.  If if having taken sildenafil with nitro, it can be life-threatening.    2.  Increase lisinopril from 2.5 mg daily to 10 mg daily.    3.  Take 81 mg of aspirin for life.    4.  You are to take amoxicillin 2 g, 30 to 60 minutes prior to dental cleanings/procedures.    5.  We will plan for an ultrasound of her carotids in 1 year with history of less than 50% blockages in your carotid arteries on 4/21/2022.    6.  You will be set up for a Zio patch.  This is a monitor will be placed on your chest to determine if you are having ongoing atrial fibrillation or not.    7.  We will recheck her blood pressure today.    8.  Plan for an echocardiogram in 1 year with history of a mild ascending aortic aneurysm of 4.2 cm.  Surgery is performed at 5.5 cm.    9.  Follow-up after testing.

## 2022-06-28 NOTE — PROGRESS NOTES
ANTICOAGULATION MANAGEMENT     Arun Wren 77 year old male is on warfarin with supratherapeutic INR result. (Goal INR 2.0-3.0)    Recent labs: (last 7 days)     06/28/22  1047   INR 3.1*       ASSESSMENT     Source(s): Chart Review and In person     Warfarin doses taken: Warfarin taken as instructed  Diet: No new diet changes identified  New illness, injury, or hospitalization: Yes: Soft tissue Infection at angiogram site. Patient on amoxicillin X 10 days  Medication/supplement changes: Amoxicillin started on 6/22/22 may increase risk of bleeding, but not expected to affect INR  Signs or symptoms of bleeding or clotting: No  Previous INR: Supratherapeutic  Additional findings: None       PLAN     Recommended plan for temporary change(s) affecting INR     Dosing Instructions: partial hold then continue your current warfarin dose with next INR in 1 week       Summary  As of 6/28/2022    Full warfarin instructions:  6/28: 1.25 mg; Otherwise 5 mg every Mon, Fri; 2.5 mg all other days   Next INR check:  7/5/2022             In person    Lab visit scheduled    Education provided: Please call back if any changes to your diet, medications or how you've been taking warfarin    Plan made per [facility] anticoagulation protocol    Izzy Weinberg RN  Anticoagulation Clinic  6/28/2022    _______________________________________________________________________     Anticoagulation Episode Summary     Current INR goal:  2.0-3.0   TTR:  28.5 % (1.4 wk)   Target end date:  9/8/2022   Send INR reminders to:  [facility]    Indications    Postoperative atrial fibrillation (H) [I97.89  I48.91]           Comments:           Anticoagulation Care Providers     Provider Role Specialty Phone number    Carlos Cote PA Referring Cardiovascular & Thoracic Surgery [phone]

## 2022-06-28 NOTE — PROGRESS NOTES
[facility] HEART CARE   CARDIOLOGY PROGRESS NOTE     Chief Complaint   Patient presents with     Follow Up     Surgery on 5/31          Diagnosis:  1.  Severe AS, tissue AVR on 5/31/2022 at the [facility]-open. 25 mm [place] Inspiris Resilia bovine pericardial valve.  2.  CABG x2 on 5/31/2022 at [facility]. rSVG to the ramus and PDA. LCX was small and not grafted.   3.  Cardiac cath at the [facility], 4/22/2022.  LM normal, LAD normal, second diag 30%, circumflex 95%, ramus 90%, and RCA 75%,  4.  Postop A. Fib on 6/2/22.  Amnio, metoprolol, and Coumadin.  5.  Hypertension-uncontrolled.  6.  Hyperlipidemia-controlled.  7.  Syncope likely to bradycardia episodes.  8.  CHF-Diastolic dysfunction grade 2 on 2/15/2022.  9.  BREANNE AAA 4.2 cm on 2/15/2022.  10.  H/O tobacco abuse.  11.  Bilateral carotid artery stenosis less than 50% on 4/21/2022.    Assessment/Plan:    1.  Severe AS: Status post aortic valve replacement on 5/31/2022 at the [facility].  Patient had a bovine pericardial valve replacement.  Aspirin 81 mg indefinitely.  Will need amoxicillin 2 g prior to dental procedures.  Patient currently participating in cardiac rehab.  2.  CAD: Had CABG x2 at the [facility].  R SVG to the ramus and PDA.  Circumflex was too small to be grafted as it was less than 1.5 mm.  Continue aspirin 81 mg daily, metoprolol 25 mg XL daily, sublingual nitro as needed for chest pain, and rosuvastatin 40 mg daily.  3.  Postop A. fib: Currently on Coumadin and metoprolol.  Was on amiodarone for 14 days.  We will plan for Zio patch.  Zio patch unrevealing related to A. fib, plan to discontinue Coumadin.  If found to have A. fib on Zio patch.  We will continue on Coumadin.  4.  TAAA: At 4.2 cm on 2/15/2022.  We will plan for repeat echocardiogram in 1 year.  Results explained.  5.  Carotid artery stenosis.  Less than 50% on 4/21/2022.  We will plan for an ultrasound carotid arteries in 1 year.  6.  Hypertension: Uncontrolled.  Blood pressure today 182/90.  Increase  lisinopril from 2.5 mg daily to 10 mg daily.  7.  ED: Has sildenafil/Viagra.  Warned patient if taken sildenafil under no circumstances it is easier to take nitro within 24 hours of taking sildenafil/Viagra.  This will kill him.  8.  Follow-up after completion of Zio patch to determine if should continue Coumadin or not.        Interval history:  Gene is being seen in follow-up.  He was found to have severe aortic stenosis on 2/15/2022.  Referred to [facility] for valve replacement.  He underwent cardiac catheterization on 4/22/2022.  Found to have disease in the ramus, circumflex, and RCA.  CABG x2 involving the ramus and RCA but circumflex felt to be too small to be bypassed.  Patient has not had chest pain or chest tightness previously.  He is found to have postop atrial fibrillation.  He was continued on amiodarone for 14 days, now off amiodarone.  He has been on metoprolol with some bradycardia.  He is also on Coumadin.  We will plan for Zio patch.  If Zio patch deficient of ANTONI mcconnell, we will discontinue Coumadin at that time.  He understands he needs to take amoxicillin 2 g, 30 to 60 minutes prior to cleaning or dental work.  He is on aspirin 81 mg indefinitely.  We will plan for an echocardiogram in 1 year related to ascending aortic aneurysm.  We will plan for an ultrasound of his carotids in 1 year related to bilateral carotid artery disease.  He understands under no circumstances is he did take nitro if he is taken sildenafil within 24 hours.  Follow-up will be after testing.      HPI:    Mr. Wren is being seen by cardiology for severe aortic stenosis.  He was seen in the clinic on 1/19/2022.  Repeat echocardiogram completed on 2/15/2022.  He was found to have severe aortic stenosis.  He was referred to cardiology as a result.     He had an echo on 10/31/2018.  At that time, he was found to have mild to moderate aortic valve sclerosis with mild aortic stenosis.  Mean gradient was 18 mmHg.   Valve area was 1.5 cm .  Repeat echocardiogram on 2/15/2022.  Echocardiogram showed a mean gradient of 50 mmHg, peak velocity of 4.3 m/s, and DVI of 0 0.26.  Aortic valve area 0.95 cm .  Ascending aorta was 4.2 cm.     We discussed the results of his echocardiogram.  He understands he has severe aortic stenosis.  We discussed TAVR versus SAVR.  He has denied dyspnea, swelling, chest pain, near-syncope, or syncope.     We had a long discussion using the heart model in the room as well as SCREEMO.  We discussed TAVR to replace his aortic valve.  He understands will likely require 2 trips to the [facility].  He was explained that the first work-up may include a cardiac catheterization.  We will plan for an ultrasound of his carotids and abdomen for AAA here locally.  He is also aware that after valve replacement, he will need to take amoxicillin 2 g, 30 to 60 minutes prior to dental procedures.  He currently has an abscess tooth which he is on antibiotics.  He needs a tooth pulled.  He cannot get in for couple weeks.  He has an appointment.  He understands this will need to be taken care of prior to valve replacement.  Risks and benefits discussed.  Patient agreeable to going to the [facility].      Relevant testing:  Zio patch in 6/8/2022:  Pending data     Echo on 5/31/2022:  s/p CABG+AVR 25MM INSPIRIS RESILIA AORTIC VALVE.  AVR valve leaflets not well seen. Normal Doppler interrogation of the valve. Mean gradient is 5 mmHg. No AI.  Global and regional left ventricular function is normal with an EF of 55-60%.  RV is not seen well. From limited views appear normal size and function.  This study was compared with the study from 2/15/2022.  Pateint is now s/p AVR for severe AS.    Echo on 2/15/2022:  Global and regional left ventricular function is normal with an EF of 55-60%.  Grade II or moderate diastolic dysfunction.  The right ventricle is normal in function and size.  Severe aortic stenosis is present  (MG 50 mmHg, PV 4.3 m/s, DVI 0.26)  Ascending aorta is dilated measuring 4.2 cm  No pericardial effusion is present.  This study was compared with the study from 10/31/18 the AS has progressed and aorta is more dilated.     Echo on 10/31/2018:  EF 55-60%.   Thickening of the anterior basal septum present.  Diastolic function normal.  Atria are normal.  Mild aortic stenosis present.     US abdominal aorta on 9/18/2018:  No abdominal aortic aneurysm.      No diagnosis found.    Past Medical History:   Diagnosis Date     Actinic keratosis     05/19/09,Keratosis left external ear, treated with cryocautery, recheck if recurs or persists     Allergic contact dermatitis due to plants, except food     recurrent     Basal cell carcinoma of skin     No Comments Provided     Carpal tunnel syndrome     right greater than left     Closed fracture of base of skull (H)     No Comments Provided     Essential (primary) hypertension     No Comments Provided     Ganglion     No Comments Provided     Hydrocele     No Comments Provided     Male erectile dysfunction     decreased libido     Personal history of nicotine dependence     No Comments Provided     Pure hypercholesterolemia     No Comments Provided     Residual hemorrhoidal skin tags     Past external hemorrhoid     Tinea unguium     No Comments Provided       Past Surgical History:   Procedure Laterality Date     BYPASS GRAFT ARTERY CORONARY, REPAIR VALVE AORTIC, COMBINED N/A 05/31/2022    Procedure: MEDIAN STERNOTOMY. CARDIOPULMONARY BYPASS. ENDOSCOPIC HARVEST OF LEFT GREATER SAPHENOUS VEIN. CORONARY ARTERY BYPASS GRAFT (CABG) X2 . AORTIC VALVE REPLACEMENT USING 25MM INSPIRIS RESILIA  AORTIC VALVE. TRANSESOPHAGEAL ECHOCARDIOGRAM (BREANNE) PER ANESTHESIA.;  Surgeon: Sai Benítez MD;  Location: [facility] OR     BYPASS GRAFT ARTERY CORONARY, REPAIR VALVE AORTIC, COMBINED  05/31/2022    Procedure: ;  Surgeon: Sai Bentíez MD;  Location: [facility] OR     COLONOSCOPY   01/01/1998 1998,at [facility], which was normal     COLONOSCOPY  03/03/2005 03/03/05,at [facility]     COLONOSCOPY  03/02/2015    3/2/15,F/U 2020  tubular adnomas     COLONOSCOPY  03/16/2020    F/U N/A hyperplastic     CV CORONARY ANGIOGRAM N/A 04/22/2022    Procedure: Coronary Angiogram with possible intervention;  Surgeon: Saulo Mccarthy MD;  [facility]     CV RIGHT HEART CATH MEASUREMENTS RECORDED N/A 04/22/2022    Procedure: Right Heart Cath;  Surgeon: Saulo Mccarthy MD;  Location:  HEART CARDIAC CATH LAB     OTHER SURGICAL HISTORY      ,HERNIA REPAIR     OTHER SURGICAL HISTORY      05/24/16,[id number],OTHER,Left,Dr. Jeremiah GIBSON     OTHER SURGICAL HISTORY      07/28/16,[id number],ORCHIECTOMY SIMPLE / PARTIAL / RADICAL W/ SCROTAL / INGUINAL APPROACH,Left,Dr. Jeremiah GIBSON     PICC DOUBLE LUMEN PLACEMENT Right 06/06/2022    on Right Basilic, cut 43 cm, needs X-ray to verify tip due to A-fib       No Known Allergies    Current Outpatient Medications   Medication Sig Dispense Refill     acetaminophen (TYLENOL) 325 MG tablet Take 2 tablets (650 mg) by mouth every 6 hours as needed for other (For optimal non-opioid multimodal pain management to improve pain control.) 50 tablet 0     amoxicillin (AMOXIL) 500 MG capsule Take 4 capsules (2,000 mg) by mouth once as needed (30-60 minutes prior to dental procedures/cleanings.) 4 capsule 3     amoxicillin-clavulanate (AUGMENTIN) 875-125 MG tablet Take 1 tablet by mouth 2 times daily 20 tablet 0     aspirin (ASA) 81 MG chewable tablet Take 1 tablet (81 mg) by mouth daily 90 tablet 3     cholecalciferol (VITAMIN D3) 25 mcg (1000 units) capsule Take 1 capsule by mouth daily       lisinopril (ZESTRIL) 2.5 MG tablet Take 1 tablet (2.5 mg) by mouth daily 45 tablet 0     melatonin 3 MG tablet Take 3 mg by mouth nightly as needed for sleep       methocarbamol (ROBAXIN) 500 MG tablet Take 1 tablet (500 mg) by mouth 3 times daily as needed  for muscle spasms (sternal pain) 25 tablet 0     metoprolol succinate ER (TOPROL XL) 25 MG 24 hr tablet Take 0.5 tablets (12.5 mg) by mouth every evening 30 tablet 0     nystatin (MYCOSTATIN) 411251 UNIT/GM external cream APPLY TOPICALLY TO AFFECTEDAREA(S) TWICE DAILY (Patient taking differently: No sig reported) 60 g 4     rosuvastatin (CRESTOR) 40 MG tablet Take 1 tablet (40 mg) by mouth daily 90 tablet 4     senna-docusate (SENOKOT-S/PERICOLACE) 8.6-50 MG tablet Take 1-2 tablets by mouth 2 times daily as needed for constipation 30 tablet 0     sildenafil (REVATIO) 20 MG tablet TAKE 4-5 TABLETS BY MOUTH ONCE DAILY IF NEEDED FOR OTHER (SPECIFY).TAKE 30 MINUTES BEFORE SEXUAL ACTIVITY. 80 tablet 1     triamcinolone (KENALOG) 0.1 % external cream Apply topically 2 times daily (Patient taking differently: Apply topically 2 times daily as needed for irritation) 80 g 4     warfarin ANTICOAGULANT (COUMADIN) 2.5 MG tablet Take 5 mg PO daily at 6 pm until next INR check, then dose per INR goal 2-3 for Atrial fibrillation 100 tablet 0     amiodarone (PACERONE) 200 MG tablet Take 1 tablet (200 mg) by mouth daily for 14 days then stop 14 tablet 0       Social History     Socioeconomic History     Marital status:      Spouse name: Not on file     Number of children: Not on file     Years of education: Not on file     Highest education level: Not on file   Occupational History     Not on file   Tobacco Use     Smoking status: Former Smoker     Years: 27.00     Types: Cigarettes     Quit date: 1988     Years since quittin.5     Smokeless tobacco: Never Used   Vaping Use     Vaping Use: Never used   Substance and Sexual Activity     Alcohol use: Yes     Comment: Alcoholic Drinks/day: 1-2 drinks per day, whiskey     Drug use: No     Sexual activity: Not Currently   Other Topics Concern     Parent/sibling w/ CABG, MI or angioplasty before 65F 55M? Not Asked   Social History Narrative    Quit smoking .  Occ  alcohol.  .   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    Stays active, owns tree farm.     Social Determinants of Health     Financial Resource Strain: Not on file   Food Insecurity: Not on file   Transportation Needs: Not on file   Physical Activity: Not on file   Stress: Not on file   Social Connections: Not on file   Intimate Partner Violence: Not on file   Housing Stability: Not on file       LAB RESULTS:   Anticoagulation Therapy Visit on 06/23/2022   Component Date Value Ref Range Status     INR Point of Care 06/23/2022 3.6 (A) 0.9 - 1.1 Final   Anticoagulation Therapy Visit on 06/20/2022   Component Date Value Ref Range Status     INR Point of Care 06/20/2022 2.9 (A) 0.9 - 1.1 Final   Anticoagulation Therapy Visit on 06/16/2022   Component Date Value Ref Range Status     INR Point of Care 06/16/2022 2.7 (A) 0.9 - 1.1 Final   Anticoagulation Therapy Visit on 06/13/2022   Component Date Value Ref Range Status     INR Point of Care 06/13/2022 3.3 (A) 0.9 - 1.1 Final   Allied Health/Nurse Visit on 05/27/2022   Component Date Value Ref Range Status     SARS CoV2 PCR 05/27/2022 Negative  Negative Final   Lab on 05/18/2022   Component Date Value Ref Range Status     WBC Count 05/18/2022 7.7  4.0 - 11.0 10e3/uL Final     RBC Count 05/18/2022 4.75  4.40 - 5.90 10e6/uL Final     Hemoglobin 05/18/2022 15.2  13.3 - 17.7 g/dL Final     Hematocrit 05/18/2022 46.5  40.0 - 53.0 % Final     MCV 05/18/2022 98  78 - 100 fL Final     MCH 05/18/2022 32.0  26.5 - 33.0 pg Final     MCHC 05/18/2022 32.7  31.5 - 36.5 g/dL Final     RDW 05/18/2022 13.2  10.0 - 15.0 % Final     Platelet Count 05/18/2022 185  150 - 450 10e3/uL Final     Sodium 05/18/2022 138  133 - 144 mmol/L Final     Potassium 05/18/2022 4.2  3.4 - 5.3 mmol/L Final     Chloride 05/18/2022 104  94 - 109 mmol/L Final     Carbon Dioxide (CO2) 05/18/2022 30  20 - 32 mmol/L Final     Anion Gap 05/18/2022 4  3 - 14 mmol/L Final     Urea Nitrogen  "05/18/2022 21  7 - 30 mg/dL Final     Creatinine 05/18/2022 0.99  0.66 - 1.25 mg/dL Final     Calcium 05/18/2022 9.0  8.5 - 10.1 mg/dL Final     Glucose 05/18/2022 97  70 - 99 mg/dL Final     Alkaline Phosphatase 05/18/2022 47  40 - 150 U/L Final     AST 05/18/2022 32  0 - 45 U/L Final     ALT 05/18/2022 36  0 - 70 U/L Final     Protein Total 05/18/2022 7.6  6.8 - 8.8 g/dL Final     Albumin 05/18/2022 3.8  3.4 - 5.0 g/dL Final     Bilirubin Total 05/18/2022 0.5  0.2 - 1.3 mg/dL Final     GFR Estimate 05/18/2022 79  >60 mL/min/1.73m2 Final     ABO/RH(D) 05/18/2022 A POS   Final     Antibody Screen 05/18/2022 Negative  Negative Final     SPECIMEN EXPIRATION DATE 05/18/2022 [id number]   Final   Orders Only on 04/29/2022   Component Date Value Ref Range Status     Prealbumin 05/18/2022 22  15 - 45 mg/dL Final     Magnesium 05/18/2022 2.4 (A) 1.6 - 2.3 mg/dL Final        Review of systems: Negative except that which was noted in the HPI.    Physical examination:  BP (!) 182/90 (BP Location: Right arm, Patient Position: Sitting, Cuff Size: Adult Large)   Pulse (!) 48   Temp (!) 96.3  F (35.7  C) (Tympanic)   Resp 18   Ht 1.753 m (5' 9\")   Wt 108 kg (238 lb)   SpO2 97%   BMI 35.15 kg/m      GENERAL APPEARANCE: healthy, alert and no distress  HEENT: no icterus, no xanthelasmas, normal pupil size and reaction, no cyanosis.  NECK: no adenopathy, no asymmetry, masses.  CHEST: lungs clear to auscultation - no rales, rhonchi or wheezes, no use of accessory muscles, no retractions, respirations are unlabored, normal respiratory rate  CARDIOVASCULAR: regular rhythm, normal S1 with physiologic split S2, no S3 or S4 and no murmur, click or rub  EXTREMITIES: no clubbing, cyanosis or edema  NEURO: alert and oriented normal speech, and affect  VASC: No vascular bruits heard.  SKIN: no ecchymoses, no rashes    Total time spent on day of visit, including review of tests, obtaining/reviewing separately obtained history, " ordering medications/tests/procedures, communicating with PCP/consultants, and documenting in electronic medical record: 60 minutes.           Thank you for allowing me to participate in the care of your patient. Please do not hesitate to contact me if you have any questions.     Jann Ulloa, DO

## 2022-06-28 NOTE — NURSING NOTE
"Patient comes in for 3 month follow up.  Marine Griffiths LPN ....................6/28/2022   9:51 AM  Chief Complaint   Patient presents with     Follow Up     Surgery on 5/31       Initial BP (!) 182/90 (BP Location: Right arm, Patient Position: Sitting, Cuff Size: Adult Large)   Pulse (!) 48   Temp (!) 96.3  F (35.7  C) (Tympanic)   Resp 18   Ht 1.753 m (5' 9\")   Wt 108 kg (238 lb)   SpO2 97%   BMI 35.15 kg/m   Estimated body mass index is 35.15 kg/m  as calculated from the following:    Height as of this encounter: 1.753 m (5' 9\").    Weight as of this encounter: 108 kg (238 lb).  Meds Reconciled: complete  Pt is on Aspirin  Pt is on a Statin  PHQ and/or STEVENSON reviewed. Pt referred to PCP/MH Provider as appropriate.    Marine Griffiths LPN      FOOD SECURITY SCREENING QUESTIONS:    The next two questions are to help us understand your food security.  If you are feeling you need any assistance in this area, we have resources available to support you today.    Hunger Vital Signs:  Within the past 12 months we worried whether our food would run out before we got money to buy more. Never  Within the past 12 months the food we bought just didn't last and we didn't have money to get more. Never  Marine Griffiths LPN,LPN on 6/28/2022 at 9:51 AM      "

## 2022-06-29 ENCOUNTER — TELEPHONE (OUTPATIENT)
Dept: CARDIOLOGY | Facility: OTHER | Age: 77
End: 2022-06-29

## 2022-06-29 ENCOUNTER — PATIENT OUTREACH (OUTPATIENT)
Dept: FAMILY MEDICINE | Facility: OTHER | Age: 77
End: 2022-06-29
Payer: COMMERCIAL

## 2022-06-29 ENCOUNTER — HOSPITAL ENCOUNTER (OUTPATIENT)
Dept: CARDIAC REHAB | Facility: OTHER | Age: 77
Discharge: HOME | End: 2022-06-29
Attending: INTERNAL MEDICINE
Payer: COMMERCIAL

## 2022-06-29 PROCEDURE — 93798 PHYS/QHP OP CAR RHAB W/ECG: CPT

## 2022-06-29 NOTE — TELEPHONE ENCOUNTER
Spoke to patient who asked that I speak to his wife Rajiv in regards to his medications.  Patient after he was discharged from the hospital about a month ago was put on lisinopril 10mg once daily.  For the last month the wife didn't realize that the 2 bottles of lisinopril were 2 different doses.  She had a bottle of 20mg tablets and a bottle of 10mg tablets.  She counted and she figures 9 of the last 30 or so days he was given the wrong dose of 20mg once daily (double the correct dose).  Since she is nervous about what to do she only gave him a 2.5mg tablet of lisinopril this morning at 6:30AM.  At 8AM his blood pressure was 172-103.  At 8:15AM his blood pressure was 147-94 with pulse of 50bpm.  She is wondering if she should give him the other 3 tablets of 2.5mg to equal 10mg or not.  She is also not sure if he should go to cardiac rehab this morning at 10AM.  To Jann Ulloa, DO to address how she should proceed.  Lety Wheeler RN......June 29, 2022...9:06 AM

## 2022-06-29 NOTE — TELEPHONE ENCOUNTER
Wife Rajiv called, patient is suppose to be taking Lisinopril 10mg and she thought his old ones at home were the same but they are 20 mg. And has been putting those and the 10 mg in pill case for past month---his BP has been up and down and wonders about if it changes his warfrin-she just discovered this he has cardiac rehab at 10:00 this morning and wonders if he should go or not and discuss the wrong medication-please call as soon as you can--

## 2022-06-29 NOTE — TELEPHONE ENCOUNTER
Patient scheduled for post heart surgery follow up with [facility] 7/14/22.    Roslyn Irby on 6/29/2022 at 1:59 PM

## 2022-06-29 NOTE — TELEPHONE ENCOUNTER
Transitional Care Management Phone Call    Post Discharge Medication Reconciliation: discharge medications reconciled, continue medications without change    Medications reviewed by: by myself    Problems taking medications regularly:  None    Problems adhering to non-medication therapy:  None      Just a friendly reminder that you appointment is:  Next 5 appointments (look out 90 days)    Jul 14, 2022  2:20 PM  SHORT with Richard Arita MD  [facility] ([facility] ) [address]  [facility] [address] [phone]          We encourage you to keep this appointment.  Please remember to bring all of your pills in their bottles (including any vitamins or over the counter pills) with you to your appointment.   The patient indicates understanding of these issues and agrees with the plan of care.   Yes      Was the Medication reconciliation and management done since the patient was discharged? Yes    Justice Stearns RN ....................  6/29/2022   2:47 PM

## 2022-06-29 NOTE — TELEPHONE ENCOUNTER
Patient needs to have a post heart surgery follow up appointment with PCP or someone else. He has already seen Dr Ulloa and KAITLYNN. Are you willing to work him in or should he be seen by someone else?  Norma J. Gosselin, LPN .......  6/29/2022  11:22 AM

## 2022-06-30 NOTE — TELEPHONE ENCOUNTER
"Per Jann Ulloa, DO from 6/29/22 at 5:59PM: \"I would have him continue on the 10 mg of lisinopril daily.  I would take the additional doses of 2.5 mg to equal 10 mg if he were to get this message today.  I would have them continue to check his blood pressure and heart rate on a daily basis.  Have him write these down.  Have him call back in 1 to 2 weeks with his blood pressure and heart rate readings..  If his blood pressure is still elevated, the lisinopril should be increased from 10 mg to 20 mg daily.  Hopefully this helps.  Sorry for the delay in reply!     Dr. Ulloa\"    Rajiv (patient's wife) verified patient's full name and date of birth.  Consent to communicate on file.  Rajiv notified of above information and verbalized understanding.  She will call back in a week or 2 with the blood pressure and pulse readings.  Lety Wheeler RN......June 30, 2022...10:58 AM   "

## 2022-07-01 ENCOUNTER — HOSPITAL ENCOUNTER (OUTPATIENT)
Dept: CARDIAC REHAB | Facility: OTHER | Age: 77
Discharge: HOME | End: 2022-07-01
Attending: INTERNAL MEDICINE
Payer: COMMERCIAL

## 2022-07-01 PROCEDURE — 93798 PHYS/QHP OP CAR RHAB W/ECG: CPT

## 2022-07-06 ENCOUNTER — ANTICOAGULATION THERAPY VISIT (OUTPATIENT)
Dept: ANTICOAGULATION | Facility: OTHER | Age: 77
End: 2022-07-06
Attending: FAMILY MEDICINE
Payer: COMMERCIAL

## 2022-07-06 ENCOUNTER — HOSPITAL ENCOUNTER (OUTPATIENT)
Dept: CARDIAC REHAB | Facility: OTHER | Age: 77
Discharge: HOME | End: 2022-07-06
Attending: INTERNAL MEDICINE
Payer: COMMERCIAL

## 2022-07-06 DIAGNOSIS — I48.91 POSTOPERATIVE ATRIAL FIBRILLATION (H): Primary | ICD-10-CM

## 2022-07-06 DIAGNOSIS — I97.89 POSTOPERATIVE ATRIAL FIBRILLATION (H): Primary | ICD-10-CM

## 2022-07-06 LAB — INR POINT OF CARE: 3.8 (ref 0.9–1.1)

## 2022-07-06 PROCEDURE — 36416 COLLJ CAPILLARY BLOOD SPEC: CPT | Mod: ZL

## 2022-07-06 PROCEDURE — 93798 PHYS/QHP OP CAR RHAB W/ECG: CPT

## 2022-07-06 NOTE — PROGRESS NOTES
ANTICOAGULATION MANAGEMENT     Arun Wren 77 year old male is on warfarin with therapeutic INR result. (Goal INR 2.0-3.0)    Recent labs: (last 7 days)     07/06/22  1101   INR 3.8*       ASSESSMENT     Source(s): Chart Review and Patient/Caregiver Call     Warfarin doses taken: Warfarin taken as instructed  Diet: No new diet changes identified  New illness, injury, or hospitalization: No  Medication/supplement changes: None noted  Signs or symptoms of bleeding or clotting: No  Previous INR: Supratherapeutic  Additional findings: None       PLAN     Recommended plan for no diet, medication or health factor changes affecting INR     Dosing Instructions: decrease your warfarin dose (11.1% change) with next INR in 1 week       Summary  As of 7/6/2022    Full warfarin instructions:  7/6: 1.25 mg; Otherwise 5 mg every Mon; 2.5 mg all other days   Next INR check:  7/13/2022             In person    Lab visit scheduled    Education provided: None required    Plan made per [facility] anticoagulation protocol    Sasha Brito RN  Anticoagulation Clinic  7/6/2022    _______________________________________________________________________     Anticoagulation Episode Summary     Current INR goal:  2.0-3.0   TTR:  16.1 % (2.6 wk)   Target end date:  9/8/2022   Send INR reminders to:  [facility]    Indications    Postoperative atrial fibrillation (H) [I97.89  I48.91]           Comments:           Anticoagulation Care Providers     Provider Role Specialty Phone number    Carlos Cote PA Referring Cardiovascular & Thoracic Surgery [phone]

## 2022-07-07 ENCOUNTER — HOSPITAL ENCOUNTER (OUTPATIENT)
Dept: CARDIOLOGY | Facility: OTHER | Age: 77
Discharge: HOME | End: 2022-07-07
Attending: INTERNAL MEDICINE | Admitting: INTERNAL MEDICINE
Payer: COMMERCIAL

## 2022-07-07 DIAGNOSIS — I97.89 POSTOPERATIVE ATRIAL FIBRILLATION (H): ICD-10-CM

## 2022-07-07 DIAGNOSIS — I48.91 POSTOPERATIVE ATRIAL FIBRILLATION (H): ICD-10-CM

## 2022-07-07 PROCEDURE — 93248 EXT ECG>7D<15D REV&INTERPJ: CPT | Performed by: INTERNAL MEDICINE

## 2022-07-07 PROCEDURE — 93246 EXT ECG>7D<15D RECORDING: CPT

## 2022-07-08 ENCOUNTER — HOSPITAL ENCOUNTER (OUTPATIENT)
Dept: CARDIAC REHAB | Facility: OTHER | Age: 77
Discharge: HOME | End: 2022-07-08
Attending: INTERNAL MEDICINE
Payer: COMMERCIAL

## 2022-07-08 PROCEDURE — 93798 PHYS/QHP OP CAR RHAB W/ECG: CPT

## 2022-07-11 ENCOUNTER — HOSPITAL ENCOUNTER (OUTPATIENT)
Dept: CARDIAC REHAB | Facility: OTHER | Age: 77
Discharge: HOME | End: 2022-07-11
Attending: INTERNAL MEDICINE
Payer: COMMERCIAL

## 2022-07-11 PROCEDURE — 93798 PHYS/QHP OP CAR RHAB W/ECG: CPT

## 2022-07-14 ENCOUNTER — OFFICE VISIT (OUTPATIENT)
Dept: FAMILY MEDICINE | Facility: OTHER | Age: 77
End: 2022-07-14
Attending: FAMILY MEDICINE
Payer: COMMERCIAL

## 2022-07-14 ENCOUNTER — ANTICOAGULATION THERAPY VISIT (OUTPATIENT)
Dept: ANTICOAGULATION | Facility: OTHER | Age: 77
End: 2022-07-14
Attending: FAMILY MEDICINE
Payer: COMMERCIAL

## 2022-07-14 VITALS
OXYGEN SATURATION: 97 % | HEART RATE: 55 BPM | HEIGHT: 69 IN | WEIGHT: 229 LBS | RESPIRATION RATE: 16 BRPM | TEMPERATURE: 97.8 F | DIASTOLIC BLOOD PRESSURE: 88 MMHG | BODY MASS INDEX: 33.92 KG/M2 | SYSTOLIC BLOOD PRESSURE: 138 MMHG

## 2022-07-14 DIAGNOSIS — I97.89 POSTOPERATIVE ATRIAL FIBRILLATION (H): ICD-10-CM

## 2022-07-14 DIAGNOSIS — I10 ESSENTIAL HYPERTENSION: Primary | ICD-10-CM

## 2022-07-14 DIAGNOSIS — I25.10 CORONARY ARTERY DISEASE INVOLVING NATIVE CORONARY ARTERY OF NATIVE HEART WITHOUT ANGINA PECTORIS: ICD-10-CM

## 2022-07-14 DIAGNOSIS — I48.91 POSTOPERATIVE ATRIAL FIBRILLATION (H): Primary | ICD-10-CM

## 2022-07-14 DIAGNOSIS — I97.89 POSTOPERATIVE ATRIAL FIBRILLATION (H): Primary | ICD-10-CM

## 2022-07-14 DIAGNOSIS — I48.91 POSTOPERATIVE ATRIAL FIBRILLATION (H): ICD-10-CM

## 2022-07-14 LAB — INR POINT OF CARE: 2.8 (ref 0.9–1.1)

## 2022-07-14 PROCEDURE — 85610 PROTHROMBIN TIME: CPT | Mod: ZL,QW

## 2022-07-14 PROCEDURE — 99214 OFFICE O/P EST MOD 30 MIN: CPT | Performed by: FAMILY MEDICINE

## 2022-07-14 PROCEDURE — G0463 HOSPITAL OUTPT CLINIC VISIT: HCPCS

## 2022-07-14 RX ORDER — LISINOPRIL 20 MG/1
20 TABLET ORAL DAILY
Qty: 90 TABLET | Refills: 4 | Status: SHIPPED | OUTPATIENT
Start: 2022-07-14 | End: 2022-08-15

## 2022-07-14 ASSESSMENT — PAIN SCALES - GENERAL: PAINLEVEL: NO PAIN (0)

## 2022-07-14 NOTE — NURSING NOTE
"Chief Complaint   Patient presents with     Surgical Followup     Heart surgery       Initial /88   Pulse 55   Temp 97.8  F (36.6  C) (Tympanic)   Resp 16   Ht 1.74 m (5' 8.5\")   Wt 103.9 kg (229 lb)   SpO2 97%   BMI 34.31 kg/m   Estimated body mass index is 34.31 kg/m  as calculated from the following:    Height as of this encounter: 1.74 m (5' 8.5\").    Weight as of this encounter: 103.9 kg (229 lb).  Medication Reconciliation: complete    FOOD SECURITY SCREENING QUESTIONS  Hunger Vital Signs:  Within the past 12 months we worried whether our food would run out before we got money to buy more. Never  Within the past 12 months the food we bought just didn't last and we didn't have money to get more. Never  Corry Carias LPN 7/14/2022 1:57 PM    Do you have a healthcare directive? Yes      "

## 2022-07-14 NOTE — PROGRESS NOTES
ANTICOAGULATION MANAGEMENT     Arun Wren 77 year old male is on warfarin with therapeutic INR result. (Goal INR 2.0-3.0)    Recent labs: (last 7 days)     07/14/22  1255   INR 2.8*       ASSESSMENT     Source(s): Chart Review and In person     Warfarin doses taken: Warfarin taken as instructed  Diet: No new diet changes identified  New illness, injury, or hospitalization: No  Medication/supplement changes: None noted  Signs or symptoms of bleeding or clotting: No  Previous INR: Supratherapeutic  Additional findings: None       PLAN     Recommended plan for no diet, medication or health factor changes affecting INR     Dosing Instructions: continue your current warfarin dose with next INR in 2 weeks       Summary  As of 7/14/2022    Full warfarin instructions:  5 mg every Mon; 2.5 mg all other days   Next INR check:  7/29/2022             In person    Lab visit scheduled    Education provided: Please call back if any changes to your diet, medications or how you've been taking warfarin    Plan made per [facility] anticoagulation protocol    Izzy Weinberg RN  Anticoagulation Clinic  7/14/2022    _______________________________________________________________________     Anticoagulation Episode Summary     Current INR goal:  2.0-3.0   TTR:  17.3 % (3.7 wk)   Target end date:  9/8/2022   Send INR reminders to:  [facility]    Indications    Postoperative atrial fibrillation (H) [I97.89  I48.91]           Comments:           Anticoagulation Care Providers     Provider Role Specialty Phone number    Carlos Cote PA Referring Cardiovascular & Thoracic Surgery [phone]

## 2022-07-14 NOTE — PROGRESS NOTES
"  Assessment & Plan     (I10) Essential hypertension  (primary encounter diagnosis)  Comment: blood pressure not at goal.  I am a bit worried about being too aggressive so will increase the lisinopril from 10 to 20 milligram daily   Plan: lisinopril (ZESTRIL) 20 MG tablet             (I97.89,  I48.91) Postoperative atrial fibrillation (H)  Comment: is in NSR on exam currently.  Working with cardiology on this to determine length of need for anticoagulation.  Plan:      (I25.10) Coronary artery disease involving native coronary artery of native heart without angina pectoris  Comment: recent CABG, without significant complications.    Plan: lifelong statin and aggressive blood pressure control.               BMI:   Estimated body mass index is 34.31 kg/m  as calculated from the following:    Height as of this encounter: 1.74 m (5' 8.5\").    Weight as of this encounter: 103.9 kg (229 lb).           Return in about 3 months (around 10/14/2022) for Follow up, with me.    Richard Arita MD  [facility]    Subjective   Valentin is a 77 year old, presenting for the following health issues:  Surgical Followup (Heart surgery)      HPI  Had an aortic valve replacement, CABG, a fib postoperatively.  Is now wearing a Zio patch, has met with cardiology already.  Labile blood pressure in the hospital.  Hs is checking this twice daily now and running 120/80 to 186/104. Highs are usually in the AM before he takes his meds.  No headache, no vision changes.  Feels \"prettyu good\". Ongoing rehab and is advancing his activity there.  No chest pain at all. No fevers.      Current Outpatient Medications   Medication     acetaminophen (TYLENOL) 325 MG tablet     aspirin (ASA) 81 MG chewable tablet     lisinopril (ZESTRIL) 10 MG tablet     metoprolol succinate ER (TOPROL XL) 25 MG 24 hr tablet     nitroGLYcerin (NITROSTAT) 0.4 MG sublingual tablet     rosuvastatin (CRESTOR) 40 MG tablet     senna-docusate " "(SENOKOT-S/PERICOLACE) 8.6-50 MG tablet     sildenafil (REVATIO) 20 MG tablet     warfarin ANTICOAGULANT (COUMADIN) 2.5 MG tablet     amoxicillin-clavulanate (AUGMENTIN) 875-125 MG tablet     cholecalciferol (VITAMIN D3) 25 mcg (1000 units) capsule     nystatin (MYCOSTATIN) 377746 UNIT/GM external cream     triamcinolone (KENALOG) 0.1 % external cream     No current facility-administered medications for this visit.             Review of Systems         Objective    /88   Pulse 55   Temp 97.8  F (36.6  C) (Tympanic)   Resp 16   Ht 1.74 m (5' 8.5\")   Wt 103.9 kg (229 lb)   SpO2 97%   BMI 34.31 kg/m    Body mass index is 34.31 kg/m .  Physical Exam  Constitutional:       Appearance: Normal appearance.   Cardiovascular:      Rate and Rhythm: Normal rate and regular rhythm.      Pulses: Normal pulses.      Heart sounds: No murmur heard.     Comments: Sternotomy scar is CDI, no tenderness on palpation   Pulmonary:      Effort: Pulmonary effort is normal. No respiratory distress.      Breath sounds: No stridor.   Neurological:      General: No focal deficit present.      Mental Status: He is alert and oriented to person, place, and time.   Psychiatric:         Mood and Affect: Mood normal.         Behavior: Behavior normal.         Thought Content: Thought content normal.                            .  ..  "

## 2022-07-15 ENCOUNTER — HOSPITAL ENCOUNTER (OUTPATIENT)
Dept: CARDIAC REHAB | Facility: OTHER | Age: 77
Discharge: HOME | End: 2022-07-15
Attending: INTERNAL MEDICINE
Payer: COMMERCIAL

## 2022-07-15 PROCEDURE — 93798 PHYS/QHP OP CAR RHAB W/ECG: CPT

## 2022-07-18 ENCOUNTER — HOSPITAL ENCOUNTER (OUTPATIENT)
Dept: CARDIAC REHAB | Facility: OTHER | Age: 77
Discharge: HOME | End: 2022-07-18
Attending: INTERNAL MEDICINE
Payer: COMMERCIAL

## 2022-07-18 PROCEDURE — 93798 PHYS/QHP OP CAR RHAB W/ECG: CPT

## 2022-07-20 ENCOUNTER — HOSPITAL ENCOUNTER (OUTPATIENT)
Dept: CARDIAC REHAB | Facility: OTHER | Age: 77
Discharge: HOME | End: 2022-07-20
Attending: INTERNAL MEDICINE
Payer: COMMERCIAL

## 2022-07-20 DIAGNOSIS — Z95.1 S/P CABG (CORONARY ARTERY BYPASS GRAFT): ICD-10-CM

## 2022-07-20 DIAGNOSIS — Z95.3 BIOPROSTHETIC AORTIC VALVE REPLACEMENT DURING CURRENT HOSPITALIZATION: ICD-10-CM

## 2022-07-20 PROCEDURE — 93798 PHYS/QHP OP CAR RHAB W/ECG: CPT

## 2022-07-22 ENCOUNTER — HOSPITAL ENCOUNTER (OUTPATIENT)
Dept: CARDIAC REHAB | Facility: OTHER | Age: 77
Discharge: HOME | End: 2022-07-22
Attending: INTERNAL MEDICINE
Payer: COMMERCIAL

## 2022-07-22 PROCEDURE — 93798 PHYS/QHP OP CAR RHAB W/ECG: CPT

## 2022-07-22 RX ORDER — AMOXICILLIN 250 MG
CAPSULE ORAL
Qty: 180 TABLET | Refills: 3 | Status: SHIPPED | OUTPATIENT
Start: 2022-07-22

## 2022-07-22 RX ORDER — METOPROLOL SUCCINATE 25 MG/1
TABLET, EXTENDED RELEASE ORAL
Qty: 135 TABLET | Refills: 3 | Status: SHIPPED | OUTPATIENT
Start: 2022-07-22 | End: 2022-08-23 | Stop reason: ALTCHOICE

## 2022-07-22 NOTE — TELEPHONE ENCOUNTER
[id number] of [place] sent Rx request for the following:      Requested Prescriptions   Pending Prescriptions Disp Refills     metoprolol succinate ER (TOPROL XL) 25 MG 24 hr tablet [Pharmacy Med Name: METOPROLOL SUCC 25MG ER TABLET] 135 tablet 0     Si 1/2 DAILY   Last Prescription Date:     metoprolol succinate ER (TOPROL XL) 25 MG 24 hr tablet 30 tablet 0 2022  No   Sig - Route: Take 0.5 tablets (12.5 mg) by mouth every evening - Oral     Sig requested different than last prescription.       senna-docusate (SENOKOT-S/PERICOLACE) 8.6-50 MG tablet [Pharmacy Med Name: SENNA-DOCUSATE SODIUM TAB] 180 tablet 0     Si-2 DAILY   Last Prescription Date:    senna-docusate (SENOKOT-S/PERICOLACE) 8.6-50 MG tablet 30 tablet 0 2022  No   Sig - Route: Take 1-2 tablets by mouth 2 times daily as needed for constipation - Oral     Sig requested different than last prescription.    Last Office Visit:              22  Future Office visit:                Oct 12, 2022  9:00 AM  SHORT with Richard Arita MD  [facility] ([facility] ) [address]  [facility] [id number] [phone]        Annamarie Ferguson RN .............. 2022  10:24 AM

## 2022-07-25 ENCOUNTER — HOSPITAL ENCOUNTER (OUTPATIENT)
Dept: CARDIAC REHAB | Facility: OTHER | Age: 77
Discharge: HOME | End: 2022-07-25
Attending: INTERNAL MEDICINE
Payer: COMMERCIAL

## 2022-07-25 PROCEDURE — 93798 PHYS/QHP OP CAR RHAB W/ECG: CPT

## 2022-07-27 ENCOUNTER — HOSPITAL ENCOUNTER (OUTPATIENT)
Dept: CARDIAC REHAB | Facility: OTHER | Age: 77
Discharge: HOME | End: 2022-07-27
Attending: INTERNAL MEDICINE
Payer: COMMERCIAL

## 2022-07-27 PROCEDURE — 93798 PHYS/QHP OP CAR RHAB W/ECG: CPT

## 2022-07-29 ENCOUNTER — TELEPHONE (OUTPATIENT)
Dept: CARDIAC REHAB | Facility: OTHER | Age: 77
End: 2022-07-29

## 2022-07-29 ENCOUNTER — HOSPITAL ENCOUNTER (OUTPATIENT)
Dept: CARDIAC REHAB | Facility: OTHER | Age: 77
Discharge: HOME | End: 2022-07-29
Attending: INTERNAL MEDICINE
Payer: COMMERCIAL

## 2022-07-29 ENCOUNTER — ANTICOAGULATION THERAPY VISIT (OUTPATIENT)
Dept: ANTICOAGULATION | Facility: OTHER | Age: 77
End: 2022-07-29
Attending: FAMILY MEDICINE
Payer: COMMERCIAL

## 2022-07-29 DIAGNOSIS — I48.91 POSTOPERATIVE ATRIAL FIBRILLATION (H): Primary | ICD-10-CM

## 2022-07-29 DIAGNOSIS — I97.89 POSTOPERATIVE ATRIAL FIBRILLATION (H): Primary | ICD-10-CM

## 2022-07-29 LAB — INR POINT OF CARE: 2.4 (ref 0.9–1.1)

## 2022-07-29 PROCEDURE — 36416 COLLJ CAPILLARY BLOOD SPEC: CPT | Mod: ZL

## 2022-07-29 PROCEDURE — 93798 PHYS/QHP OP CAR RHAB W/ECG: CPT

## 2022-07-29 PROCEDURE — 85610 PROTHROMBIN TIME: CPT | Mod: ZL,QW

## 2022-07-29 NOTE — TELEPHONE ENCOUNTER
please see cardiac session dated 7/29/22.  Patient has increased his metoprolol to 37.5 mg per last RTC visit.   Resting Heart rate at cardiac rehab low 40's.  Bp remains elevated.  Pt denies any dizziness or other sx.  Heart rate did increase with exercise.  Note low heart rates at rest.

## 2022-08-01 ENCOUNTER — HOSPITAL ENCOUNTER (OUTPATIENT)
Dept: CARDIAC REHAB | Facility: OTHER | Age: 77
Discharge: HOME | End: 2022-08-01
Attending: INTERNAL MEDICINE
Payer: COMMERCIAL

## 2022-08-01 PROCEDURE — 93798 PHYS/QHP OP CAR RHAB W/ECG: CPT

## 2022-08-05 ENCOUNTER — HOSPITAL ENCOUNTER (OUTPATIENT)
Dept: CARDIAC REHAB | Facility: OTHER | Age: 77
Discharge: HOME | End: 2022-08-05
Attending: INTERNAL MEDICINE
Payer: COMMERCIAL

## 2022-08-05 PROCEDURE — 93798 PHYS/QHP OP CAR RHAB W/ECG: CPT

## 2022-08-08 ENCOUNTER — HOSPITAL ENCOUNTER (OUTPATIENT)
Dept: CARDIAC REHAB | Facility: OTHER | Age: 77
Discharge: HOME | End: 2022-08-08
Attending: INTERNAL MEDICINE
Payer: COMMERCIAL

## 2022-08-08 PROCEDURE — 93798 PHYS/QHP OP CAR RHAB W/ECG: CPT

## 2022-08-10 ENCOUNTER — HOSPITAL ENCOUNTER (OUTPATIENT)
Dept: CARDIAC REHAB | Facility: OTHER | Age: 77
Discharge: HOME | End: 2022-08-10
Attending: INTERNAL MEDICINE
Payer: COMMERCIAL

## 2022-08-10 PROCEDURE — 93798 PHYS/QHP OP CAR RHAB W/ECG: CPT

## 2022-08-12 ENCOUNTER — TELEPHONE (OUTPATIENT)
Dept: CARDIAC REHAB | Facility: OTHER | Age: 77
End: 2022-08-12

## 2022-08-12 ENCOUNTER — HOSPITAL ENCOUNTER (OUTPATIENT)
Dept: CARDIAC REHAB | Facility: OTHER | Age: 77
Discharge: HOME | End: 2022-08-12
Attending: INTERNAL MEDICINE
Payer: COMMERCIAL

## 2022-08-12 DIAGNOSIS — I25.810 CORONARY ARTERY DISEASE INVOLVING AUTOLOGOUS ARTERY CORONARY BYPASS GRAFT WITHOUT ANGINA PECTORIS: Primary | ICD-10-CM

## 2022-08-12 PROCEDURE — 93798 PHYS/QHP OP CAR RHAB W/ECG: CPT

## 2022-08-12 NOTE — TELEPHONE ENCOUNTER
Spouse returned our call verified pts .  They will take home BP readings over the weekend to see if BP is still elevated, and will update us with BP readings on next session on 8/15/22.

## 2022-08-12 NOTE — TELEPHONE ENCOUNTER
Pt reports on 7/31/22 felt dizzy light headed.  Spouse and patient informed cardiac rehab they decreased his metoprolol from 1.5 tabs to 1 tab every day (25 mg).  He reports no further episodes of dizziness.  His BP has remained elevated on some days with BP ranging in 160's -180/100.  On the other days ranges from 130's /80's.  They do take his BP at home also.  I informed patient of your  note on 7/29/22 that if BP is elevated to increase the lisinopril 40 mg.    He does have a return to clinic with  on 8/23/22.    We will continue to monitor.

## 2022-08-15 ENCOUNTER — HOSPITAL ENCOUNTER (OUTPATIENT)
Dept: CARDIAC REHAB | Facility: OTHER | Age: 77
Discharge: HOME | End: 2022-08-15
Attending: INTERNAL MEDICINE
Payer: COMMERCIAL

## 2022-08-15 PROCEDURE — 93798 PHYS/QHP OP CAR RHAB W/ECG: CPT

## 2022-08-15 RX ORDER — LISINOPRIL 40 MG/1
40 TABLET ORAL DAILY
Qty: 90 TABLET | Refills: 4 | Status: SHIPPED | OUTPATIENT
Start: 2022-08-15 | End: 2023-01-12

## 2022-08-17 ENCOUNTER — HOSPITAL ENCOUNTER (OUTPATIENT)
Dept: CARDIAC REHAB | Facility: OTHER | Age: 77
Discharge: HOME | End: 2022-08-17
Attending: INTERNAL MEDICINE
Payer: COMMERCIAL

## 2022-08-17 PROCEDURE — 93798 PHYS/QHP OP CAR RHAB W/ECG: CPT

## 2022-08-19 ENCOUNTER — HOSPITAL ENCOUNTER (OUTPATIENT)
Dept: CARDIAC REHAB | Facility: OTHER | Age: 77
Discharge: HOME | End: 2022-08-19
Attending: INTERNAL MEDICINE
Payer: COMMERCIAL

## 2022-08-19 ENCOUNTER — ANTICOAGULATION THERAPY VISIT (OUTPATIENT)
Dept: ANTICOAGULATION | Facility: OTHER | Age: 77
End: 2022-08-19
Attending: FAMILY MEDICINE
Payer: COMMERCIAL

## 2022-08-19 DIAGNOSIS — I48.91 POSTOPERATIVE ATRIAL FIBRILLATION (H): Primary | ICD-10-CM

## 2022-08-19 DIAGNOSIS — I97.89 POSTOPERATIVE ATRIAL FIBRILLATION (H): Primary | ICD-10-CM

## 2022-08-19 LAB — INR POINT OF CARE: 2.4 (ref 0.9–1.1)

## 2022-08-19 PROCEDURE — 36416 COLLJ CAPILLARY BLOOD SPEC: CPT | Mod: ZL

## 2022-08-19 PROCEDURE — 85610 PROTHROMBIN TIME: CPT | Mod: ZL,QW

## 2022-08-19 PROCEDURE — 93798 PHYS/QHP OP CAR RHAB W/ECG: CPT

## 2022-08-19 NOTE — PROGRESS NOTES
ANTICOAGULATION MANAGEMENT     Arun Wren 77 year old male is on warfarin with therapeutic INR result. (Goal INR 2.0-3.0)    Recent labs: (last 7 days)     08/19/22  1105   INR 2.4*       ASSESSMENT     Source(s): Chart Review and In person     Warfarin doses taken: Warfarin taken as instructed  Diet: No new diet changes identified  New illness, injury, or hospitalization: No  Medication/supplement changes: None noted  Signs or symptoms of bleeding or clotting: No  Previous INR: Therapeutic last 2(+) visits  Additional findings: None       PLAN     Recommended plan for no diet, medication or health factor changes affecting INR     Dosing Instructions: Continue your current warfarin dose with next INR in 4 weeks       Summary  As of 8/19/2022    Full warfarin instructions:  5 mg every Mon; 2.5 mg all other days   Next INR check:  9/16/2022             In person    Lab visit scheduled    Education provided: Please call back if any changes to your diet, medications or how you've been taking warfarin    Plan made per [facility] anticoagulation protocol    Izzy Weinberg RN  Anticoagulation Clinic  8/19/2022    _______________________________________________________________________     Anticoagulation Episode Summary     Current INR goal:  2.0-3.0   TTR:  64.9 % (2.1 mo)   Target end date:  9/8/2022   Send INR reminders to:  [facility]    Indications    Postoperative atrial fibrillation (H) [I97.89  I48.91]           Comments:           Anticoagulation Care Providers     Provider Role Specialty Phone number    Carlos Cote PA Referring Cardiovascular & Thoracic Surgery [phone]

## 2022-08-22 ENCOUNTER — HOSPITAL ENCOUNTER (OUTPATIENT)
Dept: CARDIAC REHAB | Facility: OTHER | Age: 77
Discharge: HOME | End: 2022-08-22
Attending: INTERNAL MEDICINE
Payer: COMMERCIAL

## 2022-08-22 PROCEDURE — 93798 PHYS/QHP OP CAR RHAB W/ECG: CPT

## 2022-08-22 NOTE — PROGRESS NOTES
[facility] HEART CARE   CARDIOLOGY PROGRESS NOTE     Chief Complaint   Patient presents with     Follow Up     Blood pressure          Diagnosis:  1.  Severe AS, tissue AVR on 5/31/2022, [facility] -open. 25 mm Merritt Inspiris Resilia bovine pericardial valve.  2.  CABG x2 on 5/31/2022, [facility] . rSVG to the ramus and PDA. LCX was small and not grafted.   3.  Cardiac cath at the [facility], 4/22/2022.  LM normal, LAD normal, second diag 30%, circumflex 95%, ramus 90%, and RCA 75%,  4.  Postop A. Fib on 6/2/22.  Amnio, metoprolol, and Coumadin.  5.  Hypertension-uncontrolled.  6.  Hyperlipidemia-controlled.  7.  Syncope likely to bradycardia episodes.  8.  CHF-Diastolic dysfunction grade 2 on 2/15/2022.  9.  TAAA 4.2 cm on 2/15/2022.  10.  H/O tobacco abuse.  11.  Bilateral carotid artery stenosis less than 50% on 4/21/2022.  12.  Balance issues.    Assessment/Plan:    1.  Severe AS: Status post aortic valve replacement on 5/31/2022 at the [facility].  Patient had a bovine pericardial valve replacement.  Aspirin 81 mg indefinitely.  Will need amoxicillin 2 g prior to dental procedures.  Patient currently participating in cardiac rehab.  2.  CAD: Had CABG x2 at the [facility].  RSVG to the ramus and PDA.  Circumflex was too small to be grafted as it was less than 1.5 mm.  Continue aspirin 81 mg daily, metoprolol 25 mg XL daily, sublingual nitro as needed for chest pain, and rosuvastatin 40 mg daily.  3.  Postop A. fib: Reviewed his Zio patch from 6/8/2022.  No evidence of A. fib.  Discussed discontinuing Coumadin.  Is not having palpitations or fluttering.  A. fib was postop.  Was on amiodarone for 14 days but now discontinued as Zio patch did not show evidence for A. fib.  4.  TAAA: At 4.2 cm on 2/15/2022.  We will plan for repeat echocardiogram in 1 year.  Results explained.  5.  Carotid artery stenosis.  Less than 50% on 4/21/2022.  We will plan for an ultrasound carotid arteries in 1 year.  6.  Hypertension: Uncontrolled.  Blood  pressure today 170/98.  On lisinopril 40 mg daily.  Not tolerating metoprolol secondary to dizziness.  Discontinue metoprolol 25 mg XL daily and start on Coreg 6.25 mg twice a day.    7.  ED: Has sildenafil/Viagra.  Warned patient if taken sildenafil under no circumstances it is easier to take nitro within 24 hours of taking sildenafil/Viagra.  This will kill him.  8.  Balance issues: Discussed physical therapy.  Currently getting cardiac rehab.  Declined physical therapy.  9.  AAA: We will plan for repeat ultrasound of abdominal aorta in 1 year.  10.  Follow-up in 1 to 2 months to recheck blood pressures.        Interval history:  Valentin is being seen in follow-up.  He was found to have severe aortic stenosis on 2/15/2022.  Referred to [facility] for valve replacement.  He underwent cardiac catheterization on 4/22/2022.  Found to have disease in the ramus, circumflex, and RCA.  CABG x2 involving the ramus and RCA but circumflex felt to be too small to be bypassed.  Patient has not had chest pain or chest tightness previously.  He was found to have postop atrial fibrillation.  He was continued on amiodarone for 14 days, now off amiodarone.  He has been on metoprolol with some bradycardia and dizziness.  He is also on Coumadin but discontinued on 8/23/2022 secondary to Zio patch being deficient of A. fib.  He understands he needs to take amoxicillin 2 g, 30 to 60 minutes prior to cleaning or dental work.  He is on aspirin 81 mg indefinitely.  We will plan for an echocardiogram in 1 year related to ascending aortic aneurysm.  We will plan for an ultrasound of his carotids in 1 year related to bilateral carotid artery disease.  We will plan for an ultrasound of the abdomen for AAA in 1 year.  He understands under no circumstances is he did take nitro if he is taken sildenafil within 24 hours.  He is continue to check his blood pressures at home and bring this log with him.  Follow-up in 1 to 2 months to  reassess blood pressure.      HPI:    Mr. Wren is being seen by cardiology for severe aortic stenosis.  He was seen in the clinic on 1/19/2022.  Repeat echocardiogram completed on 2/15/2022.  He was found to have severe aortic stenosis.  He was referred to cardiology as a result.     He had an echo on 10/31/2018.  At that time, he was found to have mild to moderate aortic valve sclerosis with mild aortic stenosis.  Mean gradient was 18 mmHg.  Valve area was 1.5 cm .  Repeat echocardiogram on 2/15/2022.  Echocardiogram showed a mean gradient of 50 mmHg, peak velocity of 4.3 m/s, and DVI of 0 0.26.  Aortic valve area 0.95 cm .  Ascending aorta was 4.2 cm.     We discussed the results of his echocardiogram.  He understands he has severe aortic stenosis.  We discussed TAVR versus SAVR.  He has denied dyspnea, swelling, chest pain, near-syncope, or syncope.     We had a long discussion using the heart model in the room as well as [employer].  We discussed TAVR to replace his aortic valve.  He understands will likely require 2 trips to the [facility].  He was explained that the first work-up may include a cardiac catheterization.  We will plan for an ultrasound of his carotids and abdomen for AAA here locally.  He is also aware that after valve replacement, he will need to take amoxicillin 2 g, 30 to 60 minutes prior to dental procedures.  He currently has an abscess tooth which he is on antibiotics.  He needs a tooth pulled.  He cannot get in for couple weeks.  He has an appointment.  He understands this will need to be taken care of prior to valve replacement.  Risks and benefits discussed.  Patient agreeable to going to the [facility].      Relevant testing:  Zio patch in 6/8/2022:  Worn for 11 days and 4 hr's.  After removing artifact, total time was 10 days and 8 hr's. Placed on 7/7/2022 at 11:28 AM and completed on 7/18/2022 at 2:58 PM.  Underlying rhythm was sinus.  Hrt rate ranged from 39 bpm, maximum  heart rate of 136 bmp, averaging 54 bmp.  No significant pauses, Mobitz type II or 3rd degree heart block.  No atrial fibrillation on this study.  x1 triggered events and x0 diary entries.  These corresponded to sinus rhythm.  x0 runs of VT.    x6 runs of SVT lasting 5 mins and 16 sec's with a maximum heart rate of 136 bmp.  Rare, <1% of PAC's, atrial couplets, atrial triplets, and PVC's.  0 episodes of ventricular bigeminy.  0 episodes of ventricular trigeminy.    Echo on 5/31/2022:  s/p CABG+AVR 25MM INSPIRIS RESILIA AORTIC VALVE.  AVR valve leaflets not well seen. Normal Doppler interrogation of the valve. Mean gradient is 5 mmHg. No AI.  Global and regional left ventricular function is normal with an EF of 55-60%.  RV is not seen well. From limited views appear normal size and function.  This study was compared with the study from 2/15/2022.  Pateint is now s/p AVR for severe AS.    Echo on 2/15/2022:  Global and regional left ventricular function is normal with an EF of 55-60%.  Grade II or moderate diastolic dysfunction.  The right ventricle is normal in function and size.  Severe aortic stenosis is present (MG 50 mmHg, PV 4.3 m/s, DVI 0.26)  Ascending aorta is dilated measuring 4.2 cm  No pericardial effusion is present.  This study was compared with the study from 10/31/18 the AS has progressed and aorta is more dilated.     Echo on 10/31/2018:  EF 55-60%.   Thickening of the anterior basal septum present.  Diastolic function normal.  Atria are normal.  Mild aortic stenosis present.     US abdominal aorta on 9/18/2018:  No abdominal aortic aneurysm.      No diagnosis found.    Past Medical History:   Diagnosis Date     Actinic keratosis     05/19/09,Keratosis left external ear, treated with cryocautery, recheck if recurs or persists     Allergic contact dermatitis due to plants, except food     recurrent     Basal cell carcinoma of skin     No Comments Provided     Carpal tunnel syndrome     right greater  than left     Closed fracture of base of skull (H)     No Comments Provided     Essential (primary) hypertension     No Comments Provided     Ganglion     No Comments Provided     Hydrocele     No Comments Provided     Male erectile dysfunction     decreased libido     Personal history of nicotine dependence     No Comments Provided     Pure hypercholesterolemia     No Comments Provided     Residual hemorrhoidal skin tags     Past external hemorrhoid     Tinea unguium     No Comments Provided       Past Surgical History:   Procedure Laterality Date     BYPASS GRAFT ARTERY CORONARY, REPAIR VALVE AORTIC, COMBINED N/A 05/31/2022    Procedure: MEDIAN STERNOTOMY. CARDIOPULMONARY BYPASS. ENDOSCOPIC HARVEST OF LEFT GREATER SAPHENOUS VEIN. CORONARY ARTERY BYPASS GRAFT (CABG) X2 . AORTIC VALVE REPLACEMENT USING 25MM INSPIRIS RESILIA  AORTIC VALVE. TRANSESOPHAGEAL ECHOCARDIOGRAM (BREANNE) PER ANESTHESIA.;  Surgeon: Sai Benítez MD;  Location:  OR     BYPASS GRAFT ARTERY CORONARY, REPAIR VALVE AORTIC, COMBINED  05/31/2022    Procedure: ;  Surgeon: Sai Benítez MD;  Location:  OR     COLONOSCOPY  01/01/1998    1998,at [facility], which was normal     COLONOSCOPY  03/03/2005 03/03/05,at [facility]     COLONOSCOPY  03/02/2015    3/2/15,F/U 2020  tubular adnomas     COLONOSCOPY  03/16/2020    F/U N/A hyperplastic     CV CORONARY ANGIOGRAM N/A 04/22/2022    Procedure: Coronary Angiogram with possible intervention;  Surgeon: Saulo Mccarthy MD;  Location:  HEART CARDIAC CATH LAB     CV RIGHT HEART CATH MEASUREMENTS RECORDED N/A 04/22/2022    Procedure: Right Heart Cath;  Surgeon: Saulo Mccarthy MD;  Location:  HEART CARDIAC CATH LAB     OTHER SURGICAL HISTORY      ,HERNIA REPAIR     OTHER SURGICAL HISTORY      05/24/16,[id number],OTHER,Left,Dr. Jeremiah GIBSON     OTHER SURGICAL HISTORY      07/28/16,[id number],ORCHIECTOMY SIMPLE / PARTIAL / RADICAL W/ SCROTAL / INGUINAL APPROACH,Left,Dr. Jeremiah Garland  [facility]     PICC DOUBLE LUMEN PLACEMENT Right 2022    on Right Basilic, cut 43 cm, needs X-ray to verify tip due to A-fib       No Known Allergies    Current Outpatient Medications   Medication Sig Dispense Refill     acetaminophen (TYLENOL) 325 MG tablet Take 2 tablets (650 mg) by mouth every 6 hours as needed for other (For optimal non-opioid multimodal pain management to improve pain control.) 50 tablet 0     aspirin (ASA) 81 MG chewable tablet Take 1 tablet (81 mg) by mouth daily 90 tablet 3     lisinopril (ZESTRIL) 40 MG tablet Take 1 tablet (40 mg) by mouth daily 90 tablet 4     metoprolol succinate ER (TOPROL XL) 25 MG 24 hr tablet Take 1.5 tablets by mouth daily. 135 tablet 3     nitroGLYcerin (NITROSTAT) 0.4 MG sublingual tablet For chest pain place 1 tablet under the tongue every 5 minutes for 3 doses. If symptoms persist 5 minutes after 1st dose call 911. 25 tablet 3     rosuvastatin (CRESTOR) 40 MG tablet Take 1 tablet (40 mg) by mouth daily 90 tablet 4     senna-docusate (SENOKOT-S/PERICOLACE) 8.6-50 MG tablet Take 1 to 2 tablets by mouth daily. 180 tablet 3     sildenafil (REVATIO) 20 MG tablet TAKE 4-5 TABLETS BY MOUTH ONCE DAILY IF NEEDED FOR OTHER (SPECIFY).TAKE 30 MINUTES BEFORE SEXUAL ACTIVITY. 80 tablet 1     warfarin ANTICOAGULANT (COUMADIN) 2.5 MG tablet Take 5 mg PO daily at 6 pm until next INR check, then dose per INR goal 2-3 for Atrial fibrillation 100 tablet 0       Social History     Socioeconomic History     Marital status:      Spouse name: Not on file     Number of children: Not on file     Years of education: Not on file     Highest education level: Not on file   Occupational History     Not on file   Tobacco Use     Smoking status: Former Smoker     Years: 27.00     Types: Cigarettes     Quit date: 1988     Years since quittin.6     Smokeless tobacco: Never Used   Vaping Use     Vaping Use: Never used   Substance and Sexual Activity     Alcohol use: Yes      Comment: Alcoholic Drinks/day: 1-2 drinks per day, whiskey     Drug use: No     Sexual activity: Not Currently   Other Topics Concern     Parent/sibling w/ CABG, MI or angioplasty before 65F 55M? Not Asked   Social History Narrative    Quit smoking 1990.  Occ alcohol.  .   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    Stays active, owns tree farm.     Social Determinants of Health     Financial Resource Strain: Not on file   Food Insecurity: Not on file   Transportation Needs: Not on file   Physical Activity: Not on file   Stress: Not on file   Social Connections: Not on file   Intimate Partner Violence: Not on file   Housing Stability: Not on file       LAB RESULTS:   Anticoagulation Therapy Visit on 06/23/2022   Component Date Value Ref Range Status     INR Point of Care 06/23/2022 3.6 (A) 0.9 - 1.1 Final   Anticoagulation Therapy Visit on 06/20/2022   Component Date Value Ref Range Status     INR Point of Care 06/20/2022 2.9 (A) 0.9 - 1.1 Final   Anticoagulation Therapy Visit on 06/16/2022   Component Date Value Ref Range Status     INR Point of Care 06/16/2022 2.7 (A) 0.9 - 1.1 Final   Anticoagulation Therapy Visit on 06/13/2022   Component Date Value Ref Range Status     INR Point of Care 06/13/2022 3.3 (A) 0.9 - 1.1 Final   Allied Health/Nurse Visit on 05/27/2022   Component Date Value Ref Range Status     SARS CoV2 PCR 05/27/2022 Negative  Negative Final   Lab on 05/18/2022   Component Date Value Ref Range Status     WBC Count 05/18/2022 7.7  4.0 - 11.0 10e3/uL Final     RBC Count 05/18/2022 4.75  4.40 - 5.90 10e6/uL Final     Hemoglobin 05/18/2022 15.2  13.3 - 17.7 g/dL Final     Hematocrit 05/18/2022 46.5  40.0 - 53.0 % Final     MCV 05/18/2022 98  78 - 100 fL Final     MCH 05/18/2022 32.0  26.5 - 33.0 pg Final     MCHC 05/18/2022 32.7  31.5 - 36.5 g/dL Final     RDW 05/18/2022 13.2  10.0 - 15.0 % Final     Platelet Count 05/18/2022 185  150 - 450 10e3/uL Final     Sodium 05/18/2022  138  133 - 144 mmol/L Final     Potassium 05/18/2022 4.2  3.4 - 5.3 mmol/L Final     Chloride 05/18/2022 104  94 - 109 mmol/L Final     Carbon Dioxide (CO2) 05/18/2022 30  20 - 32 mmol/L Final     Anion Gap 05/18/2022 4  3 - 14 mmol/L Final     Urea Nitrogen 05/18/2022 21  7 - 30 mg/dL Final     Creatinine 05/18/2022 0.99  0.66 - 1.25 mg/dL Final     Calcium 05/18/2022 9.0  8.5 - 10.1 mg/dL Final     Glucose 05/18/2022 97  70 - 99 mg/dL Final     Alkaline Phosphatase 05/18/2022 47  40 - 150 U/L Final     AST 05/18/2022 32  0 - 45 U/L Final     ALT 05/18/2022 36  0 - 70 U/L Final     Protein Total 05/18/2022 7.6  6.8 - 8.8 g/dL Final     Albumin 05/18/2022 3.8  3.4 - 5.0 g/dL Final     Bilirubin Total 05/18/2022 0.5  0.2 - 1.3 mg/dL Final     GFR Estimate 05/18/2022 79  >60 mL/min/1.73m2 Final     ABO/RH(D) 05/18/2022 A POS   Final     Antibody Screen 05/18/2022 Negative  Negative Final     SPECIMEN EXPIRATION DATE 05/18/2022 [id number]   Final   Orders Only on 04/29/2022   Component Date Value Ref Range Status     Prealbumin 05/18/2022 22  15 - 45 mg/dL Final     Magnesium 05/18/2022 2.4 (A) 1.6 - 2.3 mg/dL Final        Review of systems: Negative except that which was noted in the HPI.    Physical examination:  BP (!) 170/98 (BP Location: Right arm, Patient Position: Sitting, Cuff Size: Adult Large)   Pulse (!) 44   Temp 97.8  F (36.6  C) (Temporal)   Resp 18   Wt 104.3 kg (230 lb)   SpO2 97%   BMI 34.46 kg/m      GENERAL APPEARANCE: healthy, alert and no distress  HEENT: no icterus, no xanthelasmas, normal pupil size and reaction, no cyanosis.  NECK: no adenopathy, no asymmetry, masses.  CHEST: lungs clear to auscultation - no rales, rhonchi or wheezes, no use of accessory muscles, no retractions, respirations are unlabored, normal respiratory rate  CARDIOVASCULAR: regular rhythm, normal S1 with physiologic split S2, no S3 or S4 and no murmur, click or rub  EXTREMITIES: no clubbing, cyanosis or  edema  NEURO: alert and oriented normal speech, and affect  VASC: No vascular bruits heard.  SKIN: no ecchymoses, no rashes    Total time spent on day of visit, including review of tests, obtaining/reviewing separately obtained history, ordering medications/tests/procedures, communicating with PCP/consultants, and documenting in electronic medical record: 60 minutes.           Thank you for allowing me to participate in the care of your patient. Please do not hesitate to contact me if you have any questions.     Jann Ulloa, DO        [facility] HEART [facility]   CARDIOLOGY PROGRESS NOTE     Chief Complaint   Patient presents with     Follow Up     Blood pressure          Diagnosis:  1.  Severe AS, tissue AVR on 5/31/2022 at the [facility]-open. 25 mm Merritt Inspiris Resilia bovine pericardial valve.  2.  CABG x2 on 5/31/2022 at [facility]. rSVG to the ramus and PDA. LCX was small and not grafted.   3.  Cardiac cath at the [facility], 4/22/2022.  LM normal, LAD normal, second diag 30%, circumflex 95%, ramus 90%, and RCA 75%,  4.  Postop A. Fib on 6/2/22.  Amnio, metoprolol, and Coumadin.  5.  Hypertension-uncontrolled.  6.  Hyperlipidemia-controlled.  7.  Syncope likely to bradycardia episodes.  8.  CHF-Diastolic dysfunction grade 2 on 2/15/2022.  9.  BREANNE AAA 4.2 cm on 2/15/2022.  10.  H/O tobacco abuse.  11.  Bilateral carotid artery stenosis less than 50% on 4/21/2022.    Assessment/Plan:    1.  Severe AS: Status post aortic valve replacement on 5/31/2022 at the [facility].  Patient had a bovine pericardial valve replacement.  Aspirin 81 mg indefinitely.  Will need amoxicillin 2 g prior to dental procedures.  Patient currently participating in cardiac rehab.  2.  CAD: Had CABG x2 at the [facility].  R SVG to the ramus and PDA.  Circumflex was too small to be grafted as it was less than 1.5 mm.  Continue aspirin 81 mg daily, metoprolol 25 mg XL daily, sublingual nitro as needed for chest pain, and rosuvastatin 40 mg daily.  3.  Postop A.  fib: Currently on Coumadin and metoprolol.  Was on amiodarone for 14 days.  We will plan for Zio patch.  Zio patch unrevealing related to A. fib, plan to discontinue Coumadin.  If found to have A. fib on Zio patch.  We will continue on Coumadin.  4.  TAAA: At 4.2 cm on 2/15/2022.  We will plan for repeat echocardiogram in 1 year.  Results explained.  5.  Carotid artery stenosis.  Less than 50% on 4/21/2022.  We will plan for an ultrasound carotid arteries in 1 year.  6.  Hypertension: Uncontrolled.  Blood pressure today 182/90.  Increase lisinopril from 2.5 mg daily to 10 mg daily.  7.  ED: Has sildenafil/Viagra.  Warned patient if taken sildenafil under no circumstances it is easier to take nitro within 24 hours of taking sildenafil/Viagra.  This will kill him.  8.  Follow-up after completion of Zio patch to determine if should continue Coumadin or not.        Interval history:  Valentin is being seen in follow-up.  He was found to have severe aortic stenosis on 2/15/2022.  Referred to [facility] for valve replacement.  He underwent cardiac catheterization on 4/22/2022.  Found to have disease in the ramus, circumflex, and RCA.  CABG x2 involving the ramus and RCA but circumflex felt to be too small to be bypassed.  Patient has not had chest pain or chest tightness previously.  He is found to have postop atrial fibrillation.  He was continued on amiodarone for 14 days, now off amiodarone.  He has been on metoprolol with some bradycardia.  He is also on Coumadin.  We will plan for Zio patch.  If Zio patch deficient of A. fib, we will discontinue Coumadin at that time.  He understands he needs to take amoxicillin 2 g, 30 to 60 minutes prior to cleaning or dental work.  He is on aspirin 81 mg indefinitely.  We will plan for an echocardiogram in 1 year related to ascending aortic aneurysm.  We will plan for an ultrasound of his carotids in 1 year related to bilateral carotid artery disease.  He understands  under no circumstances is he did take nitro if he is taken sildenafil within 24 hours.  Follow-up will be after testing.      HPI:    Mr. Wren is being seen by cardiology for severe aortic stenosis.  He was seen in the clinic on 1/19/2022.  Repeat echocardiogram completed on 2/15/2022.  He was found to have severe aortic stenosis.  He was referred to cardiology as a result.     He had an echo on 10/31/2018.  At that time, he was found to have mild to moderate aortic valve sclerosis with mild aortic stenosis.  Mean gradient was 18 mmHg.  Valve area was 1.5 cm .  Repeat echocardiogram on 2/15/2022.  Echocardiogram showed a mean gradient of 50 mmHg, peak velocity of 4.3 m/s, and DVI of 0 0.26.  Aortic valve area 0.95 cm .  Ascending aorta was 4.2 cm.     We discussed the results of his echocardiogram.  He understands he has severe aortic stenosis.  We discussed TAVR versus SAVR.  He has denied dyspnea, swelling, chest pain, near-syncope, or syncope.     We had a long discussion using the heart model in the room as well as [employer].  We discussed TAVR to replace his aortic valve.  He understands will likely require 2 trips to the [facility].  He was explained that the first work-up may include a cardiac catheterization.  We will plan for an ultrasound of his carotids and abdomen for AAA here locally.  He is also aware that after valve replacement, he will need to take amoxicillin 2 g, 30 to 60 minutes prior to dental procedures.  He currently has an abscess tooth which he is on antibiotics.  He needs a tooth pulled.  He cannot get in for couple weeks.  He has an appointment.  He understands this will need to be taken care of prior to valve replacement.  Risks and benefits discussed.  Patient agreeable to going to the [facility].      Relevant testing:  Zio patch in 6/8/2022:  Pending data     Echo on 5/31/2022:  s/p CABG+AVR 25MM INSPIRIS RESILIA AORTIC VALVE.  AVR valve leaflets not well seen. Normal  Doppler interrogation of the valve. Mean gradient is 5 mmHg. No AI.  Global and regional left ventricular function is normal with an EF of 55-60%.  RV is not seen well. From limited views appear normal size and function.  This study was compared with the study from 2/15/2022.  Pateint is now s/p AVR for severe AS.    Echo on 2/15/2022:  Global and regional left ventricular function is normal with an EF of 55-60%.  Grade II or moderate diastolic dysfunction.  The right ventricle is normal in function and size.  Severe aortic stenosis is present (MG 50 mmHg, PV 4.3 m/s, DVI 0.26)  Ascending aorta is dilated measuring 4.2 cm  No pericardial effusion is present.  This study was compared with the study from 10/31/18 the AS has progressed and aorta is more dilated.     Echo on 10/31/2018:  EF 55-60%.   Thickening of the anterior basal septum present.  Diastolic function normal.  Atria are normal.  Mild aortic stenosis present.     US abdominal aorta on 9/18/2018:  No abdominal aortic aneurysm.      No diagnosis found.    Past Medical History:   Diagnosis Date     Actinic keratosis     05/19/09,Keratosis left external ear, treated with cryocautery, recheck if recurs or persists     Allergic contact dermatitis due to plants, except food     recurrent     Basal cell carcinoma of skin     No Comments Provided     Carpal tunnel syndrome     right greater than left     Closed fracture of base of skull (H)     No Comments Provided     Essential (primary) hypertension     No Comments Provided     Ganglion     No Comments Provided     Hydrocele     No Comments Provided     Male erectile dysfunction     decreased libido     Personal history of nicotine dependence     No Comments Provided     Pure hypercholesterolemia     No Comments Provided     Residual hemorrhoidal skin tags     Past external hemorrhoid     Tinea unguium     No Comments Provided       Past Surgical History:   Procedure Laterality Date     BYPASS GRAFT ARTERY  CORONARY, REPAIR VALVE AORTIC, COMBINED N/A 05/31/2022    Procedure: MEDIAN STERNOTOMY. CARDIOPULMONARY BYPASS. ENDOSCOPIC HARVEST OF LEFT GREATER SAPHENOUS VEIN. CORONARY ARTERY BYPASS GRAFT (CABG) X2 . AORTIC VALVE REPLACEMENT USING 25MM INSPIRIS RESILIA  AORTIC VALVE. TRANSESOPHAGEAL ECHOCARDIOGRAM (BREANNE) PER ANESTHESIA.;  Surgeon: Sai Benítez MD;  Location: [facility] OR     BYPASS GRAFT ARTERY CORONARY, REPAIR VALVE AORTIC, COMBINED  05/31/2022    Procedure: ;  Surgeon: Sai Benítez MD;  Location: [facility] OR     COLONOSCOPY  01/01/1998 [facility]     COLONOSCOPY  03/03/2005 03/03/05,at [facility]     COLONOSCOPY  03/02/2015    3/2/15,F/U 2020  tubular adnomas     COLONOSCOPY  03/16/2020    F/U N/A hyperplastic     CV CORONARY ANGIOGRAM N/A 04/22/2022    Procedure: Coronary Angiogram with possible intervention;  Surgeon: Saulo Mccarthy MD;  Location:  [facility]     CV RIGHT HEART CATH MEASUREMENTS RECORDED N/A 04/22/2022    Procedure: Right Heart Cath;  Surgeon: Saulo Mccarthy MD;  Location:  [facility]     OTHER SURGICAL HISTORY      ,HERNIA REPAIR     OTHER SURGICAL HISTORY      05/24/16,[id number],OTHER,Left,Dr. Jeremiah GIBSON     OTHER SURGICAL HISTORY      07/28/16,[id number],ORCHIECTOMY SIMPLE / PARTIAL / RADICAL W/ SCROTAL / INGUINAL APPROACH,Left,Dr. Jeremiah GIBSON     PICC DOUBLE LUMEN PLACEMENT Right 06/06/2022    on Right Basilic, cut 43 cm, needs X-ray to verify tip due to A-fib       No Known Allergies    Current Outpatient Medications   Medication Sig Dispense Refill     acetaminophen (TYLENOL) 325 MG tablet Take 2 tablets (650 mg) by mouth every 6 hours as needed for other (For optimal non-opioid multimodal pain management to improve pain control.) 50 tablet 0     aspirin (ASA) 81 MG chewable tablet Take 1 tablet (81 mg) by mouth daily 90 tablet 3     lisinopril (ZESTRIL) 40 MG tablet Take 1 tablet (40 mg) by mouth daily  90 tablet 4     metoprolol succinate ER (TOPROL XL) 25 MG 24 hr tablet Take 1.5 tablets by mouth daily. 135 tablet 3     nitroGLYcerin (NITROSTAT) 0.4 MG sublingual tablet For chest pain place 1 tablet under the tongue every 5 minutes for 3 doses. If symptoms persist 5 minutes after 1st dose call 911. 25 tablet 3     rosuvastatin (CRESTOR) 40 MG tablet Take 1 tablet (40 mg) by mouth daily 90 tablet 4     senna-docusate (SENOKOT-S/PERICOLACE) 8.6-50 MG tablet Take 1 to 2 tablets by mouth daily. 180 tablet 3     sildenafil (REVATIO) 20 MG tablet TAKE 4-5 TABLETS BY MOUTH ONCE DAILY IF NEEDED FOR OTHER (SPECIFY).TAKE 30 MINUTES BEFORE SEXUAL ACTIVITY. 80 tablet 1     warfarin ANTICOAGULANT (COUMADIN) 2.5 MG tablet Take 5 mg PO daily at 6 pm until next INR check, then dose per INR goal 2-3 for Atrial fibrillation 100 tablet 0       Social History     Socioeconomic History     Marital status:      Spouse name: Not on file     Number of children: Not on file     Years of education: Not on file     Highest education level: Not on file   Occupational History     Not on file   Tobacco Use     Smoking status: Former Smoker     Years: 27.00     Types: Cigarettes     Quit date: 1988     Years since quittin.6     Smokeless tobacco: Never Used   Vaping Use     Vaping Use: Never used   Substance and Sexual Activity     Alcohol use: Yes     Comment: Alcoholic Drinks/day: 1-2 drinks per day, whiskey     Drug use: No     Sexual activity: Not Currently   Other Topics Concern     Parent/sibling w/ CABG, MI or angioplasty before 65F 55M? Not Asked   Social History Narrative    Quit smoking .  Occ alcohol.  .   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    Stays active, owns tree farm.     Social Determinants of Health     Financial Resource Strain: Not on file   Food Insecurity: Not on file   Transportation Needs: Not on file   Physical Activity: Not on file   Stress: Not on file   Social  Connections: Not on file   Intimate Partner Violence: Not on file   Housing Stability: Not on file       LAB RESULTS:   Anticoagulation Therapy Visit on 06/23/2022   Component Date Value Ref Range Status     INR Point of Care 06/23/2022 3.6 (A) 0.9 - 1.1 Final   Anticoagulation Therapy Visit on 06/20/2022   Component Date Value Ref Range Status     INR Point of Care 06/20/2022 2.9 (A) 0.9 - 1.1 Final   Anticoagulation Therapy Visit on 06/16/2022   Component Date Value Ref Range Status     INR Point of Care 06/16/2022 2.7 (A) 0.9 - 1.1 Final   Anticoagulation Therapy Visit on 06/13/2022   Component Date Value Ref Range Status     INR Point of Care 06/13/2022 3.3 (A) 0.9 - 1.1 Final   [facility]ied Health/Nurse Visit on 05/27/2022   Component Date Value Ref Range Status     SARS CoV2 PCR 05/27/2022 Negative  Negative Final   Lab on 05/18/2022   Component Date Value Ref Range Status     WBC Count 05/18/2022 7.7  4.0 - 11.0 10e3/uL Final     RBC Count 05/18/2022 4.75  4.40 - 5.90 10e6/uL Final     Hemoglobin 05/18/2022 15.2  13.3 - 17.7 g/dL Final     Hematocrit 05/18/2022 46.5  40.0 - 53.0 % Final     MCV 05/18/2022 98  78 - 100 fL Final     MCH 05/18/2022 32.0  26.5 - 33.0 pg Final     MCHC 05/18/2022 32.7  31.5 - 36.5 g/dL Final     RDW 05/18/2022 13.2  10.0 - 15.0 % Final     Platelet Count 05/18/2022 185  150 - 450 10e3/uL Final     Sodium 05/18/2022 138  133 - 144 mmol/L Final     Potassium 05/18/2022 4.2  3.4 - 5.3 mmol/L Final     Chloride 05/18/2022 104  94 - 109 mmol/L Final     Carbon Dioxide (CO2) 05/18/2022 30  20 - 32 mmol/L Final     Anion Gap 05/18/2022 4  3 - 14 mmol/L Final     Urea Nitrogen 05/18/2022 21  7 - 30 mg/dL Final     Creatinine 05/18/2022 0.99  0.66 - 1.25 mg/dL Final     Calcium 05/18/2022 9.0  8.5 - 10.1 mg/dL Final     Glucose 05/18/2022 97  70 - 99 mg/dL Final     Alkaline Phosphatase 05/18/2022 47  40 - 150 U/L Final     AST 05/18/2022 32  0 - 45 U/L Final     ALT 05/18/2022 36  0 - 70 U/L  Final     Protein Total 05/18/2022 7.6  6.8 - 8.8 g/dL Final     Albumin 05/18/2022 3.8  3.4 - 5.0 g/dL Final     Bilirubin Total 05/18/2022 0.5  0.2 - 1.3 mg/dL Final     GFR Estimate 05/18/2022 79  >60 mL/min/1.73m2 Final     ABO/RH(D) 05/18/2022 A POS   Final     Antibody Screen 05/18/2022 Negative  Negative Final     SPECIMEN EXPIRATION DATE 05/18/2022 [id number]   Final   Orders Only on 04/29/2022   Component Date Value Ref Range Status     Prealbumin 05/18/2022 22  15 - 45 mg/dL Final     Magnesium 05/18/2022 2.4 (A) 1.6 - 2.3 mg/dL Final        Review of systems: Negative except that which was noted in the HPI.    Physical examination:  BP (!) 170/98 (BP Location: Right arm, Patient Position: Sitting, Cuff Size: Adult Large)   Pulse (!) 44   Temp 97.8  F (36.6  C) (Temporal)   Resp 18   Wt 104.3 kg (230 lb)   SpO2 97%   BMI 34.46 kg/m      GENERAL APPEARANCE: healthy, alert and no distress  HEENT: no icterus, no xanthelasmas, normal pupil size and reaction, no cyanosis.  NECK: no adenopathy, no asymmetry, masses.  CHEST: lungs clear to auscultation - no rales, rhonchi or wheezes, no use of accessory muscles, no retractions, respirations are unlabored, normal respiratory rate  CARDIOVASCULAR: regular rhythm, normal S1 with physiologic split S2, no S3 or S4 and no murmur, click or rub  EXTREMITIES: no clubbing, cyanosis or edema  NEURO: alert and oriented normal speech, and affect  VASC: No vascular bruits heard.  SKIN: no ecchymoses, no rashes    Total time spent on day of visit, including review of tests, obtaining/reviewing separately obtained history, ordering medications/tests/procedures, communicating with PCP/consultants, and documenting in electronic medical record: 40 minutes.           Thank you for allowing me to participate in the care of your patient. Please do not hesitate to contact me if you have any questions.     Jann Ulloa, DO

## 2022-08-23 ENCOUNTER — OFFICE VISIT (OUTPATIENT)
Dept: CARDIOLOGY | Facility: OTHER | Age: 77
End: 2022-08-23
Attending: INTERNAL MEDICINE
Payer: COMMERCIAL

## 2022-08-23 VITALS
HEART RATE: 44 BPM | RESPIRATION RATE: 18 BRPM | TEMPERATURE: 97.8 F | BODY MASS INDEX: 34.46 KG/M2 | SYSTOLIC BLOOD PRESSURE: 158 MMHG | OXYGEN SATURATION: 97 % | DIASTOLIC BLOOD PRESSURE: 94 MMHG | WEIGHT: 230 LBS

## 2022-08-23 DIAGNOSIS — I10 ESSENTIAL HYPERTENSION: Primary | ICD-10-CM

## 2022-08-23 DIAGNOSIS — Z87.891 HISTORY OF TOBACCO ABUSE: ICD-10-CM

## 2022-08-23 DIAGNOSIS — I65.23 BILATERAL CAROTID ARTERY STENOSIS: ICD-10-CM

## 2022-08-23 DIAGNOSIS — I50.32 DIASTOLIC DYSFUNCTION WITH CHRONIC HEART FAILURE (H): ICD-10-CM

## 2022-08-23 DIAGNOSIS — E78.2 MIXED HYPERLIPIDEMIA: ICD-10-CM

## 2022-08-23 DIAGNOSIS — I35.0 AORTIC STENOSIS, SEVERE: ICD-10-CM

## 2022-08-23 DIAGNOSIS — I25.10 CORONARY ARTERY DISEASE INVOLVING NATIVE CORONARY ARTERY OF NATIVE HEART WITHOUT ANGINA PECTORIS: ICD-10-CM

## 2022-08-23 DIAGNOSIS — E66.01 MORBID OBESITY (H): ICD-10-CM

## 2022-08-23 DIAGNOSIS — Z95.2 S/P AVR: ICD-10-CM

## 2022-08-23 DIAGNOSIS — I10 BENIGN ESSENTIAL HYPERTENSION: ICD-10-CM

## 2022-08-23 DIAGNOSIS — E78.00 HYPERCHOLESTEROLEMIA: ICD-10-CM

## 2022-08-23 DIAGNOSIS — I71.40 ABDOMINAL AORTIC ANEURYSM (AAA) WITHOUT RUPTURE (H): ICD-10-CM

## 2022-08-23 DIAGNOSIS — Z98.890 STATUS POST CORONARY ANGIOGRAM: ICD-10-CM

## 2022-08-23 DIAGNOSIS — Z95.1 HISTORY OF CORONARY ARTERY BYPASS GRAFT X 2: ICD-10-CM

## 2022-08-23 DIAGNOSIS — I71.20 THORACIC AORTIC ANEURYSM WITHOUT RUPTURE (H): ICD-10-CM

## 2022-08-23 DIAGNOSIS — I73.9 PAD (PERIPHERAL ARTERY DISEASE) (H): ICD-10-CM

## 2022-08-23 PROBLEM — I97.89 POSTOPERATIVE ATRIAL FIBRILLATION (H): Status: RESOLVED | Noted: 2022-06-08 | Resolved: 2022-08-23

## 2022-08-23 PROBLEM — I48.91 POSTOPERATIVE ATRIAL FIBRILLATION (H): Status: RESOLVED | Noted: 2022-06-08 | Resolved: 2022-08-23

## 2022-08-23 PROCEDURE — 99214 OFFICE O/P EST MOD 30 MIN: CPT | Performed by: INTERNAL MEDICINE

## 2022-08-23 PROCEDURE — G0463 HOSPITAL OUTPT CLINIC VISIT: HCPCS

## 2022-08-23 RX ORDER — CARVEDILOL 6.25 MG/1
6.25 TABLET ORAL 2 TIMES DAILY WITH MEALS
Qty: 180 TABLET | Refills: 3 | Status: SHIPPED | OUTPATIENT
Start: 2022-08-23 | End: 2023-04-12

## 2022-08-23 RX ORDER — AMOXICILLIN 500 MG/1
CAPSULE ORAL
Qty: 4 CAPSULE | Refills: 3 | Status: SHIPPED | OUTPATIENT
Start: 2022-08-23 | End: 2022-10-12

## 2022-08-23 ASSESSMENT — PAIN SCALES - GENERAL: PAINLEVEL: NO PAIN (0)

## 2022-08-23 NOTE — NURSING NOTE
"Patient comes in for follow up on blood pressure.  Marine Griffiths LPN ....................8/23/2022   10:19 AM  Chief Complaint   Patient presents with     Follow Up     Blood pressure       Initial BP (!) 170/98 (BP Location: Right arm, Patient Position: Sitting, Cuff Size: Adult Large)   Pulse (!) 44   Temp 97.8  F (36.6  C) (Temporal)   Resp 18   Wt 104.3 kg (230 lb)   SpO2 97%   BMI 34.46 kg/m   Estimated body mass index is 34.46 kg/m  as calculated from the following:    Height as of 7/14/22: 1.74 m (5' 8.5\").    Weight as of this encounter: 104.3 kg (230 lb).  Meds Reconciled: complete  Pt is on Aspirin  Pt is on a Statin  PHQ and/or STEVENSON reviewed. Pt referred to PCP/MH Provider as appropriate.    Marine Griffiths LPN    FOOD SECURITY SCREENING QUESTIONS  Hunger Vital Signs:  Within the past 12 months we worried whether our food would run out before we got money to buy more. Never  Within the past 12 months the food we bought just didn't last and we didn't have money to get more. Never  Marine Griffiths LPN 8/23/2022 10:20 AM    "

## 2022-08-23 NOTE — PATIENT INSTRUCTIONS
1.  Discontinue Coumadin.  You were on this for atrial fibrillation which was not seen on your most recent monitor which is called a Zio patch.    2.  Discontinue metoprolol 25 mg XL daily.    3.  Start on Coreg 6.25 mg twice a day for elevated blood pressures.  This was sent to her pharmacy.    4.  You will have ABIs which check the circulation in your legs.  They will call you to set this up.    5.  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 They will call you to set this up.    6.  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 They will call you to set this up.    7.  You will have an ultrasound of her abdominal aorta looking for an aneurysm in 1 year.  They will call you to set this up.  They will call you to set this up.    8.  Follow-up in 1 to 2 months.  Continue to check your blood pressure at home and bring these with you to your next visit.

## 2022-08-24 ENCOUNTER — HOSPITAL ENCOUNTER (OUTPATIENT)
Dept: CARDIAC REHAB | Facility: OTHER | Age: 77
Discharge: HOME | End: 2022-08-24
Attending: INTERNAL MEDICINE
Payer: COMMERCIAL

## 2022-08-24 PROCEDURE — 93798 PHYS/QHP OP CAR RHAB W/ECG: CPT

## 2022-08-26 ENCOUNTER — HOSPITAL ENCOUNTER (OUTPATIENT)
Dept: CARDIAC REHAB | Facility: OTHER | Age: 77
Discharge: HOME | End: 2022-08-26
Attending: INTERNAL MEDICINE
Payer: COMMERCIAL

## 2022-08-26 ENCOUNTER — TELEPHONE (OUTPATIENT)
Dept: CARDIOLOGY | Facility: OTHER | Age: 77
End: 2022-08-26

## 2022-08-26 PROCEDURE — 93798 PHYS/QHP OP CAR RHAB W/ECG: CPT

## 2022-08-29 ENCOUNTER — HOSPITAL ENCOUNTER (OUTPATIENT)
Dept: CARDIAC REHAB | Facility: OTHER | Age: 77
Discharge: HOME | End: 2022-08-29
Attending: INTERNAL MEDICINE
Payer: COMMERCIAL

## 2022-08-29 PROCEDURE — 93798 PHYS/QHP OP CAR RHAB W/ECG: CPT

## 2022-08-29 NOTE — TELEPHONE ENCOUNTER
"Per Radiology Scheduling:  \"The US BHARATI Doppler w/ Exercise bilateral- We don't do these order with exercise- we do them without exercise    Can you change the order to w/o exercise if possible.     Thank you,MACEY SOLO on 8/26/2022 at 5:46 PM\"        To DUSTIN Sorensen CNP and Michelle Pierson CNP to address in Dr. Ulloa's absence.  Lety Wheeler RN......August 29, 2022...10:50 AM   "

## 2022-08-30 DIAGNOSIS — I73.9 PAD (PERIPHERAL ARTERY DISEASE) (H): Primary | ICD-10-CM

## 2022-08-30 NOTE — TELEPHONE ENCOUNTER
"Per Michelle Pierson CNP:  \"I put the order in without exercise.     Thanks,   Michelle Pierson CNP on 8/30/2022 at 8:36 AM\"    Radiology Scheduling notified of this.  Lety Wheeler RN......August 30, 2022...10:56 AM   "

## 2022-08-31 ENCOUNTER — HOSPITAL ENCOUNTER (OUTPATIENT)
Dept: CARDIAC REHAB | Facility: OTHER | Age: 77
Discharge: HOME | End: 2022-08-31
Attending: INTERNAL MEDICINE
Payer: COMMERCIAL

## 2022-08-31 PROCEDURE — 93798 PHYS/QHP OP CAR RHAB W/ECG: CPT

## 2022-09-02 ENCOUNTER — HOSPITAL ENCOUNTER (OUTPATIENT)
Dept: CARDIAC REHAB | Facility: OTHER | Age: 77
Discharge: HOME | End: 2022-09-02
Attending: INTERNAL MEDICINE
Payer: COMMERCIAL

## 2022-09-02 PROCEDURE — 93798 PHYS/QHP OP CAR RHAB W/ECG: CPT

## 2022-09-07 ENCOUNTER — HOSPITAL ENCOUNTER (OUTPATIENT)
Dept: CARDIAC REHAB | Facility: OTHER | Age: 77
Discharge: HOME | End: 2022-09-07
Attending: INTERNAL MEDICINE
Payer: COMMERCIAL

## 2022-09-07 PROCEDURE — 93798 PHYS/QHP OP CAR RHAB W/ECG: CPT

## 2022-09-12 ENCOUNTER — HOSPITAL ENCOUNTER (OUTPATIENT)
Dept: CARDIAC REHAB | Facility: OTHER | Age: 77
Discharge: HOME | End: 2022-09-12
Attending: INTERNAL MEDICINE
Payer: COMMERCIAL

## 2022-09-12 PROCEDURE — 93798 PHYS/QHP OP CAR RHAB W/ECG: CPT

## 2022-09-14 ENCOUNTER — HOSPITAL ENCOUNTER (OUTPATIENT)
Dept: CARDIAC REHAB | Facility: OTHER | Age: 77
Discharge: HOME | End: 2022-09-14
Attending: INTERNAL MEDICINE
Payer: COMMERCIAL

## 2022-09-14 ENCOUNTER — ANTICOAGULATION THERAPY VISIT (OUTPATIENT)
Dept: ANTICOAGULATION | Facility: OTHER | Age: 77
End: 2022-09-14
Attending: FAMILY MEDICINE
Payer: COMMERCIAL

## 2022-09-14 LAB — INR POINT OF CARE: 1.1 (ref 0.9–1.1)

## 2022-09-14 PROCEDURE — 85610 PROTHROMBIN TIME: CPT | Mod: ZL,QW

## 2022-09-14 PROCEDURE — 36416 COLLJ CAPILLARY BLOOD SPEC: CPT | Mod: ZL

## 2022-09-14 PROCEDURE — 93798 PHYS/QHP OP CAR RHAB W/ECG: CPT

## 2022-09-14 NOTE — PROGRESS NOTES
ANTICOAGULATION  MANAGEMENT    Arun Wren is being discharged from the [facility]ticoagulation [facility] Program ([facility]).    Reason for discharge: warfarin therapy completed    Anticoagulation episode resolved, [facility] referral closed and Standing order discontinued    If patient needs warfarin management in the future, please send a new referral    Sasha Brito RN

## 2022-09-17 ENCOUNTER — HEALTH MAINTENANCE LETTER (OUTPATIENT)
Age: 77
End: 2022-09-17

## 2022-09-19 ENCOUNTER — HOSPITAL ENCOUNTER (OUTPATIENT)
Dept: CARDIAC REHAB | Facility: OTHER | Age: 77
Discharge: HOME | End: 2022-09-19
Attending: INTERNAL MEDICINE
Payer: COMMERCIAL

## 2022-09-19 PROCEDURE — 93798 PHYS/QHP OP CAR RHAB W/ECG: CPT

## 2022-09-21 ENCOUNTER — HOSPITAL ENCOUNTER (OUTPATIENT)
Dept: CARDIAC REHAB | Facility: OTHER | Age: 77
Discharge: HOME | End: 2022-09-21
Attending: INTERNAL MEDICINE
Payer: COMMERCIAL

## 2022-09-21 PROCEDURE — 93798 PHYS/QHP OP CAR RHAB W/ECG: CPT

## 2022-09-23 ENCOUNTER — HOSPITAL ENCOUNTER (OUTPATIENT)
Dept: CARDIAC REHAB | Facility: OTHER | Age: 77
Discharge: HOME | End: 2022-09-23
Attending: INTERNAL MEDICINE
Payer: COMMERCIAL

## 2022-09-23 PROCEDURE — 93798 PHYS/QHP OP CAR RHAB W/ECG: CPT

## 2022-09-26 NOTE — PROGRESS NOTES
[facility] HEART CARE   CARDIOLOGY PROGRESS NOTE     Chief Complaint   Patient presents with     Follow Up     Blood pressure          Diagnosis:  1.  AS-severe, tissue AVR on 5/31/2022, [facility]. 25 mm Merritt Inspiris Resilia bovine pericardial valve.  2.  CABG x2 on 5/31/2022, [facility]. rSVG to the ramus and PDA. LCX was small and not grafted.   3.  Cardiac cath at the [facility], 4/22/2022.  LM normal, LAD normal, second diag 30%, circumflex 95%, ramus 90%, and RCA 75%,  4.  A. Fib-postop on 6/2/22.  Coreg.  Previously on amiodarone for 14 days/Coumadin for 3 months per [place] discharge summary.  5.  Hypertension-uncontrolled.  6.  Hyperlipidemia-controlled.  7.  Syncope likely to bradycardia episodes.  8.  CHF-Diastolic dysfunction grade 2 on 2/15/2022.  9.  TAAA 4.2 cm on 2/15/2022.  10.  H/O tobacco abuse.  11.  Bilateral carotid artery stenosis less than 50% on 4/21/2022.  12.  Balance issues.    Assessment/Plan:    1.  Severe AS: Status post aortic valve replacement on 5/31/2022 at the [facility].  Patient had a bovine pericardial valve replacement.  Aspirin 81 mg indefinitely.  Will need amoxicillin 2 g prior to dental procedures.  He has completed cardiac rehab.  Doing well, no issues.  2.  CAD: [facility]  RSVG to the ramus and PDA.  Circumflex was too small to be grafted as it was less than 1.5 mm.  Continue aspirin 81 mg daily, metoprolol 25 mg XL daily, sublingual nitro as needed for chest pain, and rosuvastatin 40 mg daily.  3.  Postop A. fib: Reviewed his Zio patch from 6/8/2022.  No evidence of A. fib.  Presently off the Coumadin and amiodarone.  Has not had palpitations or fluttering.  A. fib was postop.   4.  TAAA: At 4.2 cm on 2/15/2022.  We will plan for repeat echocardiogram.  Results explained and testing ordered.  5.  Carotid artery stenosis.  Less than 50% on 4/21/2022.  We will plan for an ultrasound carotid arteries.  6.  Hypertension: Uncontrolled.  Start hydrochlorothiazide 25  mg daily.  Reviewed his blood pressure log.  Blood pressure today 184/100.  On lisinopril 40 mg daily.  Not tolerating metoprolol secondary to dizziness.  Currently on Coreg 6.25 mg twice a day.    7.  ED: Has sildenafil/Viagra.  Warned patient if taken sildenafil under no circumstances it is easier to take nitro within 24 hours of taking sildenafil/Viagra.  This will kill him.  8.  Balance issues: Discussed physical therapy.  Has completed cardiac rehab but symptoms persist.  Declined physical therapy.  9.  AAA: We will plan for repeat ultrasound of abdominal aorta.  10.  Follow-up in 1 to 2 months to recheck blood pressures.        Interval history:  Valentin is being seen in follow-up.  He was found to have severe aortic stenosis on 2/15/2022.  Referred to [facility] for valve replacement.  He underwent cardiac catheterization on 4/22/2022.  Found to have disease in the ramus, circumflex, and RCA.  CABG x2 involving the ramus and RCA but circumflex felt to be too small to be bypassed.  Patient has not had chest pain or chest tightness previously.  He was found to have postop atrial fibrillation.  He was continued on amiodarone for 14 days, now off amiodarone.  He has been on metoprolol with some bradycardia and dizziness.  He is also on Coumadin but discontinued on 8/23/2022 secondary to Zio patch being deficient of A. fib.  He understands he needs to take amoxicillin 2 g, 30 to 60 minutes prior to cleaning or dental work.  He is on aspirin 81 mg indefinitely.  We will plan for an echocardiogram in 1 year related to ascending aortic aneurysm.  We will plan for an ultrasound of his carotids in 1 year related to bilateral carotid artery disease.  We will plan for an ultrasound of the abdomen for AAA in 1 year.  He understands under no circumstances is he did take nitro if he is taken sildenafil within 24 hours.  He is continue to check his blood pressures at home and bring this log with him.  Follow-up in  1 to 2 months to reassess blood pressure.  Start hydrochlorothiazide 25 mg daily.  Not having any significant issues.  Testing ordered at his last visit was never completed/scheduled and will be scheduled today.      HPI:    Mr. Wren is being seen by cardiology for severe aortic stenosis.  He was seen in the clinic on 1/19/2022.  Repeat echocardiogram completed on 2/15/2022.  He was found to have severe aortic stenosis.  He was referred to cardiology as a result.     He had an echo on 10/31/2018.  At that time, he was found to have mild to moderate aortic valve sclerosis with mild aortic stenosis.  Mean gradient was 18 mmHg.  Valve area was 1.5 cm .  Repeat echocardiogram on 2/15/2022.  Echocardiogram showed a mean gradient of 50 mmHg, peak velocity of 4.3 m/s, and DVI of 0 0.26.  Aortic valve area 0.95 cm .  Ascending aorta was 4.2 cm.     We discussed the results of his echocardiogram.  He understands he has severe aortic stenosis.  We discussed TAVR versus SAVR.  He has denied dyspnea, swelling, chest pain, near-syncope, or syncope.     We had a long discussion using the heart model in the room as well as [employer].  We discussed TAVR to replace his aortic valve.  He understands will likely require 2 trips to the [facility].  He was explained that the first work-up may include a cardiac catheterization.  We will plan for an ultrasound of his carotids and abdomen for AAA here locally.  He is also aware that after valve replacement, he will need to take amoxicillin 2 g, 30 to 60 minutes prior to dental procedures.  He currently has an abscess tooth which he is on antibiotics.  He needs a tooth pulled.  He cannot get in for couple weeks.  He has an appointment.  He understands this will need to be taken care of prior to valve replacement.  Risks and benefits discussed.  Patient agreeable to going to the [facility].      Relevant testing:  Zio patch in 6/8/2022:  Worn for 11 days and 4 hr's.  After  removing artifact, total time was 10 days and 8 hr's. Placed on 7/7/2022 at 11:28 AM and completed on 7/18/2022 at 2:58 PM.  Underlying rhythm was sinus.  Hrt rate ranged from 39 bpm, maximum heart rate of 136 bmp, averaging 54 bmp.  No significant pauses, Mobitz type II or 3rd degree heart block.  No atrial fibrillation on this study.  x1 triggered events and x0 diary entries.  These corresponded to sinus rhythm.  x0 runs of VT.    x6 runs of SVT lasting 5 mins and 16 sec's with a maximum heart rate of 136 bmp.  Rare, <1% of PAC's, atrial couplets, atrial triplets, and PVC's.  0 episodes of ventricular bigeminy.  0 episodes of ventricular trigeminy.    Echo on 5/31/2022:  s/p CABG+AVR 25MM INSPIRIS RESILIA AORTIC VALVE.  AVR valve leaflets not well seen. Normal Doppler interrogation of the valve. Mean gradient is 5 mmHg. No AI.  Global and regional left ventricular function is normal with an EF of 55-60%.  RV is not seen well. From limited views appear normal size and function.  This study was compared with the study from 2/15/2022.  Pateint is now s/p AVR for severe AS.    Echo on 2/15/2022:  Global and regional left ventricular function is normal with an EF of 55-60%.  Grade II or moderate diastolic dysfunction.  The right ventricle is normal in function and size.  Severe aortic stenosis is present (MG 50 mmHg, PV 4.3 m/s, DVI 0.26)  Ascending aorta is dilated measuring 4.2 cm  No pericardial effusion is present.  This study was compared with the study from 10/31/18 the AS has progressed and aorta is more dilated.     Echo on 10/31/2018:  EF 55-60%.   Thickening of the anterior basal septum present.  Diastolic function normal.  Atria are normal.  Mild aortic stenosis present.     US abdominal aorta on 9/18/2018:  No abdominal aortic aneurysm.        ICD-10-CM    1. Essential hypertension  I10 hydrochlorothiazide (HYDRODIURIL) 25 MG tablet   2. Diastolic dysfunction with chronic heart failure (H)  I50.32  hydrochlorothiazide (HYDRODIURIL) 25 MG tablet   3. Benign essential hypertension  I10 hydrochlorothiazide (HYDRODIURIL) 25 MG tablet   4. History of coronary artery bypass graft [facility] 2 [facility]/2020 [facility]   Z95.1    5. S/P AVR on 5/31/2022 with 25 mm Merritt Inspiris Resilia bovine pericardial valve at the [facility]   Z95.2    6. History of tobacco abuse  Z87.891    7. Status post coronary angiogram on 4/22/2022 with multivessel disease, [facility].  Z98.890    8. Abdominal aortic aneurysm (AAA) without rupture (H)  I71.4    9. Bilateral carotid artery stenosis  I65.23    10. Coronary artery disease involving native coronary artery of native heart without angina pectoris  I25.10    11. H/O aortic stenosis, severe  I35.0    12. Thoracic aortic aneurysm without rupture (H)  I71.2    13. PAD (peripheral artery disease) (H)  I73.9    14. Hypercholesterolemia  E78.00    15. Morbid obesity (H)  E66.01        Past Medical History:   Diagnosis Date     Actinic keratosis     05/19/09,Keratosis left external ear, treated with cryocautery, recheck if recurs or persists     Allergic contact dermatitis due to plants, except food     recurrent     Basal cell carcinoma of skin     No Comments Provided     Carpal tunnel syndrome     right greater than left     Closed fracture of base of skull (H)     No Comments Provided     Essential (primary) hypertension     No Comments Provided     Ganglion     No Comments Provided     Hydrocele     No Comments Provided     Male erectile dysfunction     decreased libido     Personal history of nicotine dependence     No Comments Provided     Pure hypercholesterolemia     No Comments Provided     Residual hemorrhoidal skin tags     Past external hemorrhoid     Tinea unguium     No Comments Provided       Past Surgical History:   Procedure Laterality Date     BYPASS GRAFT ARTERY CORONARY, REPAIR VALVE AORTIC, COMBINED N/A 05/31/2022    Procedure: MEDIAN STERNOTOMY. CARDIOPULMONARY BYPASS. ENDOSCOPIC  HARVEST OF LEFT GREATER SAPHENOUS VEIN. CORONARY ARTERY BYPASS GRAFT (CABG) X2 . AORTIC VALVE REPLACEMENT USING 25MM INSPIRIS RESILIA  AORTIC VALVE. TRANSESOPHAGEAL ECHOCARDIOGRAM (BREANNE) PER ANESTHESIA.;  Surgeon: Sai Benítez MD;  Location: [facility] OR     BYPASS GRAFT ARTERY CORONARY, REPAIR VALVE AORTIC, COMBINED  05/31/2022    Procedure: ;  Surgeon: Sai Benítez MD;  Location: [facility] OR     COLONOSCOPY  01/01/1998 1998,at [facility], which was normal     COLONOSCOPY  03/03/2005 03/03/05,at [facility]     COLONOSCOPY  03/02/2015    3/2/15,F/U 2020  tubular adnomas     COLONOSCOPY  03/16/2020    F/U N/A hyperplastic     CV CORONARY ANGIOGRAM N/A 04/22/2022    Procedure: Coronary Angiogram with possible intervention;  Surgeon: Saulo Mccarthy MD;  Location:  HEART CARDIAC CATH LAB     CV RIGHT HEART CATH MEASUREMENTS RECORDED N/A 04/22/2022    Procedure: Right Heart Cath;  Surgeon: Saulo Mccarthy MD;  Location:  HEART CARDIAC CATH LAB     OTHER SURGICAL HISTORY      ,HERNIA REPAIR     OTHER SURGICAL HISTORY      05/24/16,[id number],OTHER,Left,Dr. Jeremiah GIBSON     OTHER SURGICAL HISTORY      07/28/16,[id number],ORCHIECTOMY SIMPLE / PARTIAL / RADICAL W/ SCROTAL / INGUINAL APPROACH,Left,Dr. Jeremiah GIBSON     PICC DOUBLE LUMEN PLACEMENT Right 06/06/2022    on Right Basilic, cut 43 cm, needs X-ray to verify tip due to A-fib       No Known Allergies    Current Outpatient Medications   Medication Sig Dispense Refill     acetaminophen (TYLENOL) 325 MG tablet Take 2 tablets (650 mg) by mouth every 6 hours as needed for other (For optimal non-opioid multimodal pain management to improve pain control.) 50 tablet 0     amoxicillin (AMOXIL) 500 MG capsule Take 2 g / 4 tablets 30 to 60 minutes prior to dental procedures/dental cleanings 4 capsule 3     carvedilol (COREG) 6.25 MG tablet Take 1 tablet (6.25 mg) by mouth 2 times daily (with meals) 180 tablet 3     hydrochlorothiazide (HYDRODIURIL) 25  MG tablet Take 1 tablet (25 mg) by mouth daily 90 tablet 3     lisinopril (ZESTRIL) 40 MG tablet Take 1 tablet (40 mg) by mouth daily 90 tablet 4     nitroGLYcerin (NITROSTAT) 0.4 MG sublingual tablet For chest pain place 1 tablet under the tongue every 5 minutes for 3 doses. If symptoms persist 5 minutes after 1st dose call 911. 25 tablet 3     rosuvastatin (CRESTOR) 40 MG tablet Take 1 tablet (40 mg) by mouth daily 90 tablet 4     senna-docusate (SENOKOT-S/PERICOLACE) 8.6-50 MG tablet Take 1 to 2 tablets by mouth daily. 180 tablet 3     sildenafil (REVATIO) 20 MG tablet TAKE 4-5 TABLETS BY MOUTH ONCE DAILY IF NEEDED FOR OTHER (SPECIFY).TAKE 30 MINUTES BEFORE SEXUAL ACTIVITY. 80 tablet 1     aspirin (ASA) 81 MG chewable tablet Take 1 tablet (81 mg) by mouth daily (Patient not taking: Reported on 2022) 90 tablet 3       Social History     Socioeconomic History     Marital status:      Spouse name: Not on file     Number of children: Not on file     Years of education: Not on file     Highest education level: Not on file   Occupational History     Not on file   Tobacco Use     Smoking status: Former Smoker     Years: 27.00     Types: Cigarettes     Quit date: 1988     Years since quittin.7     Smokeless tobacco: Never Used   Vaping Use     Vaping Use: Never used   Substance and Sexual Activity     Alcohol use: Yes     Comment: Alcoholic Drinks/day: 1-2 drinks per day, whiskey     Drug use: No     Sexual activity: Not Currently   Other Topics Concern     Parent/sibling w/ CABG, MI or angioplasty before 65F 55M? Not Asked   Social History Narrative    Quit smoking .  Occ alcohol.  .   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    Stays active, owns tree farm.     Social Determinants of Health     Financial Resource Strain: Not on file   Food Insecurity: Not on file   Transportation Needs: Not on file   Physical Activity: Not on file   Stress: Not on file   Social  Connections: Not on file   Intimate Partner Violence: Not on file   Housing Stability: Not on file       LAB RESULTS:   Anticoagulation Therapy Visit on 06/23/2022   Component Date Value Ref Range Status     INR Point of Care 06/23/2022 3.6 (A) 0.9 - 1.1 Final   Anticoagulation Therapy Visit on 06/20/2022   Component Date Value Ref Range Status     INR Point of Care 06/20/2022 2.9 (A) 0.9 - 1.1 Final   Anticoagulation Therapy Visit on 06/16/2022   Component Date Value Ref Range Status     INR Point of Care 06/16/2022 2.7 (A) 0.9 - 1.1 Final   Anticoagulation Therapy Visit on 06/13/2022   Component Date Value Ref Range Status     INR Point of Care 06/13/2022 3.3 (A) 0.9 - 1.1 Final   Allied Health/Nurse Visit on 05/27/2022   Component Date Value Ref Range Status     SARS CoV2 PCR 05/27/2022 Negative  Negative Final   Lab on 05/18/2022   Component Date Value Ref Range Status     WBC Count 05/18/2022 7.7  4.0 - 11.0 10e3/uL Final     RBC Count 05/18/2022 4.75  4.40 - 5.90 10e6/uL Final     Hemoglobin 05/18/2022 15.2  13.3 - 17.7 g/dL Final     Hematocrit 05/18/2022 46.5  40.0 - 53.0 % Final     MCV 05/18/2022 98  78 - 100 fL Final     MCH 05/18/2022 32.0  26.5 - 33.0 pg Final     MCHC 05/18/2022 32.7  31.5 - 36.5 g/dL Final     RDW 05/18/2022 13.2  10.0 - 15.0 % Final     Platelet Count 05/18/2022 185  150 - 450 10e3/uL Final     Sodium 05/18/2022 138  133 - 144 mmol/L Final     Potassium 05/18/2022 4.2  3.4 - 5.3 mmol/L Final     Chloride 05/18/2022 104  94 - 109 mmol/L Final     Carbon Dioxide (CO2) 05/18/2022 30  20 - 32 mmol/L Final     Anion Gap 05/18/2022 4  3 - 14 mmol/L Final     Urea Nitrogen 05/18/2022 21  7 - 30 mg/dL Final     Creatinine 05/18/2022 0.99  0.66 - 1.25 mg/dL Final     Calcium 05/18/2022 9.0  8.5 - 10.1 mg/dL Final     Glucose 05/18/2022 97  70 - 99 mg/dL Final     Alkaline Phosphatase 05/18/2022 47  40 - 150 U/L Final     AST 05/18/2022 32  0 - 45 U/L Final     ALT 05/18/2022 36  0 - 70 U/L  "Final     Protein Total 05/18/2022 7.6  6.8 - 8.8 g/dL Final     Albumin 05/18/2022 3.8  3.4 - 5.0 g/dL Final     Bilirubin Total 05/18/2022 0.5  0.2 - 1.3 mg/dL Final     GFR Estimate 05/18/2022 79  >60 mL/min/1.73m2 Final     ABO/RH(D) 05/18/2022 A POS   Final     Antibody Screen 05/18/2022 Negative  Negative Final     SPECIMEN EXPIRATION DATE 05/18/2022 [id number]   Final   Orders Only on 04/29/2022   Component Date Value Ref Range Status     Prealbumin 05/18/2022 22  15 - 45 mg/dL Final     Magnesium 05/18/2022 2.4 (A) 1.6 - 2.3 mg/dL Final        Review of systems: Negative except that which was noted in the HPI.    Physical examination:  BP (!) 184/100 (BP Location: Right arm, Patient Position: Sitting, Cuff Size: Adult Large)   Pulse (!) 43   Temp 97.6  F (36.4  C) (Temporal)   Resp 18   Ht 1.74 m (5' 8.5\")   Wt 103.9 kg (229 lb)   SpO2 97%   BMI 34.31 kg/m      GENERAL APPEARANCE: healthy, alert and no distress  HEENT: no icterus, no xanthelasmas, normal pupil size and reaction, no cyanosis.  NECK: no adenopathy, no asymmetry, masses.  CHEST: lungs clear to auscultation - no rales, rhonchi or wheezes, no use of accessory muscles, no retractions, respirations are unlabored, normal respiratory rate  CARDIOVASCULAR: regular rhythm, normal S1 with physiologic split S2, no S3 or S4 and no murmur, click or rub  EXTREMITIES: no clubbing, cyanosis or edema  NEURO: alert and oriented normal speech, and affect  VASC: No vascular bruits heard.  SKIN: no ecchymoses, no rashes    Total time spent on day of visit, including review of tests, obtaining/reviewing separately obtained history, ordering medications/tests/procedures, communicating with PCP/consultants, and documenting in electronic medical record: 60 minutes.           Thank you for allowing me to participate in the care of your patient. Please do not hesitate to contact me if you have any questions.     Jann Ulloa, DO        [facility] "   CARDIOLOGY PROGRESS NOTE     Chief Complaint   Patient presents with     Follow Up     Blood pressure          Diagnosis:  1.  Severe AS, tissue AVR on 5/31/2022 at the [facility]-open. 25 mm Merritt Inspiris Resilia bovine pericardial valve.  2.  CABG x2 on 5/31/2022 at [facility]. rSVG to the ramus and PDA. LCX was small and not grafted.   3.  Cardiac cath at the [facility], 4/22/2022.  LM normal, LAD normal, second diag 30%, circumflex 95%, ramus 90%, and RCA 75%,  4.  Postop A. Fib on 6/2/22.  Amnio, metoprolol, and Coumadin.  5.  Hypertension-uncontrolled.  6.  Hyperlipidemia-controlled.  7.  Syncope likely to bradycardia episodes.  8.  CHF-Diastolic dysfunction grade 2 on 2/15/2022.  9.  BREANNE AAA 4.2 cm on 2/15/2022.  10.  H/O tobacco abuse.  11.  Bilateral carotid artery stenosis less than 50% on 4/21/2022.    Assessment/Plan:    1.  Severe AS: Status post aortic valve replacement on 5/31/2022 at the [facility].  Patient had a bovine pericardial valve replacement.  Aspirin 81 mg indefinitely.  Will need amoxicillin 2 g prior to dental procedures.  Patient currently participating in cardiac rehab.  2.  CAD: Had CABG x2 at the [facility].  R SVG to the ramus and PDA.  Circumflex was too small to be grafted as it was less than 1.5 mm.  Continue aspirin 81 mg daily, metoprolol 25 mg XL daily, sublingual nitro as needed for chest pain, and rosuvastatin 40 mg daily.  3.  Postop A. fib: Currently on Coumadin and metoprolol.  Was on amiodarone for 14 days.  We will plan for Zio patch.  Zio patch unrevealing related to A. fib, plan to discontinue Coumadin.  If found to have A. fib on Zio patch.  We will continue on Coumadin.  4.  TAAA: At 4.2 cm on 2/15/2022.  We will plan for repeat echocardiogram in 1 year.  Results explained.  5.  Carotid artery stenosis.  Less than 50% on 4/21/2022.  We will plan for an ultrasound carotid arteries in 1 year.  6.  Hypertension: Uncontrolled.  Blood pressure today 182/90.  Increase lisinopril from 2.5  mg daily to 10 mg daily.  7.  ED: Has sildenafil/Viagra.  Warned patient if taken sildenafil under no circumstances it is easier to take nitro within 24 hours of taking sildenafil/Viagra.  This will kill him.  8.  Follow-up after completion of Zio patch to determine if should continue Coumadin or not.        Interval history:  Gene is being seen in follow-up.  He was found to have severe aortic stenosis on 2/15/2022.  Referred to [facility] for valve replacement.  He underwent cardiac catheterization on 4/22/2022.  Found to have disease in the ramus, circumflex, and RCA.  CABG x2 involving the ramus and RCA but circumflex felt to be too small to be bypassed.  Patient has not had chest pain or chest tightness previously.  He is found to have postop atrial fibrillation.  He was continued on amiodarone for 14 days, now off amiodarone.  He has been on metoprolol with some bradycardia.  He is also on Coumadin.  We will plan for Zio patch.  If Zio patch deficient of ANTONI mcconnell, we will discontinue Coumadin at that time.  He understands he needs to take amoxicillin 2 g, 30 to 60 minutes prior to cleaning or dental work.  He is on aspirin 81 mg indefinitely.  We will plan for an echocardiogram in 1 year related to ascending aortic aneurysm.  We will plan for an ultrasound of his carotids in 1 year related to bilateral carotid artery disease.  He understands under no circumstances is he did take nitro if he is taken sildenafil within 24 hours.  Follow-up will be after testing.      HPI:    Mr. Wren is being seen by cardiology for severe aortic stenosis.  He was seen in the clinic on 1/19/2022.  Repeat echocardiogram completed on 2/15/2022.  He was found to have severe aortic stenosis.  He was referred to cardiology as a result.     He had an echo on 10/31/2018.  At that time, he was found to have mild to moderate aortic valve sclerosis with mild aortic stenosis.  Mean gradient was 18 mmHg.  Valve area was 1.5  cm .  Repeat echocardiogram on 2/15/2022.  Echocardiogram showed a mean gradient of 50 mmHg, peak velocity of 4.3 m/s, and DVI of 0 0.26.  Aortic valve area 0.95 cm .  Ascending aorta was 4.2 cm.     We discussed the results of his echocardiogram.  He understands he has severe aortic stenosis.  We discussed TAVR versus SAVR.  He has denied dyspnea, swelling, chest pain, near-syncope, or syncope.     We had a long discussion using the heart model in the room as well as [employer].  We discussed TAVR to replace his aortic valve.  He understands will likely require 2 trips to the [facility].  He was explained that the first work-up may include a cardiac catheterization.  We will plan for an ultrasound of his carotids and abdomen for AAA here locally.  He is also aware that after valve replacement, he will need to take amoxicillin 2 g, 30 to 60 minutes prior to dental procedures.  He currently has an abscess tooth which he is on antibiotics.  He needs a tooth pulled.  He cannot get in for couple weeks.  He has an appointment.  He understands this will need to be taken care of prior to valve replacement.  Risks and benefits discussed.  Patient agreeable to going to the [facility].      Relevant testing:  Zio patch in 6/8/2022:  Pending data     Echo on 5/31/2022:  s/p CABG+AVR 25MM INSPIRIS RESILIA AORTIC VALVE.  AVR valve leaflets not well seen. Normal Doppler interrogation of the valve. Mean gradient is 5 mmHg. No AI.  Global and regional left ventricular function is normal with an EF of 55-60%.  RV is not seen well. From limited views appear normal size and function.  This study was compared with the study from 2/15/2022.  Pateint is now s/p AVR for severe AS.    Echo on 2/15/2022:  Global and regional left ventricular function is normal with an EF of 55-60%.  Grade II or moderate diastolic dysfunction.  The right ventricle is normal in function and size.  Severe aortic stenosis is present (MG 50 mmHg, PV  4.3 m/s, DVI 0.26)  Ascending aorta is dilated measuring 4.2 cm  No pericardial effusion is present.  This study was compared with the study from 10/31/18 the AS has progressed and aorta is more dilated.     Echo on 10/31/2018:  EF 55-60%.   Thickening of the anterior basal septum present.  Diastolic function normal.  Atria are normal.  Mild aortic stenosis present.     US abdominal aorta on 9/18/2018:  No abdominal aortic aneurysm.        ICD-10-CM    1. Essential hypertension  I10 hydrochlorothiazide (HYDRODIURIL) 25 MG tablet   2. Diastolic dysfunction with chronic heart failure (H)  I50.32 hydrochlorothiazide (HYDRODIURIL) 25 MG tablet   3. Benign essential hypertension  I10 hydrochlorothiazide (HYDRODIURIL) 25 MG tablet   4. History of coronary artery bypass graft [facility] 2 [facility]/2020 [facility]   Z95.1    5. S/P AVR on 5/31/2022 with 25 mm Merritt Inspiris Resilia bovine pericardial valve [facility]   Z95.2    6. History of tobacco abuse  Z87.891    7. Status post coronary angiogram on 4/22/2022 with multivessel disease, [facility] .  Z98.890    8. Abdominal aortic aneurysm (AAA) without rupture (H)  I71.4    9. Bilateral carotid artery stenosis  I65.23    10. Coronary artery disease involving native coronary artery of native heart without angina pectoris  I25.10    11. H/O aortic stenosis, severe  I35.0    12. Thoracic aortic aneurysm without rupture (H)  I71.2    13. PAD (peripheral artery disease) (H)  I73.9    14. Hypercholesterolemia  E78.00    15. Morbid obesity (H)  E66.01        Past Medical History:   Diagnosis Date     Actinic keratosis     05/19/09,Keratosis left external ear, treated with cryocautery, recheck if recurs or persists     Allergic contact dermatitis due to plants, except food     recurrent     Basal cell carcinoma of skin     No Comments Provided     Carpal tunnel syndrome     right greater than left     Closed fracture of base of skull (H)     No Comments Provided     Essential (primary)  hypertension     No Comments Provided     Ganglion     No Comments Provided     Hydrocele     No Comments Provided     Male erectile dysfunction     decreased libido     Personal history of nicotine dependence     No Comments Provided     Pure hypercholesterolemia     No Comments Provided     Residual hemorrhoidal skin tags     Past external hemorrhoid     Tinea unguium     No Comments Provided       Past Surgical History:   Procedure Laterality Date     BYPASS GRAFT ARTERY CORONARY, REPAIR VALVE AORTIC, COMBINED N/A 05/31/2022    Procedure: MEDIAN STERNOTOMY. CARDIOPULMONARY BYPASS. ENDOSCOPIC HARVEST OF LEFT GREATER SAPHENOUS VEIN. CORONARY ARTERY BYPASS GRAFT (CABG) X2 . AORTIC VALVE REPLACEMENT USING 25MM INSPIRIS RESILIA  AORTIC VALVE. TRANSESOPHAGEAL ECHOCARDIOGRAM (BREANNE) PER ANESTHESIA.;  Surgeon: Sai Benítez MD;  Location:  OR     BYPASS GRAFT ARTERY CORONARY, REPAIR VALVE AORTIC, COMBINED  05/31/2022    Procedure: ;  Surgeon: Sai Benítez MD;  Location:  OR     COLONOSCOPY  01/01/1998 1998,at [facility], which was normal     COLONOSCOPY  03/03/2005 03/03/05,at [facility]     [facility]  03/02/2015    [facility]     COLONOSCOPY  03/16/2020    F/U N/A hyperplastic     CV CORONARY ANGIOGRAM N/A 04/22/2022    Procedure: Coronary Angiogram with possible intervention;  Surgeon: Saulo Mccarthy MD;  Location:  HEART CARDIAC CATH LAB     CV RIGHT HEART CATH MEASUREMENTS RECORDED N/A 04/22/2022    Procedure: Right Heart Cath;  Surgeon: Saulo Mccarthy MD;  Location:  HEART CARDIAC CATH LAB     OTHER SURGICAL HISTORY      ,HERNIA REPAIR     OTHER SURGICAL HISTORY      05/24/16,[id number],OTHER,Left,Dr. Jeremiah GIBSON     OTHER SURGICAL HISTORY      07/28/16,[id number],ORCHIECTOMY SIMPLE / PARTIAL / RADICAL W/ SCROTAL / INGUINAL APPROACH,Left,Dr. Jeremiah GIBSON     PICC DOUBLE LUMEN PLACEMENT Right 06/06/2022    on Right Basilic, cut 43 cm, needs X-ray  to verify tip due to A-fib       No Known Allergies    Current Outpatient Medications   Medication Sig Dispense Refill     acetaminophen (TYLENOL) 325 MG tablet Take 2 tablets (650 mg) by mouth every 6 hours as needed for other (For optimal non-opioid multimodal pain management to improve pain control.) 50 tablet 0     amoxicillin (AMOXIL) 500 MG capsule Take 2 g / 4 tablets 30 to 60 minutes prior to dental procedures/dental cleanings 4 capsule 3     carvedilol (COREG) 6.25 MG tablet Take 1 tablet (6.25 mg) by mouth 2 times daily (with meals) 180 tablet 3     hydrochlorothiazide (HYDRODIURIL) 25 MG tablet Take 1 tablet (25 mg) by mouth daily 90 tablet 3     lisinopril (ZESTRIL) 40 MG tablet Take 1 tablet (40 mg) by mouth daily 90 tablet 4     nitroGLYcerin (NITROSTAT) 0.4 MG sublingual tablet For chest pain place 1 tablet under the tongue every 5 minutes for 3 doses. If symptoms persist 5 minutes after 1st dose call 911. 25 tablet 3     rosuvastatin (CRESTOR) 40 MG tablet Take 1 tablet (40 mg) by mouth daily 90 tablet 4     senna-docusate (SENOKOT-S/PERICOLACE) 8.6-50 MG tablet Take 1 to 2 tablets by mouth daily. 180 tablet 3     sildenafil (REVATIO) 20 MG tablet TAKE 4-5 TABLETS BY MOUTH ONCE DAILY IF NEEDED FOR OTHER (SPECIFY).TAKE 30 MINUTES BEFORE SEXUAL ACTIVITY. 80 tablet 1     aspirin (ASA) 81 MG chewable tablet Take 1 tablet (81 mg) by mouth daily (Patient not taking: Reported on 2022) 90 tablet 3       Social History     Socioeconomic History     Marital status:      Spouse name: Not on file     Number of children: Not on file     Years of education: Not on file     Highest education level: Not on file   Occupational History     Not on file   Tobacco Use     Smoking status: Former Smoker     Years: 27.00     Types: Cigarettes     Quit date: 1988     Years since quittin.7     Smokeless tobacco: Never Used   Vaping Use     Vaping Use: Never used   Substance and Sexual Activity     Alcohol  use: Yes     Comment: Alcoholic Drinks/day: 1-2 drinks per day, whiskey     Drug use: No     Sexual activity: Not Currently   Other Topics Concern     Parent/sibling w/ CABG, MI or angioplasty before 65F 55M? Not Asked   Social History Narrative    Quit smoking 1990.  Occ alcohol.  .   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    Stays active, owns tree farm.     Social Determinants of Health     Financial Resource Strain: Not on file   Food Insecurity: Not on file   Transportation Needs: Not on file   Physical Activity: Not on file   Stress: Not on file   Social Connections: Not on file   Intimate Partner Violence: Not on file   Housing Stability: Not on file       LAB RESULTS:   Anticoagulation Therapy Visit on 06/23/2022   Component Date Value Ref Range Status     INR Point of Care 06/23/2022 3.6 (A) 0.9 - 1.1 Final   Anticoagulation Therapy Visit on 06/20/2022   Component Date Value Ref Range Status     INR Point of Care 06/20/2022 2.9 (A) 0.9 - 1.1 Final   Anticoagulation Therapy Visit on 06/16/2022   Component Date Value Ref Range Status     INR Point of Care 06/16/2022 2.7 (A) 0.9 - 1.1 Final   Anticoagulation Therapy Visit on 06/13/2022   Component Date Value Ref Range Status     INR Point of Care 06/13/2022 3.3 (A) 0.9 - 1.1 Final   Allied Health/Nurse Visit on 05/27/2022   Component Date Value Ref Range Status     SARS CoV2 PCR 05/27/2022 Negative  Negative Final   Lab on 05/18/2022   Component Date Value Ref Range Status     WBC Count 05/18/2022 7.7  4.0 - 11.0 10e3/uL Final     RBC Count 05/18/2022 4.75  4.40 - 5.90 10e6/uL Final     Hemoglobin 05/18/2022 15.2  13.3 - 17.7 g/dL Final     Hematocrit 05/18/2022 46.5  40.0 - 53.0 % Final     MCV 05/18/2022 98  78 - 100 fL Final     MCH 05/18/2022 32.0  26.5 - 33.0 pg Final     MCHC 05/18/2022 32.7  31.5 - 36.5 g/dL Final     RDW 05/18/2022 13.2  10.0 - 15.0 % Final     Platelet Count 05/18/2022 185  150 - 450 10e3/uL Final     Sodium  "05/18/2022 138  133 - 144 mmol/L Final     Potassium 05/18/2022 4.2  3.4 - 5.3 mmol/L Final     Chloride 05/18/2022 104  94 - 109 mmol/L Final     Carbon Dioxide (CO2) 05/18/2022 30  20 - 32 mmol/L Final     Anion Gap 05/18/2022 4  3 - 14 mmol/L Final     Urea Nitrogen 05/18/2022 21  7 - 30 mg/dL Final     Creatinine 05/18/2022 0.99  0.66 - 1.25 mg/dL Final     Calcium 05/18/2022 9.0  8.5 - 10.1 mg/dL Final     Glucose 05/18/2022 97  70 - 99 mg/dL Final     Alkaline Phosphatase 05/18/2022 47  40 - 150 U/L Final     AST 05/18/2022 32  0 - 45 U/L Final     ALT 05/18/2022 36  0 - 70 U/L Final     Protein Total 05/18/2022 7.6  6.8 - 8.8 g/dL Final     Albumin 05/18/2022 3.8  3.4 - 5.0 g/dL Final     Bilirubin Total 05/18/2022 0.5  0.2 - 1.3 mg/dL Final     GFR Estimate 05/18/2022 79  >60 mL/min/1.73m2 Final     ABO/RH(D) 05/18/2022 A POS   Final     Antibody Screen 05/18/2022 Negative  Negative Final     SPECIMEN EXPIRATION DATE 05/18/2022 [id number]   Final   Orders Only on 04/29/2022   Component Date Value Ref Range Status     Prealbumin 05/18/2022 22  15 - 45 mg/dL Final     Magnesium 05/18/2022 2.4 (A) 1.6 - 2.3 mg/dL Final        Review of systems: Negative except that which was noted in the HPI.    Physical examination:  BP (!) 184/100 (BP Location: Right arm, Patient Position: Sitting, Cuff Size: Adult Large)   Pulse (!) 43   Temp 97.6  F (36.4  C) (Temporal)   Resp 18   Ht 1.74 m (5' 8.5\")   Wt 103.9 kg (229 lb)   SpO2 97%   BMI 34.31 kg/m      GENERAL APPEARANCE: healthy, alert and no distress  HEENT: no icterus, no xanthelasmas, normal pupil size and reaction, no cyanosis.  NECK: no adenopathy, no asymmetry, masses.  CHEST: lungs clear to auscultation - no rales, rhonchi or wheezes, no use of accessory muscles, no retractions, respirations are unlabored, normal respiratory rate  CARDIOVASCULAR: regular rhythm, normal S1 with physiologic split S2, no S3 or S4 and no murmur, click or rub  EXTREMITIES: " no clubbing, cyanosis or edema  NEURO: alert and oriented normal speech, and affect  VASC: No vascular bruits heard.  SKIN: no ecchymoses, no rashes    Total time spent on day of visit, including review of tests, obtaining/reviewing separately obtained history, ordering medications/tests/procedures, communicating with PCP/consultants, and documenting in electronic medical record: 40 minutes.           Thank you for allowing me to participate in the care of your patient. Please do not hesitate to contact me if you have any questions.     Jann Ulloa, DO

## 2022-09-27 ENCOUNTER — OFFICE VISIT (OUTPATIENT)
Dept: CARDIOLOGY | Facility: OTHER | Age: 77
End: 2022-09-27
Attending: INTERNAL MEDICINE
Payer: COMMERCIAL

## 2022-09-27 VITALS
RESPIRATION RATE: 18 BRPM | HEIGHT: 69 IN | HEART RATE: 43 BPM | OXYGEN SATURATION: 97 % | WEIGHT: 229 LBS | TEMPERATURE: 97.6 F | BODY MASS INDEX: 33.92 KG/M2 | DIASTOLIC BLOOD PRESSURE: 100 MMHG | SYSTOLIC BLOOD PRESSURE: 184 MMHG

## 2022-09-27 DIAGNOSIS — Z95.1 HISTORY OF CORONARY ARTERY BYPASS GRAFT X 2: ICD-10-CM

## 2022-09-27 DIAGNOSIS — Z98.890 STATUS POST CORONARY ANGIOGRAM: ICD-10-CM

## 2022-09-27 DIAGNOSIS — Z95.2 S/P AVR: ICD-10-CM

## 2022-09-27 DIAGNOSIS — E78.00 HYPERCHOLESTEROLEMIA: ICD-10-CM

## 2022-09-27 DIAGNOSIS — I10 BENIGN ESSENTIAL HYPERTENSION: ICD-10-CM

## 2022-09-27 DIAGNOSIS — I71.40 ABDOMINAL AORTIC ANEURYSM (AAA) WITHOUT RUPTURE (H): ICD-10-CM

## 2022-09-27 DIAGNOSIS — I71.20 THORACIC AORTIC ANEURYSM WITHOUT RUPTURE (H): ICD-10-CM

## 2022-09-27 DIAGNOSIS — I50.32 DIASTOLIC DYSFUNCTION WITH CHRONIC HEART FAILURE (H): ICD-10-CM

## 2022-09-27 DIAGNOSIS — I73.9 PAD (PERIPHERAL ARTERY DISEASE) (H): ICD-10-CM

## 2022-09-27 DIAGNOSIS — I35.0 AORTIC STENOSIS, SEVERE: ICD-10-CM

## 2022-09-27 DIAGNOSIS — I25.10 CORONARY ARTERY DISEASE INVOLVING NATIVE CORONARY ARTERY OF NATIVE HEART WITHOUT ANGINA PECTORIS: ICD-10-CM

## 2022-09-27 DIAGNOSIS — E66.01 MORBID OBESITY (H): ICD-10-CM

## 2022-09-27 DIAGNOSIS — Z87.891 HISTORY OF TOBACCO ABUSE: ICD-10-CM

## 2022-09-27 DIAGNOSIS — I10 ESSENTIAL HYPERTENSION: Primary | ICD-10-CM

## 2022-09-27 DIAGNOSIS — I65.23 BILATERAL CAROTID ARTERY STENOSIS: ICD-10-CM

## 2022-09-27 PROCEDURE — G0463 HOSPITAL OUTPT CLINIC VISIT: HCPCS

## 2022-09-27 PROCEDURE — 99214 OFFICE O/P EST MOD 30 MIN: CPT | Performed by: INTERNAL MEDICINE

## 2022-09-27 RX ORDER — HYDROCHLOROTHIAZIDE 25 MG/1
25 TABLET ORAL DAILY
Qty: 90 TABLET | Refills: 3 | Status: SHIPPED | OUTPATIENT
Start: 2022-09-27 | End: 2023-04-12

## 2022-09-27 ASSESSMENT — PAIN SCALES - GENERAL: PAINLEVEL: NO PAIN (0)

## 2022-09-27 NOTE — PATIENT INSTRUCTIONS
1.  Start on hydrochlorothiazide 25 mg daily.  This was sent to your pharmacy.    2.  Continue to check your blood pressures as you have been doing.    3.  Please schedule ABIs which check the circulation in your legs.    4.  Please schedule the echocardiogram which assesses your heart function and your ascending aortic aneurysm.    5.  Please obtain an ultrasound of your abdomen which looks for an aneurysm.    6.  Please schedule the ultrasound of your carotid arteries which looks for blockages in these arteries.  These are the main arteries that supply blood to your brain.    7.  Follow-up in 2 to 3 months to reassess your blood pressure.  Please bring your blood pressure log with you at that time.

## 2022-10-12 ENCOUNTER — OFFICE VISIT (OUTPATIENT)
Dept: FAMILY MEDICINE | Facility: OTHER | Age: 77
End: 2022-10-12
Attending: FAMILY MEDICINE
Payer: COMMERCIAL

## 2022-10-12 VITALS
WEIGHT: 227 LBS | HEART RATE: 50 BPM | TEMPERATURE: 96.9 F | RESPIRATION RATE: 16 BRPM | OXYGEN SATURATION: 98 % | BODY MASS INDEX: 34.01 KG/M2 | DIASTOLIC BLOOD PRESSURE: 86 MMHG | SYSTOLIC BLOOD PRESSURE: 138 MMHG

## 2022-10-12 DIAGNOSIS — Z95.2 S/P AVR: ICD-10-CM

## 2022-10-12 DIAGNOSIS — I25.10 CORONARY ARTERY DISEASE INVOLVING NATIVE CORONARY ARTERY OF NATIVE HEART WITHOUT ANGINA PECTORIS: ICD-10-CM

## 2022-10-12 DIAGNOSIS — I35.0 AORTIC STENOSIS, SEVERE: ICD-10-CM

## 2022-10-12 DIAGNOSIS — I10 BENIGN ESSENTIAL HYPERTENSION: Primary | ICD-10-CM

## 2022-10-12 PROCEDURE — 99213 OFFICE O/P EST LOW 20 MIN: CPT | Performed by: FAMILY MEDICINE

## 2022-10-12 PROCEDURE — G0463 HOSPITAL OUTPT CLINIC VISIT: HCPCS

## 2022-10-12 PROCEDURE — G0008 ADMIN INFLUENZA VIRUS VAC: HCPCS

## 2022-10-12 PROCEDURE — 91312 COVID-19,PF,PFIZER BOOSTER BIVALENT: CPT

## 2022-10-12 PROCEDURE — G0463 HOSPITAL OUTPT CLINIC VISIT: HCPCS | Mod: 25

## 2022-10-12 RX ORDER — AMOXICILLIN 500 MG/1
CAPSULE ORAL
Qty: 4 CAPSULE | Refills: 3 | Status: SHIPPED | OUTPATIENT
Start: 2022-10-12 | End: 2023-04-12

## 2022-10-12 ASSESSMENT — PAIN SCALES - GENERAL: PAINLEVEL: NO PAIN (0)

## 2022-10-12 NOTE — PROGRESS NOTES
"  Assessment & Plan     (I10) Benign essential hypertension  (primary encounter diagnosis)  Comment: blood pressure is at goal.  Hr is a little low, but he is tolerating this and for now does not want to make changes.  Consider reducing the coreg and adding on another med, like amlodipine in the future.   Plan: follow up in 3 months    (I25.10) Coronary artery disease involving native coronary artery of native heart without angina pectoris  Comment: stable  Plan:      (Z95.2) S/P AVR on 5/31/2022 with 25 mm Merritt Inspiris Resilia bovine pericardial valve at the [facility]  Comment: stable  Plan: antibiotics with dental work             BMI:   Estimated body mass index is 34.01 kg/m  as calculated from the following:    Height as of 9/27/22: 1.74 m (5' 8.5\").    Weight as of this encounter: 103 kg (227 lb).           No follow-ups on file.    Richard Arita MD  [facility]    Juan J Hanson is a 77 year old, presenting for the following health issues:  RECHECK (Blood pressure)      History of Present Illness       Hypertension: He presents for follow up of hypertension.  He does check blood pressure  regularly outside of the clinic. Outside blood pressures have been over 140/90. He does not follow a low salt diet.       He has now completed cardiac rehab.  Has no chest pain at all.  Was having some shortness of breath initially there, this is now resolved too.  One in a while he has some lightheadedness with standing up quickly.  Checking blood pressure daily, and running 98//90.  HR is 45-55 at rest.              Review of Systems         Objective    /86   Pulse 50   Temp 96.9  F (36.1  C) (Tympanic)   Resp 16   Wt 103 kg (227 lb)   SpO2 98%   BMI 34.01 kg/m    Body mass index is 34.01 kg/m .  Physical Exam  Cardiovascular:      Rate and Rhythm: Regular rhythm. Bradycardia present.      Heart sounds: No murmur heard.    No friction rub.   Pulmonary:      Effort: Pulmonary " effort is normal. No respiratory distress.      Breath sounds: No stridor.   Neurological:      General: No focal deficit present.      Mental Status: He is alert and oriented to person, place, and time.   Psychiatric:         Mood and Affect: Mood normal.         Behavior: Behavior normal.         Thought Content: Thought content normal.

## 2022-10-12 NOTE — NURSING NOTE
"Chief Complaint   Patient presents with     RECHECK     Blood pressure       Initial /86   Pulse 50   Temp 96.9  F (36.1  C) (Tympanic)   Resp 16   Wt 103 kg (227 lb)   SpO2 98%   BMI 34.01 kg/m   Estimated body mass index is 34.01 kg/m  as calculated from the following:    Height as of 9/27/22: 1.74 m (5' 8.5\").    Weight as of this encounter: 103 kg (227 lb).  Medication Reconciliation: complete    FOOD SECURITY SCREENING QUESTIONS  Hunger Vital Signs:  Within the past 12 months we worried whether our food would run out before we got money to buy more. Never  Within the past 12 months the food we bought just didn't last and we didn't have money to get more. Never  Corry Carias LPN 10/12/2022 9:17 AM    Do you have a healthcare directive? No        "

## 2022-10-17 NOTE — ANESTHESIA CARE TRANSFER NOTE
Patient: Arun Wren    Procedure(s):  COLONOSCOPY, WITH LESION EXCISION USING HOT BIOPSY DEVICE    Diagnosis: Encounter for screening colonoscopy [Z12.11]  Diagnosis Additional Information: No value filed.    Anesthesia Type:   MAC     Note:  Airway :Nasal Cannula  Patient transferred to:Phase II  Handoff Report: Identifed the Patient, Identified the Reponsible Provider, Reviewed the pertinent medical history, Discussed the surgical course, Reviewed Intra-OP anesthesia mangement and issues during anesthesia, Set expectations for post-procedure period and Allowed opportunity for questions and acknowledgement of understanding      Vitals: (Last set prior to Anesthesia Care Transfer)    CRNA VITALS  3/16/2020 0730 - 3/16/2020 0800      3/16/2020             Pulse:  53    Ht Rate:  53    SpO2:  95 %    Resp Rate (set):  10    EKG:  NSR                Electronically Signed By: DUSTIN Valenzuela CRNA  March 16, 2020  8:00 AM   It is highly recommended you follow up with your primary care physician within 4  weeks to discuss recent surgery/general checkup/possible medication adjustment.

## 2022-10-31 ENCOUNTER — HOSPITAL ENCOUNTER (OUTPATIENT)
Dept: ULTRASOUND IMAGING | Facility: OTHER | Age: 77
Discharge: HOME | End: 2022-10-31
Attending: NURSE PRACTITIONER | Admitting: NURSE PRACTITIONER
Payer: COMMERCIAL

## 2022-10-31 DIAGNOSIS — I73.9 PAD (PERIPHERAL ARTERY DISEASE) (H): ICD-10-CM

## 2022-10-31 PROCEDURE — 93922 UPR/L XTREMITY ART 2 LEVELS: CPT

## 2022-12-07 ENCOUNTER — HOSPITAL ENCOUNTER (OUTPATIENT)
Dept: ULTRASOUND IMAGING | Facility: OTHER | Age: 77
Discharge: HOME | End: 2022-12-07
Attending: INTERNAL MEDICINE
Payer: COMMERCIAL

## 2022-12-07 ENCOUNTER — HOSPITAL ENCOUNTER (OUTPATIENT)
Dept: CARDIOLOGY | Facility: OTHER | Age: 77
Discharge: HOME | End: 2022-12-07
Attending: INTERNAL MEDICINE
Payer: COMMERCIAL

## 2022-12-07 DIAGNOSIS — I71.40 ABDOMINAL AORTIC ANEURYSM (AAA) WITHOUT RUPTURE (H): ICD-10-CM

## 2022-12-07 DIAGNOSIS — I50.32 DIASTOLIC DYSFUNCTION WITH CHRONIC HEART FAILURE (H): ICD-10-CM

## 2022-12-07 DIAGNOSIS — I10 ESSENTIAL HYPERTENSION: ICD-10-CM

## 2022-12-07 DIAGNOSIS — I71.20 THORACIC AORTIC ANEURYSM WITHOUT RUPTURE (H): ICD-10-CM

## 2022-12-07 DIAGNOSIS — I65.23 BILATERAL CAROTID ARTERY STENOSIS: ICD-10-CM

## 2022-12-07 DIAGNOSIS — Z95.2 S/P AVR: ICD-10-CM

## 2022-12-07 LAB — LVEF ECHO: NORMAL

## 2022-12-07 PROCEDURE — 93880 EXTRACRANIAL BILAT STUDY: CPT

## 2022-12-07 PROCEDURE — 93306 TTE W/DOPPLER COMPLETE: CPT | Mod: 26 | Performed by: INTERNAL MEDICINE

## 2022-12-07 PROCEDURE — 255N000002 HC RX 255 OP 636: Performed by: INTERNAL MEDICINE

## 2022-12-07 PROCEDURE — 76706 US ABDL AORTA SCREEN AAA: CPT

## 2022-12-07 PROCEDURE — 999N000208 ECHOCARDIOGRAM COMPLETE

## 2022-12-07 RX ADMIN — PERFLUTREN 3 ML: 6.52 INJECTION, SUSPENSION INTRAVENOUS at 10:21

## 2022-12-13 ENCOUNTER — OFFICE VISIT (OUTPATIENT)
Dept: CARDIOLOGY | Facility: OTHER | Age: 77
End: 2022-12-13
Attending: INTERNAL MEDICINE
Payer: COMMERCIAL

## 2022-12-13 VITALS
WEIGHT: 232 LBS | SYSTOLIC BLOOD PRESSURE: 138 MMHG | HEIGHT: 69 IN | DIASTOLIC BLOOD PRESSURE: 86 MMHG | BODY MASS INDEX: 34.36 KG/M2 | RESPIRATION RATE: 16 BRPM | TEMPERATURE: 98 F | OXYGEN SATURATION: 96 % | HEART RATE: 48 BPM

## 2022-12-13 DIAGNOSIS — Z95.2 S/P AVR: Primary | ICD-10-CM

## 2022-12-13 DIAGNOSIS — I65.23 BILATERAL CAROTID ARTERY STENOSIS: ICD-10-CM

## 2022-12-13 DIAGNOSIS — E78.2 MIXED HYPERLIPIDEMIA: ICD-10-CM

## 2022-12-13 DIAGNOSIS — E66.01 MORBID OBESITY (H): ICD-10-CM

## 2022-12-13 DIAGNOSIS — Z95.1 HISTORY OF CORONARY ARTERY BYPASS GRAFT X 2: ICD-10-CM

## 2022-12-13 DIAGNOSIS — I50.32 DIASTOLIC DYSFUNCTION WITH CHRONIC HEART FAILURE (H): ICD-10-CM

## 2022-12-13 DIAGNOSIS — I25.10 CORONARY ARTERY DISEASE INVOLVING NATIVE CORONARY ARTERY OF NATIVE HEART WITHOUT ANGINA PECTORIS: ICD-10-CM

## 2022-12-13 DIAGNOSIS — Z98.890 STATUS POST CORONARY ANGIOGRAM: ICD-10-CM

## 2022-12-13 DIAGNOSIS — I35.8 AORTIC VALVE SCLEROSIS: ICD-10-CM

## 2022-12-13 DIAGNOSIS — I71.21 ANEURYSM OF ASCENDING AORTA WITHOUT RUPTURE (H): ICD-10-CM

## 2022-12-13 DIAGNOSIS — I10 BENIGN ESSENTIAL HYPERTENSION: ICD-10-CM

## 2022-12-13 DIAGNOSIS — I35.0 AORTIC STENOSIS, SEVERE: ICD-10-CM

## 2022-12-13 DIAGNOSIS — Z87.891 HISTORY OF TOBACCO ABUSE: ICD-10-CM

## 2022-12-13 DIAGNOSIS — I10 ESSENTIAL HYPERTENSION: ICD-10-CM

## 2022-12-13 DIAGNOSIS — E78.00 HYPERCHOLESTEROLEMIA: ICD-10-CM

## 2022-12-13 PROCEDURE — 99214 OFFICE O/P EST MOD 30 MIN: CPT | Performed by: INTERNAL MEDICINE

## 2022-12-13 PROCEDURE — G0463 HOSPITAL OUTPT CLINIC VISIT: HCPCS

## 2022-12-13 ASSESSMENT — PAIN SCALES - GENERAL: PAINLEVEL: NO PAIN (0)

## 2022-12-13 NOTE — NURSING NOTE
"Patient comes in for 3 month follow up after tests.  Marine Griffiths LPN ....................12/13/2022   10:46 AM  Chief Complaint   Patient presents with     Follow Up     3 month       Initial /86 (BP Location: Right arm, Patient Position: Sitting, Cuff Size: Adult Large)   Pulse (!) 48   Temp 98  F (36.7  C) (Temporal)   Resp 16   Ht 1.74 m (5' 8.5\")   Wt 105.2 kg (232 lb)   SpO2 96%   BMI 34.76 kg/m   Estimated body mass index is 34.76 kg/m  as calculated from the following:    Height as of this encounter: 1.74 m (5' 8.5\").    Weight as of this encounter: 105.2 kg (232 lb).  Meds Reconciled: complete  Pt is on Aspirin  Pt is on a Statin  PHQ and/or STEVENSON reviewed. Pt referred to PCP/MH Provider as appropriate.    Marine Griffiths LPN    FOOD SECURITY SCREENING QUESTIONS  Hunger Vital Signs:  Within the past 12 months we worried whether our food would run out before we got money to buy more. Never  Within the past 12 months the food we bought just didn't last and we didn't have money to get more. Never  Marine Griffiths LPN 12/13/2022 10:46 AM    "

## 2022-12-13 NOTE — PROGRESS NOTES
[facility] HEART [facility]   CARDIOLOGY PROGRESS NOTE     Chief Complaint   Patient presents with     Follow Up     3 month          Diagnosis:  1.  AS-severe, tissue SAVR on [facility]/31/2022, [facility]. 25 mm Merritt Inspiris Resilia bovine pericardial valve.  2.  CABG x2 on 5/31/2022, [facility]. rSVG to the ramus and PDA. LCX was small and not grafted.   3.  [facility] , 4/22/2022.  LM normal, LAD normal, second diag 30%, circumflex 95%, ramus 90%, and RCA 75%,  4.  A. Fib-postop on 6/2/22.  Coreg.  Previously on amiodarone for 14 days/Coumadin for 3 months per [place] discharge summary.  5.  Hypertension-uncontrolled.  6.  Hyperlipidemia-controlled.  7.  Syncope likely to bradycardia episodes.  8.  CHF-Diastolic dysfunction grade 2 on 2/15/2022.  9.  TAAA. 4.4 CM on 12/7/22.  4.2 cm on 2/15/2022.  10.  H/O tobacco abuse.  11.  Bilateral carotid artery stenosis less than 50% on 4/21/2022 and 12/7/22.  12.  Balance issues.    Assessment/Plan:    1.  Echocardiogram: Reviewed his echocardiogram.  EF normal at 55-60%.  Aortic valve replacement functioning well without issue.  He has a mild ascending aortic aneurysm.  Results reviewed.  2.  Reviewed ultrasound of carotid arteries: Less than 50% stenosis.  Findings discussed.  Suggested that down the road he have a repeat ultrasound of his carotid arteries.  3.  Ultrasound for AAA: Ultrasound was negative for AAA.  Findings discussed.  4.  CAD: History of bypass x2.  Having mild structural chest discomfort but no anginal symptoms.  Discussed importance of continuing medications indefinitely.  5.  SAVR: Discussed his valve replacement is functioning well on his echocardiogram.  He is aware if he has heart failure symptoms we may need to consider echocardiogram in the future.  He will only take an aspirin daily indefinitely and amoxicillin prior to dental procedures.  6.  Follow-up in 1 year or sooner with issues.        Interval history:  Gene is being seen in follow-up.   He was found to have severe aortic stenosis on 2/15/2022.  Referred to [facility] for valve replacement.  He underwent cardiac catheterization on 4/22/2022.  Found to have disease in the ramus, circumflex, and RCA.  CABG x2 involving the ramus and RCA but circumflex felt to be too small to be bypassed.  Patient has not had chest pain or chest tightness previously.  He was found to have postop atrial fibrillation.  He was continued on amiodarone for 14 days, now off amiodarone.  He has been on metoprolol with some bradycardia and dizziness.  He is also on Coumadin but discontinued on 8/23/2022 secondary to Zio patch being deficient of A. fib.  He understands he needs to take amoxicillin 2 g, 30 to 60 minutes prior to cleaning or dental work.  He is on aspirin 81 mg indefinitely.  Reviewed his echocardiogram as described with history of valve replacement.  No evidence of heart failure.  Identified as having a mild ascending aortic aneurysm at 4.4 cm.  Also, discussed the ultrasound results of carotids which showed at less than stent 50% stenosis.  Discussed the ultrasounds abdomen for AAA which was negative for AAA. He understands under no circumstances is he did take nitro if he is taken sildenafil within 24 hours.  He is to continue to check his blood pressures at home and bring this log with him.  He is not having any other issues or concerns.    HPI:    Mr. Wren is being seen by cardiology for severe aortic stenosis.  He was seen in the clinic on 1/19/2022.  Repeat echocardiogram completed on 2/15/2022.  He was found to have severe aortic stenosis.  He was referred to cardiology as a result.     He had an echo on 10/31/2018.  At that time, he was found to have mild to moderate aortic valve sclerosis with mild aortic stenosis.  Mean gradient was 18 mmHg.  Valve area was 1.5 cm .  Repeat echocardiogram on 2/15/2022.  Echocardiogram showed a mean gradient of 50 mmHg, peak velocity of 4.3 m/s, and DVI  of 0 0.26.  Aortic valve area 0.95 cm .  Ascending aorta was 4.2 cm.     We discussed the results of his echocardiogram.  He understands he has severe aortic stenosis.  We discussed TAVR versus SAVR.  He has denied dyspnea, swelling, chest pain, near-syncope, or syncope.     We had a long discussion using the heart model in the room as well as [employer].  We discussed TAVR to replace his aortic valve.  He understands will likely require 2 trips to the [facility].  He was explained that the first work-up may include a cardiac catheterization.  We will plan for an ultrasound of his carotids and abdomen for AAA here locally.  He is also aware that after valve replacement, he will need to take amoxicillin 2 g, 30 to 60 minutes prior to dental procedures.  He currently has an abscess tooth which he is on antibiotics.  He needs a tooth pulled.  He cannot get in for couple weeks.  He has an appointment.  He understands this will need to be taken care of prior to valve replacement.  Risks and benefits discussed.  Patient agreeable to going to the [facility].      Relevant testing:  Echocardiogram in 12/7/2022:  Global and regional left ventricular function is normal with an EF of 55-60%.  Global right ventricular function is normal.  Status post AVR with 25 mm Merritt Inspiris Resilia bovine pericardial valve on 5/31/2022 by history. The valve is well seated. There is no valvular or  paravalvular regurgitation. The mean pressure gradient is 8 mmHg (normal). The peak velocity is 2.0 m/s (normal).  Mild dilatation of the aorta is present. Ascending aorta 4.4 cm. Indexed ascending aorta is 2.0 cm/m^2  IVC diameter <2.1 cm collapsing >50% with sniff suggests a normal RA pressure of 3 mmHg.  No pericardial effusion is present.  This study was compared with the study from 6/4/2022. No significant changes noted.    US carotid arteries on 12/7/2022:  The caliber of the common carotid arteries is preserved. There is  less  than 50% atherosclerotic plaque at the carotid bifurcations.    US abdominal aorta on 12/7/2022:  No evidence of an abdominal aortic aneurysm.    BHARATI in 10/31/2022:  Right leg: Resting BHARATI is 1.23, which is normal.  Left leg: Resting BHARATI is 1.14, which is normal.    ZIO on 7/7/2022:  Worn for 11 days and 4 hr's.  After removing artifact, total time was 10 days and 8 hr's. Placed on 7/7/2022 at 11:28 AM and completed on 7/18/2022 at 2:58 PM.  Underlying rhythm was sinus.  Hrt rate ranged from 39 bpm, maximum heart rate of 136 bmp, averaging 54 bmp.  No significant pauses, Mobitz type II or 3rd degree heart block.  No atrial fibrillation on this study.  x1 triggered events and x0 diary entries.  These corresponded to sinus rhythm.  x0 runs of VT.    x6 runs of SVT lasting 5 mins and 16 sec's with a maximum heart rate of 136 bmp.  Rare, <1% of PAC's, atrial couplets, atrial triplets, and PVC's.  0 episodes of ventricular bigeminy.  0 episodes of ventricular trigeminy.    Zio patch in 6/8/2022:  Worn for 11 days and 4 hr's.  After removing artifact, total time was 10 days and 8 hr's. Placed on 7/7/2022 at 11:28 AM and completed on 7/18/2022 at 2:58 PM.  Underlying rhythm was sinus.  Hrt rate ranged from 39 bpm, maximum heart rate of 136 bmp, averaging 54 bmp.  No significant pauses, Mobitz type II or 3rd degree heart block.  No atrial fibrillation on this study.  x1 triggered events and x0 diary entries.  These corresponded to sinus rhythm.  x0 runs of VT.    x6 runs of SVT lasting 5 mins and 16 sec's with a maximum heart rate of 136 bmp.  Rare, <1% of PAC's, atrial couplets, atrial triplets, and PVC's.  0 episodes of ventricular bigeminy.  0 episodes of ventricular trigeminy.    Echo on 5/31/2022:  s/p CABG+AVR 25MM INSPIRIS RESILIA AORTIC VALVE.  AVR valve leaflets not well seen. Normal Doppler interrogation of the valve. Mean gradient is 5 mmHg. No AI.  Global and regional left ventricular function is normal  with an EF of 55-60%.  RV is not seen well. From limited views appear normal size and function.  This study was compared with the study from 2/15/2022.  Pateint is now s/p AVR for severe AS.    Echo on 2/15/2022:  Global and regional left ventricular function is normal with an EF of 55-60%.  Grade II or moderate diastolic dysfunction.  The right ventricle is normal in function and size.  Severe aortic stenosis is present (MG 50 mmHg, PV 4.3 m/s, DVI 0.26)  Ascending aorta is dilated measuring 4.2 cm  No pericardial effusion is present.  This study was compared with the study from 10/31/18 the AS has progressed and aorta is more dilated.     Echo on 10/31/2018:  EF 55-60%.   Thickening of the anterior basal septum present.  Diastolic function normal.  Atria are normal.  Mild aortic stenosis present.     US abdominal aorta on 9/18/2018:  No abdominal aortic aneurysm.        ICD-10-CM    1. S/P AVR on 5/31/2022 with 25 mm Merritt Inspiris Resilia bovine pericardial valve at the [facility]   Z95.2       2. Status post coronary angiogram on 4/22/2022 with multivessel disease, [facility] .  Z98.890       3. History of tobacco abuse  Z87.891       4. Morbid obesity (H)  E66.01       5. Mixed hyperlipidemia  E78.2       6. Hypercholesterolemia  E78.00       7. Aneurysm of ascending aorta without rupture  I71.21       8. H/O aortic stenosis, severe  I35.0       9. Essential hypertension  I10       10. Diastolic dysfunction with chronic heart failure (H)  I50.32       11. Coronary artery disease involving native coronary artery of native heart without angina pectoris  I25.10       12. Bilateral carotid artery stenosis  I65.23       13. Benign essential hypertension  I10       14. Aortic valve sclerosis  I35.8       15. History of coronary artery bypass graft x 2 on 5/31/2020 at the [facility]   Z95.1           Past Medical History:   Diagnosis Date     Actinic keratosis     05/19/09,Keratosis left external ear, treated with cryocautery,  recheck if recurs or persists     Allergic contact dermatitis due to plants, except food     recurrent     Basal cell carcinoma of skin     No Comments Provided     Carpal tunnel syndrome     right greater than left     Closed fracture of base of skull (H)     No Comments Provided     Essential (primary) hypertension     No Comments Provided     Ganglion     No Comments Provided     Hydrocele     No Comments Provided     Male erectile dysfunction     decreased libido     Personal history of nicotine dependence     No Comments Provided     Pure hypercholesterolemia     No Comments Provided     Residual hemorrhoidal skin tags     Past external hemorrhoid     Tinea unguium     No Comments Provided       Past Surgical History:   Procedure Laterality Date     BYPASS GRAFT ARTERY CORONARY, REPAIR VALVE AORTIC, COMBINED N/A 05/31/2022    Procedure: MEDIAN STERNOTOMY. CARDIOPULMONARY BYPASS. ENDOSCOPIC HARVEST OF LEFT GREATER SAPHENOUS VEIN. CORONARY ARTERY BYPASS GRAFT (CABG) X2 . AORTIC VALVE REPLACEMENT USING 25MM INSPIRIS RESILIA  AORTIC VALVE. TRANSESOPHAGEAL ECHOCARDIOGRAM (BREANNE) PER ANESTHESIA.;  Surgeon: Sai Benítez MD;  [facility]     BYPASS GRAFT ARTERY CORONARY, REPAIR VALVE AORTIC, COMBINED  05/31/2022    Procedure: ;  Surgeon: Sai Benítez MD;  Location: [facility] OR     COLONOSCOPY  01/01/1998 1998,at [facility], which was normal     COLONOSCOPY  03/03/2005 03/03/05,at [facility]     COLONOSCOPY  03/02/2015    3/2/15,F/U 2020  tubular adnomas     COLONOSCOPY  03/16/2020    F/U N/A hyperplastic     CV CORONARY ANGIOGRAM N/A 04/22/2022    Procedure: Coronary Angiogram with possible intervention;  Surgeon: Saulo Mccarthy MD;  Location:  HEART CARDIAC CATH LAB     CV RIGHT HEART CATH MEASUREMENTS RECORDED N/A 04/22/2022    Procedure: Right Heart Cath;  Surgeon: Saulo Mccarthy MD;  Location:  HEART CARDIAC CATH LAB     OTHER SURGICAL HISTORY      ,HERNIA REPAIR      OTHER SURGICAL HISTORY      05/24/16,[id number],OTHER,Left,Dr. Jeremiah GIBSON     OTHER SURGICAL HISTORY      07/28/16,[id number],ORCHIECTOMY SIMPLE / PARTIAL / RADICAL W/ SCROTAL / INGUINAL APPROACH,Left,Dr. Jeremiah GIBSON     PICC DOUBLE LUMEN PLACEMENT Right 06/06/2022    on Right Basilic, cut 43 cm, needs X-ray to verify tip due to A-fib       No Known Allergies    Current Outpatient Medications   Medication Sig Dispense Refill     acetaminophen (TYLENOL) 325 MG tablet Take 2 tablets (650 mg) by mouth every 6 hours as needed for other (For optimal non-opioid multimodal pain management to improve pain control.) 50 tablet 0     amoxicillin (AMOXIL) 500 MG capsule Take 2 g / 4 tablets 30 to 60 minutes prior to dental procedures/dental cleanings 4 capsule 3     aspirin (ASA) 81 MG chewable tablet Take 1 tablet (81 mg) by mouth daily 90 tablet 3     carvedilol (COREG) 6.25 MG tablet Take 1 tablet (6.25 mg) by mouth 2 times daily (with meals) 180 tablet 3     hydrochlorothiazide (HYDRODIURIL) 25 MG tablet Take 1 tablet (25 mg) by mouth daily 90 tablet 3     lisinopril (ZESTRIL) 40 MG tablet Take 1 tablet (40 mg) by mouth daily 90 tablet 4     nitroGLYcerin (NITROSTAT) 0.4 MG sublingual tablet For chest pain place 1 tablet under the tongue every 5 minutes for 3 doses. If symptoms persist 5 minutes after 1st dose call 911. 25 tablet 3     rosuvastatin (CRESTOR) 40 MG tablet Take 1 tablet (40 mg) by mouth daily 90 tablet 4     senna-docusate (SENOKOT-S/PERICOLACE) 8.6-50 MG tablet Take 1 to 2 tablets by mouth daily. 180 tablet 3     sildenafil (REVATIO) 20 MG tablet TAKE 4-5 TABLETS BY MOUTH ONCE DAILY IF NEEDED FOR OTHER (SPECIFY).TAKE 30 MINUTES BEFORE SEXUAL ACTIVITY. 80 tablet 1       Social History     Socioeconomic History     Marital status:      Spouse name: Not on file     Number of children: Not on file     Years of education: Not on file     Highest education level: Not on file   Occupational History     Not on  file   Tobacco Use     Smoking status: Former     Years: .00     Types: Cigarettes     Quit date: 1988     Years since quittin.9     Smokeless tobacco: Never   Vaping Use     Vaping Use: Never used   Substance and Sexual Activity     Alcohol use: Yes     Comment: Alcoholic Drinks/day: 1-2 drinks per day, whiskey     Drug use: No     Sexual activity: Not Currently   Other Topics Concern     Parent/sibling w/ CABG, MI or angioplasty before 65F 55M? Not Asked   Social History Narrative    Quit smoking .  Occ alcohol.  .   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    Stays active, owns tree farm.     Social Determinants of Health     Financial Resource Strain: Not on file   Food Insecurity: Not on file   Transportation Needs: Not on file   Physical Activity: Not on file   Stress: Not on file   Social Connections: Not on file   Intimate Partner Violence: Not on file   Housing Stability: Not on file       LAB RESULTS:   Anticoagulation Therapy Visit on 2022   Component Date Value Ref Range Status     INR Point of Care 2022 3.6 (A) 0.9 - 1.1 Final   Anticoagulation Therapy Visit on 2022   Component Date Value Ref Range Status     INR Point of Care 2022 2.9 (A) 0.9 - 1.1 Final   Anticoagulation Therapy Visit on 2022   Component Date Value Ref Range Status     INR Point of Care 2022 2.7 (A) 0.9 - 1.1 Final   Anticoagulation Therapy Visit on 2022   Component Date Value Ref Range Status     INR Point of Care 2022 3.3 (A) 0.9 - 1.1 Final   Allied Health/Nurse Visit on 2022   Component Date Value Ref Range Status     SARS CoV2 PCR 2022 Negative  Negative Final   Lab on 2022   Component Date Value Ref Range Status     WBC Count 2022 7.7  4.0 - 11.0 10e3/uL Final     RBC Count 2022 4.75  4.40 - 5.90 10e6/uL Final     Hemoglobin 2022 15.2  13.3 - 17.7 g/dL Final     Hematocrit 2022 46.5  40.0 - 53.0 %  "Final     MCV 05/18/2022 98  78 - 100 fL Final     MCH 05/18/2022 32.0  26.5 - 33.0 pg Final     MCHC 05/18/2022 32.7  31.5 - 36.5 g/dL Final     RDW 05/18/2022 13.2  10.0 - 15.0 % Final     Platelet Count 05/18/2022 185  150 - 450 10e3/uL Final     Sodium 05/18/2022 138  133 - 144 mmol/L Final     Potassium 05/18/2022 4.2  3.4 - 5.3 mmol/L Final     Chloride 05/18/2022 104  94 - 109 mmol/L Final     Carbon Dioxide (CO2) 05/18/2022 30  20 - 32 mmol/L Final     Anion Gap 05/18/2022 4  3 - 14 mmol/L Final     Urea Nitrogen 05/18/2022 21  7 - 30 mg/dL Final     Creatinine 05/18/2022 0.99  0.66 - 1.25 mg/dL Final     Calcium 05/18/2022 9.0  8.5 - 10.1 mg/dL Final     Glucose 05/18/2022 97  70 - 99 mg/dL Final     Alkaline Phosphatase 05/18/2022 47  40 - 150 U/L Final     AST 05/18/2022 32  0 - 45 U/L Final     ALT 05/18/2022 36  0 - 70 U/L Final     Protein Total 05/18/2022 7.6  6.8 - 8.8 g/dL Final     Albumin 05/18/2022 3.8  3.4 - 5.0 g/dL Final     Bilirubin Total 05/18/2022 0.5  0.2 - 1.3 mg/dL Final     GFR Estimate 05/18/2022 79  >60 mL/min/1.73m2 Final     ABO/RH(D) 05/18/2022 A POS   Final     Antibody Screen 05/18/2022 Negative  Negative Final     SPECIMEN EXPIRATION DATE 05/18/2022 [id number]   Final   Orders Only on 04/29/2022   Component Date Value Ref Range Status     Prealbumin 05/18/2022 22  15 - 45 mg/dL Final     Magnesium 05/18/2022 2.4 (A) 1.6 - 2.3 mg/dL Final        Review of systems: Negative except that which was noted in the HPI.    Physical examination:  /86 (BP Location: Right arm, Patient Position: Sitting, Cuff Size: Adult Large)   Pulse (!) 48   Temp 98  F (36.7  C) (Temporal)   Resp 16   Ht 1.74 m (5' 8.5\")   Wt 105.2 kg (232 lb)   SpO2 96%   BMI 34.76 kg/m      GENERAL APPEARANCE: healthy, alert and no distress  HEENT: no icterus, no xanthelasmas, normal pupil size and reaction, no cyanosis.  NECK: no adenopathy, no asymmetry, masses.  CHEST: lungs clear to auscultation - " no rales, rhonchi or wheezes, no use of accessory muscles, no retractions, respirations are unlabored, normal respiratory rate  CARDIOVASCULAR: regular rhythm, normal S1 with physiologic split S2, no S3 or S4 and no murmur, click or rub  EXTREMITIES: no clubbing, cyanosis or edema  NEURO: alert and oriented normal speech, and affect  VASC: No vascular bruits heard.  SKIN: no ecchymoses, no rashes    Total time spent on day of visit, including review of tests, obtaining/reviewing separately obtained history, ordering medications/tests/procedures, communicating with PCP/consultants, and documenting in electronic medical record: 60 minutes.           Thank you for allowing me to participate in the care of your patient. Please do not hesitate to contact me if you have any questions.     Jann Ulloa, DO        [facility] HEART [facility]   CARDIOLOGY PROGRESS NOTE     Chief Complaint   Patient presents with     Follow Up     3 month          Diagnosis:  1.  Severe AS, tissue AVR on 5/31/2022 at the [facility]-open. 25 mm Merritt Inspiris Resilia bovine pericardial valve.  2.  CABG x2 on 5/31/2022 at [facility]. rSVG to the ramus and PDA. LCX was small and not grafted.   3.  Cardiac cath at the [facility], 4/22/2022.  LM normal, LAD normal, second diag 30%, circumflex 95%, ramus 90%, and RCA 75%,  4.  Postop A. Fib on 6/2/22.  Amnio, metoprolol, and Coumadin.  5.  Hypertension-uncontrolled.  6.  Hyperlipidemia-controlled.  7.  Syncope likely to bradycardia episodes.  8.  CHF-Diastolic dysfunction grade 2 on 2/15/2022.  9.  BREANNE AAA 4.2 cm on 2/15/2022.  10.  H/O tobacco abuse.  11.  Bilateral carotid artery stenosis less than 50% on 4/21/2022.    Assessment/Plan:    1.  Severe AS: Status post aortic valve replacement on 5/31/2022 at the [facility].  Patient had a bovine pericardial valve replacement.  Aspirin 81 mg indefinitely.  Will need amoxicillin 2 g prior to dental procedures.  Patient currently participating in cardiac rehab.  2.  CAD:  Had CABG x2 at the [facility].   SVG to the ramus and PDA.  Circumflex was too small to be grafted as it was less than 1.5 mm.  Continue aspirin 81 mg daily, metoprolol 25 mg XL daily, sublingual nitro as needed for chest pain, and rosuvastatin 40 mg daily.  3.  Postop A. fib: Currently on Coumadin and metoprolol.  Was on amiodarone for 14 days.  We will plan for Zio patch.  Zio patch unrevealing related to A. fib, plan to discontinue Coumadin.  If found to have A. fib on Zio patch.  We will continue on Coumadin.  4.  TAAA: At 4.2 cm on 2/15/2022.  We will plan for repeat echocardiogram in 1 year.  Results explained.  5.  Carotid artery stenosis.  Less than 50% on 4/21/2022.  We will plan for an ultrasound carotid arteries in 1 year.  6.  Hypertension: Uncontrolled.  Blood pressure today 182/90.  Increase lisinopril from 2.5 mg daily to 10 mg daily.  7.  ED: Has sildenafil/Viagra.  Warned patient if taken sildenafil under no circumstances it is easier to take nitro within 24 hours of taking sildenafil/Viagra.  This will kill him.  8.  Follow-up after completion of Zio patch to determine if should continue Coumadin or not.        Interval history:  Gene is being seen in follow-up.  He was found to have severe aortic stenosis on 2/15/2022.  Referred to [facility] for valve replacement.  He underwent cardiac catheterization on 4/22/2022.  Found to have disease in the ramus, circumflex, and RCA.  CABG x2 involving the ramus and RCA but circumflex felt to be too small to be bypassed.  Patient has not had chest pain or chest tightness previously.  He is found to have postop atrial fibrillation.  He was continued on amiodarone for 14 days, now off amiodarone.  He has been on metoprolol with some bradycardia.  He is also on Coumadin.  We will plan for Zio patch.  If Zio patch deficient of A. fib, we will discontinue Coumadin at that time.  He understands he needs to take amoxicillin 2 g, 30 to 60 minutes prior to  cleaning or dental work.  He is on aspirin 81 mg indefinitely.  We will plan for an echocardiogram in 1 year related to ascending aortic aneurysm.  We will plan for an ultrasound of his carotids in 1 year related to bilateral carotid artery disease.  He understands under no circumstances is he did take nitro if he is taken sildenafil within 24 hours.  Follow-up will be after testing.      HPI:    Mr. Wren is being seen by cardiology for severe aortic stenosis.  He was seen in the clinic on 1/19/2022.  Repeat echocardiogram completed on 2/15/2022.  He was found to have severe aortic stenosis.  He was referred to cardiology as a result.     He had an echo on 10/31/2018.  At that time, he was found to have mild to moderate aortic valve sclerosis with mild aortic stenosis.  Mean gradient was 18 mmHg.  Valve area was 1.5 cm .  Repeat echocardiogram on 2/15/2022.  Echocardiogram showed a mean gradient of 50 mmHg, peak velocity of 4.3 m/s, and DVI of 0 0.26.  Aortic valve area 0.95 cm .  Ascending aorta was 4.2 cm.     We discussed the results of his echocardiogram.  He understands he has severe aortic stenosis.  We discussed TAVR versus SAVR.  He has denied dyspnea, swelling, chest pain, near-syncope, or syncope.     We had a long discussion using the heart model in the room as well as [employer].  We discussed TAVR to replace his aortic valve.  He understands will likely require 2 trips to the [facility].  He was explained that the first work-up may include a cardiac catheterization.  We will plan for an ultrasound of his carotids and abdomen for AAA here locally.  He is also aware that after valve replacement, he will need to take amoxicillin 2 g, 30 to 60 minutes prior to dental procedures.  He currently has an abscess tooth which he is on antibiotics.  He needs a tooth pulled.  He cannot get in for couple weeks.  He has an appointment.  He understands this will need to be taken care of prior to valve replacement.   Risks and benefits discussed.  Patient agreeable to going to the [facility].      Relevant testing:  Zio patch in 6/8/2022:  Pending data     Echo on 5/31/2022:  s/p CABG+AVR 25MM INSPIRIS RESILIA AORTIC VALVE.  AVR valve leaflets not well seen. Normal Doppler interrogation of the valve. Mean gradient is 5 mmHg. No AI.  Global and regional left ventricular function is normal with an EF of 55-60%.  RV is not seen well. From limited views appear normal size and function.  This study was compared with the study from 2/15/2022.  Pateint is now s/p AVR for severe AS.    Echo on 2/15/2022:  Global and regional left ventricular function is normal with an EF of 55-60%.  Grade II or moderate diastolic dysfunction.  The right ventricle is normal in function and size.  Severe aortic stenosis is present (MG 50 mmHg, PV 4.3 m/s, DVI 0.26)  Ascending aorta is dilated measuring 4.2 cm  No pericardial effusion is present.  This study was compared with the study from 10/31/18 the AS has progressed and aorta is more dilated.     Echo on 10/31/2018:  EF 55-60%.   Thickening of the anterior basal septum present.  Diastolic function normal.  Atria are normal.  Mild aortic stenosis present.     US abdominal aorta on 9/18/2018:  No abdominal aortic aneurysm.        ICD-10-CM    1. S/P AVR on 5/31/2022 with 25 mm [place] Inspiris Resilia bovine pericardial valve at the [facility]  Z95.2       2. Status post coronary angiogram on 4/22/2022 with multivessel disease, [facility].  Z98.890       3. History of tobacco abuse  Z87.891       4. Morbid obesity (H)  E66.01       5. Mixed hyperlipidemia  E78.2       6. Hypercholesterolemia  E78.00       7. Aneurysm of ascending aorta without rupture  I71.21       8. H/O aortic stenosis, severe  I35.0       9. Essential hypertension  I10       10. Diastolic dysfunction with chronic heart failure (H)  I50.32       11. Coronary artery disease involving native coronary artery of native heart without  angina pectoris  I25.10       12. Bilateral carotid artery stenosis  I65.23       13. Benign essential hypertension  I10       14. Aortic valve sclerosis  I35.8       15. History of coronary artery bypass graft x 2 on 5/31/2020 at the [facility]  Z95.1           Past Medical History:   Diagnosis Date     Actinic keratosis     05/19/09,Keratosis left external ear, treated with cryocautery, recheck if recurs or persists     Allergic contact dermatitis due to plants, except food     recurrent     Basal cell carcinoma of skin     No Comments Provided     Carpal tunnel syndrome     right greater than left     Closed fracture of base of skull (H)     No Comments Provided     Essential (primary) hypertension     No Comments Provided     Ganglion     No Comments Provided     Hydrocele     No Comments Provided     Male erectile dysfunction     decreased libido     Personal history of nicotine dependence     No Comments Provided     Pure hypercholesterolemia     No Comments Provided     Residual hemorrhoidal skin tags     Past external hemorrhoid     Tinea unguium     No Comments Provided       Past Surgical History:   Procedure Laterality Date     BYPASS GRAFT ARTERY CORONARY, REPAIR VALVE AORTIC, COMBINED N/A 05/31/2022    Procedure: MEDIAN STERNOTOMY. CARDIOPULMONARY BYPASS. ENDOSCOPIC HARVEST OF LEFT GREATER SAPHENOUS VEIN. CORONARY ARTERY BYPASS GRAFT (CABG) X2 . AORTIC VALVE REPLACEMENT USING 25MM INSPIRIS RESILIA  AORTIC VALVE. TRANSESOPHAGEAL ECHOCARDIOGRAM (BREANNE) PER ANESTHESIA.;  Surgeon: Sai Benítez MD;  Location:  OR     BYPASS GRAFT ARTERY CORONARY, REPAIR VALVE AORTIC, COMBINED  05/31/2022    Procedure: ;  Surgeon: Sai Benítez MD;  Location:  OR     COLONOSCOPY  01/01/1998 1998,at [facility], which was normal     COLONOSCOPY  03/03/2005 03/03/05,at [facility]     COLONOSCOPY  03/02/2015    3/2/15,F/U 2020  tubular adnomas     COLONOSCOPY  03/16/2020    F/U N/A hyperplastic      CV CORONARY ANGIOGRAM N/A 04/22/2022    Procedure: Coronary Angiogram with possible intervention;  Surgeon: Saulo Mccarthy MD;  [facility]     CV RIGHT HEART CATH MEASUREMENTS RECORDED N/A 04/22/2022    Procedure: Right Heart Cath;  Surgeon: Saulo Mccarthy MD;  Location:  HEART CARDIAC CATH LAB     OTHER SURGICAL HISTORY      ,HERNIA REPAIR     OTHER SURGICAL HISTORY      05/24/16,[id number],OTHER,Left,Dr. Jeremiah GIBSON     OTHER SURGICAL HISTORY      07/28/16,[id number],ORCHIECTOMY SIMPLE / PARTIAL / RADICAL W/ SCROTAL / INGUINAL APPROACH,Left,Dr. Jeremiah GIBSON     PICC DOUBLE LUMEN PLACEMENT Right 06/06/2022    on Right Basilic, cut 43 cm, needs X-ray to verify tip due to A-fib       No Known Allergies    Current Outpatient Medications   Medication Sig Dispense Refill     acetaminophen (TYLENOL) 325 MG tablet Take 2 tablets (650 mg) by mouth every 6 hours as needed for other (For optimal non-opioid multimodal pain management to improve pain control.) 50 tablet 0     amoxicillin (AMOXIL) 500 MG capsule Take 2 g / 4 tablets 30 to 60 minutes prior to dental procedures/dental cleanings 4 capsule 3     aspirin (ASA) 81 MG chewable tablet Take 1 tablet (81 mg) by mouth daily 90 tablet 3     carvedilol (COREG) 6.25 MG tablet Take 1 tablet (6.25 mg) by mouth 2 times daily (with meals) 180 tablet 3     hydrochlorothiazide (HYDRODIURIL) 25 MG tablet Take 1 tablet (25 mg) by mouth daily 90 tablet 3     lisinopril (ZESTRIL) 40 MG tablet Take 1 tablet (40 mg) by mouth daily 90 tablet 4     nitroGLYcerin (NITROSTAT) 0.4 MG sublingual tablet For chest pain place 1 tablet under the tongue every 5 minutes for 3 doses. If symptoms persist 5 minutes after 1st dose call 911. 25 tablet 3     rosuvastatin (CRESTOR) 40 MG tablet Take 1 tablet (40 mg) by mouth daily 90 tablet 4     senna-docusate (SENOKOT-S/PERICOLACE) 8.6-50 MG tablet Take 1 to 2 tablets by mouth daily. 180 tablet 3     sildenafil (REVATIO)  20 MG tablet TAKE 4-5 TABLETS BY MOUTH ONCE DAILY IF NEEDED FOR OTHER (SPECIFY).TAKE 30 MINUTES BEFORE SEXUAL ACTIVITY. 80 tablet 1       Social History     Socioeconomic History     Marital status:      Spouse name: Not on file     Number of children: Not on file     Years of education: Not on file     Highest education level: Not on file   Occupational History     Not on file   Tobacco Use     Smoking status: Former     Years: 27.00     Types: Cigarettes     Quit date: 1988     Years since quittin.9     Smokeless tobacco: Never   Vaping Use     Vaping Use: Never used   Substance and Sexual Activity     Alcohol use: Yes     Comment: Alcoholic Drinks/day: 1-2 drinks per day, whiskey     Drug use: No     Sexual activity: Not Currently   Other Topics Concern     Parent/sibling w/ CABG, MI or angioplasty before 65F 55M? Not Asked   Social History Narrative    Quit smoking .  Occ alcohol.  .   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    Stays active, owns tree farm.     Social Determinants of Health     Financial Resource Strain: Not on file   Food Insecurity: Not on file   Transportation Needs: Not on file   Physical Activity: Not on file   Stress: Not on file   Social Connections: Not on file   Intimate Partner Violence: Not on file   Housing Stability: Not on file       LAB RESULTS:   Anticoagulation Therapy Visit on 2022   Component Date Value Ref Range Status     INR Point of Care 2022 3.6 (A) 0.9 - 1.1 Final   Anticoagulation Therapy Visit on 2022   Component Date Value Ref Range Status     INR Point of Care 2022 2.9 (A) 0.9 - 1.1 Final   Anticoagulation Therapy Visit on 2022   Component Date Value Ref Range Status     INR Point of Care 2022 2.7 (A) 0.9 - 1.1 Final   Anticoagulation Therapy Visit on 2022   Component Date Value Ref Range Status     INR Point of Care 2022 3.3 (A) 0.9 - 1.1 Final   Allied Health/Nurse Visit  on 05/27/2022   Component Date Value Ref Range Status     SARS CoV2 PCR 05/27/2022 Negative  Negative Final   Lab on 05/18/2022   Component Date Value Ref Range Status     WBC Count 05/18/2022 7.7  4.0 - 11.0 10e3/uL Final     RBC Count 05/18/2022 4.75  4.40 - 5.90 10e6/uL Final     Hemoglobin 05/18/2022 15.2  13.3 - 17.7 g/dL Final     Hematocrit 05/18/2022 46.5  40.0 - 53.0 % Final     MCV 05/18/2022 98  78 - 100 fL Final     MCH 05/18/2022 32.0  26.5 - 33.0 pg Final     MCHC 05/18/2022 32.7  31.5 - 36.5 g/dL Final     RDW 05/18/2022 13.2  10.0 - 15.0 % Final     Platelet Count 05/18/2022 185  150 - 450 10e3/uL Final     Sodium 05/18/2022 138  133 - 144 mmol/L Final     Potassium 05/18/2022 4.2  3.4 - 5.3 mmol/L Final     Chloride 05/18/2022 104  94 - 109 mmol/L Final     Carbon Dioxide (CO2) 05/18/2022 30  20 - 32 mmol/L Final     Anion Gap 05/18/2022 4  3 - 14 mmol/L Final     Urea Nitrogen 05/18/2022 21  7 - 30 mg/dL Final     Creatinine 05/18/2022 0.99  0.66 - 1.25 mg/dL Final     Calcium 05/18/2022 9.0  8.5 - 10.1 mg/dL Final     Glucose 05/18/2022 97  70 - 99 mg/dL Final     Alkaline Phosphatase 05/18/2022 47  40 - 150 U/L Final     AST 05/18/2022 32  0 - 45 U/L Final     ALT 05/18/2022 36  0 - 70 U/L Final     Protein Total 05/18/2022 7.6  6.8 - 8.8 g/dL Final     Albumin 05/18/2022 3.8  3.4 - 5.0 g/dL Final     Bilirubin Total 05/18/2022 0.5  0.2 - 1.3 mg/dL Final     GFR Estimate 05/18/2022 79  >60 mL/min/1.73m2 Final     ABO/RH(D) 05/18/2022 A POS   Final     Antibody Screen 05/18/2022 Negative  Negative Final     SPECIMEN EXPIRATION DATE 05/18/2022 [id number]   Final   Orders Only on 04/29/2022   Component Date Value Ref Range Status     Prealbumin 05/18/2022 22  15 - 45 mg/dL Final     Magnesium 05/18/2022 2.4 (A) 1.6 - 2.3 mg/dL Final        Review of systems: Negative except that which was noted in the HPI.    Physical examination:  /86 (BP Location: Right arm, Patient Position: Sitting,  "Cuff Size: Adult Large)   Pulse (!) 48   Temp 98  F (36.7  C) (Temporal)   Resp 16   Ht 1.74 m (5' 8.5\")   Wt 105.2 kg (232 lb)   SpO2 96%   BMI 34.76 kg/m      GENERAL APPEARANCE: healthy, alert and no distress  HEENT: no icterus, no xanthelasmas, normal pupil size and reaction, no cyanosis.  NECK: no adenopathy, no asymmetry, masses.  CHEST: lungs clear to auscultation - no rales, rhonchi or wheezes, no use of accessory muscles, no retractions, respirations are unlabored, normal respiratory rate  CARDIOVASCULAR: regular rhythm, normal S1 with physiologic split S2, no S3 or S4 and no murmur, click or rub  EXTREMITIES: no clubbing, cyanosis or edema  NEURO: alert and oriented normal speech, and affect  VASC: No vascular bruits heard.  SKIN: no ecchymoses, no rashes    Total time spent on day of visit, including review of tests, obtaining/reviewing separately obtained history, ordering medications/tests/procedures, communicating with PCP/consultants, and documenting in electronic medical record: 30 minutes.           Thank you for allowing me to participate in the care of your patient. Please do not hesitate to contact me if you have any questions.     Jann Ulloa, DO          "

## 2023-01-12 ENCOUNTER — OFFICE VISIT (OUTPATIENT)
Dept: FAMILY MEDICINE | Facility: OTHER | Age: 78
End: 2023-01-12
Attending: FAMILY MEDICINE
Payer: COMMERCIAL

## 2023-01-12 VITALS
WEIGHT: 233.6 LBS | HEART RATE: 62 BPM | RESPIRATION RATE: 15 BRPM | OXYGEN SATURATION: 98 % | TEMPERATURE: 97 F | DIASTOLIC BLOOD PRESSURE: 86 MMHG | SYSTOLIC BLOOD PRESSURE: 138 MMHG | BODY MASS INDEX: 35 KG/M2

## 2023-01-12 DIAGNOSIS — I25.810 CORONARY ARTERY DISEASE INVOLVING AUTOLOGOUS ARTERY CORONARY BYPASS GRAFT WITHOUT ANGINA PECTORIS: ICD-10-CM

## 2023-01-12 DIAGNOSIS — I73.9 PAD (PERIPHERAL ARTERY DISEASE) (H): ICD-10-CM

## 2023-01-12 DIAGNOSIS — I50.32 DIASTOLIC DYSFUNCTION WITH CHRONIC HEART FAILURE (H): ICD-10-CM

## 2023-01-12 DIAGNOSIS — E66.01 MORBID OBESITY (H): ICD-10-CM

## 2023-01-12 PROCEDURE — 99213 OFFICE O/P EST LOW 20 MIN: CPT | Performed by: FAMILY MEDICINE

## 2023-01-12 PROCEDURE — G0463 HOSPITAL OUTPT CLINIC VISIT: HCPCS

## 2023-01-12 RX ORDER — LISINOPRIL 40 MG/1
40 TABLET ORAL AT BEDTIME
Qty: 90 TABLET | Refills: 4 | COMMUNITY
Start: 2023-01-12 | End: 2023-04-12

## 2023-01-12 ASSESSMENT — ENCOUNTER SYMPTOMS
COLOR CHANGE: 0
SINUS PAIN: 0
ABDOMINAL PAIN: 0
WHEEZING: 0
DIARRHEA: 0
HEMATOCHEZIA: 0
HALLUCINATIONS: 0
HYPERACTIVE: 0
DECREASED CONCENTRATION: 0
NERVOUS/ANXIOUS: 0
SHORTNESS OF BREATH: 0
FATIGUE: 0
ABDOMINAL DISTENTION: 0
AGITATION: 0
ANAL BLEEDING: 0
FACIAL ASYMMETRY: 0
SINUS PRESSURE: 0
LIGHT-HEADEDNESS: 1
WOUND: 0
SEIZURES: 0
DIFFICULTY URINATING: 0
CONSTIPATION: 1
COUGH: 0
HEADACHES: 0
CHEST TIGHTNESS: 0
EYE DISCHARGE: 0
CHOKING: 0
APNEA: 0
DYSURIA: 0
DYSPHORIC MOOD: 0
DIZZINESS: 0
STRIDOR: 0
ARTHRALGIAS: 0
FEVER: 0
APPETITE CHANGE: 0
ACTIVITY CHANGE: 0
BACK PAIN: 0
CONFUSION: 0
SLEEP DISTURBANCE: 0
PALPITATIONS: 0
EYE ITCHING: 0
JOINT SWELLING: 0
ENDOCRINE NEGATIVE: 1
DIAPHORESIS: 0

## 2023-01-12 ASSESSMENT — PAIN SCALES - GENERAL: PAINLEVEL: NO PAIN (0)

## 2023-01-12 NOTE — PROGRESS NOTES
"  Assessment & Plan     Diastolic dysfunction with chronic heart failure (H)  Patient comfortable with current medication regimen. Blood pressure rarely gets up to 200/100. We talked about goals of care with respect to continuing his medications. Patient is noticing some dizziness with standing up too quickly. We discussed switching his lisinopril to q HS.     PAD (peripheral artery disease) (H)  Patient has good sensation in his feet and hands. He has normal capillary refill his feet and hands.     Morbid obesity (H)  Stable.     Coronary artery disease involving autologous artery coronary bypass graft without angina pectoris  Patient declined depressed mood. He hopes to go out ice [employer] this year but is worried about his occasional dizziness. He will switch some medications from AM to PM to see whether this relieves light-headedness symptoms.   - lisinopril (ZESTRIL) 40 MG tablet; Take 1 tablet (40 mg) by mouth At Bedtime             BMI:   Estimated body mass index is 35 kg/m  as calculated from the following:    Height as of 12/13/22: 1.74 m (5' 8.5\").    Weight as of this encounter: 106 kg (233 lb 9.6 oz).           Return in about 3 months (around 4/12/2023) for Routine preventive.    Patient was seen and examined by me as well as Gaston Carpio, MS3    Richard Arita MD  [facility] \[facility]\""    Subjective   Gene is a 77 year old, presenting for the following health issues:  RECHECK (Heart condition)    Patient is wondering about how much longer he will need the new medications prescribed after his heart procedure. He notices that his blood pressure is rarely up to near 200/100 in the morning. He has noticed that he is newly constipated after starting these new mediations. Patient is interested in starting Miralax to help with his constipation.      HPI           Review of Systems   Constitutional: Negative for activity change, appetite change, diaphoresis, fatigue and fever.   HENT: Negative for " congestion, sinus pressure and sinus pain.    Eyes: Negative for discharge and itching.   Respiratory: Negative for apnea, cough, choking, chest tightness, shortness of breath, wheezing and stridor.    Cardiovascular: Negative for chest pain, palpitations and peripheral edema.   Gastrointestinal: Positive for constipation. Negative for abdominal distention, abdominal pain, anal bleeding, diarrhea and hematochezia.   Endocrine: Negative.    Genitourinary: Negative for difficulty urinating and dysuria.   Musculoskeletal: Negative for arthralgias, back pain, gait problem and joint swelling.   Skin: Negative for color change, pallor, rash and wound.   Neurological: Positive for light-headedness. Negative for dizziness, seizures, facial asymmetry and headaches.        Light-headedness with standing occasionally.    Psychiatric/Behavioral: Negative for agitation, behavioral problems, confusion, decreased concentration, dysphoric mood, hallucinations, mood changes, self-injury, sleep disturbance and suicidal ideas. The patient is not nervous/anxious and is not hyperactive.             Objective    /86   Pulse 62   Temp 97  F (36.1  C) (Tympanic)   Resp 15   Wt 106 kg (233 lb 9.6 oz)   SpO2 98%   BMI 35.00 kg/m    Body mass index is 35 kg/m .  Physical Exam  Constitutional:       Appearance: Normal appearance.   HENT:      Head: Normocephalic and atraumatic.      Right Ear: Tympanic membrane normal.      Left Ear: Tympanic membrane normal.      Nose: Nose normal.   Eyes:      Extraocular Movements: Extraocular movements intact.      Conjunctiva/sclera: Conjunctivae normal.      Pupils: Pupils are equal, round, and reactive to light.   Cardiovascular:      Rate and Rhythm: Normal rate and regular rhythm.      Pulses: Normal pulses.      Heart sounds: Normal heart sounds.   Pulmonary:      Effort: Pulmonary effort is normal.      Breath sounds: Normal breath sounds.   Abdominal:      General: Abdomen is flat.       Palpations: Abdomen is soft.   Musculoskeletal:         General: Normal range of motion.      Cervical back: Normal range of motion and neck supple.   Skin:     General: Skin is warm and dry.   Neurological:      General: No focal deficit present.      Mental Status: He is alert and oriented to person, place, and time. Mental status is at baseline.   Psychiatric:         Mood and Affect: Mood normal.         Behavior: Behavior normal.         Thought Content: Thought content normal.         Judgment: Judgment normal.                        Gaston Carpio on 1/12/2023 at 10:09 AM

## 2023-01-12 NOTE — NURSING NOTE
"Chief Complaint   Patient presents with     RECHECK     Heart condition       Initial /86   Pulse 62   Temp 97  F (36.1  C) (Tympanic)   Resp 15   Wt 106 kg (233 lb 9.6 oz)   SpO2 98%   BMI 35.00 kg/m   Estimated body mass index is 35 kg/m  as calculated from the following:    Height as of 12/13/22: 1.74 m (5' 8.5\").    Weight as of this encounter: 106 kg (233 lb 9.6 oz).  Medication Reconciliation: complete    FOOD SECURITY SCREENING QUESTIONS  Hunger Vital Signs:  Within the past 12 months we worried whether our food would run out before we got money to buy more. Never  Within the past 12 months the food we bought just didn't last and we didn't have money to get more. Never  Corry Carias LPN 1/12/2023 9:38 AM        "

## 2023-04-06 DIAGNOSIS — E78.00 HYPERCHOLESTEROLEMIA: ICD-10-CM

## 2023-04-07 RX ORDER — ROSUVASTATIN CALCIUM 40 MG/1
40 TABLET, COATED ORAL DAILY
Qty: 90 TABLET | Refills: 4 | Status: SHIPPED | OUTPATIENT
Start: 2023-04-07 | End: 2024-04-17

## 2023-04-07 NOTE — TELEPHONE ENCOUNTER
ROSUVASTATIN 40MG TABLET        Last Written Prescription Date:  1/19/22  Last Fill Quantity: 90,   # refills: 4  Last Office Visit: 1/12/23  Future Office visit:    Next 5 appointments (look out 90 days)    Apr 12, 2023  8:40 AM  PHYSICAL with Richard Arita MD  [facility] ([facility] ) [address]  [place] [id number] [phone]           Routing refill request to provider for review/approval because:  Drug not on the [facility], [facility] or [facility] refill protocol or controlled substance. Unable to complete prescription refill per RNMedication Refill Policy.................... Tracie Vargas RN ....................  4/7/2023   2:11 PM

## 2023-04-11 NOTE — PROGRESS NOTES
Pre-Visit Planning   Next 5 appointments (look out 90 days)    Apr 12, 2023  8:40 AM  PHYSICAL with Richard Arita MD  [facility] ([facility] ) [address]  [facility] [id number] [phone]        Appointment Notes for this encounter:   mcw    Questionnaires Reviewed/Assigned  Additional questionnaires assigned: [id number]    Patient preferred phone number: [phone]    Unable to reach. Left voicemail. Advised patient to call clinic back at [phone].    Corinne R Thayer, RN on 4/11/2023 at 12:27 PM

## 2023-04-12 ENCOUNTER — HOSPITAL ENCOUNTER (OUTPATIENT)
Dept: GENERAL RADIOLOGY | Facility: OTHER | Age: 78
Discharge: HOME | End: 2023-04-12
Attending: FAMILY MEDICINE
Payer: COMMERCIAL

## 2023-04-12 ENCOUNTER — OFFICE VISIT (OUTPATIENT)
Dept: FAMILY MEDICINE | Facility: OTHER | Age: 78
End: 2023-04-12
Attending: FAMILY MEDICINE
Payer: COMMERCIAL

## 2023-04-12 VITALS
SYSTOLIC BLOOD PRESSURE: 130 MMHG | HEIGHT: 68 IN | TEMPERATURE: 97 F | WEIGHT: 238.6 LBS | OXYGEN SATURATION: 99 % | RESPIRATION RATE: 16 BRPM | DIASTOLIC BLOOD PRESSURE: 82 MMHG | BODY MASS INDEX: 36.16 KG/M2 | HEART RATE: 50 BPM

## 2023-04-12 DIAGNOSIS — I10 BENIGN ESSENTIAL HYPERTENSION: ICD-10-CM

## 2023-04-12 DIAGNOSIS — I25.810 CORONARY ARTERY DISEASE INVOLVING AUTOLOGOUS ARTERY CORONARY BYPASS GRAFT WITHOUT ANGINA PECTORIS: ICD-10-CM

## 2023-04-12 DIAGNOSIS — M54.50 LUMBAR PAIN: ICD-10-CM

## 2023-04-12 DIAGNOSIS — I10 ESSENTIAL HYPERTENSION: ICD-10-CM

## 2023-04-12 DIAGNOSIS — Z00.00 ENCOUNTER FOR MEDICARE ANNUAL WELLNESS EXAM: Primary | ICD-10-CM

## 2023-04-12 DIAGNOSIS — I50.32 DIASTOLIC DYSFUNCTION WITH CHRONIC HEART FAILURE (H): ICD-10-CM

## 2023-04-12 DIAGNOSIS — E78.00 HYPERCHOLESTEROLEMIA: ICD-10-CM

## 2023-04-12 DIAGNOSIS — Z23 NEED FOR DIPHTHERIA-TETANUS-PERTUSSIS (TDAP) VACCINE: ICD-10-CM

## 2023-04-12 LAB
ANION GAP SERPL CALCULATED.3IONS-SCNC: 7 MMOL/L (ref 7–15)
BUN SERPL-MCNC: 18.5 MG/DL (ref 8–23)
CALCIUM SERPL-MCNC: 9 MG/DL (ref 8.8–10.2)
CHLORIDE SERPL-SCNC: 101 MMOL/L (ref 98–107)
CREAT SERPL-MCNC: 1.03 MG/DL (ref 0.67–1.17)
DEPRECATED HCO3 PLAS-SCNC: 31 MMOL/L (ref 22–29)
GFR SERPL CREATININE-BSD FRML MDRD: 75 ML/MIN/1.73M2
GLUCOSE SERPL-MCNC: 82 MG/DL (ref 70–99)
HOLD SPECIMEN: NORMAL
POTASSIUM SERPL-SCNC: 4.2 MMOL/L (ref 3.4–5.3)
SODIUM SERPL-SCNC: 139 MMOL/L (ref 136–145)

## 2023-04-12 PROCEDURE — G0463 HOSPITAL OUTPT CLINIC VISIT: HCPCS | Mod: 25

## 2023-04-12 PROCEDURE — 80048 BASIC METABOLIC PNL TOTAL CA: CPT | Mod: ZL | Performed by: FAMILY MEDICINE

## 2023-04-12 PROCEDURE — 99213 OFFICE O/P EST LOW 20 MIN: CPT | Mod: 25 | Performed by: FAMILY MEDICINE

## 2023-04-12 PROCEDURE — 72100 X-RAY EXAM L-S SPINE 2/3 VWS: CPT

## 2023-04-12 PROCEDURE — 36415 COLL VENOUS BLD VENIPUNCTURE: CPT | Mod: ZL | Performed by: FAMILY MEDICINE

## 2023-04-12 PROCEDURE — G0439 PPPS, SUBSEQ VISIT: HCPCS | Performed by: FAMILY MEDICINE

## 2023-04-12 RX ORDER — LISINOPRIL 40 MG/1
40 TABLET ORAL AT BEDTIME
Qty: 90 TABLET | Refills: 4 | Status: SHIPPED | OUTPATIENT
Start: 2023-04-12 | End: 2024-04-12

## 2023-04-12 RX ORDER — CARVEDILOL 6.25 MG/1
6.25 TABLET ORAL 2 TIMES DAILY WITH MEALS
Qty: 180 TABLET | Refills: 3 | Status: SHIPPED | OUTPATIENT
Start: 2023-04-12 | End: 2023-12-01

## 2023-04-12 RX ORDER — HYDROCHLOROTHIAZIDE 25 MG/1
25 TABLET ORAL DAILY
Qty: 90 TABLET | Refills: 4 | Status: SHIPPED | OUTPATIENT
Start: 2023-04-12 | End: 2024-04-19

## 2023-04-12 ASSESSMENT — ENCOUNTER SYMPTOMS
DIZZINESS: 1
HEMATOCHEZIA: 0
PALPITATIONS: 0
HEADACHES: 0
FREQUENCY: 0
NAUSEA: 0
ABDOMINAL PAIN: 0
SHORTNESS OF BREATH: 0
CONSTIPATION: 0
SORE THROAT: 0
EYE PAIN: 0
JOINT SWELLING: 0
CHILLS: 0
ARTHRALGIAS: 0
WEAKNESS: 0
NERVOUS/ANXIOUS: 0
DYSURIA: 0
PARESTHESIAS: 0
COUGH: 0
FEVER: 0
MYALGIAS: 0
DIARRHEA: 0
HEARTBURN: 0
HEMATURIA: 0

## 2023-04-12 ASSESSMENT — ACTIVITIES OF DAILY LIVING (ADL): CURRENT_FUNCTION: NO ASSISTANCE NEEDED

## 2023-04-12 ASSESSMENT — PAIN SCALES - GENERAL: PAINLEVEL: NO PAIN (0)

## 2023-04-12 NOTE — NURSING NOTE
"Chief Complaint   Patient presents with     Medicare Visit       Initial /82   Pulse 50   Temp 97  F (36.1  C) (Tympanic)   Resp 16   Ht 1.721 m (5' 7.75\")   Wt 108.2 kg (238 lb 9.6 oz)   SpO2 99%   BMI 36.55 kg/m   Estimated body mass index is 36.55 kg/m  as calculated from the following:    Height as of this encounter: 1.721 m (5' 7.75\").    Weight as of this encounter: 108.2 kg (238 lb 9.6 oz).  Medication Reconciliation: complete    FOOD SECURITY SCREENING QUESTIONS  Hunger Vital Signs:  Within the past 12 months we worried whether our food would run out before we got money to buy more. Never  Within the past 12 months the food we bought just didn't last and we didn't have money to get more. Never  Corry Carias LPN 4/12/2023 8:48 AM      "

## 2023-04-12 NOTE — PATIENT INSTRUCTIONS
Patient Education   Personalized Prevention Plan  You are due for the preventive services outlined below.  Your care team is available to assist you in scheduling these services.  If you have already completed any of these items, please share that information with your care team to update in your medical record.  There are no preventive care reminders to display for this patient.  Your Health Risk Assessment indicates you feel you are not in good health    A healthy lifestyle helps keep the body fit and the mind alert. It helps protect you from disease, helps you fight disease, and helps prevent chronic disease (disease that doesn't go away) from getting worse. This is important as you get older and begin to notice twinges in muscles and joints and a decline in the strength and stamina you once took for granted. A healthy lifestyle includes good healthcare, good nutrition, weight control, recreation, and regular exercise. Avoid harmful substances and do what you can to keep safe. Another part of a healthy lifestyle is stay mentally active and socially involved.    Good healthcare     Have a wellness visit every year.     If you have new symptoms, let us know right away. Don't wait until the next checkup.     Take medicines exactly as prescribed and keep your medicines in a safe place. Tell us if your medicine causes problems.   Healthy diet and weight control     Eat 3 or 4 small, nutritious, low-fat, high-fiber meals a day. Include a variety of fruits, vegetables, and whole-grain foods.     Make sure you get enough calcium in your diet. Calcium, vitamin D, and exercise help prevent osteoporosis (bone thinning).     If you live alone, try eating with others when you can. That way you get a good meal and have company while you eat it.     Try to keep a healthy weight. If you eat more calories than your body uses for energy, it will be stored as fat and you will gain weight.     Recreation   Recreation is not  limited to sports and team events. It includes any activity that provides relaxation, interest, enjoyment, and exercise. Recreation provides an outlet for physical, mental, and social energy. It can give a sense of worth and achievement. It can help you stay healthy.    Mental Exercise and Social Involvement  Mental and emotional health is as important as physical health. Keep in touch with friends and family. Stay as active as possible. Continue to learn and challenge yourself.   Things you can do to stay mentally active are:    Learn something new, like a foreign language or musical instrument.     Play SCRABBLE or do crossword puzzles. If you cannot find people to play these games with you at home, you can play them with others on your computer through the Internet.     Join a games club--anything from card games to chess or checkers or lawn bowling.     Start a new hobby.     Go back to school.     Volunteer.     Read.   Keep up with world events.    Understanding USDA MyPlate  The USDA has guidelines to help you make healthy food choices. These are called MyPlate. MyPlate shows the food groups that make up healthy meals using the image of a place setting. Before you eat, think about the healthiest choices for what to put on your plate or in your cup or bowl. To learn more about building a healthy plate, visit www.choosemyplate.gov.     The food groups    Fruits. Any fruit or 100% fruit juice counts as part of the Fruit Group. Fruits may be fresh, canned, frozen, or dried, and may be whole, cut-up, or pureed. Make 1/2 of your plate fruits and vegetables.    Vegetables. Any vegetable or 100% vegetable juice counts as a member of the Vegetable Group. Vegetables may be fresh, frozen, canned, or dried. They can be served raw or cooked and may be whole, cut-up, or mashed. Make 1/2 of your plate fruits and vegetables.    Grains. All foods made from grains are part of the Grains Group. These include wheat, rice, oats,  cornmeal, and barley. Grains are often used to make foods such as bread, pasta, oatmeal, cereal, tortillas, and grits. Grains should be no more than 1/4 of your plate. At least half of your grains should be whole grains.    Protein. This group includes meat, poultry, seafood, beans and peas, eggs, processed soy products (such as tofu), nuts (including nut butters), and seeds. Make protein choices no more than 1/4 of your plate. Meat and poultry choices should be lean or low fat.    Dairy. The Dairy Group includes all fluid milk products and foods made from milk that contain calcium, such as yogurt and cheese. (Foods that have little calcium, such as cream, butter, and cream cheese, are not part of this group.) Most dairy choices should be low-fat or fat-free.    Oils. Oils aren't a food group, but they do contain essential nutrients. However it's important to watch your intake of oils. These are fats that are liquid at room temperature. They include canola, corn, olive, soybean, vegetable, and sunflower oil. Foods that are mainly oil include mayonnaise, certain salad dressings, and soft margarines. You likely already get your daily oil allowance from the foods you eat.  Things to limit  Eating healthy also means limiting these things in your diet:    Salt (sodium). Many processed foods have a lot of sodium. To keep sodium intake down, eat fresh vegetables, meats, poultry, and seafood when possible. Purchase low-sodium, reduced-sodium, or no-salt-added food products at the store. And don't add salt to your meals at home. Instead, season them with herbs and spices such as dill, oregano, cumin, and paprika. Or try adding flavor with lemon or lime zest and juice.    Saturated fat. Saturated fats are most often found in animal products such as beef, pork, and chicken. They are often solid at room temperature, such as butter. To reduce your saturated fat intake, choose leaner cuts of meat and poultry. And try healthier  cooking methods such as grilling, broiling, roasting, or baking. For a simple lower-fat swap, use plain nonfat yogurt instead of mayonnaise when making potato salad or macaroni salad.    Added sugars. These are sugars added to foods. They are in foods such as ice cream, candy, soda, fruit drinks, sports drinks, energy drinks, cookies, pastries, jams, and syrups. Cut down on added sugars by sharing sweet treats with a family member or friend. You can also choose fruit for dessert, and drink water or other unsweetened beverages.  [employer] last reviewed this educational content on 6/1/2020 2000-2022 [employer]. All rights reserved. This information is not intended as a substitute for professional medical care. Always follow your healthcare professional's instructions.          Signs of Hearing Loss  Hearing loss is a problem shared by many people. In fact, it's one of the most common health problems, particularly as people age. Most people aged 65 and older have some hearing loss. By age 80, almost everyone does. Hearing loss often occurs slowly over the years. So, you may not realize your hearing has gotten worse.   When sudden hearing loss occurs, it's important to contact your healthcare provider right away. Your provider will do a medical exam and a hearing exam as soon as possible. This is to help find the cause and type of your sudden hearing loss. Based on your diagnosis, your healthcare provider will discuss possible treatments.      Hearing much better with one ear can be a sign of hearing loss.     Have your hearing checked  Call your healthcare provider if you:     Have to strain to hear normal conversation    Have to watch other people s faces very carefully to follow what they re saying    Need to ask people to repeat what they ve said    Often misunderstand what people are saying    Turn the volume of the television or radio up so high that others complain    Feel that people are  mumbling when they re talking to you    Find that the effort to hear leaves you feeling tired and irritated    Notice, when using the phone, that you hear better with one ear than the other  [employer] last reviewed this educational content on 6/1/2022 2000-2022 [employer]. All rights reserved. This information is not intended as a substitute for professional medical care. Always follow your healthcare professional's instructions.          Preventing Falls at Home  A person can fall for many reasons. Older adults may fall because reaction time slows down as we age. Your muscles and joints may get stiff, weak, or less flexible because of illness, medicines, or a physical condition.   Other health problems that make falls more likely include:     Arthritis    Dizziness or lightheadedness when you stand up (orthostatic hypotension)    History of a stroke    Dizziness    Anemia    Certain medicines taken for mental illness or to control blood pressure.    Problems with balance or gait    Bladder or urinary problems    History of falling    Changes in vision (vision impairment)    Changes in thinking skills and memory (cognitive impairment)  Injuries from a fall can include serious injuries such as broken bones, dislocated joints, internal bleeding and cuts. Injuries like these can limit your independence.   Prevention tips  To help prevent falls and fall-related injuries, follow the tips below.    Floors  To make floors safer:     Put nonskid pads under area rugs.    Remove small rugs.    Replace worn floor coverings.    Tack carpets firmly to each step on carpeted stairs. Put nonskid strips on the edges of uncarpeted stairs.    Keep floors and stairs free of clutter and cords.    Arrange furniture so there are clear pathways.    Clean up any spills right away.  Bathrooms    To make bathrooms safer:     Install grab bars in the tub or shower.    Apply nonskid strips or put a nonskid rubber mat in the tub  or shower.    Sit on a bath chair to bathe.    Use bathmats with nonskid backing.  Lighting  To improve visibility in your home:      Keep a flashlight in each room. Or put a lamp next to the bed within easy reach.    Put nightlights in the bedrooms, hallways, kitchen, and bathrooms.    Make sure all stairways have good lighting.    Take your time when going up and down stairs.    Put handrails on both sides of stairs and in walkways for more support. To prevent injury to your wrist or arm, don t use handrails to pull yourself up.    Install grab bars to pull yourself up.    Move or rearrange items that you use often. This will make them easier to find or reach.    Look at your home to find any safety hazards. Especially look at doorways, walkways, and the driveway. Remove or repair any safety problems that you find.  Other changes to make    Look around to find any safety hazards. Look closely at doorways, walkways, and the driveway. Remove or repair any safety problems that you find.    Wear shoes that fit well.    Take your time when going up and down stairs.    Put handrails on both sides of stairs and in walkways for more support. To prevent injury to your wrist or arm, don t use handrails to pull yourself up.    Install grab bars wherever needed to pull yourself up.    Arrange items that you use often. This will make them easier to find or reach.    [employer] last reviewed this educational content on 3/1/2020    9802-4298 [employer]. All rights reserved. This information is not intended as a substitute for professional medical care. Always follow your healthcare professional's instructions.

## 2023-04-12 NOTE — PROGRESS NOTES
"SUBJECTIVE:   Valentin is a 78 year old who presents for Preventive Visit.      4/12/2023     8:45 AM   Additional Questions   Roomed by BRITTANY Wheatley   Accompanied by Self         4/12/2023     8:45 AM   Patient Reported Additional Medications   Patient reports taking the following new medications N/A     Patient has been advised of split billing requirements and indicates understanding: Yes  Are you in the first 12 months of your Medicare coverage?  No    Healthy Habits:     In general, how would you rate your overall health?  Fair    Frequency of exercise:  6-7 days/week    Duration of exercise:  30-45 minutes    Do you usually eat at least 4 servings of fruit and vegetables a day, include whole grains    & fiber and avoid regularly eating high fat or \"junk\" foods?  No    Taking medications regularly:  Yes    Medication side effects:  None and Lightheadedness    Ability to successfully perform activities of daily living:  No assistance needed    Home Safety:  No safety concerns identified    Hearing Impairment:  Difficulty following a conversation in a noisy restaurant or crowded room, feel that people are mumbling or not speaking clearly, need to ask people to speak up or repeat themselves, find that men's voices are easier to understand than woman's and difficulty understanding speech on the telephone    In the past 6 months, have you been bothered by leaking of urine?  No    In general, how would you rate your overall mental or emotional health?  Good      PHQ-2 Total Score: 0    Additional concerns today:  Yes      Have you ever done Advance Care Planning? (For example, a Health Directive, POLST, or a discussion with a medical provider or your loved ones about your wishes): No, advance care planning information given to patient to review.  Patient declined advance care planning discussion at this time.       Fall risk  Fallen 2 or more times in the past year?: Yes  Any fall with injury in the past year?: " Yes    Cognitive Screening   1) Repeat 3 items (Leader, Season, Winter)    2) Clock draw: NORMAL  3) 3 item recall: Recalls 1 object   Results: NORMAL clock, 1-2 items recalled: COGNITIVE IMPAIRMENT LESS LIKELY    Mini-CogTM Copyright LUIS Valentine. Licensed by the author for use in [facility]; reprinted with permission ([email or web address]). All rights reserved.      Do you have sleep apnea, excessive snoring or daytime drowsiness?: no    Reviewed and updated as needed this visit by clinical staff   Tobacco  Allergies  Meds   Med Hx  Surg Hx  Fam Hx  Soc Hx        Reviewed and updated as needed this visit by Provider                 Social History     Tobacco Use     Smoking status: Former     Years: 27.00     Types: Cigarettes     Quit date: 1988     Years since quittin.3     Smokeless tobacco: Never   Vaping Use     Vaping status: Never Used   Substance Use Topics     Alcohol use: Yes     Comment: Alcoholic Drinks/day: 1-2 drinks per day, liliana             2023     8:44 AM   Alcohol Use   Prescreen: >3 drinks/day or >7 drinks/week? No     Do you have a current opioid prescription? No  Do you use any other controlled substances or medications that are not prescribed by a provider? Alcohol              Current providers sharing in care for this patient include:    Patient Care Team:  Richard Arita MD as PCP - General (Family Medicine)  Richard Arita MD as Assigned PCP  Jann Ulloa, DO as Assigned Heart and Vascular Provider    The following health maintenance items are reviewed in Epic and correct as of today:  Health Maintenance   Topic Date Due     MEDICARE ANNUAL WELLNESS VISIT  2023     COLORECTAL CANCER SCREENING  2025 (Originally 1945)     FALL RISK ASSESSMENT  2024     LIPID  [id number]     ADVANCE CARE PLANNING  2027     DTAP/TDAP/TD IMMUNIZATION (5 - Td or Tdap) 2033     HEPATITIS C SCREENING  Completed     PHQ-2 (once per calendar year)   Completed     INFLUENZA VACCINE  Completed     Pneumococcal Vaccine: 65+ Years  Completed     ZOSTER IMMUNIZATION  Completed     COVID-19 Vaccine  Completed     IPV IMMUNIZATION  Aged Out     MENINGITIS IMMUNIZATION  Aged Out     LUNG CANCER SCREENING  Discontinued     Labs reviewed in [facility]  Current Outpatient Medications   Medication Sig Dispense Refill     acetaminophen (TYLENOL) 325 MG tablet Take 2 tablets (650 mg) by mouth every 6 hours as needed for other (For optimal non-opioid multimodal pain management to improve pain control.) 50 tablet 0     aspirin (ASA) 81 MG chewable tablet Take 1 tablet (81 mg) by mouth daily 90 tablet 3     carvedilol (COREG) 6.25 MG tablet Take 1 tablet (6.25 mg) by mouth 2 times daily (with meals) 180 tablet 3     hydrochlorothiazide (HYDRODIURIL) 25 MG tablet Take 1 tablet (25 mg) by mouth daily 90 tablet 4     lisinopril (ZESTRIL) 40 MG tablet Take 1 tablet (40 mg) by mouth At Bedtime 90 tablet 4     nitroGLYcerin (NITROSTAT) 0.4 MG sublingual tablet For chest pain place 1 tablet under the tongue every 5 minutes for 3 doses. If symptoms persist 5 minutes after 1st dose call 911. 25 tablet 3     rosuvastatin (CRESTOR) 40 MG tablet TAKE 1 TABLET (40 MG) BY MOUTH DAILY 90 tablet 4     senna-docusate (SENOKOT-S/PERICOLACE) 8.6-50 MG tablet Take 1 to 2 tablets by mouth daily. 180 tablet 3     sildenafil (REVATIO) 20 MG tablet TAKE 4-5 TABLETS BY MOUTH ONCE DAILY IF NEEDED FOR OTHER (SPECIFY).TAKE 30 MINUTES BEFORE SEXUAL ACTIVITY. (Patient not taking: Reported on 4/12/2023) 80 tablet 1     No Known Allergies          Review of Systems   Constitutional: Negative for chills and fever.   HENT: Positive for hearing loss. Negative for congestion, ear pain and sore throat.    Eyes: Negative for pain and visual disturbance.   Respiratory: Negative for cough and shortness of breath.    Cardiovascular: Negative for chest pain, palpitations and peripheral edema.   Gastrointestinal: Negative for  "abdominal pain, constipation, diarrhea, heartburn, hematochezia and nausea.   Genitourinary: Positive for impotence. Negative for dysuria, frequency, genital sores, hematuria, penile discharge and urgency.   Musculoskeletal: Negative for arthralgias, joint swelling and myalgias.   Skin: Negative for rash.   Neurological: Positive for dizziness. Negative for weakness, headaches and paresthesias.   Psychiatric/Behavioral: Negative for mood changes. The patient is not nervous/anxious.      He fell on the ice about 4 weeks ago. Has had significant low back pain since with walking only. Not at rest. 2 chiro visits, no help.     dizziness comes and goes. He cannot see a pattern. Says it is more of lightheadedness. Blood pressure at times has been 100/60, but average is around 120/70 or so. Has not checked when feeling the symptoms         OBJECTIVE:   /82   Pulse 50   Temp 97  F (36.1  C) (Tympanic)   Resp 16   Ht 1.721 m (5' 7.75\")   Wt 108.2 kg (238 lb 9.6 oz)   SpO2 99%   BMI 36.55 kg/m   Estimated body mass index is 36.55 kg/m  as calculated from the following:    Height as of this encounter: 1.721 m (5' 7.75\").    Weight as of this encounter: 108.2 kg (238 lb 9.6 oz).  Physical Exam  GENERAL: healthy, alert and no distress  EYES: Eyes grossly normal to inspection, PERRL and conjunctivae and sclerae normal  HENT: ear canals and TM's normal, nose and mouth without ulcers or lesions  NECK: no adenopathy, no asymmetry, masses, or scars and thyroid normal to palpation  RESP: lungs clear to auscultation - no rales, rhonchi or wheezes  CV: regular rate and rhythm, normal S1 S2, no S3 or S4, no murmur, click or rub, no peripheral edema and peripheral pulses strong  ABDOMEN: soft, nontender, no hepatosplenomegaly, no masses and bowel sounds normal  MS: no gross musculoskeletal defects noted, no edema. No lumbar pain on palpation and negative straight leg raise today.   SKIN: no suspicious lesions or " olamide  NEURO: Normal strength and tone, mentation intact and speech normal  PSYCH: mentation appears normal, affect normal/bright    Diagnostic Test Results:  Labs reviewed in Epic  Results for orders placed or performed during the hospital encounter of 04/12/23   XR Lumbar Spine 2/3 Views     Status: None    Narrative    XR LUMBAR SPINE 2/3 VIEWS    HISTORY: Lumbar pain .    COMPARISON: None.    TECHNIQUE: 3 views of the lumbosacral spine.    FINDINGS:    The lumbar lordosis is preserved. Advanced facet degenerative  hypertrophy is best seen at L4-5 and L5-S1. Vascular calcifications  are seen. SI joint degenerative changes are seen.        Impression    IMPRESSION:     Multifocal degenerative changes.      SOURAV SOARES MD         SYSTEM ID:  [id number]   Results for orders placed or performed in visit on 04/12/23   Basic Metabolic Panel     Status: Abnormal   Result Value Ref Range    Sodium 139 136 - 145 mmol/L    Potassium 4.2 3.4 - 5.3 mmol/L    Chloride 101 98 - 107 mmol/L    Carbon Dioxide (CO2) 31 (H) 22 - 29 mmol/L    Anion Gap 7 7 - 15 mmol/L    Urea Nitrogen 18.5 8.0 - 23.0 mg/dL    Creatinine 1.03 0.67 - 1.17 mg/dL    Calcium 9.0 8.8 - 10.2 mg/dL    Glucose 82 70 - 99 mg/dL    GFR Estimate 75 >60 mL/min/1.73m2   Extra Tube     Status: None    Narrative    The following orders were created for panel order Extra Tube.  Procedure                               Abnormality         Status                     ---------                               -----------         ------                     Extra Serum Separator Tu...[[id number]]                      Final result                 Please view results for these tests on the individual orders.   Extra Serum Separator Tube (SST)     Status: None   Result Value Ref Range    Hold Specimen JIC          ASSESSMENT / PLAN:   (Z00.00) Encounter for Medicare annual wellness exam  (primary encounter diagnosis)  Comment:    Plan:      (Z23) Need for  "diphtheria-tetanus-pertussis (Tdap) vaccine  Comment: to get at a pharmacy  Plan:      (I10) Essential hypertension  Comment: stable  Plan: carvedilol (COREG) 6.25 MG tablet,         hydrochlorothiazide (HYDRODIURIL) 25 MG tablet,        Basic Metabolic Panel        Refilled without changes    (I10) Benign essential hypertension  Comment:    Plan: carvedilol (COREG) 6.25 MG tablet,         hydrochlorothiazide (HYDRODIURIL) 25 MG tablet             (I50.32) Diastolic dysfunction with chronic heart failure (H)  Comment: stable  Plan: hydrochlorothiazide (HYDRODIURIL) 25 MG tablet             (I25.810) 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            COUNSELING:  Reviewed preventive health counseling, as reflected in patient instructions       Regular exercise       Healthy diet/nutrition      BMI:   Estimated body mass index is 36.55 kg/m  as calculated from the following:    Height as of this encounter: 1.721 m (5' 7.75\").    Weight as of this encounter: 108.2 kg (238 lb 9.6 oz).   Weight management plan: Discussed healthy diet and exercise guidelines      He reports that he quit smoking about 35 years ago. His smoking use included cigarettes. He has never used smokeless tobacco.      Appropriate preventive services were discussed with this patient, including applicable screening as appropriate for cardiovascular disease, diabetes, osteopenia/osteoporosis, and glaucoma.  As appropriate for age/gender, discussed screening for colorectal cancer, prostate cancer, breast cancer, and cervical cancer. Checklist reviewing preventive services available has been given to the " patient.    Reviewed patients plan of care and provided an AVS. The Basic Care Plan (routine screening as documented in Health Maintenance) for Arun meets the Care Plan requirement. This Care Plan has been established and reviewed with the Patient.          Richard Arita MD  [facility]    Identified Health Risks:    I have reviewed Opioid Use Disorder and Substance Use Disorder risk factors and made any needed referrals.

## 2023-04-12 NOTE — PROGRESS NOTES
The patient was provided with suggestions to help him develop a healthy physical lifestyle.  The patient was counseled and encouraged to consider modifying their diet and eating habits. He was provided with information on recommended healthy diet options.  The patient was provided with written information regarding signs of hearing loss.  He is at risk for falling and has been provided with information to reduce the risk of falling at home.

## 2023-06-14 ENCOUNTER — OFFICE VISIT (OUTPATIENT)
Dept: FAMILY MEDICINE | Facility: OTHER | Age: 78
End: 2023-06-14
Attending: NURSE PRACTITIONER
Payer: COMMERCIAL

## 2023-06-14 VITALS
HEIGHT: 69 IN | RESPIRATION RATE: 22 BRPM | HEART RATE: 55 BPM | WEIGHT: 235.8 LBS | OXYGEN SATURATION: 97 % | TEMPERATURE: 97.7 F | DIASTOLIC BLOOD PRESSURE: 92 MMHG | BODY MASS INDEX: 34.93 KG/M2 | SYSTOLIC BLOOD PRESSURE: 152 MMHG

## 2023-06-14 DIAGNOSIS — R05.1 ACUTE COUGH: Primary | ICD-10-CM

## 2023-06-14 PROCEDURE — 99213 OFFICE O/P EST LOW 20 MIN: CPT | Performed by: STUDENT IN AN ORGANIZED HEALTH CARE EDUCATION/TRAINING PROGRAM

## 2023-06-14 PROCEDURE — G0463 HOSPITAL OUTPT CLINIC VISIT: HCPCS

## 2023-06-14 RX ORDER — BENZONATATE 100 MG/1
100 CAPSULE ORAL 3 TIMES DAILY PRN
Qty: 20 CAPSULE | Refills: 0 | Status: SHIPPED | OUTPATIENT
Start: 2023-06-14 | End: 2023-06-21

## 2023-06-14 ASSESSMENT — PAIN SCALES - GENERAL: PAINLEVEL: NO PAIN (0)

## 2023-06-14 NOTE — PATIENT INSTRUCTIONS
Cough    Discussed viral illness versus irritation/allergy.     Wolfgang Montesinos for cough.  Consider zyrtec for allergy symptoms.    Continue OTC cough medicines.   Follow up with PCP if symptoms persist.    Return to rapid clinic or go to ER if symptoms worsen or change.

## 2023-06-14 NOTE — NURSING NOTE
"Chief Complaint   Patient presents with     Cough     congestion   cough for 3 days . Took over the counter medication but does not know name.   Initial Temp 97.7  F (36.5  C) (Tympanic)   Resp 22   Ht 1.74 m (5' 8.5\")   Wt 107 kg (235 lb 12.8 oz)   SpO2 97%   BMI 35.33 kg/m   Estimated body mass index is 35.33 kg/m  as calculated from the following:    Height as of this encounter: 1.74 m (5' 8.5\").    Weight as of this encounter: 107 kg (235 lb 12.8 oz).  Medication Reconciliation: complete    Kate Miller LPN  "
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
## 2023-06-21 DIAGNOSIS — R05.1 ACUTE COUGH: ICD-10-CM

## 2023-06-21 RX ORDER — BENZONATATE 100 MG/1
100 CAPSULE ORAL 3 TIMES DAILY PRN
Qty: 20 CAPSULE | Refills: 0 | Status: SHIPPED | OUTPATIENT
Start: 2023-06-21 | End: 2023-12-01

## 2023-07-12 NOTE — TELEPHONE ENCOUNTER
Prescription approved per [facility] Refill Protocol.    LOV with medication addressed with Vin Sandoval was 7/24/17 Sheela Stewart RN on 3/15/2018 at 11:39 AM    
negative...

## 2023-07-20 DIAGNOSIS — M25.551 BILATERAL HIP PAIN: Primary | ICD-10-CM

## 2023-07-20 DIAGNOSIS — M25.552 BILATERAL HIP PAIN: Primary | ICD-10-CM

## 2023-07-25 ENCOUNTER — OFFICE VISIT (OUTPATIENT)
Dept: ORTHOPEDICS | Facility: OTHER | Age: 78
End: 2023-07-25
Attending: ORTHOPAEDIC SURGERY
Payer: COMMERCIAL

## 2023-07-25 ENCOUNTER — HOSPITAL ENCOUNTER (OUTPATIENT)
Dept: GENERAL RADIOLOGY | Facility: OTHER | Age: 78
Discharge: HOME | End: 2023-07-25
Attending: ORTHOPAEDIC SURGERY
Payer: COMMERCIAL

## 2023-07-25 VITALS — OXYGEN SATURATION: 97 % | SYSTOLIC BLOOD PRESSURE: 124 MMHG | DIASTOLIC BLOOD PRESSURE: 84 MMHG | HEART RATE: 55 BPM

## 2023-07-25 DIAGNOSIS — M25.552 BILATERAL HIP PAIN: ICD-10-CM

## 2023-07-25 DIAGNOSIS — M25.551 BILATERAL HIP PAIN: ICD-10-CM

## 2023-07-25 DIAGNOSIS — M48.061 SPINAL STENOSIS OF LUMBAR REGION WITHOUT NEUROGENIC CLAUDICATION: Primary | ICD-10-CM

## 2023-07-25 PROCEDURE — G0463 HOSPITAL OUTPT CLINIC VISIT: HCPCS

## 2023-07-25 PROCEDURE — 73522 X-RAY EXAM HIPS BI 3-4 VIEWS: CPT

## 2023-07-25 PROCEDURE — 99203 OFFICE O/P NEW LOW 30 MIN: CPT | Performed by: ORTHOPAEDIC SURGERY

## 2023-07-25 ASSESSMENT — PAIN SCALES - GENERAL: PAINLEVEL: NO PAIN (1)

## 2023-07-25 NOTE — PROGRESS NOTES
Patient is here for consult on his hips.  Анна Nickerson LPN .....................7/25/2023 1:58 PM

## 2023-07-25 NOTE — PROGRESS NOTES
Visit Date: 07/25/2023    REASON FOR EVALUATION:   1.  Lower back pain.  2.  Bilateral hip pain.    HISTORY OF PRESENT ILLNESS:  The patient comes in, lower back and hip pain.  We did update his hip x-ray. It shows some mild approaching moderate arthrosis.  The majority of his pain seems to be in the posterior aspect, pain that does occasionally run down.  It has been going on for a few months.  His back seems the primary focus for that.  Does occasionally have hip discomfort, but not as much in the way of groin pain at this point in time, and he did also have a recent coronary angiogram on 04/22/2022, and then surgery thereafter has set well in addition to the ACL, and he recovered from that.  It does not appear as if he is on any blood thinning agents that I am aware of in regard to that.    MEDICATIONS:  Reviewed.    ALLERGIES:  NO KNOWN DRUG ALLERGIES.    REVIEW OF SYSTEMS:  A 12-point otherwise negative with the exception as stated above.    PHYSICAL EXAMINATION:  The patient is 5 feet 8 inches, 235 pounds.  Alert and oriented x3, cooperative with exam, in no acute distress.  Does ambulate with some gait antalgia.  Affect is appropriate as well in addition to that.  Examination of the hip shows pain with hip flex and internal rotation.  No significant limitations of range of motion.  He has more straight leg raise pain on the right side than the left at this point in time as well.  Neurovascular examination is otherwise intact.  Bursal palpation is asymptomatic.  He does not appear to have any dropped reflexes as far as his neurovascular examination is concerned in the lower extremity.    IMAGING:  X-rays reviewed of the hips bilaterally do show mild approaching moderate arthrosis. Lumbar spine does also show moderate stenosis present there as well.    IMPRESSION:    1.  Bilateral leg pain secondary to lumbar stenosis.  2.  Mild underlying hip arthritis.    PLAN:  MRI of lumbar spine is recommended.  Further  recommendations to follow once that is completed, as I think this is causing the vast majority of his symptomatology at this point in time. As far as his hips, if that does ramp up on its intensity, we can certainly look at a hip injection to help settle things down in that capacity.    Pepe Cameron MD        D: 2023   T: 2023   MT:     Name:     IVELISSE MONTEJO  MRN:      [id number]        Account:    [id number]   :      1945           Visit Date: 2023     Document: [id number]

## 2023-08-02 ENCOUNTER — HOSPITAL ENCOUNTER (OUTPATIENT)
Dept: MRI IMAGING | Facility: OTHER | Age: 78
Discharge: HOME | End: 2023-08-02
Attending: ORTHOPAEDIC SURGERY | Admitting: ORTHOPAEDIC SURGERY
Payer: COMMERCIAL

## 2023-08-02 DIAGNOSIS — M48.061 SPINAL STENOSIS OF LUMBAR REGION WITHOUT NEUROGENIC CLAUDICATION: ICD-10-CM

## 2023-08-02 PROCEDURE — 72148 MRI LUMBAR SPINE W/O DYE: CPT

## 2023-08-15 ENCOUNTER — TELEPHONE (OUTPATIENT)
Dept: ORTHOPEDICS | Facility: OTHER | Age: 78
End: 2023-08-15
Payer: COMMERCIAL

## 2023-08-15 NOTE — TELEPHONE ENCOUNTER
Looks like enoch sent an order to [facility] here at [facility]. I left a message letting him know to call [facility] at [phone]  Анна Nickerson LPN .....................8/15/2023 4:41 PM

## 2023-08-15 NOTE — TELEPHONE ENCOUNTER
Patient called with questions about getting injections.  He stated that he was supposed to get back injections but no one has called him.  I do not see any orders in his chart.  He did speak with [facility] office but was told to call here.  Does he need a follow-up with Dr. Cameron prior to getting any injections?  Linda Hargrove on 8/15/2023 at 9:06 AM

## 2023-09-07 ENCOUNTER — HOSPITAL ENCOUNTER (OUTPATIENT)
Dept: GENERAL RADIOLOGY | Facility: OTHER | Age: 78
Discharge: HOME | End: 2023-09-07
Attending: ORTHOPAEDIC SURGERY | Admitting: ORTHOPAEDIC SURGERY
Payer: COMMERCIAL

## 2023-09-07 DIAGNOSIS — M48.061 SPINAL STENOSIS, LUMBAR REGION, WITHOUT NEUROGENIC CLAUDICATION: ICD-10-CM

## 2023-09-07 DIAGNOSIS — M54.16 LUMBAR RADICULOPATHY: ICD-10-CM

## 2023-09-07 PROCEDURE — 250N000011 HC RX IP 250 OP 636: Mod: JZ | Performed by: RADIOLOGY

## 2023-09-07 PROCEDURE — 255N000002 HC RX 255 OP 636: Performed by: RADIOLOGY

## 2023-09-07 PROCEDURE — 62323 NJX INTERLAMINAR LMBR/SAC: CPT

## 2023-09-07 PROCEDURE — 250N000009 HC RX 250: Performed by: RADIOLOGY

## 2023-09-07 RX ORDER — LIDOCAINE HYDROCHLORIDE 10 MG/ML
2 INJECTION, SOLUTION EPIDURAL; INFILTRATION; INTRACAUDAL; PERINEURAL ONCE
Status: COMPLETED | OUTPATIENT
Start: 2023-09-07 | End: 2023-09-07

## 2023-09-07 RX ORDER — LIDOCAINE HYDROCHLORIDE 10 MG/ML
2 INJECTION, SOLUTION INFILTRATION; PERINEURAL ONCE
Status: COMPLETED | OUTPATIENT
Start: 2023-09-07 | End: 2023-09-07

## 2023-09-07 RX ORDER — DEXAMETHASONE SODIUM PHOSPHATE 10 MG/ML
10 INJECTION, SOLUTION INTRAMUSCULAR; INTRAVENOUS ONCE
Status: COMPLETED | OUTPATIENT
Start: 2023-09-07 | End: 2023-09-07

## 2023-09-07 RX ADMIN — DEXAMETHASONE SODIUM PHOSPHATE 10 MG: 10 INJECTION, SOLUTION INTRAMUSCULAR; INTRAVENOUS at 11:17

## 2023-09-07 RX ADMIN — IOHEXOL 1 ML: 240 INJECTION, SOLUTION INTRATHECAL; INTRAVASCULAR; INTRAVENOUS; ORAL at 11:17

## 2023-09-07 RX ADMIN — LIDOCAINE HYDROCHLORIDE 2 ML: 10 INJECTION, SOLUTION EPIDURAL; INFILTRATION; INTRACAUDAL; PERINEURAL at 11:16

## 2023-09-07 RX ADMIN — LIDOCAINE HYDROCHLORIDE 5 ML: 10 INJECTION, SOLUTION INFILTRATION; PERINEURAL at 11:17

## 2023-10-11 NOTE — PHARMACY-ANTICOAGULATION SERVICE
Clinical Pharmacy - Warfarin Dosing Consult     Pharmacy has been consulted to manage this patient s warfarin therapy.  Indication: Atrial Fibrillation  Therapy Goal: INR 2-3  Warfarin Prior to Admission: No  Significant drug interactions: amiodarone, aspirin, heparin IV,    INR   Date Value Ref Range Status   06/06/2022 1.16 (H) 0.85 - 1.15 Final   06/05/2022 1.11 0.85 - 1.15 Final       Recommend warfarin 2mg mg today due to age > 75yr and significant drug interaction with amiodarone.  Pharmacy will monitor Arun Jamestz daily and order warfarin doses to achieve specified goal.      Please contact pharmacy as soon as possible if the warfarin needs to be held for a procedure or if the warfarin goals change.      Ines Alexander, Pharm.D., [facility]  Pager [phone]   Patient needed refills on [employer]. 2 refills given until patient can be seen again.

## 2023-11-01 DIAGNOSIS — I35.0 AORTIC STENOSIS, SEVERE: ICD-10-CM

## 2023-11-01 DIAGNOSIS — Z95.2 S/P AVR: ICD-10-CM

## 2023-11-07 RX ORDER — AMOXICILLIN 500 MG/1
CAPSULE ORAL
Qty: 4 CAPSULE | Refills: 3 | Status: SHIPPED | OUTPATIENT
Start: 2023-11-07

## 2023-12-01 ENCOUNTER — OFFICE VISIT (OUTPATIENT)
Dept: FAMILY MEDICINE | Facility: OTHER | Age: 78
End: 2023-12-01
Attending: FAMILY MEDICINE
Payer: COMMERCIAL

## 2023-12-01 VITALS
BODY MASS INDEX: 34.8 KG/M2 | SYSTOLIC BLOOD PRESSURE: 148 MMHG | RESPIRATION RATE: 18 BRPM | TEMPERATURE: 97.5 F | OXYGEN SATURATION: 96 % | WEIGHT: 235 LBS | HEIGHT: 69 IN | HEART RATE: 46 BPM | DIASTOLIC BLOOD PRESSURE: 62 MMHG

## 2023-12-01 DIAGNOSIS — R55 NEAR SYNCOPE: ICD-10-CM

## 2023-12-01 DIAGNOSIS — R00.1 BRADYCARDIA: Primary | ICD-10-CM

## 2023-12-01 DIAGNOSIS — I10 ESSENTIAL HYPERTENSION: ICD-10-CM

## 2023-12-01 DIAGNOSIS — I25.810 CORONARY ARTERY DISEASE INVOLVING AUTOLOGOUS ARTERY CORONARY BYPASS GRAFT WITHOUT ANGINA PECTORIS: ICD-10-CM

## 2023-12-01 LAB
ANION GAP SERPL CALCULATED.3IONS-SCNC: 9 MMOL/L (ref 7–15)
BUN SERPL-MCNC: 19.7 MG/DL (ref 8–23)
CALCIUM SERPL-MCNC: 9.2 MG/DL (ref 8.8–10.2)
CHLORIDE SERPL-SCNC: 100 MMOL/L (ref 98–107)
CREAT SERPL-MCNC: 1.02 MG/DL (ref 0.67–1.17)
DEPRECATED HCO3 PLAS-SCNC: 29 MMOL/L (ref 22–29)
EGFRCR SERPLBLD CKD-EPI 2021: 76 ML/MIN/1.73M2
ERYTHROCYTE [DISTWIDTH] IN BLOOD BY AUTOMATED COUNT: 13.4 % (ref 10–15)
GLUCOSE SERPL-MCNC: 104 MG/DL (ref 70–99)
HCT VFR BLD AUTO: 47.7 % (ref 40–53)
HGB BLD-MCNC: 16.5 G/DL (ref 13.3–17.7)
MCH RBC QN AUTO: 32.7 PG (ref 26.5–33)
MCHC RBC AUTO-ENTMCNC: 34.6 G/DL (ref 31.5–36.5)
MCV RBC AUTO: 95 FL (ref 78–100)
PLATELET # BLD AUTO: 184 10E3/UL (ref 150–450)
POTASSIUM SERPL-SCNC: 4.3 MMOL/L (ref 3.4–5.3)
RBC # BLD AUTO: 5.04 10E6/UL (ref 4.4–5.9)
SODIUM SERPL-SCNC: 138 MMOL/L (ref 135–145)
WBC # BLD AUTO: 7.4 10E3/UL (ref 4–11)

## 2023-12-01 PROCEDURE — 93010 ELECTROCARDIOGRAM REPORT: CPT | Performed by: INTERNAL MEDICINE

## 2023-12-01 PROCEDURE — 93005 ELECTROCARDIOGRAM TRACING: CPT | Performed by: FAMILY MEDICINE

## 2023-12-01 PROCEDURE — 85027 COMPLETE CBC AUTOMATED: CPT | Mod: ZL | Performed by: FAMILY MEDICINE

## 2023-12-01 PROCEDURE — G0463 HOSPITAL OUTPT CLINIC VISIT: HCPCS

## 2023-12-01 PROCEDURE — 82310 ASSAY OF CALCIUM: CPT | Mod: ZL | Performed by: FAMILY MEDICINE

## 2023-12-01 PROCEDURE — 36415 COLL VENOUS BLD VENIPUNCTURE: CPT | Mod: ZL | Performed by: FAMILY MEDICINE

## 2023-12-01 PROCEDURE — 99214 OFFICE O/P EST MOD 30 MIN: CPT | Performed by: FAMILY MEDICINE

## 2023-12-01 RX ORDER — CARVEDILOL 3.12 MG/1
3.12 TABLET ORAL 2 TIMES DAILY WITH MEALS
Qty: 180 TABLET | Refills: 4 | Status: SHIPPED | OUTPATIENT
Start: 2023-12-01 | End: 2024-04-19

## 2023-12-01 RX ORDER — AMLODIPINE BESYLATE 5 MG/1
5 TABLET ORAL DAILY
Qty: 90 TABLET | Refills: 4 | Status: SHIPPED | OUTPATIENT
Start: 2023-12-01

## 2023-12-01 NOTE — NURSING NOTE
"Pt presents for lightheadedness.  BP (!) 148/62   Pulse (!) 46   Temp 97.5  F (36.4  C) (Tympanic)   Resp 18   Ht 1.753 m (5' 9\")   Wt 106.6 kg (235 lb)   SpO2 96%   BMI 34.70 kg/m      Kristin Mcmahon LPN on 12/1/2023 at 3:11 PM    "

## 2023-12-01 NOTE — PROGRESS NOTES
"  Assessment & Plan     (R00.1) Bradycardia  (primary encounter diagnosis)  Comment: this is not severe, but might be at the times he has the near syncope symptoms. Will cut his coreg in half, and add on norvasc to compensate for the reduced hypertension control. If symptoms are getting worse, should follow up acutely. Otherwise follow up in 6 weeks or so. Will likely need to increase the norvasc to 10 milligram then, if hypertension on under 135/85.   Plan: EKG 12-lead complete w/read - Clinics, Leadless        EKG Monitor 8 to 14 Days             (R55) Near syncope  Comment:    Plan: Basic Metabolic Panel, CBC W PLT No Diff        It is also possible he is having runs of a fib, given he had this with his CABG.    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    Weight as of this encounter: 106.6 kg (235 lb).           Return in about 6 weeks (around 1/12/2024).    Richard Arita MD  [facility] AND \[facility] [place] [facility]\""    Juan J Hanson is a 78 year old, presenting for the following health issues:  Dizziness (Lightheadedness)      12/1/2023     3:05 PM   Additional Questions   Roomed by Brain Mcmahon LPN   Accompanied by Self       HPI lightheadedness with a sense he is close to passing out off and on for a year. Comes on perhaps once a month. He says this is much worse if he takes Viagra. Says his HR has been running low, since his CABG 1 year ago. Is almost always 45. Symptoms will get better if he sits down when walking has to start slowly then speed up. Will trigger symptoms if " "he walks quickly initially. No blood loss. No chest pain or shortness of breath at all. No palpitations.  Did have post op a fib with his CABG.     Current Outpatient Medications   Medication    acetaminophen (TYLENOL) 325 MG tablet    amoxicillin (AMOXIL) 500 MG capsule    aspirin (ASA) 81 MG chewable tablet    carvedilol (COREG) 6.25 MG tablet    hydrochlorothiazide (HYDRODIURIL) 25 MG tablet    lisinopril (ZESTRIL) 40 MG tablet    nitroGLYcerin (NITROSTAT) 0.4 MG sublingual tablet    rosuvastatin (CRESTOR) 40 MG tablet    senna-docusate (SENOKOT-S/PERICOLACE) 8.6-50 MG tablet    sildenafil (REVATIO) 20 MG tablet    benzonatate (TESSALON) 100 MG capsule     No current facility-administered medications for this visit.               Review of Systems         Objective    BP (!) 148/62   Pulse (!) 46   Temp 97.5  F (36.4  C) (Tympanic)   Resp 18   Ht 1.753 m (5' 9\")   Wt 106.6 kg (235 lb)   SpO2 96%   BMI 34.70 kg/m    Body mass index is 34.7 kg/m .  Physical Exam  Constitutional:       Appearance: Normal appearance.   Cardiovascular:      Rate and Rhythm: Regular rhythm. Bradycardia present.      Heart sounds: No murmur heard.     No friction rub. No gallop.   Pulmonary:      Effort: Pulmonary effort is normal. No respiratory distress.      Breath sounds: No stridor.   Neurological:      General: No focal deficit present.      Mental Status: He is alert and oriented to person, place, and time.   Psychiatric:         Mood and Affect: Mood normal.         Behavior: Behavior normal.         Thought Content: Thought content normal.            Results for orders placed or performed in visit on 12/01/23   Basic Metabolic Panel     Status: Abnormal   Result Value Ref Range    Sodium 138 135 - 145 mmol/L    Potassium 4.3 3.4 - 5.3 mmol/L    Chloride 100 98 - 107 mmol/L    Carbon Dioxide (CO2) 29 22 - 29 mmol/L    Anion Gap 9 7 - 15 mmol/L    Urea Nitrogen 19.7 8.0 - 23.0 mg/dL    Creatinine 1.02 0.67 - 1.17 mg/dL    " GFR Estimate 76 >60 mL/min/1.73m2    Calcium 9.2 8.8 - 10.2 mg/dL    Glucose 104 (H) 70 - 99 mg/dL   CBC W PLT No Diff     Status: Normal   Result Value Ref Range    WBC Count 7.4 4.0 - 11.0 10e3/uL    RBC Count 5.04 4.40 - 5.90 10e6/uL    Hemoglobin 16.5 13.3 - 17.7 g/dL    Hematocrit 47.7 40.0 - 53.0 %    MCV 95 78 - 100 fL    MCH 32.7 26.5 - 33.0 pg    MCHC 34.6 31.5 - 36.5 g/dL    RDW 13.4 10.0 - 15.0 %    Platelet Count 184 150 - 450 10e3/uL   EKG 12-lead complete w/read - Clinics     Status: None (Preliminary result)   Result Value Ref Range    Systolic Blood Pressure  mmHg    Diastolic Blood Pressure  mmHg    Ventricular Rate 48 BPM    Atrial Rate 48 BPM    FL Interval 182 ms    QRS Duration 100 ms     ms    QTc 450 ms    P Axis 53 degrees    R AXIS 48 degrees    T Axis 80 degrees    Interpretation ECG       Sinus bradycardia  Nonspecific ST and T wave abnormality  Abnormal ECG  When compared with ECG of 07-JUN-2022 13:04,  ST now depressed in Lateral leads

## 2023-12-04 NOTE — TELEPHONE ENCOUNTER
Assessment and Plan:    Diagnoses and all orders for this visit:    Stage 2 chronic kidney disease  -     Basic metabolic panel; Future  -     Phosphorus; Future  -     Magnesium; Future  -     CBC; Future  -     Vitamin D 25 hydroxy; Future  -     Basic metabolic panel  -     Phosphorus  -     Magnesium  -     CBC  -     Vitamin D 25 hydroxy    Other specified hypotension    Hyponatremia  -     Basic metabolic panel; Future  -     Magnesium; Future  -     Basic metabolic panel  -     Magnesium    Bilateral leg edema    Alcoholic cirrhosis of liver with ascites (HCC)  -     Discontinue: midodrine (PROAMATINE) 2.5 mg tablet; Take 3 tablets (7.5 mg total) by mouth 3 (three) times a day before meals  -     midodrine (PROAMATINE) 2.5 mg tablet; Take 3 tablets (7.5 mg total) by mouth 3 (three) times a day before meals      Chronic Kidney Disease stage 2- Baseline creatinine 1.0. Etiology secondary to hypertensive nephrosclerosis and cirrhosis. Creatinine is at baseline. Minimal proteinuria. Hypotension- Continue midodrine 7.5mg three times a day. Hyponatremia- Continue to follow a fluid restriction limiting all fluids. Sodium level is below normal but stable in the low 130s. Anemia- HGB at goal.  Continue to monitor. LE edema- continue diuretics. Recommend wearing compression stockings. ETOH Cirrhosis with ascites- continue to follow up with GI    Cramping at night- check magnesium level. Follow up with Dr. Lovely Salguero or an AP in 9 months. Please call the office with any questions or concerns. Reason for Visit: No chief complaint on file. HPI: Juan F Clemens is a 60 y.o. female who is here for follow up of chronic kidney disease and hyponatremia. Patient was last seen in May 2023. Since then, she was hospitalized last month after a seizure from alcohol. She states she is a little lightheaded/dizzy since then.   She states she takes the midodrine twice a day instead of 3 times a Keep monitoring both. As long as he has no symptoms, not too concerned about the HR.  If blood pressure remains elevated, can increase lisinopril from 20 milligram to 40 milligram.  Richard Arita MD on 7/29/2022 at 12:34 PM     day and has difficulty cutting it. We discussed she needs to take it 3 times a day since it is short acting and her BP may be dropping during the afternoon when she skips her dose. We will prescribe the 2.5mg tablets so she does not have to cut them. She also states her urine is dark and she drinks 2 20 oz bottles of propel at night to avoid cramps but doesn't drink during the day. ROS: A complete review of systems was performed and was negative unless otherwise noted in the history of present illness. Allergies:   Sulfa antibiotics and Ace inhibitors    Medications:     Current Outpatient Medications:     albuterol (2.5 mg/3 mL) 0.083 % nebulizer solution, Take 3 mL (2.5 mg total) by nebulization every 6 (six) hours as needed for wheezing or shortness of breath, Disp: 180 mL, Rfl: 5    albuterol (Proventil HFA) 90 mcg/act inhaler, Inhale 2 puffs every 6 (six) hours as needed for wheezing or shortness of breath, Disp: 18 g, Rfl: 3    ALPRAZolam (XANAX) 1 mg tablet, Take 1 tablet (1 mg total) by mouth 2 (two) times a day as needed for anxiety, Disp: 60 tablet, Rfl: 0    anastrozole (ARIMIDEX) 1 mg tablet, Take 1 tablet (1 mg total) by mouth daily, Disp: 90 tablet, Rfl: 1    budesonide-formoterol (Symbicort) 160-4.5 mcg/act inhaler, Inhale 2 puffs 2 (two) times a day Rinse mouth after use., Disp: 10.2 g, Rfl: 1    furosemide (LASIX) 40 mg tablet, Take 1 tablet (40 mg total) by mouth daily, Disp: 90 tablet, Rfl: 3    guselkumab (TREMFYA) subcutaneous injection, Inject 100 mg under the skin every 6 weeks, Disp: , Rfl:     lactulose 20 g/30 mL, Take 45 mL (30 g total) by mouth 2 (two) times a day Titrate dose for an effect of 3 soft, but formed bowel movements per day.   You can start with 1 tablespoon per day and slowly increase to effect., Disp: 2700 mL, Rfl: 0    midodrine (PROAMATINE) 2.5 mg tablet, Take 3 tablets (7.5 mg total) by mouth 3 (three) times a day before meals, Disp: 810 tablet, Rfl: 3 pantoprazole (PROTONIX) 20 mg tablet, Take 1 tablet (20 mg total) by mouth daily, Disp: 90 tablet, Rfl: 2    spironolactone (ALDACTONE) 100 mg tablet, Take 1 tablet (100 mg total) by mouth daily, Disp: 90 tablet, Rfl: 3    traZODone (DESYREL) 50 mg tablet, Take 1 tablet (50 mg total) by mouth daily at bedtime, Disp: 30 tablet, Rfl: 5    Past Medical History:   Diagnosis Date    Alcoholic fatty liver     Anxiety     Asthma     COPD (chronic obstructive pulmonary disease) (HCC)     Elevated liver function tests     GERD (gastroesophageal reflux disease)     GERD without esophagitis     Hyperlipidemia     Hypertension     IBS (irritable bowel syndrome)     Insomnia     Insomnia     Liver disease     Nervousness     Nicotine dependence     Other specified urinary incontinence     RLS (restless legs syndrome)     Wears glasses      Past Surgical History:   Procedure Laterality Date    BACK SURGERY      BREAST BIOPSY Right 05/21/2021    DCIS    BREAST EXCISIONAL BIOPSY Right 11/01/2004    benign    CATARACT EXTRACTION, BILATERAL  08/01/2018    COLONOSCOPY N/A 5/8/2019    Procedure: COLONOSCOPY;  Surgeon: Tien Cortez MD;  Location: [facility]; Service: Gastroenterology    EGD AND COLONOSCOPY N/A 3/19/2018    Procedure: EGD AND COLONOSCOPY;  Surgeon: Tien Cortez MD;  Location: [facility]; Service: Gastroenterology    ESOPHAGOGASTRODUODENOSCOPY N/A 5/8/2019    Procedure: ESOPHAGOGASTRODUODENOSCOPY (EGD); Surgeon: Tien Cortez MD;  Location: [facility];   Service: Gastroenterology    IR PARACENTESIS  11/27/2020    IR PARACENTESIS  12/8/2020    IR PARACENTESIS  10/28/2022    IR PARACENTESIS  11/7/2022    IR PARACENTESIS  11/30/2022    IR PARACENTESIS  12/30/2022    IR PARACENTESIS  1/16/2023    IR THORACENTESIS  1/16/2023    IR THORACENTESIS  3/28/2023    KNEE SURGERY      KNEE SURGERY      LUMBAR LAMINECTOMY  04/1999    LYMPH NODE BIOPSY      MAMMO STEREOTACTIC BREAST BIOPSY RIGHT (ALL INC) Right 5/21/2021 MAMMO STEREOTACTIC BREAST BIOPSY RIGHT (ALL INC) EACH ADD Right 5/21/2021    TOTAL ABDOMINAL HYSTERECTOMY  02/05/2008    TUBAL LIGATION      TUBAL LIGATION  1989    UPPER GASTROINTESTINAL ENDOSCOPY       Family History   Problem Relation Age of Onset    Breast cancer Mother     No Known Problems Father     No Known Problems Sister     No Known Problems Brother     No Known Problems Son     No Known Problems Maternal Grandmother     No Known Problems Maternal Grandfather     No Known Problems Paternal Grandmother     No Known Problems Paternal Grandfather     No Known Problems Maternal Aunt     No Known Problems Maternal Aunt     No Known Problems Paternal Aunt     Substance Abuse Neg Hx     Mental illness Neg Hx       reports that she has been smoking cigarettes. She started smoking about 45 years ago. She has a 45.00 pack-year smoking history. She has never used smokeless tobacco. She reports current alcohol use of about 7.0 standard drinks of alcohol per week. She reports that she does not use drugs. Physical Exam:   Vitals:    12/04/23 1350   BP: 120/70   Pulse: 65   SpO2: 97%   Weight: 67.6 kg (149 lb)   Height: 5' 4" (1.626 m)     Body mass index is 25.58 kg/m². General: NAD  Neuro: AAO  Skin: no rash  Eyes: anicteric  ENMT: mmmoist  Neck: no masses  Respiratory: CTAB  Cardiovascular: RRR  Extremities: no edema  Gastrointestinal: soft nt nd    Procedure:  No results found for this or any previous visit. Lab Results   Component Value Date    GLUCOSE 95 07/28/2017    CALCIUM 8.9 11/29/2023     07/28/2017    K 3.7 11/29/2023    CO2 24 11/29/2023    CL 98 11/29/2023    BUN 12 11/29/2023    CREATININE 0.86 11/29/2023       Results from last 7 days   Lab Units 11/29/23  1245 11/29/23  1240   WHITE BLOOD CELL COUNT. Thousand/uL 5.2 5.3   HEMOGLOBIN. g/dL 14.0 14.3   HEMATOCRIT. % 39.2 40.0   PLATELETS.  Thousand/uL 83* 90*   POTASSIUM mmol/L 3.7 3.6   CHLORIDE mmol/L 98 98   CO2 mmol/L 24 25   BUN mg/dL 12 12   CREATININE mg/dL 0.86 0.87   CALCIUM mg/dL 8.9 9.0     I have personally reviewed the blood work as stated above and in my note. I have personally reviewed last renal note.

## 2023-12-05 ENCOUNTER — HOSPITAL ENCOUNTER (OUTPATIENT)
Dept: CARDIOLOGY | Facility: OTHER | Age: 78
Discharge: HOME | End: 2023-12-05
Attending: FAMILY MEDICINE | Admitting: FAMILY MEDICINE
Payer: COMMERCIAL

## 2023-12-05 DIAGNOSIS — R00.1 BRADYCARDIA: ICD-10-CM

## 2023-12-05 LAB
ATRIAL RATE - MUSE: 48 BPM
DIASTOLIC BLOOD PRESSURE - MUSE: NORMAL MMHG
INTERPRETATION ECG - MUSE: NORMAL
P AXIS - MUSE: 53 DEGREES
PR INTERVAL - MUSE: 182 MS
QRS DURATION - MUSE: 100 MS
QT - MUSE: 504 MS
QTC - MUSE: 450 MS
R AXIS - MUSE: 48 DEGREES
SYSTOLIC BLOOD PRESSURE - MUSE: NORMAL MMHG
T AXIS - MUSE: 80 DEGREES
VENTRICULAR RATE- MUSE: 48 BPM

## 2023-12-05 PROCEDURE — 93246 EXT ECG>7D<15D RECORDING: CPT

## 2023-12-05 PROCEDURE — 999N000157 HC STATISTIC RCP TIME EA 10 MIN

## 2023-12-05 NOTE — PATIENT INSTRUCTIONS
Date Placed on Pt:  12/05/2023    Respiratory Therapist reviewed [employer] placement process and asked patient if they are comfortable proceeding with placement.  Patient (is or is not) is comfortable proceeding.  Patient (would or would not) would not like an employee of same gender to be (present or to place) present or to place the [employer] Patch.    Patient instructed not to:   -take baths, swim, sauna   -remove device prior to 14 days   -use electric blankets   -shower or sweat excessively within first 24 hours of device application  Patient instructed to:   -shower as needed   -be carefull when toweling off and dressing   -press button when cardiac symptoms occur   -document symptoms in log book   -remove and return device (send via mail to [employer] company)   -Call [employer] with any questions    Documentation of Zio Patch placement site (Bleeding or Not Bleeding) Not Bleeding.  If there is bleeding documentation of why.  N/A    Documentation of accommodations made for patient.  No

## 2024-01-03 NOTE — PROGRESS NOTES
Patient Information     Patient Name MRN Sex Arun Israel [id number] Male 1945      Progress Notes by Jorge Alberto Daniels MD at 10/27/2017 11:45 AM     Author:  Jorge Alberto Daniels MD Service:  (none) Author Type:  Physician     Filed:  10/27/2017 12:10 PM Encounter Date:  10/27/2017 Status:  Signed     :  Jorge Alberto Daniels MD (Physician)            Type of Visit  Established    Chief Complaint  Microscopic hematuria    HPI  Mr. Wren is a 72 y.o. male with history of ED who presents with microscopic hematuria.  Microhematuria was noted on UA yesterday.  There was no sign of UTI in the patient denies dysuria.  Patient denies clots associated with hematuria.    He was a former smoker but quit about 25 years ago.    Hematuria-related signs/symptoms  History of smoking?    Yes - former  History of chemotherapy?   No  History of radiation?    No  History of kidney or bladder stones?  No  History of frequent urinary tract infections? No      Review of Systems  Nursing Notes:   Sheela Stewart RN  10/27/2017 11:31 AM  Signed  Patient here for FU and microscopic hematuria. Was seen in clinic yesterday by Dr. Child with symptoms of weak stream and feeling of retention and pain radiating from pubic bone to back. Patient states symptoms have resolved after rectal exam yesterday. No complaints today. SHEELA STEWART RN ....................  10/27/2017   11:29 AM          Review of Systems:    Weight loss:    No     Recent fever/chills:  No   Night sweats:   No  Current skin rash:  No   Recent hair loss:  No  Heat intolerance:  No   Cold intolerance:  No  Chest pain:   No   Palpitations:   No  Shortness of breath:  No   Wheezing:   No  Constipation:    No   Diarrhea:   No   Nausea:   No   Vomiting:   No   Kidney/side pain:  No   Back pain:   No  Frequent headaches:  No   Dizziness:     No  Leg swelling:   No   Calf pain:    No        Family History  Family History       Problem   Relation Age of Onset     Diabetes   Mother      Other  Mother      joint disease       Heart Disease  Mother 70     CABG       Stroke  Father      stroke/heavy smoker       Diabetes  Paternal Grandmother      ?GMother         Physical Exam  /80  Pulse 62  Resp 16  Wt 103.7 kg (228 lb 9.6 oz)  BMI 33.76 kg/m2  Constitutional: NAD, WDWN.  Cardiovascular: Regular rate.  Pulmonary/Chest: Respirations are even and non-labored bilaterally.  Abdominal: Soft. No distension, tenderness, masses or guarding. No CVA tenderness.  Extremities: FRANNY x 4, Warm. No clubbing.  No cyanosis.    Skin: Pink, warm and dry.  No rashes noted.    Labs  Results for IVELISSE WREN (MRN [id number]) as of 10/27/2017 11:33   10/26/2017 09:38   COLOR                     Yellow   CLARITY                   Clear   SPECIFIC GRAVITY,URINE    1.015   PH,URINE                  5.5   UROBILINOGEN,QUALITATIVE  Normal   PROTEIN, URINE Negative   GLUCOSE, URINE Negative   KETONES,URINE             Negative   BILIRUBIN,URINE           Negative   OCCULT BLOOD,URINE        Large (A)   NITRITE                   Negative   LEUKOCYTE ESTERASE        Negative   RBC 26-50 (A)   WBC None Seen   BACTERIA None Seen   EPITHELIAL CELLS None Seen   HYALINE CASTS 11-25 (A)   GRANULAR CASTS 0-2 (A)       CREATININE (mg/dL)    Date Value   07/24/2017 0.97       Imaging  None    Assessment  Mr. Wren is a 72 y.o. male with sterile microhematuria.    Discussed the rationale for work up.  Discussed potential findings of hematuria work up including, but not limited to, kidney stones, bladder tumors and/or kidney tumors.  Also discussed the potential for a normal work up.    He was somewhat reluctant to undergo testing but I explained that with kidney tumors or bladder tumors additional symptoms are rare unless the cancer becomes locally advanced.    Plan  CT Urogram with follow up for cystoscopy         independent

## 2024-01-18 ENCOUNTER — OFFICE VISIT (OUTPATIENT)
Dept: FAMILY MEDICINE | Facility: OTHER | Age: 79
End: 2024-01-18
Attending: FAMILY MEDICINE
Payer: COMMERCIAL

## 2024-01-18 VITALS
OXYGEN SATURATION: 100 % | TEMPERATURE: 97 F | BODY MASS INDEX: 35.24 KG/M2 | RESPIRATION RATE: 14 BRPM | DIASTOLIC BLOOD PRESSURE: 68 MMHG | WEIGHT: 238.6 LBS | HEART RATE: 58 BPM | SYSTOLIC BLOOD PRESSURE: 134 MMHG

## 2024-01-18 DIAGNOSIS — I50.32 DIASTOLIC DYSFUNCTION WITH CHRONIC HEART FAILURE (H): ICD-10-CM

## 2024-01-18 DIAGNOSIS — E66.01 MORBID OBESITY (H): ICD-10-CM

## 2024-01-18 DIAGNOSIS — I10 ESSENTIAL HYPERTENSION: Primary | ICD-10-CM

## 2024-01-18 DIAGNOSIS — I73.9 PAD (PERIPHERAL ARTERY DISEASE) (H): ICD-10-CM

## 2024-01-18 PROCEDURE — 99213 OFFICE O/P EST LOW 20 MIN: CPT | Performed by: FAMILY MEDICINE

## 2024-01-18 PROCEDURE — G0463 HOSPITAL OUTPT CLINIC VISIT: HCPCS

## 2024-01-18 ASSESSMENT — PAIN SCALES - GENERAL: PAINLEVEL: NO PAIN (0)

## 2024-01-18 NOTE — PROGRESS NOTES
"  Assessment & Plan     (I10) Essential hypertension  (primary encounter diagnosis)  Comment: this is at goal currently, and the bradycardia has improved, not fully resolved. With his HF, the betablocker is ideal, but if having symptoms from bradycardia we can continue to taper it down. For now we both elected to noty make changes yet.   Plan:      (I50.32) Diastolic dysfunction with chronic heart failure (H)  Comment: see above. Having no symptoms form this currently   Plan:      (I73.9) PAD (peripheral artery disease) (H24)  Comment: this too is stable, is on a statin.   Plan:      (E66.01) Morbid obesity (H)  Comment: stable. Discussed with him diet and exercise treatments to help with the hypertension as well as the lumbar stenosis   Plan:              BMI  Estimated body mass index is 35.24 kg/m  as calculated from the following:    Height as of 12/1/23: 1.753 m (5' 9\").    Weight as of this encounter: 108.2 kg (238 lb 9.6 oz).   Weight management plan: Discussed healthy diet and exercise guidelines        Return in about 3 months (around 4/18/2024) for Routine preventive, with meMaría Hanson is a 78 year old, presenting for the following health issues:  RECHECK (Lightheadedness)        1/18/2024     9:23 AM   Additional Questions   Roomed by BRITTANY Wheatley   Accompanied by Self         1/18/2024     9:23 AM   Patient Reported Additional Medications   Patient reports taking the following new medications N/A     History of Present Illness       Hypertension: He presents for follow up of hypertension.  He does check blood pressure  regularly outside of the clinic. Outside blood pressures have been over 140/90. He does not follow a low salt diet.     Vascular Disease:  He presents for follow up of vascular disease.      He takes daily aspirin.    He eats 0-1 servings of fruits and vegetables daily.He consumes 0 sweetened beverage(s) daily.He exercises with enough effort to increase his heart rate 10 to 19 " "minutes per day.  He exercises with enough effort to increase his heart rate 7 days per week.   He is taking medications regularly.     At the last visit we had reduced his beta blocker. Since then his HR has risen a little. Feeling about 50% better. No longer has a feeling like is going to faint. Previously would happen if he would stand too quickly. No longer. But has also started to pause once standing for a few moments. Zio patch showed minimal supraventricular ectopy.  He says hi HR was 40-45 before and now it is 45-50 at home.     No chest pain, no shortness of breath. No weight changes. \"Keeping my normal fat on me\". Would like to lose 20#. Has never tried before. Eats ice cream often, daily typically. 1 medium bowl.             Current Outpatient Medications   Medication    acetaminophen (TYLENOL) 325 MG tablet    amLODIPine (NORVASC) 5 MG tablet    aspirin (ASA) 81 MG chewable tablet    carvedilol (COREG) 3.125 MG tablet    hydrochlorothiazide (HYDRODIURIL) 25 MG tablet    lisinopril (ZESTRIL) 40 MG tablet    nitroGLYcerin (NITROSTAT) 0.4 MG sublingual tablet    rosuvastatin (CRESTOR) 40 MG tablet    sildenafil (REVATIO) 20 MG tablet    amoxicillin (AMOXIL) 500 MG capsule    senna-docusate (SENOKOT-S/PERICOLACE) 8.6-50 MG tablet     No current facility-administered medications for this visit.           Objective    /68   Pulse 58   Temp 97  F (36.1  C) (Temporal)   Resp 14   Wt 108.2 kg (238 lb 9.6 oz)   SpO2 100%   BMI 35.24 kg/m    Body mass index is 35.24 kg/m .    Physical Exam  Constitutional:       Appearance: Normal appearance.   HENT:      Head: Normocephalic.   Cardiovascular:      Rate and Rhythm: Regular rhythm. Bradycardia present.      Heart sounds: No murmur heard.     No friction rub. No gallop.   Skin:     General: Skin is warm.   Neurological:      General: No focal deficit present.      Mental Status: He is alert and oriented to person, place, and time.   Psychiatric:         " Mood and Affect: Mood normal.         Behavior: Behavior normal.                    Signed Electronically by: Richard Arita MD

## 2024-01-18 NOTE — NURSING NOTE
"Chief Complaint   Patient presents with    RECHECK     Lightheadedness       Initial /68   Pulse 58   Temp 97  F (36.1  C) (Temporal)   Resp 14   Wt 108.2 kg (238 lb 9.6 oz)   SpO2 100%   BMI 35.24 kg/m   Estimated body mass index is 35.24 kg/m  as calculated from the following:    Height as of 12/1/23: 1.753 m (5' 9\").    Weight as of this encounter: 108.2 kg (238 lb 9.6 oz).  Medication Reconciliation: complete        "

## 2024-04-09 DIAGNOSIS — I25.810 CORONARY ARTERY DISEASE INVOLVING AUTOLOGOUS ARTERY CORONARY BYPASS GRAFT WITHOUT ANGINA PECTORIS: ICD-10-CM

## 2024-04-12 DIAGNOSIS — E78.00 HYPERCHOLESTEROLEMIA: ICD-10-CM

## 2024-04-12 RX ORDER — LISINOPRIL 40 MG/1
40 TABLET ORAL AT BEDTIME
Qty: 90 TABLET | Refills: 3 | Status: SHIPPED | OUTPATIENT
Start: 2024-04-12

## 2024-04-12 NOTE — TELEPHONE ENCOUNTER
[facility] sent Rx request for the following:      Requested Prescriptions   Pending Prescriptions Disp Refills    lisinopril (ZESTRIL) 40 MG tablet [Pharmacy Med Name: LISINOPRIL 40MG TABLET] 90 tablet 3     Sig: TAKE 1 TABLET (40 MG) BY MOUTH AT BEDTIME     Last Prescription Date:   4/12/23  Last Fill Qty/Refills:         90, R-4    Last Office Visit:              12/1/23   Future Office visit:           4/19/24    Prescription approved per [facility] Refill Protocol.  Danielle Tang RN on 4/12/2024 at 10:41 AM

## 2024-04-17 RX ORDER — ROSUVASTATIN CALCIUM 40 MG/1
40 TABLET, COATED ORAL DAILY
Qty: 90 TABLET | Refills: 4 | Status: SHIPPED | OUTPATIENT
Start: 2024-04-17

## 2024-04-17 NOTE — TELEPHONE ENCOUNTER
[id number] sent Rx request for the following:      Requested Prescriptions   Pending Prescriptions Disp Refills    rosuvastatin (CRESTOR) 40 MG tablet [Pharmacy Med Name: ROSUVASTATIN 40MG TABLET] 90 tablet 4     Sig: TAKE 1 TABLET (40 MG) BY MOUTH DAILY       Antihyperlipidemic agents Failed - 4/17/2024  7:56 AM        Failed - LDL on file in the past 12 months          Last Prescription Date:   4/7/23  Last Fill Qty/Refills:         90, R-4    Last Office Visit:              1/18/24   Future Office visit:           4/19/24    Routing refill request to provider for review/approval because:  Labs out of range:  LDL    Erin Ibanez RN on 4/17/2024 at 7:57 AM

## 2024-04-19 ENCOUNTER — OFFICE VISIT (OUTPATIENT)
Dept: FAMILY MEDICINE | Facility: OTHER | Age: 79
End: 2024-04-19
Attending: FAMILY MEDICINE
Payer: COMMERCIAL

## 2024-04-19 VITALS
OXYGEN SATURATION: 100 % | TEMPERATURE: 96.9 F | RESPIRATION RATE: 16 BRPM | DIASTOLIC BLOOD PRESSURE: 78 MMHG | HEART RATE: 48 BPM | BODY MASS INDEX: 35.28 KG/M2 | HEIGHT: 69 IN | WEIGHT: 238.2 LBS | SYSTOLIC BLOOD PRESSURE: 122 MMHG

## 2024-04-19 DIAGNOSIS — M79.89 OTHER SPECIFIED SOFT TISSUE DISORDERS: ICD-10-CM

## 2024-04-19 DIAGNOSIS — I10 BENIGN ESSENTIAL HYPERTENSION: ICD-10-CM

## 2024-04-19 DIAGNOSIS — L57.0 ACTINIC KERATOSIS: ICD-10-CM

## 2024-04-19 DIAGNOSIS — Z00.00 HEALTHCARE MAINTENANCE: ICD-10-CM

## 2024-04-19 DIAGNOSIS — I71.21 ANEURYSM OF ASCENDING AORTA WITHOUT RUPTURE (H): ICD-10-CM

## 2024-04-19 DIAGNOSIS — M54.41 CHRONIC BILATERAL LOW BACK PAIN WITH BILATERAL SCIATICA: ICD-10-CM

## 2024-04-19 DIAGNOSIS — I10 ESSENTIAL HYPERTENSION: ICD-10-CM

## 2024-04-19 DIAGNOSIS — I25.10 CORONARY ARTERY DISEASE INVOLVING NATIVE CORONARY ARTERY OF NATIVE HEART WITHOUT ANGINA PECTORIS: ICD-10-CM

## 2024-04-19 DIAGNOSIS — Z00.00 ENCOUNTER FOR MEDICARE ANNUAL WELLNESS EXAM: Primary | ICD-10-CM

## 2024-04-19 DIAGNOSIS — I50.32 DIASTOLIC DYSFUNCTION WITH CHRONIC HEART FAILURE (H): ICD-10-CM

## 2024-04-19 DIAGNOSIS — E78.2 MIXED HYPERLIPIDEMIA: ICD-10-CM

## 2024-04-19 DIAGNOSIS — G89.29 CHRONIC BILATERAL LOW BACK PAIN WITH BILATERAL SCIATICA: ICD-10-CM

## 2024-04-19 DIAGNOSIS — M54.42 CHRONIC BILATERAL LOW BACK PAIN WITH BILATERAL SCIATICA: ICD-10-CM

## 2024-04-19 DIAGNOSIS — M48.062 PSEUDOCLAUDICATION: ICD-10-CM

## 2024-04-19 DIAGNOSIS — Z29.11 NEED FOR VACCINATION AGAINST RESPIRATORY SYNCYTIAL VIRUS: ICD-10-CM

## 2024-04-19 LAB
ALT SERPL W P-5'-P-CCNC: 20 U/L (ref 0–70)
CHOLEST SERPL-MCNC: 142 MG/DL
FASTING STATUS PATIENT QL REPORTED: NO
HDLC SERPL-MCNC: 66 MG/DL
LDLC SERPL CALC-MCNC: 61 MG/DL
NONHDLC SERPL-MCNC: 76 MG/DL
TRIGL SERPL-MCNC: 74 MG/DL

## 2024-04-19 PROCEDURE — 99214 OFFICE O/P EST MOD 30 MIN: CPT | Mod: 25 | Performed by: FAMILY MEDICINE

## 2024-04-19 PROCEDURE — G0439 PPPS, SUBSEQ VISIT: HCPCS | Performed by: FAMILY MEDICINE

## 2024-04-19 PROCEDURE — G0463 HOSPITAL OUTPT CLINIC VISIT: HCPCS

## 2024-04-19 PROCEDURE — 84460 ALANINE AMINO (ALT) (SGPT): CPT | Mod: ZL | Performed by: FAMILY MEDICINE

## 2024-04-19 PROCEDURE — 36415 COLL VENOUS BLD VENIPUNCTURE: CPT | Mod: ZL | Performed by: FAMILY MEDICINE

## 2024-04-19 PROCEDURE — 80061 LIPID PANEL: CPT | Mod: ZL | Performed by: FAMILY MEDICINE

## 2024-04-19 RX ORDER — HYDROCHLOROTHIAZIDE 25 MG/1
25 TABLET ORAL DAILY
Qty: 90 TABLET | Refills: 4 | Status: SHIPPED | OUTPATIENT
Start: 2024-04-19

## 2024-04-19 RX ORDER — RESPIRATORY SYNCYTIAL VIRUS VACCINE 120MCG/0.5
0.5 KIT INTRAMUSCULAR ONCE
Qty: 1 EACH | Refills: 0 | Status: SHIPPED | OUTPATIENT
Start: 2024-04-19 | End: 2024-04-19

## 2024-04-19 SDOH — HEALTH STABILITY: PHYSICAL HEALTH: ON AVERAGE, HOW MANY DAYS PER WEEK DO YOU ENGAGE IN MODERATE TO STRENUOUS EXERCISE (LIKE A BRISK WALK)?: 0 DAYS

## 2024-04-19 ASSESSMENT — SOCIAL DETERMINANTS OF HEALTH (SDOH): HOW OFTEN DO YOU GET TOGETHER WITH FRIENDS OR RELATIVES?: TWICE A WEEK

## 2024-04-19 ASSESSMENT — PAIN SCALES - GENERAL: PAINLEVEL: MILD PAIN (2)

## 2024-04-19 NOTE — COMMUNITY RESOURCES LIST (ENGLISH)
April 19, 2024           YOUR PERSONALIZED LIST OF SERVICES & PROGRAMS           & RECREATION    Sports      [facility] [employer] - Sports clubs and recreational activities  [address] [place] [address] (Distance: 11.7 miles)  Phone: [phone]  Website: https://HeySpace/  Language: English  Fee: Sliding scale, Self pay      [facility] - Adult Enrichment  Phone: [phone]  Website: [email or web address]/adults-seniors/adult-[email or web address]  Language: English  Hours: Mon 7:30 AM - 4:00 PM Tue 7:30 AM - 4:00 PM Wed 7:30 AM - 4:00 PM Thu 7:30 AM - 4:00 PM Fri 7:30 AM - 4:00 PM    Classes/Groups      [facility] [employer] - Group fitness classes  [address] [place] [address] (Distance: 11.7 miles)  Phone: [phone]  Website: https://HeySpace/  Language: English  Fee: Sliding scale, Self pay      [facility] [employer] - Gym or workout facility  [address] [place] [address] (Distance: 11.7 miles)  Phone: [phone]  Website: https://HeySpace/  Language: English  Fee: Sliding scale, Self pay      [facility] - Adult Enrichment  Phone: [phone]  Website: [email or web address]-[email or web address]-[email or web address]  Language: English  Hours: Mon 7:30 AM - 4:00 PM Tue 7:30 AM - 4:00 PM Wed 7:30 AM - 4:00 PM Thu 7:30 AM - 4:00 PM Fri 7:30 AM - 4:00 PM               IMPORTANT NUMBERS & WEBSITES        Emergency Services  911  .   United Way  211 http://211unitedway.org  .   Poison Control  (657) 278-5223 http://[place]poison.org http://wisconsinpoison.org  .     Suicide and Crisis Lifeline  988 http://988lifeline.org  .   Childhelp National Child Abuse Hotline  627.406.2290 http://Childhelphotline.org   .   National Sexual Assault Hotline  (604) 596-3754 (HOPE) http://Rainn.org   .     National Runaway Safeline  (890) 525-5482 (RUNAWAY) http://Little BirdrunaTheBlogTV.org  .   Pregnancy & Postpartum Support  Call/text 962-922-5971  [place]:  http://ppsupportmn.org  [place]: http://Vinja.com/wi  .   Substance Abuse National Helpline ([facility])  800-902-HELP (8216) http://Findtreatment.gov   .                DISCLAIMER: These resources have been generated via the [employer] Platform. [employer] does not endorse any service providers mentioned in this resource list. [employer] does not guarantee that the services mentioned in this resource list will be available to you or will improve your health or wellness.    [facility]
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
## 2024-04-19 NOTE — PATIENT INSTRUCTIONS
Preventive Care Advice   This is general advice given by our system to help you stay healthy. However, your care team may have specific advice just for you. Please talk to your care team about your preventive care needs.  Nutrition  Eat 5 or more servings of fruits and vegetables each day.  Try wheat bread, brown rice and whole grain pasta (instead of white bread, rice, and pasta).  Get enough calcium and vitamin D. Check the label on foods and aim for 100% of the RDA (recommended daily allowance).  Lifestyle  Exercise at least 150 minutes each week   (30 minutes a day, 5 days a week).  Do muscle strengthening activities 2 days a week. These help control your weight and prevent disease.  No smoking.  Wear sunscreen to prevent skin cancer.  Have a dental exam and cleaning every 6 months.  Yearly exams  See your health care team every year to talk about:  Any changes in your health.  Any medicines your care team has prescribed.  Preventive care, family planning, and ways to prevent chronic diseases.  Shots (vaccines)   HPV shots (up to age 26), if you've never had them before.  Hepatitis B shots (up to age 59), if you've never had them before.  COVID-19 shot: Get this shot when it's due.  Flu shot: Get a flu shot every year.  Tetanus shot: Get a tetanus shot every 10 years.  Pneumococcal, hepatitis A, and RSV shots: Ask your care team if you need these based on your risk.  Shingles shot (for age 50 and up).  General health tests  Diabetes screening:  Starting at age 35, Get screened for diabetes at least every 3 years.  If you are younger than age 35, ask your care team if you should be screened for diabetes.  Cholesterol test: At age 39, start having a cholesterol test every 5 years, or more often if advised.  Bone density scan (DEXA): At age 50, ask your care team if you should have this scan for osteoporosis (brittle bones).  Hepatitis C: Get tested at least once in your life.  STIs (sexually transmitted  infections)  Before age 24: Ask your care team if you should be screened for STIs.  After age 24: Get screened for STIs if you're at risk. You are at risk for STIs (including HIV) if:  You are sexually active with more than one person.  You don't use condoms every time.  You or a partner was diagnosed with a sexually transmitted infection.  If you are at risk for HIV, ask about PrEP medicine to prevent HIV.  Get tested for HIV at least once in your life, whether you are at risk for HIV or not.  Cancer screening tests  Cervical cancer screening: If you have a cervix, begin getting regular cervical cancer screening tests at age 21. Most people who have regular screenings with normal results can stop after age 65. Talk about this with your provider.  Breast cancer scan (mammogram): If you've ever had breasts, begin having regular mammograms starting at age 40. This is a scan to check for breast cancer.  Colon cancer screening: It is important to start screening for colon cancer at age 45.  Have a colonoscopy test every 10 years (or more often if you're at risk) Or, ask your provider about stool tests like a FIT test every year or Cologuard test every 3 years.  To learn more about your testing options, visit: https://www.[employer]/686813.pdf.  For help making a decision, visit: https://bit.ly/sn83148.  Prostate cancer screening test: If you have a prostate and are age 55 to 69, ask your provider if you would benefit from a yearly prostate cancer screening test.  Lung cancer screening: If you are a current or former smoker age 50 to 80, ask your care team if ongoing lung cancer screenings are right for you.  For informational purposes only. Not to replace the advice of your health care provider. Copyright   2023 [employer]. All rights reserved. Clinically reviewed by the [facility]. [employer] 830720 - REV 01/24.    Preventing Falls: Care Instructions  Injuries and health  problems such as trouble walking or poor eyesight can increase your risk of falling. So can some medicines. But there are things you can do to help prevent falls. You can exercise to get stronger. You can also arrange your home to make it safer.    Talk to your doctor about the medicines you take. Ask if any of them increase the risk of falls and whether they can be changed or stopped.   Try to exercise regularly. It can help improve your strength and balance. This can help lower your risk of falling.     Practice fall safety and prevention.    Wear low-heeled shoes that fit well and give your feet good support. Talk to your doctor if you have foot problems that make this hard.  Carry a cellphone or wear a medical alert device that you can use to call for help.  Use stepladders instead of chairs to reach high objects. Don't climb if you're at risk for falls. Ask for help, if needed.  Wear the correct eyeglasses, if you need them.    Make your home safer.    Remove rugs, cords, clutter, and furniture from walkways.  Keep your house well lit. Use night-lights in hallways and bathrooms.  Install and use sturdy handrails on stairways.  Wear nonskid footwear, even inside. Don't walk barefoot or in socks without shoes.    Be safe outside.    Use handrails, curb cuts, and ramps whenever possible.  Keep your hands free by using a shoulder bag or backpack.  Try to walk in well-lit areas. Watch out for uneven ground, changes in pavement, and debris.  Be careful in the winter. Walk on the grass or gravel when sidewalks are slippery. Use de-icer on steps and walkways. Add non-slip devices to shoes.    Put grab bars and nonskid mats in your shower or tub and near the toilet. Try to use a shower chair or bath bench when bathing.   Get into a tub or shower by putting in your weaker leg first. Get out with your strong side first. Have a phone or medical alert device in the bathroom with you.   Where can you learn more?  Go to  "https://www.[employer].net/patiented  Enter G117 in the search box to learn more about \"Preventing Falls: Care Instructions.\"  Current as of: July 17, 2023               Content Version: 14.0    2187-2801 [employer].   Care instructions adapted under license by your healthcare professional. If you have questions about a medical condition or this instruction, always ask your healthcare professional. [employer] disclaims any warranty or liability for your use of this information.      "

## 2024-04-19 NOTE — PROGRESS NOTES
Preventive Care Visit  [facility]  Richard Arita MD, Family Medicine  Apr 19, 2024  {Provider  Link to SmartSet :273991}    {PROVIDER CHARTING PREFERENCE:157591}    Subjective   Gene is a 79 year old, presenting for the following:  Medicare Visit        4/19/2024     9:14 AM   Additional Questions   Roomed by BRITTANY Wheatley   Accompanied by Self         4/19/2024     9:14 AM   Patient Reported Additional Medications   Patient reports taking the following new medications N/A     {ROOMER if patient is in their first year of Medicare a vision screen is required click here to document the Vison screen and then refresh the note to pull in results  :155134}    Health Care Directive  Patient has a Health Care Directive on file  Advance care planning document is on file and is current.    HPI  ***    {SUPERLIST (Optional):885927}  {additonal problems for provider to add (Optional):788498}      4/19/2024   General Health   How would you rate your overall physical health? Good   Feel stress (tense, anxious, or unable to sleep) Not at all         4/19/2024   Nutrition   Diet: Regular (no restrictions)         4/19/2024   Exercise   Days per week of moderate/strenous exercise 0 days         4/19/2024   Social Factors   Frequency of gathering with friends or relatives Twice a week   Worry food won't last until get money to buy more No   Food not last or not have enough money for food? No   Do you have housing?  Yes   Are you worried about losing your housing? No   Lack of transportation? No   Unable to get utilities (heat,electricity)? No         4/12/2023   Activities of Daily Living- Home Safety   Needs help with the following daily activites NO assistance is needed   Safety concerns in the home None of the above         4/19/2024   Dental   Dentist two times every year? Yes         4/12/2023   Hearing Screening   Hearing concerns? Difficulty following a conversation in a noisy restaurant or crowded room     Feel that people are mumbling or not speaking clearly    Need to ask people to speak up or repeat themselves    Find that men's voices are easier to understand than woman's    Difficulty understanding speech on the telephone            No data to display                   {Rooming Staff Patient needs a PHQ as part of the AWV.  Use this link to complete and then refresh the note to pull results Link to PHQ2 Assessment :883024}  {USE TO PULL IN PHQ RESULTS FOR TODAY:628803}      2024   Substance Use   Alcohol more than 3/day or more than 7/wk No   Do you have a current opioid prescription? No   How severe/bad is pain from 1 to 10? 2/10   Do you use any other substances recreationally? No     Social History     Tobacco Use    Smoking status: Former     Current packs/day: 0.00     Types: Cigarettes     Start date: 1961     Quit date: 1988     Years since quittin.3    Smokeless tobacco: Never   Vaping Use    Vaping status: Never Used   Substance Use Topics    Alcohol use: Yes     Comment: Alcoholic Drinks/day: 1-2 drinks per day, whiskey    Drug use: No     {Provider  If there are gaps in the social history shown above, please follow the link to update and then refresh the note Link to Social and Substance History :399949}  ASCVD Risk   The 10-year ASCVD risk score (Hina STARK, et al., 2019) is: 28.7%    Values used to calculate the score:      Age: 79 years      Sex: Male      Is Non- : No      Diabetic: No      Tobacco smoker: No      Systolic Blood Pressure: 122 mmHg      Is BP treated: Yes      HDL Cholesterol: 64 mg/dL      Total Cholesterol: 174 mg/dL    {Link to Fracture Risk Assessment Tool (Optional):978444}    {Provider  Use the storyboard to review patient history, after sections have been marked as reviewed, refresh note to capture documentation:978306}  {Provider   REQUIRED AWV use this link to review and update sexual activity history  after section  "has been marked as reviewed, refresh note to capture documentation:307612}  Reviewed and updated as needed this visit by Provider                    {HISTORY OPTIONS (Optional):350729}  Current providers sharing in care for this patient include:  Patient Care Team:  Richard Arita MD as PCP - General (Family Medicine)  Richard Arita MD as Assigned PCP  Jann Ulloa, DO as Assigned Heart and Vascular Provider  Pepe Cameron MD as Assigned Musculoskeletal Provider    The following health maintenance items are reviewed in Epic and correct as of today:  Health Maintenance   Topic Date Due    HF ACTION PLAN  Never done    RSV VACCINE (Pregnancy & 60+) (1 - 1-dose 60+ series) Never done    LIPID  01/19/2023    ALT  06/22/2023    COVID-19 Vaccine (5 - 2023-24 season) 09/01/2023    FALL RISK ASSESSMENT  04/12/2024    MEDICARE ANNUAL WELLNESS VISIT  04/12/2024    COLORECTAL CANCER SCREENING  03/16/2025 (Originally 1945)    BMP  06/01/2024    CBC  12/01/2024    GLUCOSE  12/01/2026    ADVANCE CARE PLANNING  04/13/2028    DTAP/TDAP/TD IMMUNIZATION (5 - Td or Tdap) 01/12/2033    TSH W/FREE T4 REFLEX  Completed    HEPATITIS C SCREENING  Completed    PHQ-2 (once per calendar year)  Completed    INFLUENZA VACCINE  Completed    Pneumococcal Vaccine: 65+ Years  Completed    ZOSTER IMMUNIZATION  Completed    IPV IMMUNIZATION  Aged Out    HPV IMMUNIZATION  Aged Out    MENINGITIS IMMUNIZATION  Aged Out    RSV MONOCLONAL ANTIBODY  Aged Out    LUNG CANCER SCREENING  Discontinued       {ROS Picklists (Optional):805955}     Objective    Exam  /78   Pulse (!) 48   Temp 96.9  F (36.1  C) (Tympanic)   Resp 16   Ht 1.746 m (5' 8.75\")   Wt 108 kg (238 lb 3.2 oz)   SpO2 100%   BMI 35.43 kg/m     Estimated body mass index is 35.43 kg/m  as calculated from the following:    Height as of this encounter: 1.746 m (5' 8.75\").    Weight as of this encounter: 108 kg (238 lb 3.2 oz).    Physical Exam  {Exam Choices " (Optional):629619}        4/19/2024   Mini Cog   Clock Draw Score 2 Normal   3 Item Recall 3 objects recalled   Mini Cog Total Score 5     {A Mini-Cog total score of 0-2 suggests the possibility of dementia, score of 3-5 suggests no dementia:810210}         Signed Electronically by: Richard Arita MD  {Email feedback regarding this note to [email or web address]   :936494}

## 2024-04-19 NOTE — NURSING NOTE
"Chief Complaint   Patient presents with    Medicare Visit       Initial /78   Pulse (!) 48   Temp 96.9  F (36.1  C) (Tympanic)   Resp 16   Ht 1.746 m (5' 8.75\")   Wt 108 kg (238 lb 3.2 oz)   SpO2 100%   BMI 35.43 kg/m   Estimated body mass index is 35.43 kg/m  as calculated from the following:    Height as of this encounter: 1.746 m (5' 8.75\").    Weight as of this encounter: 108 kg (238 lb 3.2 oz).  Medication Reconciliation: complete          "

## 2024-05-22 ENCOUNTER — HOSPITAL ENCOUNTER (OUTPATIENT)
Dept: CARDIOLOGY | Facility: OTHER | Age: 79
Discharge: HOME | End: 2024-05-22
Attending: FAMILY MEDICINE
Payer: COMMERCIAL

## 2024-05-22 ENCOUNTER — HOSPITAL ENCOUNTER (OUTPATIENT)
Dept: ULTRASOUND IMAGING | Facility: OTHER | Age: 79
Discharge: HOME | End: 2024-05-22
Attending: FAMILY MEDICINE
Payer: COMMERCIAL

## 2024-05-22 VITALS — SYSTOLIC BLOOD PRESSURE: 168 MMHG | DIASTOLIC BLOOD PRESSURE: 85 MMHG

## 2024-05-22 DIAGNOSIS — I71.21 ANEURYSM OF ASCENDING AORTA WITHOUT RUPTURE (H): ICD-10-CM

## 2024-05-22 DIAGNOSIS — M79.89 OTHER SPECIFIED SOFT TISSUE DISORDERS: ICD-10-CM

## 2024-05-22 DIAGNOSIS — M48.062 PSEUDOCLAUDICATION: ICD-10-CM

## 2024-05-22 LAB — LVEF ECHO: NORMAL

## 2024-05-22 PROCEDURE — 93306 TTE W/DOPPLER COMPLETE: CPT

## 2024-05-22 PROCEDURE — 93306 TTE W/DOPPLER COMPLETE: CPT | Mod: 26 | Performed by: INTERNAL MEDICINE

## 2024-05-22 PROCEDURE — 93922 UPR/L XTREMITY ART 2 LEVELS: CPT

## 2024-09-19 ENCOUNTER — OFFICE VISIT (OUTPATIENT)
Dept: FAMILY MEDICINE | Facility: OTHER | Age: 79
End: 2024-09-19
Attending: PHYSICIAN ASSISTANT
Payer: COMMERCIAL

## 2024-09-19 VITALS
WEIGHT: 235.4 LBS | DIASTOLIC BLOOD PRESSURE: 79 MMHG | BODY MASS INDEX: 35.02 KG/M2 | OXYGEN SATURATION: 96 % | TEMPERATURE: 97.8 F | HEART RATE: 52 BPM | SYSTOLIC BLOOD PRESSURE: 142 MMHG

## 2024-09-19 DIAGNOSIS — M54.42 CHRONIC BILATERAL LOW BACK PAIN WITH BILATERAL SCIATICA: ICD-10-CM

## 2024-09-19 DIAGNOSIS — G89.29 CHRONIC BILATERAL LOW BACK PAIN WITH BILATERAL SCIATICA: ICD-10-CM

## 2024-09-19 DIAGNOSIS — Z23 NEED FOR INFLUENZA VACCINATION: ICD-10-CM

## 2024-09-19 DIAGNOSIS — I10 ESSENTIAL HYPERTENSION: ICD-10-CM

## 2024-09-19 DIAGNOSIS — I25.10 CORONARY ARTERY DISEASE INVOLVING NATIVE CORONARY ARTERY OF NATIVE HEART WITHOUT ANGINA PECTORIS: ICD-10-CM

## 2024-09-19 DIAGNOSIS — I65.23 CAROTID STENOSIS, ASYMPTOMATIC, BILATERAL: ICD-10-CM

## 2024-09-19 DIAGNOSIS — R42 DIZZINESS: Primary | ICD-10-CM

## 2024-09-19 DIAGNOSIS — M54.41 CHRONIC BILATERAL LOW BACK PAIN WITH BILATERAL SCIATICA: ICD-10-CM

## 2024-09-19 DIAGNOSIS — R53.83 FATIGUE, UNSPECIFIED TYPE: ICD-10-CM

## 2024-09-19 DIAGNOSIS — E66.01 MORBID (SEVERE) OBESITY DUE TO EXCESS CALORIES (H): ICD-10-CM

## 2024-09-19 LAB
ALBUMIN UR-MCNC: 10 MG/DL
ANION GAP SERPL CALCULATED.3IONS-SCNC: 9 MMOL/L (ref 7–15)
APPEARANCE UR: CLEAR
BASOPHILS # BLD AUTO: 0.1 10E3/UL (ref 0–0.2)
BASOPHILS NFR BLD AUTO: 1 %
BILIRUB UR QL STRIP: NEGATIVE
BUN SERPL-MCNC: 27.3 MG/DL (ref 8–23)
CALCIUM SERPL-MCNC: 9.1 MG/DL (ref 8.8–10.4)
CHLORIDE SERPL-SCNC: 101 MMOL/L (ref 98–107)
COLOR UR AUTO: ABNORMAL
CREAT SERPL-MCNC: 1.2 MG/DL (ref 0.67–1.17)
EGFRCR SERPLBLD CKD-EPI 2021: 62 ML/MIN/1.73M2
EOSINOPHIL # BLD AUTO: 0.4 10E3/UL (ref 0–0.7)
EOSINOPHIL NFR BLD AUTO: 5 %
ERYTHROCYTE [DISTWIDTH] IN BLOOD BY AUTOMATED COUNT: 14.2 % (ref 10–15)
GLUCOSE SERPL-MCNC: 99 MG/DL (ref 70–99)
GLUCOSE UR STRIP-MCNC: NEGATIVE MG/DL
HCO3 SERPL-SCNC: 30 MMOL/L (ref 22–29)
HCT VFR BLD AUTO: 48.3 % (ref 40–53)
HGB BLD-MCNC: 16.2 G/DL (ref 13.3–17.7)
HGB UR QL STRIP: NEGATIVE
IMM GRANULOCYTES # BLD: 0 10E3/UL
IMM GRANULOCYTES NFR BLD: 0 %
KETONES UR STRIP-MCNC: NEGATIVE MG/DL
LEUKOCYTE ESTERASE UR QL STRIP: NEGATIVE
LYMPHOCYTES # BLD AUTO: 1.9 10E3/UL (ref 0.8–5.3)
LYMPHOCYTES NFR BLD AUTO: 21 %
MCH RBC QN AUTO: 32.7 PG (ref 26.5–33)
MCHC RBC AUTO-ENTMCNC: 33.5 G/DL (ref 31.5–36.5)
MCV RBC AUTO: 98 FL (ref 78–100)
MONOCYTES # BLD AUTO: 0.9 10E3/UL (ref 0–1.3)
MONOCYTES NFR BLD AUTO: 10 %
MUCOUS THREADS #/AREA URNS LPF: PRESENT /LPF
NEUTROPHILS # BLD AUTO: 5.7 10E3/UL (ref 1.6–8.3)
NEUTROPHILS NFR BLD AUTO: 63 %
NITRATE UR QL: NEGATIVE
NRBC # BLD AUTO: 0 10E3/UL
NRBC BLD AUTO-RTO: 0 /100
PH UR STRIP: 6.5 [PH] (ref 5–9)
PLATELET # BLD AUTO: 190 10E3/UL (ref 150–450)
POTASSIUM SERPL-SCNC: 4.3 MMOL/L (ref 3.4–5.3)
RBC # BLD AUTO: 4.95 10E6/UL (ref 4.4–5.9)
RBC URINE: 1 /HPF
SODIUM SERPL-SCNC: 140 MMOL/L (ref 135–145)
SP GR UR STRIP: 1.02 (ref 1–1.03)
TSH SERPL DL<=0.005 MIU/L-ACNC: 2.05 UIU/ML (ref 0.3–4.2)
UROBILINOGEN UR STRIP-MCNC: 3 MG/DL
WBC # BLD AUTO: 9 10E3/UL (ref 4–11)
WBC URINE: 1 /HPF

## 2024-09-19 PROCEDURE — 82306 VITAMIN D 25 HYDROXY: CPT | Mod: ZL | Performed by: PHYSICIAN ASSISTANT

## 2024-09-19 PROCEDURE — 90662 IIV NO PRSV INCREASED AG IM: CPT

## 2024-09-19 PROCEDURE — 80048 BASIC METABOLIC PNL TOTAL CA: CPT | Mod: ZL | Performed by: PHYSICIAN ASSISTANT

## 2024-09-19 PROCEDURE — 36415 COLL VENOUS BLD VENIPUNCTURE: CPT | Mod: ZL | Performed by: PHYSICIAN ASSISTANT

## 2024-09-19 PROCEDURE — G0463 HOSPITAL OUTPT CLINIC VISIT: HCPCS | Mod: 25

## 2024-09-19 PROCEDURE — 81003 URINALYSIS AUTO W/O SCOPE: CPT | Mod: ZL | Performed by: PHYSICIAN ASSISTANT

## 2024-09-19 PROCEDURE — 85004 AUTOMATED DIFF WBC COUNT: CPT | Mod: ZL | Performed by: PHYSICIAN ASSISTANT

## 2024-09-19 PROCEDURE — 99214 OFFICE O/P EST MOD 30 MIN: CPT | Performed by: PHYSICIAN ASSISTANT

## 2024-09-19 PROCEDURE — 84443 ASSAY THYROID STIM HORMONE: CPT | Mod: ZL | Performed by: PHYSICIAN ASSISTANT

## 2024-09-19 RX ORDER — FAMOTIDINE 20 MG
25 TABLET ORAL DAILY
COMMUNITY

## 2024-09-19 RX ORDER — MECLIZINE HYDROCHLORIDE 25 MG/1
25 TABLET ORAL 3 TIMES DAILY PRN
Qty: 30 TABLET | Refills: 2 | Status: SHIPPED | OUTPATIENT
Start: 2024-09-19

## 2024-09-19 ASSESSMENT — ENCOUNTER SYMPTOMS: DIZZINESS: 1
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
## 2024-09-19 NOTE — NURSING NOTE
"Chief Complaint   Patient presents with    Dizziness     Patient has been having dizzy spells for about 2 years. He had heart surgery a few years ago as well and was told he may need a pacemaker in the future. He and wife have noticed that blood pressure and pulse have been low at home. Mother and grandmother were diabetic.  Initial BP (!) 142/79   Pulse 52   Temp 97.8  F (36.6  C) (Tympanic)   Wt 106.8 kg (235 lb 6.4 oz)   SpO2 96%   BMI 35.02 kg/m   Estimated body mass index is 35.02 kg/m  as calculated from the following:    Height as of 4/19/24: 1.746 m (5' 8.75\").    Weight as of this encounter: 106.8 kg (235 lb 6.4 oz).  Medication Review: complete    The next two questions are to help us understand your food security.  If you are feeling you need any assistance in this area, we have resources available to support you today.          4/19/2024   SDOH- Food Insecurity   Within the past 12 months, did you worry that your food would run out before you got money to buy more? N   Within the past 12 months, did the food you bought just not last and you didn t have money to get more? N            Health Care Directive:  Patient has a Health Care Directive on file      Fatuma Jones MA      "

## 2024-09-20 LAB — VIT D+METAB SERPL-MCNC: 52 NG/ML (ref 20–50)

## 2024-10-03 ENCOUNTER — HOSPITAL ENCOUNTER (OUTPATIENT)
Dept: ULTRASOUND IMAGING | Facility: OTHER | Age: 79
Discharge: HOME | End: 2024-10-03
Attending: PHYSICIAN ASSISTANT | Admitting: PHYSICIAN ASSISTANT
Payer: COMMERCIAL

## 2024-10-03 DIAGNOSIS — I65.23 CAROTID STENOSIS, ASYMPTOMATIC, BILATERAL: ICD-10-CM

## 2024-10-03 PROCEDURE — 93880 EXTRACRANIAL BILAT STUDY: CPT

## 2024-10-21 ENCOUNTER — THERAPY VISIT (OUTPATIENT)
Dept: PHYSICAL THERAPY | Facility: OTHER | Age: 79
End: 2024-10-21
Attending: PHYSICIAN ASSISTANT
Payer: COMMERCIAL

## 2024-10-21 DIAGNOSIS — G89.29 CHRONIC BILATERAL LOW BACK PAIN WITH BILATERAL SCIATICA: ICD-10-CM

## 2024-10-21 DIAGNOSIS — M54.42 CHRONIC BILATERAL LOW BACK PAIN WITH BILATERAL SCIATICA: ICD-10-CM

## 2024-10-21 DIAGNOSIS — R42 DIZZINESS: ICD-10-CM

## 2024-10-21 DIAGNOSIS — M54.41 CHRONIC BILATERAL LOW BACK PAIN WITH BILATERAL SCIATICA: ICD-10-CM

## 2024-10-21 PROCEDURE — 97161 PT EVAL LOW COMPLEX 20 MIN: CPT | Mod: GP,PN | Performed by: PHYSICAL THERAPIST

## 2024-10-21 PROCEDURE — 97110 THERAPEUTIC EXERCISES: CPT | Mod: GP,PN | Performed by: PHYSICAL THERAPIST
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
## 2024-10-23 ENCOUNTER — THERAPY VISIT (OUTPATIENT)
Dept: PHYSICAL THERAPY | Facility: OTHER | Age: 79
End: 2024-10-23
Attending: PHYSICIAN ASSISTANT
Payer: COMMERCIAL

## 2024-10-23 DIAGNOSIS — M54.41 CHRONIC BILATERAL LOW BACK PAIN WITH BILATERAL SCIATICA: Primary | ICD-10-CM

## 2024-10-23 DIAGNOSIS — M54.42 CHRONIC BILATERAL LOW BACK PAIN WITH BILATERAL SCIATICA: Primary | ICD-10-CM

## 2024-10-23 DIAGNOSIS — G89.29 CHRONIC BILATERAL LOW BACK PAIN WITH BILATERAL SCIATICA: Primary | ICD-10-CM

## 2024-10-23 PROCEDURE — 97110 THERAPEUTIC EXERCISES: CPT | Mod: GP,PN | Performed by: PHYSICAL THERAPIST

## 2024-10-28 ENCOUNTER — THERAPY VISIT (OUTPATIENT)
Dept: PHYSICAL THERAPY | Facility: OTHER | Age: 79
End: 2024-10-28
Attending: PHYSICIAN ASSISTANT
Payer: COMMERCIAL

## 2024-10-28 DIAGNOSIS — M54.41 CHRONIC BILATERAL LOW BACK PAIN WITH BILATERAL SCIATICA: Primary | ICD-10-CM

## 2024-10-28 DIAGNOSIS — G89.29 CHRONIC BILATERAL LOW BACK PAIN WITH BILATERAL SCIATICA: Primary | ICD-10-CM

## 2024-10-28 DIAGNOSIS — M54.42 CHRONIC BILATERAL LOW BACK PAIN WITH BILATERAL SCIATICA: Primary | ICD-10-CM

## 2024-10-28 PROCEDURE — 97116 GAIT TRAINING THERAPY: CPT | Mod: GP,PN | Performed by: PHYSICAL THERAPIST

## 2024-10-28 PROCEDURE — 97140 MANUAL THERAPY 1/> REGIONS: CPT | Mod: GP,PN | Performed by: PHYSICAL THERAPIST

## 2024-10-28 NOTE — PROGRESS NOTES
[facility] HEART CARE   CARDIOLOGY PROGRESS NOTE     Chief Complaint   Patient presents with    Follow Up     Essential hypertension          Diagnosis:  1.  AS.-Severe.  -Tissue SAVR on 5/31/2022, [facility].   -25 mm Merritt Inspiris Resilia bovine pericardial valve.  2.  CABG x2 on 5/31/2022, [facility].   -rSVG ramus and PDA.   -LCX small and not grafted.   3.  Cardiac cath at the [facility], 4/22/2022.    -LM normal, LAD normal, second diag 30%, circumflex 95%, ramus 90%, and RCA 75%,  4.  A. Fib-postop on 6/2/22.   -Non on  12/5/23.   -Previously on amiodarone x14 days/Coumadin for 3 months per [facility] discharge sum.  5.  Hypertension-uncontrolled.  6.  Hyperlipidemia-controlled.  7.  Syncope.  -2/2 to bradycardia.  8.  CHF-Diastolic.   -Grade 1 on 5/22/24.  -Grade 2 on 2/15/2022.  9.  TAAA.    -3.8 CM  -4.4 CM on 12/7/22.    -4.2 cm on 2/15/2022.  10.  H/O tobacco abuse.  11.  Carotid artery stenosis.   -<50% on 10/3/24.  -< 50% on 4/21/2022 and 12/7/22.  12.  Balance issues.    Assessment:    1.  Previously seen on 5/22/24.  2.  Refilled meds.  Meds include amlodipine 5 mg daily, amoxicillin 2 g, 30-60 minutes prior to dental procedures/cleaning, aspirin 81 mg daily, SL nitro as needed for chest pain and started on amlodipine 5 mg daily.  3.  Hypertension: Uncontrolled.  Blood pressure today of 138/80.  Previous blood pressure is higher.  Start on amlodipine 5 mg once daily.  Continue hydrochlorothiazide 25 mg once daily and lisinopril 40 mg daily.  4.  AVR: With history of severe aortic valve replacement.  Discussed the importance of amoxicillin taken before dental procedures/cleanings and aspirin 81 mg daily.  No issues.  Valve was stable on 5/22/2024.  5.  CAD: History of CABG.  Denies chest discomfort or anginal symptoms.  Aspirin, nitro, and Crestor.  No changes.  6.  Dizziness: Likely dehydration.  Only drinks 3-4, 12 ounce glasses of water, 2 coffees, and 2 whiskeys with occasional milk.  Suggested he increase  his water intake.  Once a month, he will have  dizziness with standing.  7.  Carotid artery stenosis: Less than 50% bilaterally on 10/3/2024.  Findings discussed.  Continue aspirin and statin.  8.  CHF: Diastolic in nature.  Stable.  Currently on hydrochlorothiazide.      Plan:  -Labs today.  -Refill meds.  -Start on amlodipine 5 mg daily for blood pressure.  -EKG today.  -Follow-up 1 year.          Interval history:  See above.    HPI:    Mr. Wren is being seen by cardiology for severe aortic stenosis.  He was seen in the clinic on 1/19/2022.  Repeat echocardiogram completed on 2/15/2022.  He was found to have severe aortic stenosis.  He was referred to cardiology as a result.     He had an echo on 10/31/2018.  At that time, he was found to have mild to moderate aortic valve sclerosis with mild aortic stenosis.  Mean gradient was 18 mmHg.  Valve area was 1.5 cm .  Repeat echocardiogram on 2/15/2022.  Echocardiogram showed a mean gradient of 50 mmHg, peak velocity of 4.3 m/s, and DVI of 0 0.26.  Aortic valve area 0.95 cm .  Ascending aorta was 4.2 cm.     We discussed the results of his echocardiogram.  He understands he has severe aortic stenosis.  We discussed TAVR versus SAVR.  He has denied dyspnea, swelling, chest pain, near-syncope, or syncope.     We had a long discussion using the heart model in the room as well as fitogram.  We discussed TAVR to replace his aortic valve.  He understands will likely require 2 trips to the [facility].  He was explained that the first work-up may include a cardiac catheterization.  We will plan for an ultrasound of his carotids and abdomen for AAA here locally.  He is also aware that after valve replacement, he will need to take amoxicillin 2 g, 30 to 60 minutes prior to dental procedures.  He currently has an abscess tooth which he is on antibiotics.  He needs a tooth pulled.  He cannot get in for couple weeks.  He has an appointment.  He understands this will need to be  taken care of prior to valve replacement.  Risks and benefits discussed.  Patient agreeable to going to the [facility].      Relevant testing:  US carotids on 10/3/24:  1. Less than 50% stenosis of the LESLIE.    2. Less than 50% stenosis of the LICA.     Echo on 5/22/24:  Status post AVR with 25 mm Merritt Inspiris Resilia bovine pericardial valve  on 5/31/2022 by history.  Global and regional left ventricular function is normal with an EF of 60-65%.  Grade I or early diastolic dysfunction.   Right ventricular function, chamber size, wall motion, and thickness are  normal.  The mean gradient across the aortic valve is 13 mmHg.  No aortic regurgitation is present.  Pulmonary artery systolic pressure is normal.  The inferior vena cava is normal.  No pericardial effusion is present.    BHARATI on 5/22/24:  Normal.     Zio patch on 12/5/24:  Findings: Patient's monitor was in place for 13 days and 16 hours.  Minimum heart rate 38 bpm, average heart rate 54 bpm, maximum heart rate 162 bpm. Rhythm/s present include sinus rhythm, supraventricular tachycardia. There were rare ventricular ectopic beats accounting for <1% of all beats. There were 0  ventricular triplets and 0 couplets. There were occasional supraventricular ectopic beats accounting for 1.3% of total beats.  There were 1 triggered events and 0 diary entries during which time the noted rhythms were sinus rhythm, supraventricular ectopy.  There were 10 episodes of supraventricular tachycardia with a max rate of 162.  Review of actual tracings showed no other significant abnormality.     Impression: Zio patch monitor showing predominantly sinus rhythm with occasional supraventricular ectopy that appeared to be symptomatic.          Echocardiogram in 12/7/2022:  Global and regional left ventricular function is normal with an EF of 55-60%.  Global right ventricular function is normal.  Status post AVR with 25 mm Merritt Inspiris Resilia bovine pericardial  valve on 5/31/2022 by history. The valve is well seated. There is no valvular or  paravalvular regurgitation. The mean pressure gradient is 8 mmHg (normal). The peak velocity is 2.0 m/s (normal).  Mild dilatation of the aorta is present. Ascending aorta 4.4 cm. Indexed ascending aorta is 2.0 cm/m^2  IVC diameter <2.1 cm collapsing >50% with sniff suggests a normal RA pressure of 3 mmHg.  No pericardial effusion is present.  This study was compared with the study from 6/4/2022. No significant changes noted.    US carotid arteries on 12/7/2022:  The caliber of the common carotid arteries is preserved. There is less  than 50% atherosclerotic plaque at the carotid bifurcations.    US abdominal aorta on 12/7/2022:  No evidence of an abdominal aortic aneurysm.    BHARATI in 10/31/2022:  Right leg: Resting BHARATI is 1.23, which is normal.  Left leg: Resting BHARATI is 1.14, which is normal.    ZIO on 7/7/2022:  Worn for 11 days and 4 hr's.  After removing artifact, total time was 10 days and 8 hr's. Placed on 7/7/2022 at 11:28 AM and completed on 7/18/2022 at 2:58 PM.  Underlying rhythm was sinus.  Hrt rate ranged from 39 bpm, maximum heart rate of 136 bmp, averaging 54 bmp.  No significant pauses, Mobitz type II or 3rd degree heart block.  No atrial fibrillation on this study.  x1 triggered events and x0 diary entries.  These corresponded to sinus rhythm.  x0 runs of VT.    x6 runs of SVT lasting 5 mins and 16 sec's with a maximum heart rate of 136 bmp.  Rare, <1% of PAC's, atrial couplets, atrial triplets, and PVC's.  0 episodes of ventricular bigeminy.  0 episodes of ventricular trigeminy.    Zio patch in 6/8/2022:  Worn for 11 days and 4 hr's.  After removing artifact, total time was 10 days and 8 hr's. Placed on 7/7/2022 at 11:28 AM and completed on 7/18/2022 at 2:58 PM.  Underlying rhythm was sinus.  Hrt rate ranged from 39 bpm, maximum heart rate of 136 bmp, averaging 54 bmp.  No significant pauses, Mobitz type II or 3rd  degree heart block.  No atrial fibrillation on this study.  x1 triggered events and x0 diary entries.  These corresponded to sinus rhythm.  x0 runs of VT.    x6 runs of SVT lasting 5 mins and 16 sec's with a maximum heart rate of 136 bmp.  Rare, <1% of PAC's, atrial couplets, atrial triplets, and PVC's.  0 episodes of ventricular bigeminy.  0 episodes of ventricular trigeminy.    Echo on 5/31/2022:  s/p CABG+AVR 25MM INSPIRIS RESILIA AORTIC VALVE.  AVR valve leaflets not well seen. Normal Doppler interrogation of the valve. Mean gradient is 5 mmHg. No AI.  Global and regional left ventricular function is normal with an EF of 55-60%.  RV is not seen well. From limited views appear normal size and function.  This study was compared with the study from 2/15/2022.  Pateint is now s/p AVR for severe AS.    Echo on 2/15/2022:  Global and regional left ventricular function is normal with an EF of 55-60%.  Grade II or moderate diastolic dysfunction.  The right ventricle is normal in function and size.  Severe aortic stenosis is present (MG 50 mmHg, PV 4.3 m/s, DVI 0.26)  Ascending aorta is dilated measuring 4.2 cm  No pericardial effusion is present.  This study was compared with the study from 10/31/18 the AS has progressed and aorta is more dilated.     Echo on 10/31/2018:  EF 55-60%.   Thickening of the anterior basal septum present.  Diastolic function normal.  Atria are normal.  Mild aortic stenosis present.     US abdominal aorta on 9/18/2018:  No abdominal aortic aneurysm.        ICD-10-CM    1. History of coronary artery bypass graft x 2 on 5/31/2020 at the [facility]  Z95.1 CBC with platelets     CBC with platelets      2. Essential hypertension  I10 Adult Cardiology Eval  Referral     EKG 12-lead, tracing only     Comprehensive metabolic panel     Magnesium     amLODIPine (NORVASC) 5 MG tablet     amLODIPine (NORVASC) 5 MG tablet     Comprehensive metabolic panel     Magnesium      3. Coronary artery disease  involving native coronary artery of native heart without angina pectoris  I25.10 Adult Cardiology [facility] Referral     EKG 12-lead, tracing only     CBC with platelets     Lipid Profile     aspirin (ASA) 81 MG chewable tablet     nitroGLYcerin (NITROSTAT) 0.4 MG sublingual tablet     CBC with platelets     Lipid Profile      4. S/P AVR on 5/31/2022 with 25 mm Merritt Inspiris Resilia bovine pericardial valve at the [facility]  Z95.2 CBC with platelets     amoxicillin (AMOXIL) 500 MG capsule     aspirin (ASA) 81 MG chewable tablet     CBC with platelets      5. Aneurysm of ascending aorta without rupture (H)  I71.21 Magnesium     Magnesium      6. Aortic valve sclerosis  I35.8       7. Bilateral carotid artery stenosis  I65.23 aspirin (ASA) 81 MG chewable tablet      8. Benign essential hypertension  I10 TSH Reflex GH     amLODIPine (NORVASC) 5 MG tablet     TSH Reflex GH      9. Diastolic dysfunction with chronic heart failure (H)  I50.32 Comprehensive metabolic panel     N terminal pro BNP outpatient     Magnesium     Comprehensive metabolic panel     N terminal pro BNP outpatient     Magnesium      10. H/O aortic stenosis, severe  I35.0 amoxicillin (AMOXIL) 500 MG capsule     aspirin (ASA) 81 MG chewable tablet      11. Mixed hyperlipidemia  E78.2 Lipid Profile     Lipid Profile      12. Morbid obesity (H)  E66.01       13. Status post coronary angiogram on 4/22/2022 with multivessel disease, [facility].  Z98.890 aspirin (ASA) 81 MG chewable tablet     nitroGLYcerin (NITROSTAT) 0.4 MG sublingual tablet      14. Hypercholesterolemia  E78.00 Lipid Profile     TSH Reflex GH     Lipid Profile     TSH Reflex GH      15. History of tobacco abuse  Z87.891       16. Hyperglycemia  R73.9 Hemoglobin A1c     Magnesium     Hemoglobin A1c     Magnesium      17. Screening for prostate cancer  Z12.5 Prostate spec antigen screen     Prostate spec antigen screen            Past Medical History:   Diagnosis Date    Actinic keratosis      05/19/09,Keratosis left external ear, treated with cryocautery, recheck if recurs or persists    Allergic contact dermatitis due to plants, except food     recurrent    Basal cell carcinoma of skin     No Comments Provided    Carpal tunnel syndrome     right greater than left    Closed fracture of base of skull (H)     No Comments Provided    Essential (primary) hypertension     No Comments Provided    Ganglion     No Comments Provided    Hydrocele     No Comments Provided    Male erectile dysfunction     decreased libido    Personal history of nicotine dependence     No Comments Provided    Pure hypercholesterolemia     No Comments Provided    Residual hemorrhoidal skin tags     Past external hemorrhoid    Tinea unguium     No Comments Provided       Past Surgical History:   Procedure Laterality Date    BYPASS GRAFT ARTERY CORONARY, REPAIR VALVE AORTIC, COMBINED N/A 05/31/2022    Procedure: MEDIAN STERNOTOMY. CARDIOPULMONARY BYPASS. ENDOSCOPIC HARVEST OF LEFT GREATER SAPHENOUS VEIN. CORONARY ARTERY BYPASS GRAFT (CABG) X2 . AORTIC VALVE REPLACEMENT USING 25MM INSPIRIS RESILIA  AORTIC VALVE. TRANSESOPHAGEAL ECHOCARDIOGRAM (BREANNE) PER ANESTHESIA.;  Surgeon: Sai Benítez MD;  Location:  OR    BYPASS GRAFT ARTERY CORONARY, REPAIR VALVE AORTIC, COMBINED  05/31/2022    Procedure: ;  Surgeon: Sai Benítez MD;  Location:  OR    COLONOSCOPY  01/01/1998 1998,at [facility], which was normal    COLONOSCOPY  03/03/2005 03/03/05,at [facility]    COLONOSCOPY  03/02/2015    3/2/15,F/U 2020  tubular adnomas    COLONOSCOPY  03/16/2020    F/U N/A hyperplastic    CV CORONARY ANGIOGRAM N/A 04/22/2022    Procedure: Coronary Angiogram with possible intervention;  Surgeon: Saulo Mccarthy MD;  Location:  HEART CARDIAC CATH LAB    CV RIGHT HEART CATH MEASUREMENTS RECORDED N/A 04/22/2022    Procedure: Right Heart Cath;  Surgeon: Saulo Mccarthy MD;  Location:  HEART CARDIAC CATH LAB    OTHER  SURGICAL HISTORY      ,HERNIA REPAIR    OTHER SURGICAL HISTORY      05/24/16,[id number],OTHER,Left,Dr. Jeremiah GIBSON    OTHER SURGICAL HISTORY      07/28/16,[id number],ORCHIECTOMY SIMPLE / PARTIAL / RADICAL W/ SCROTAL / INGUINAL APPROACH,Left,Dr. Jeremiah GIBSON    PICC DOUBLE LUMEN PLACEMENT Right 06/06/2022    on Right Basilic, cut 43 cm, needs X-ray to verify tip due to A-fib       No Known Allergies    Current Outpatient Medications   Medication Sig Dispense Refill    amLODIPine (NORVASC) 5 MG tablet Take 1 tablet (5 mg) by mouth daily. 90 tablet 3    amLODIPine (NORVASC) 5 MG tablet Take 1 tablet (5 mg) by mouth daily. 90 tablet 3    amoxicillin (AMOXIL) 500 MG capsule TAKE 4 CAPSULES BY MOUTH 30-60 MINUTES PRIOR TO DENTAL PROCEDURES/DENTAL CLEANINGS 4 capsule 3    aspirin (ASA) 81 MG chewable tablet Take 1 tablet (81 mg) by mouth daily. 90 tablet 3    nitroGLYcerin (NITROSTAT) 0.4 MG sublingual tablet For chest pain place 1 tablet under the tongue every 5 minutes for 3 doses. If symptoms persist 5 minutes after 1st dose call 911. 25 tablet 3    acetaminophen (TYLENOL) 325 MG tablet Take 2 tablets (650 mg) by mouth every 6 hours as needed for other (For optimal non-opioid multimodal pain management to improve pain control.) 50 tablet 0    hydrochlorothiazide (HYDRODIURIL) 25 MG tablet Take 1 tablet (25 mg) by mouth daily 90 tablet 4    lisinopril (ZESTRIL) 40 MG tablet TAKE 1 TABLET (40 MG) BY MOUTH AT BEDTIME 90 tablet 3    meclizine (ANTIVERT) 25 MG tablet Take 1 tablet (25 mg) by mouth 3 times daily as needed for dizziness. 30 tablet 2    rosuvastatin (CRESTOR) 40 MG tablet TAKE 1 TABLET (40 MG) BY MOUTH DAILY 90 tablet 4    senna-docusate (SENOKOT-S/PERICOLACE) 8.6-50 MG tablet Take 1 to 2 tablets by mouth daily. (Patient not taking: Reported on 1/18/2024) 180 tablet 3    sildenafil (REVATIO) 20 MG tablet TAKE 4-5 TABLETS BY MOUTH ONCE DAILY IF NEEDED FOR OTHER (SPECIFY).TAKE 30 MINUTES BEFORE SEXUAL ACTIVITY. 80  tablet 1    Vitamin D, Cholecalciferol, 25 MCG (1000 UT) CAPS Take 25 mcg by mouth daily.         Social History     Socioeconomic History    Marital status:      Spouse name: Not on file    Number of children: Not on file    Years of education: Not on file    Highest education level: Not on file   Occupational History    Not on file   Tobacco Use    Smoking status: Former     Current packs/day: 0.00     Types: Cigarettes     Start date: 1961     Quit date: 1988     Years since quittin.8    Smokeless tobacco: Never   Vaping Use    Vaping status: Never Used   Substance and Sexual Activity    Alcohol use: Yes     Comment: Alcoholic Drinks/day: 1-2 drinks per day, whiskey    Drug use: No    Sexual activity: Not Currently   Other Topics Concern    Parent/sibling w/ CABG, MI or angioplasty before 65F 55M? Not Asked   Social History Narrative    Quit smoking .  Occ alcohol.  .   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    Stays active, owns tree farm.     Social Drivers of Health     Financial Resource Strain: Low Risk  (2024)    Financial Resource Strain     Within the past 12 months, have you or your family members you live with been unable to get utilities (heat, electricity) when it was really needed?: No   Food Insecurity: Low Risk  (2024)    Food Insecurity     Within the past 12 months, did you worry that your food would run out before you got money to buy more?: No     Within the past 12 months, did the food you bought just not last and you didn t have money to get more?: No   Transportation Needs: Low Risk  (2024)    Transportation Needs     Within the past 12 months, has lack of transportation kept you from medical appointments, getting your medicines, non-medical meetings or appointments, work, or from getting things that you need?: No   Physical Activity: Unknown (2024)    Exercise Vital Sign     Days of Exercise per Week: 0 days     Minutes  of Exercise per Session: Not on file   Stress: No Stress Concern Present (4/19/2024)    [place] [employer] - Occupational Stress Questionnaire     Feeling of Stress : Not at all   Social Connections: Unknown (4/19/2024)    Social Connection and Isolation Panel [NHANES]     Frequency of Communication with Friends and Family: Not on file     Frequency of Social Gatherings with Friends and Family: Twice a week     Attends [employer]ism Services: Not on file     Active Member of Clubs or Organizations: Not on file     Attends Club or Organization Meetings: Not on file     Marital Status: Not on file   Interpersonal Safety: Low Risk  (10/29/2024)    Interpersonal Safety     Do you feel physically and emotionally safe where you currently live?: Yes     Within the past 12 months, have you been hit, slapped, kicked or otherwise physically hurt by someone?: No     Within the past 12 months, have you been humiliated or emotionally abused in other ways by your partner or ex-partner?: No   Housing Stability: Low Risk  (4/19/2024)    Housing Stability     Do you have housing? : Yes     Are you worried about losing your housing?: No       LAB RESULTS:   Anticoagulation Therapy Visit on 06/23/2022   Component Date Value Ref Range Status    INR Point of Care 06/23/2022 3.6 (A) 0.9 - 1.1 Final   Anticoagulation Therapy Visit on 06/20/2022   Component Date Value Ref Range Status    INR Point of Care 06/20/2022 2.9 (A) 0.9 - 1.1 Final   Anticoagulation Therapy Visit on 06/16/2022   Component Date Value Ref Range Status    INR Point of Care 06/16/2022 2.7 (A) 0.9 - 1.1 Final   Anticoagulation Therapy Visit on 06/13/2022   Component Date Value Ref Range Status    INR Point of Care 06/13/2022 3.3 (A) 0.9 - 1.1 Final   Allied Health/Nurse Visit on 05/27/2022   Component Date Value Ref Range Status    SARS CoV2 PCR 05/27/2022 Negative  Negative Final   Lab on 05/18/2022   Component Date Value Ref Range Status    WBC Count  "05/18/2022 7.7  4.0 - 11.0 10e3/uL Final    RBC Count 05/18/2022 4.75  4.40 - 5.90 10e6/uL Final    Hemoglobin 05/18/2022 15.2  13.3 - 17.7 g/dL Final    Hematocrit 05/18/2022 46.5  40.0 - 53.0 % Final    MCV 05/18/2022 98  78 - 100 fL Final    MCH 05/18/2022 32.0  26.5 - 33.0 pg Final    MCHC 05/18/2022 32.7  31.5 - 36.5 g/dL Final    RDW 05/18/2022 13.2  10.0 - 15.0 % Final    Platelet Count 05/18/2022 185  150 - 450 10e3/uL Final    Sodium 05/18/2022 138  133 - 144 mmol/L Final    Potassium 05/18/2022 4.2  3.4 - 5.3 mmol/L Final    Chloride 05/18/2022 104  94 - 109 mmol/L Final    Carbon Dioxide (CO2) 05/18/2022 30  20 - 32 mmol/L Final    Anion Gap 05/18/2022 4  3 - 14 mmol/L Final    Urea Nitrogen 05/18/2022 21  7 - 30 mg/dL Final    Creatinine 05/18/2022 0.99  0.66 - 1.25 mg/dL Final    Calcium 05/18/2022 9.0  8.5 - 10.1 mg/dL Final    Glucose 05/18/2022 97  70 - 99 mg/dL Final    Alkaline Phosphatase 05/18/2022 47  40 - 150 U/L Final    AST 05/18/2022 32  0 - 45 U/L Final    ALT 05/18/2022 36  0 - 70 U/L Final    Protein Total 05/18/2022 7.6  6.8 - 8.8 g/dL Final    Albumin 05/18/2022 3.8  3.4 - 5.0 g/dL Final    Bilirubin Total 05/18/2022 0.5  0.2 - 1.3 mg/dL Final    GFR Estimate 05/18/2022 79  >60 mL/min/1.73m2 Final    ABO/RH(D) 05/18/2022 A POS   Final    Antibody Screen 05/18/2022 Negative  Negative Final    SPECIMEN EXPIRATION DATE 05/18/2022 [id number]   Final   Orders Only on 04/29/2022   Component Date Value Ref Range Status    Prealbumin 05/18/2022 22  15 - 45 mg/dL Final    Magnesium 05/18/2022 2.4 (A) 1.6 - 2.3 mg/dL Final        Review of systems: Negative except that which was noted in the HPI.    Physical examination:  /80 (BP Location: Right arm, Patient Position: Sitting, Cuff Size: Adult Large)   Pulse 53   Temp 97.7  F (36.5  C) (Temporal)   Resp 16   Ht 1.74 m (5' 8.5\")   Wt 106.1 kg (234 lb)   SpO2 98%   BMI 35.06 kg/m      GENERAL APPEARANCE: healthy, alert and no " distress  HEENT: no icterus, no xanthelasmas, normal pupil size and reaction, no cyanosis.  NECK: no adenopathy, no asymmetry, masses.  CHEST: lungs clear to auscultation - no rales, rhonchi or wheezes, no use of accessory muscles, no retractions, respirations are unlabored, normal respiratory rate  CARDIOVASCULAR: regular rhythm, normal S1 with physiologic split S2, no S3 or S4 and no murmur, click or rub  EXTREMITIES: no clubbing, cyanosis or edema  NEURO: alert and oriented normal speech, and affect  VASC: No vascular bruits heard.  SKIN: no ecchymoses, no rashes    Total time spent on day of visit, including review of tests, obtaining/reviewing separately obtained history, ordering medications/tests/procedures, communicating with PCP/consultants, and documenting in electronic medical record: 60 minutes.           Thank you for allowing me to participate in the care of your patient. Please do not hesitate to contact me if you have any questions.     Jann Ulloa, DO        [facility] HEART [facility]   CARDIOLOGY PROGRESS NOTE     Chief Complaint   Patient presents with    Follow Up     Essential hypertension          Diagnosis:  1.  Severe AS, tissue AVR on 5/31/2022 at the [facility]-open. 25 mm Merritt Inspiris Resilia bovine pericardial valve.  2.  CABG x2 on 5/31/2022 at [facility]. rSVG to the ramus and PDA. LCX was small and not grafted.   3.  Cardiac cath at the [facility], 4/22/2022.  LM normal, LAD normal, second diag 30%, circumflex 95%, ramus 90%, and RCA 75%,  4.  Postop A. Fib on 6/2/22.  Amnio, metoprolol, and Coumadin.  5.  Hypertension-uncontrolled.  6.  Hyperlipidemia-controlled.  7.  Syncope likely to bradycardia episodes.  8.  CHF-Diastolic dysfunction grade 2 on 2/15/2022.  9.  BREANNE AAA 4.2 cm on 2/15/2022.  10.  H/O tobacco abuse.  11.  Bilateral carotid artery stenosis less than 50% on 4/21/2022.    Assessment/Plan:    1.  Severe AS: Status post aortic valve replacement on 5/31/2022 at the [facility].  Patient  had a bovine pericardial valve replacement.  Aspirin 81 mg indefinitely.  Will need amoxicillin 2 g prior to dental procedures.  Patient currently participating in cardiac rehab.  2.  CAD: [facility]  R SVG to the ramus and PDA.  Circumflex was too small to be grafted as it was less than 1.5 mm.  Continue aspirin 81 mg daily, metoprolol 25 mg XL daily, sublingual nitro as needed for chest pain, and rosuvastatin 40 mg daily.  3.  Postop A. fib: Currently on Coumadin and metoprolol.  Was on amiodarone for 14 days.  We will plan for Zio patch.  Zio patch unrevealing related to A. fib, plan to discontinue Coumadin.  If found to have A. fib on Zio patch.  We will continue on Coumadin.  4.  TAAA: At 4.2 cm on 2/15/2022.  We will plan for repeat echocardiogram in 1 year.  Results explained.  5.  Carotid artery stenosis.  Less than 50% on 4/21/2022.  We will plan for an ultrasound carotid arteries in 1 year.  6.  Hypertension: Uncontrolled.  Blood pressure today 182/90.  Increase lisinopril from 2.5 mg daily to 10 mg daily.  7.  ED: Has sildenafil/Viagra.  Warned patient if taken sildenafil under no circumstances it is easier to take nitro within 24 hours of taking sildenafil/Viagra.  This will kill him.  8.  Follow-up after completion of Zio patch to determine if should continue Coumadin or not.        Interval history:  Valentin is being seen in follow-up.  He was found to have severe aortic stenosis on 2/15/2022.  Referred to [facility] for valve replacement.  He underwent cardiac catheterization on 4/22/2022.  Found to have disease in the ramus, circumflex, and RCA.  CABG x2 involving the ramus and RCA but circumflex felt to be too small to be bypassed.  Patient has not had chest pain or chest tightness previously.  He is found to have postop atrial fibrillation.  He was continued on amiodarone for 14 days, now off amiodarone.  He has been on metoprolol with some bradycardia.  He is also on  Coumadin.  We will plan for Zio patch.  If Zio patch deficient of A. fib, we will discontinue Coumadin at that time.  He understands he needs to take amoxicillin 2 g, 30 to 60 minutes prior to cleaning or dental work.  He is on aspirin 81 mg indefinitely.  We will plan for an echocardiogram in 1 year related to ascending aortic aneurysm.  We will plan for an ultrasound of his carotids in 1 year related to bilateral carotid artery disease.  He understands under no circumstances is he did take nitro if he is taken sildenafil within 24 hours.  Follow-up will be after testing.      HPI:    Mr. Wren is being seen by cardiology for severe aortic stenosis.  He was seen in the clinic on 1/19/2022.  Repeat echocardiogram completed on 2/15/2022.  He was found to have severe aortic stenosis.  He was referred to cardiology as a result.     He had an echo on 10/31/2018.  At that time, he was found to have mild to moderate aortic valve sclerosis with mild aortic stenosis.  Mean gradient was 18 mmHg.  Valve area was 1.5 cm .  Repeat echocardiogram on 2/15/2022.  Echocardiogram showed a mean gradient of 50 mmHg, peak velocity of 4.3 m/s, and DVI of 0 0.26.  Aortic valve area 0.95 cm .  Ascending aorta was 4.2 cm.     We discussed the results of his echocardiogram.  He understands he has severe aortic stenosis.  We discussed TAVR versus SAVR.  He has denied dyspnea, swelling, chest pain, near-syncope, or syncope.     We had a long discussion using the heart model in the room as well as [employer].  We discussed TAVR to replace his aortic valve.  He understands will likely require 2 trips to the [facility].  He was explained that the first work-up may include a cardiac catheterization.  We will plan for an ultrasound of his carotids and abdomen for AAA here locally.  He is also aware that after valve replacement, he will need to take amoxicillin 2 g, 30 to 60 minutes prior to dental procedures.  He currently has an abscess tooth  which he is on antibiotics.  He needs a tooth pulled.  He cannot get in for couple weeks.  He has an appointment.  He understands this will need to be taken care of prior to valve replacement.  Risks and benefits discussed.  Patient agreeable to going to the [facility].      Relevant testing:  Zio patch in 6/8/2022:  Pending data     Echo on 5/31/2022:  s/p CABG+AVR 25MM INSPIRIS RESILIA AORTIC VALVE.  AVR valve leaflets not well seen. Normal Doppler interrogation of the valve. Mean gradient is 5 mmHg. No AI.  Global and regional left ventricular function is normal with an EF of 55-60%.  RV is not seen well. From limited views appear normal size and function.  This study was compared with the study from 2/15/2022.  Pateint is now s/p AVR for severe AS.    Echo on 2/15/2022:  Global and regional left ventricular function is normal with an EF of 55-60%.  Grade II or moderate diastolic dysfunction.  The right ventricle is normal in function and size.  Severe aortic stenosis is present (MG 50 mmHg, PV 4.3 m/s, DVI 0.26)  Ascending aorta is dilated measuring 4.2 cm  No pericardial effusion is present.  This study was compared with the study from 10/31/18 the AS has progressed and aorta is more dilated.     Echo on 10/31/2018:  EF 55-60%.   Thickening of the anterior basal septum present.  Diastolic function normal.  Atria are normal.  Mild aortic stenosis present.     US abdominal aorta on 9/18/2018:  No abdominal aortic aneurysm.        ICD-10-CM    1. History of coronary artery bypass graft x 2 on 5/31/2020 at the [facility]  Z95.1 CBC with platelets     CBC with platelets      2. Essential hypertension  I10 Adult Cardiology Eval  Referral     EKG 12-lead, tracing only     Comprehensive metabolic panel     Magnesium     amLODIPine (NORVASC) 5 MG tablet     amLODIPine (NORVASC) 5 MG tablet     Comprehensive metabolic panel     Magnesium      3. Coronary artery disease involving native coronary artery  of native heart without angina pectoris  I25.10 Adult Cardiology [facility] Referral     EKG 12-lead, tracing only     CBC with platelets     Lipid Profile     aspirin (ASA) 81 MG chewable tablet     nitroGLYcerin (NITROSTAT) 0.4 MG sublingual tablet     CBC with platelets     Lipid Profile      4. S/P AVR on 5/31/2022 with 25 mm [place] Inspiris Resilia bovine pericardial valve at the [facility]  Z95.2 CBC with platelets     amoxicillin (AMOXIL) 500 MG capsule     aspirin (ASA) 81 MG chewable tablet     CBC with platelets      5. Aneurysm of ascending aorta without rupture (H)  I71.21 Magnesium     Magnesium      6. Aortic valve sclerosis  I35.8       7. Bilateral carotid artery stenosis  I65.23 aspirin (ASA) 81 MG chewable tablet      8. Benign essential hypertension  I10 TSH Reflex GH     amLODIPine (NORVASC) 5 MG tablet     TSH Reflex GH      9. Diastolic dysfunction with chronic heart failure (H)  I50.32 Comprehensive metabolic panel     N terminal pro BNP outpatient     Magnesium     Comprehensive metabolic panel     N terminal pro BNP outpatient     Magnesium      10. H/O aortic stenosis, severe  I35.0 amoxicillin (AMOXIL) 500 MG capsule     aspirin (ASA) 81 MG chewable tablet      11. Mixed hyperlipidemia  E78.2 Lipid Profile     Lipid Profile      12. Morbid obesity (H)  E66.01       13. Status post coronary angiogram on 4/22/2022 with multivessel disease, [facility].  Z98.890 aspirin (ASA) 81 MG chewable tablet     nitroGLYcerin (NITROSTAT) 0.4 MG sublingual tablet      14. Hypercholesterolemia  E78.00 Lipid Profile     TSH Reflex GH     Lipid Profile     TSH Reflex GH      15. History of tobacco abuse  Z87.891       16. Hyperglycemia  R73.9 Hemoglobin A1c     Magnesium     Hemoglobin A1c     Magnesium      17. Screening for prostate cancer  Z12.5 Prostate spec antigen screen     Prostate spec antigen screen            Past Medical History:   Diagnosis Date    Actinic keratosis     05/19/09,Keratosis left  external ear, treated with cryocautery, recheck if recurs or persists    Allergic contact dermatitis due to plants, except food     recurrent    Basal cell carcinoma of skin     No Comments Provided    Carpal tunnel syndrome     right greater than left    Closed fracture of base of skull (H)     No Comments Provided    Essential (primary) hypertension     No Comments Provided    Ganglion     No Comments Provided    Hydrocele     No Comments Provided    Male erectile dysfunction     decreased libido    Personal history of nicotine dependence     No Comments Provided    Pure hypercholesterolemia     No Comments Provided    Residual hemorrhoidal skin tags     Past external hemorrhoid    Tinea unguium     No Comments Provided       Past Surgical History:   Procedure Laterality Date    BYPASS GRAFT ARTERY CORONARY, REPAIR VALVE AORTIC, COMBINED N/A 05/31/2022    Procedure: MEDIAN STERNOTOMY. CARDIOPULMONARY BYPASS. ENDOSCOPIC HARVEST OF LEFT GREATER SAPHENOUS VEIN. CORONARY ARTERY BYPASS GRAFT (CABG) X2 . AORTIC VALVE REPLACEMENT USING 25MM INSPIRIS RESILIA  AORTIC VALVE. TRANSESOPHAGEAL ECHOCARDIOGRAM (BREANNE) PER ANESTHESIA.;  Surgeon: Sai Benítez MD;  Location:  OR    BYPASS GRAFT ARTERY CORONARY, REPAIR VALVE AORTIC, COMBINED  05/31/2022    Procedure: ;  Surgeon: Sai Benítez MD;  [facility]    COLONOSCOPY  01/01/1998 1998,at [facility], which was normal    COLONOSCOPY  03/03/2005 03/03/05,at [facility]    COLONOSCOPY  03/02/2015    3/2/15,F/U 2020  tubular adnomas    COLONOSCOPY  03/16/2020    F/U N/A hyperplastic    CV CORONARY ANGIOGRAM N/A 04/22/2022    Procedure: Coronary Angiogram with possible intervention;  Surgeon: Saulo Mccarthy MD;  Location:  HEART CARDIAC CATH LAB    CV RIGHT HEART CATH MEASUREMENTS RECORDED N/A 04/22/2022    Procedure: Right Heart Cath;  Surgeon: Saulo Mccarthy MD;  Location:  HEART CARDIAC CATH LAB    OTHER SURGICAL HISTORY       ,HERNIA REPAIR    OTHER SURGICAL HISTORY      05/24/16,320615,OTHER,Left,Dr. Jeremiah GIBSON    OTHER SURGICAL HISTORY      07/28/16,[id number],ORCHIECTOMY SIMPLE / PARTIAL / RADICAL W/ SCROTAL / INGUINAL APPROACH,Left,Dr. Jeremiah GIBSON    PICC DOUBLE LUMEN PLACEMENT Right 06/06/2022    on Right Basilic, cut 43 cm, needs X-ray to verify tip due to A-fib       No Known Allergies    Current Outpatient Medications   Medication Sig Dispense Refill    amLODIPine (NORVASC) 5 MG tablet Take 1 tablet (5 mg) by mouth daily. 90 tablet 3    amLODIPine (NORVASC) 5 MG tablet Take 1 tablet (5 mg) by mouth daily. 90 tablet 3    amoxicillin (AMOXIL) 500 MG capsule TAKE 4 CAPSULES BY MOUTH 30-60 MINUTES PRIOR TO DENTAL PROCEDURES/DENTAL CLEANINGS 4 capsule 3    aspirin (ASA) 81 MG chewable tablet Take 1 tablet (81 mg) by mouth daily. 90 tablet 3    nitroGLYcerin (NITROSTAT) 0.4 MG sublingual tablet For chest pain place 1 tablet under the tongue every 5 minutes for 3 doses. If symptoms persist 5 minutes after 1st dose call 911. 25 tablet 3    acetaminophen (TYLENOL) 325 MG tablet Take 2 tablets (650 mg) by mouth every 6 hours as needed for other (For optimal non-opioid multimodal pain management to improve pain control.) 50 tablet 0    hydrochlorothiazide (HYDRODIURIL) 25 MG tablet Take 1 tablet (25 mg) by mouth daily 90 tablet 4    lisinopril (ZESTRIL) 40 MG tablet TAKE 1 TABLET (40 MG) BY MOUTH AT BEDTIME 90 tablet 3    meclizine (ANTIVERT) 25 MG tablet Take 1 tablet (25 mg) by mouth 3 times daily as needed for dizziness. 30 tablet 2    rosuvastatin (CRESTOR) 40 MG tablet TAKE 1 TABLET (40 MG) BY MOUTH DAILY 90 tablet 4    senna-docusate (SENOKOT-S/PERICOLACE) 8.6-50 MG tablet Take 1 to 2 tablets by mouth daily. (Patient not taking: Reported on 1/18/2024) 180 tablet 3    sildenafil (REVATIO) 20 MG tablet TAKE 4-5 TABLETS BY MOUTH ONCE DAILY IF NEEDED FOR OTHER (SPECIFY).TAKE 30 MINUTES BEFORE SEXUAL ACTIVITY. 80 tablet 1    Vitamin D,  Cholecalciferol, 25 MCG (1000 UT) CAPS Take 25 mcg by mouth daily.         Social History     Socioeconomic History    Marital status:      Spouse name: Not on file    Number of children: Not on file    Years of education: Not on file    Highest education level: Not on file   Occupational History    Not on file   Tobacco Use    Smoking status: Former     Current packs/day: 0.00     Types: Cigarettes     Start date: 1961     Quit date: 1988     Years since quittin.8    Smokeless tobacco: Never   Vaping Use    Vaping status: Never Used   Substance and Sexual Activity    Alcohol use: Yes     Comment: Alcoholic Drinks/day: 1-2 drinks per day, whiskey    Drug use: No    Sexual activity: Not Currently   Other Topics Concern    Parent/sibling w/ CABG, MI or angioplasty before 65F 55M? Not Asked   Social History Narrative    Quit smoking .  Occ alcohol.  .   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    Stays active, owns tree farm.     Social Drivers of Health     Financial Resource Strain: Low Risk  (2024)    Financial Resource Strain     Within the past 12 months, have you or your family members you live with been unable to get utilities (heat, electricity) when it was really needed?: No   Food Insecurity: Low Risk  (2024)    Food Insecurity     Within the past 12 months, did you worry that your food would run out before you got money to buy more?: No     Within the past 12 months, did the food you bought just not last and you didn t have money to get more?: No   Transportation Needs: Low Risk  (2024)    Transportation Needs     Within the past 12 months, has lack of transportation kept you from medical appointments, getting your medicines, non-medical meetings or appointments, work, or from getting things that you need?: No   Physical Activity: Unknown (2024)    Exercise Vital Sign     Days of Exercise per Week: 0 days     Minutes of Exercise per  Session: Not on file   Stress: No Stress Concern Present (4/19/2024)    [employer] - Occupational Stress Questionnaire     Feeling of Stress : Not at all   Social Connections: Unknown (4/19/2024)    Social Connection and Isolation Panel [NHANES]     Frequency of Communication with Friends and Family: Not on file     Frequency of Social Gatherings with Friends and Family: Twice a week     Attends Confucianist Services: Not on file     Active Member of Clubs or Organizations: Not on file     Attends Club or Organization Meetings: Not on file     Marital Status: Not on file   Interpersonal Safety: Low Risk  (10/29/2024)    Interpersonal Safety     Do you feel physically and emotionally safe where you currently live?: Yes     Within the past 12 months, have you been hit, slapped, kicked or otherwise physically hurt by someone?: No     Within the past 12 months, have you been humiliated or emotionally abused in other ways by your partner or ex-partner?: No   Housing Stability: Low Risk  (4/19/2024)    Housing Stability     Do you have housing? : Yes     Are you worried about losing your housing?: No       LAB RESULTS:   Anticoagulation Therapy Visit on 06/23/2022   Component Date Value Ref Range Status    INR Point of Care 06/23/2022 3.6 (A) 0.9 - 1.1 Final   Anticoagulation Therapy Visit on 06/20/2022   Component Date Value Ref Range Status    INR Point of Care 06/20/2022 2.9 (A) 0.9 - 1.1 Final   Anticoagulation Therapy Visit on 06/16/2022   Component Date Value Ref Range Status    INR Point of Care 06/16/2022 2.7 (A) 0.9 - 1.1 Final   Anticoagulation Therapy Visit on 06/13/2022   Component Date Value Ref Range Status    INR Point of Care 06/13/2022 3.3 (A) 0.9 - 1.1 Final   Allied Health/Nurse Visit on 05/27/2022   Component Date Value Ref Range Status    SARS CoV2 PCR 05/27/2022 Negative  Negative Final   Lab on 05/18/2022   Component Date Value Ref Range Status    WBC Count 05/18/2022 7.7   "4.0 - 11.0 10e3/uL Final    RBC Count 05/18/2022 4.75  4.40 - 5.90 10e6/uL Final    Hemoglobin 05/18/2022 15.2  13.3 - 17.7 g/dL Final    Hematocrit 05/18/2022 46.5  40.0 - 53.0 % Final    MCV 05/18/2022 98  78 - 100 fL Final    MCH 05/18/2022 32.0  26.5 - 33.0 pg Final    MCHC 05/18/2022 32.7  31.5 - 36.5 g/dL Final    RDW 05/18/2022 13.2  10.0 - 15.0 % Final    Platelet Count 05/18/2022 185  150 - 450 10e3/uL Final    Sodium 05/18/2022 138  133 - 144 mmol/L Final    Potassium 05/18/2022 4.2  3.4 - 5.3 mmol/L Final    Chloride 05/18/2022 104  94 - 109 mmol/L Final    Carbon Dioxide (CO2) 05/18/2022 30  20 - 32 mmol/L Final    Anion Gap 05/18/2022 4  3 - 14 mmol/L Final    Urea Nitrogen 05/18/2022 21  7 - 30 mg/dL Final    Creatinine 05/18/2022 0.99  0.66 - 1.25 mg/dL Final    Calcium 05/18/2022 9.0  8.5 - 10.1 mg/dL Final    Glucose 05/18/2022 97  70 - 99 mg/dL Final    Alkaline Phosphatase 05/18/2022 47  40 - 150 U/L Final    AST 05/18/2022 32  0 - 45 U/L Final    ALT 05/18/2022 36  0 - 70 U/L Final    Protein Total 05/18/2022 7.6  6.8 - 8.8 g/dL Final    Albumin 05/18/2022 3.8  3.4 - 5.0 g/dL Final    Bilirubin Total 05/18/2022 0.5  0.2 - 1.3 mg/dL Final    GFR Estimate 05/18/2022 79  >60 mL/min/1.73m2 Final    ABO/RH(D) 05/18/2022 A POS   Final    Antibody Screen 05/18/2022 Negative  Negative Final    SPECIMEN EXPIRATION DATE 05/18/2022 [id number]   Final   Orders Only on 04/29/2022   Component Date Value Ref Range Status    Prealbumin 05/18/2022 22  15 - 45 mg/dL Final    Magnesium 05/18/2022 2.4 (A) 1.6 - 2.3 mg/dL Final        Review of systems: Negative except that which was noted in the HPI.    Physical examination:  /80 (BP Location: Right arm, Patient Position: Sitting, Cuff Size: Adult Large)   Pulse 53   Temp 97.7  F (36.5  C) (Temporal)   Resp 16   Ht 1.74 m (5' 8.5\")   Wt 106.1 kg (234 lb)   SpO2 98%   BMI 35.06 kg/m      GENERAL APPEARANCE: healthy, alert and no distress  CHEST: " lungs clear to auscultation - no rales, rhonchi or wheezes, no use of accessory muscles, no retractions, respirations are unlabored, normal respiratory rate  CARDIOVASCULAR: regular rhythm, normal S1 with physiologic split S2, no S3 or S4 and no murmur, click or rub  EXTREMITIES: no clubbing, cyanosis or edema      Total time spent on day of visit, including review of tests, obtaining/reviewing separately obtained history, ordering medications/tests/procedures, communicating with PCP/consultants, and documenting in electronic medical record: 30 minutes.           Thank you for allowing me to participate in the care of your patient. Please do not hesitate to contact me if you have any questions.     Jann Ulola, DO

## 2024-10-29 ENCOUNTER — OFFICE VISIT (OUTPATIENT)
Dept: CARDIOLOGY | Facility: OTHER | Age: 79
End: 2024-10-29
Attending: INTERNAL MEDICINE
Payer: COMMERCIAL

## 2024-10-29 VITALS
HEART RATE: 53 BPM | BODY MASS INDEX: 34.66 KG/M2 | WEIGHT: 234 LBS | TEMPERATURE: 97.7 F | DIASTOLIC BLOOD PRESSURE: 80 MMHG | RESPIRATION RATE: 16 BRPM | HEIGHT: 69 IN | OXYGEN SATURATION: 98 % | SYSTOLIC BLOOD PRESSURE: 138 MMHG

## 2024-10-29 DIAGNOSIS — I10 ESSENTIAL HYPERTENSION: ICD-10-CM

## 2024-10-29 DIAGNOSIS — I10 BENIGN ESSENTIAL HYPERTENSION: ICD-10-CM

## 2024-10-29 DIAGNOSIS — E78.00 HYPERCHOLESTEROLEMIA: ICD-10-CM

## 2024-10-29 DIAGNOSIS — Z98.890 STATUS POST CORONARY ANGIOGRAM: ICD-10-CM

## 2024-10-29 DIAGNOSIS — I50.32 DIASTOLIC DYSFUNCTION WITH CHRONIC HEART FAILURE (H): ICD-10-CM

## 2024-10-29 DIAGNOSIS — E78.2 MIXED HYPERLIPIDEMIA: ICD-10-CM

## 2024-10-29 DIAGNOSIS — I35.0 AORTIC STENOSIS, SEVERE: ICD-10-CM

## 2024-10-29 DIAGNOSIS — Z12.5 SCREENING FOR PROSTATE CANCER: ICD-10-CM

## 2024-10-29 DIAGNOSIS — I71.21 ANEURYSM OF ASCENDING AORTA WITHOUT RUPTURE (H): ICD-10-CM

## 2024-10-29 DIAGNOSIS — I35.8 AORTIC VALVE SCLEROSIS: ICD-10-CM

## 2024-10-29 DIAGNOSIS — Z95.1 HISTORY OF CORONARY ARTERY BYPASS GRAFT X 2: Primary | ICD-10-CM

## 2024-10-29 DIAGNOSIS — E66.01 MORBID OBESITY (H): ICD-10-CM

## 2024-10-29 DIAGNOSIS — Z87.891 HISTORY OF TOBACCO ABUSE: ICD-10-CM

## 2024-10-29 DIAGNOSIS — Z95.2 S/P AVR: ICD-10-CM

## 2024-10-29 DIAGNOSIS — I25.10 CORONARY ARTERY DISEASE INVOLVING NATIVE CORONARY ARTERY OF NATIVE HEART WITHOUT ANGINA PECTORIS: ICD-10-CM

## 2024-10-29 DIAGNOSIS — R73.9 HYPERGLYCEMIA: ICD-10-CM

## 2024-10-29 DIAGNOSIS — I65.23 BILATERAL CAROTID ARTERY STENOSIS: ICD-10-CM

## 2024-10-29 LAB
ALBUMIN SERPL BCG-MCNC: 4.4 G/DL (ref 3.5–5.2)
ALP SERPL-CCNC: 61 U/L (ref 40–150)
ALT SERPL W P-5'-P-CCNC: 19 U/L (ref 0–70)
ANION GAP SERPL CALCULATED.3IONS-SCNC: 9 MMOL/L (ref 7–15)
AST SERPL W P-5'-P-CCNC: 25 U/L (ref 0–45)
ATRIAL RATE - MUSE: 58 BPM
BILIRUB SERPL-MCNC: 0.5 MG/DL
BUN SERPL-MCNC: 23.2 MG/DL (ref 8–23)
CALCIUM SERPL-MCNC: 9.4 MG/DL (ref 8.8–10.4)
CHLORIDE SERPL-SCNC: 99 MMOL/L (ref 98–107)
CHOLEST SERPL-MCNC: 139 MG/DL
CREAT SERPL-MCNC: 1.15 MG/DL (ref 0.67–1.17)
DIASTOLIC BLOOD PRESSURE - MUSE: NORMAL MMHG
EGFRCR SERPLBLD CKD-EPI 2021: 65 ML/MIN/1.73M2
ERYTHROCYTE [DISTWIDTH] IN BLOOD BY AUTOMATED COUNT: 13.6 % (ref 10–15)
EST. AVERAGE GLUCOSE BLD GHB EST-MCNC: 117 MG/DL
FASTING STATUS PATIENT QL REPORTED: NO
FASTING STATUS PATIENT QL REPORTED: NO
GLUCOSE SERPL-MCNC: 82 MG/DL (ref 70–99)
HBA1C MFR BLD: 5.7 %
HCO3 SERPL-SCNC: 30 MMOL/L (ref 22–29)
HCT VFR BLD AUTO: 47.4 % (ref 40–53)
HDLC SERPL-MCNC: 71 MG/DL
HGB BLD-MCNC: 16 G/DL (ref 13.3–17.7)
INTERPRETATION ECG - MUSE: NORMAL
LDLC SERPL CALC-MCNC: 56 MG/DL
MAGNESIUM SERPL-MCNC: 2.3 MG/DL (ref 1.7–2.3)
MCH RBC QN AUTO: 32.8 PG (ref 26.5–33)
MCHC RBC AUTO-ENTMCNC: 33.8 G/DL (ref 31.5–36.5)
MCV RBC AUTO: 97 FL (ref 78–100)
NONHDLC SERPL-MCNC: 68 MG/DL
NT-PROBNP SERPL-MCNC: 292 PG/ML (ref 0–1800)
P AXIS - MUSE: 49 DEGREES
PLATELET # BLD AUTO: 191 10E3/UL (ref 150–450)
POTASSIUM SERPL-SCNC: 4.2 MMOL/L (ref 3.4–5.3)
PR INTERVAL - MUSE: 210 MS
PROT SERPL-MCNC: 7.6 G/DL (ref 6.4–8.3)
PSA SERPL DL<=0.01 NG/ML-MCNC: 1.26 NG/ML (ref 0–6.5)
QRS DURATION - MUSE: 92 MS
QT - MUSE: 454 MS
QTC - MUSE: 445 MS
R AXIS - MUSE: 45 DEGREES
RBC # BLD AUTO: 4.88 10E6/UL (ref 4.4–5.9)
SODIUM SERPL-SCNC: 138 MMOL/L (ref 135–145)
SYSTOLIC BLOOD PRESSURE - MUSE: NORMAL MMHG
T AXIS - MUSE: 60 DEGREES
TRIGL SERPL-MCNC: 60 MG/DL
TSH SERPL DL<=0.005 MIU/L-ACNC: 1.96 UIU/ML (ref 0.3–4.2)
VENTRICULAR RATE- MUSE: 58 BPM
WBC # BLD AUTO: 9.4 10E3/UL (ref 4–11)

## 2024-10-29 PROCEDURE — 85018 HEMOGLOBIN: CPT | Mod: ZL | Performed by: INTERNAL MEDICINE

## 2024-10-29 PROCEDURE — 82310 ASSAY OF CALCIUM: CPT | Mod: ZL | Performed by: INTERNAL MEDICINE

## 2024-10-29 PROCEDURE — G0463 HOSPITAL OUTPT CLINIC VISIT: HCPCS

## 2024-10-29 PROCEDURE — 93010 ELECTROCARDIOGRAM REPORT: CPT | Performed by: INTERNAL MEDICINE

## 2024-10-29 PROCEDURE — 99214 OFFICE O/P EST MOD 30 MIN: CPT | Performed by: INTERNAL MEDICINE

## 2024-10-29 PROCEDURE — 83036 HEMOGLOBIN GLYCOSYLATED A1C: CPT | Mod: ZL | Performed by: INTERNAL MEDICINE

## 2024-10-29 PROCEDURE — G0103 PSA SCREENING: HCPCS | Mod: ZL | Performed by: INTERNAL MEDICINE

## 2024-10-29 PROCEDURE — 36415 COLL VENOUS BLD VENIPUNCTURE: CPT | Mod: ZL | Performed by: INTERNAL MEDICINE

## 2024-10-29 PROCEDURE — 83880 ASSAY OF NATRIURETIC PEPTIDE: CPT | Mod: ZL | Performed by: INTERNAL MEDICINE

## 2024-10-29 PROCEDURE — 93005 ELECTROCARDIOGRAM TRACING: CPT | Performed by: INTERNAL MEDICINE

## 2024-10-29 PROCEDURE — 80061 LIPID PANEL: CPT | Mod: ZL | Performed by: INTERNAL MEDICINE

## 2024-10-29 PROCEDURE — 84443 ASSAY THYROID STIM HORMONE: CPT | Mod: ZL | Performed by: INTERNAL MEDICINE

## 2024-10-29 PROCEDURE — 83735 ASSAY OF MAGNESIUM: CPT | Mod: ZL | Performed by: INTERNAL MEDICINE

## 2024-10-29 RX ORDER — NITROGLYCERIN 0.4 MG/1
TABLET SUBLINGUAL
Qty: 25 TABLET | Refills: 3 | Status: SHIPPED | OUTPATIENT
Start: 2024-10-29

## 2024-10-29 RX ORDER — ASPIRIN 81 MG/1
81 TABLET, CHEWABLE ORAL DAILY
Qty: 90 TABLET | Refills: 3 | Status: SHIPPED | OUTPATIENT
Start: 2024-10-29

## 2024-10-29 RX ORDER — AMLODIPINE BESYLATE 5 MG/1
5 TABLET ORAL DAILY
Qty: 90 TABLET | Refills: 3 | Status: SHIPPED | OUTPATIENT
Start: 2024-10-29

## 2024-10-29 RX ORDER — AMOXICILLIN 500 MG/1
CAPSULE ORAL
Qty: 4 CAPSULE | Refills: 3 | Status: SHIPPED | OUTPATIENT
Start: 2024-10-29

## 2024-10-29 ASSESSMENT — PAIN SCALES - GENERAL: PAINLEVEL_OUTOF10: NO PAIN (0)

## 2024-10-29 NOTE — LETTER
October 30, 2024      Valentin Wren  [address] [facility] [address]        Dear ,    We are writing to inform you of your test results.    Here is a copy of your lab results. Your A1c which is a 3-month average of your sugars came back at 5.7%.  This puts you in the prediabetic range.  Otherwise, your labs look pretty good.  This includes your electrolytes, liver function, glucose, protein levels, cholesterol levels, TSH for thyroid, PSA for prostate, BNP for heart failure/fluid overload, magnesium level, and blood counts.     Resulted Orders   CBC with platelets   Result Value Ref Range    WBC Count 9.4 4.0 - 11.0 10e3/uL    RBC Count 4.88 4.40 - 5.90 10e6/uL    Hemoglobin 16.0 13.3 - 17.7 g/dL    Hematocrit 47.4 40.0 - 53.0 %    MCV 97 78 - 100 fL    MCH 32.8 26.5 - 33.0 pg    MCHC 33.8 31.5 - 36.5 g/dL    RDW 13.6 10.0 - 15.0 %    Platelet Count 191 150 - 450 10e3/uL   Comprehensive metabolic panel   Result Value Ref Range    Sodium 138 135 - 145 mmol/L    Potassium 4.2 3.4 - 5.3 mmol/L    Carbon Dioxide (CO2) 30 (H) 22 - 29 mmol/L    Anion Gap 9 7 - 15 mmol/L    Urea Nitrogen 23.2 (H) 8.0 - 23.0 mg/dL    Creatinine 1.15 0.67 - 1.17 mg/dL    GFR Estimate 65 >60 mL/min/1.73m2      Comment:      eGFR calculated using 2021 CKD-EPI equation.    Calcium 9.4 8.8 - 10.4 mg/dL      Comment:      Reference intervals for this test were updated on 7/16/2024 to reflect our healthy population more accurately. There may be differences in the flagging of prior results with similar values performed with this method. Those prior results can be interpreted in the context of the updated reference intervals.    Chloride 99 98 - 107 mmol/L    Glucose 82 70 - 99 mg/dL    Alkaline Phosphatase 61 40 - 150 U/L    AST 25 0 - 45 U/L    ALT 19 0 - 70 U/L    Protein Total 7.6 6.4 - 8.3 g/dL    Albumin 4.4 3.5 - 5.2 g/dL    Bilirubin Total 0.5 <=1.2 mg/dL    Patient Fasting > 8hrs? No    Lipid Profile   Result Value Ref  Range    Cholesterol 139 <200 mg/dL    Triglycerides 60 <150 mg/dL    Direct Measure HDL 71 >=40 mg/dL    LDL Cholesterol Calculated 56 <100 mg/dL    Non HDL Cholesterol 68 <130 mg/dL    Patient Fasting > 8hrs? No     Narrative    Cholesterol  Desirable: < 200 mg/dL  Borderline High: 200 - 239 mg/dL  High: >= 240 mg/dL    Triglycerides  Normal: < 150 mg/dL  Borderline High: 150 - 199 mg/dL  High: 200-499 mg/dL  Very High: >= 500 mg/dL    Direct Measure HDL  Female: >= 50 mg/dL   Male: >= 40 mg/dL    LDL Cholesterol  Desirable: < 100 mg/dL  Above Desirable: 100 - 129 mg/dL   Borderline High: 130 - 159 mg/dL   High:  160 - 189 mg/dL   Very High: >= 190 mg/dL    Non HDL Cholesterol  Desirable: < 130 mg/dL  Above Desirable: 130 - 159 mg/dL  Borderline High: 160 - 189 mg/dL  High: 190 - 219 mg/dL  Very High: >= 220 mg/dL   TSH Reflex GH   Result Value Ref Range    TSH 1.96 0.30 - 4.20 uIU/mL   Hemoglobin A1c   Result Value Ref Range    Estimated Average Glucose 117 (H) <117 mg/dL    Hemoglobin A1C 5.7 (H) <5.7 %      Comment:      Normal <5.7%   Prediabetes 5.7-6.4%    Diabetes 6.5% or higher     Note: Adopted from ADA consensus guidelines.   Prostate spec antigen screen   Result Value Ref Range    Prostate Specific Antigen Screen 1.26 0.00 - 6.50 ng/mL    Narrative    This result is obtained using the Roche Elecsys total PSA method on the ez [id number] immunoassay analyzer, which is an ultrasensitive method. Results obtained with different assay methods or kits cannot be used interchangeably.  This test is intended for initial prostate cancer screening. PSA values exceeding the age-specific limits are suspicious for prostate disease, but additional testing, such as prostate biopsy, is needed to diagnose prostate pathology. The American Cancer Society recommends annual examination with digital rectal examination and serum PSA beginning at age 50 and for men with a life expectancy of at least 10 years after detection of  prostate cancer. For men in high-risk groups, such as  Americans or men with a first-degree relative diagnosed at a younger age, testing should begin at a younger age. It is generally recommended that information be provided to patients about the benefits and limitations of testing and treatment so they can make informed decisions.   N terminal pro BNP outpatient   Result Value Ref Range    N Terminal Pro BNP Outpatient 292 0 - 1,800 pg/mL      Comment:      Reference range shown and results flagged as abnormal are for the outpatient, non acute settings. Establishing a baseline value for each individual patient is useful for follow-up.    Suggested inpatient cut points for confirming diagnosis of CHF in an acute setting are:  >450 pg/mL (age 18 to less than 50)  >900 pg/mL (age 50 to less than 75)  >1800 pg/mL (75 yrs and older)    An inpatient or emergency department NT-proPBNP <300 pg/mL effectively rules out acute CHF, with 99% negative predictive value.       Magnesium   Result Value Ref Range    Magnesium 2.3 1.7 - 2.3 mg/dL       If you have any questions or concerns, please call the clinic at the number listed above.       Sincerely,      Jann Ulloa, DO

## 2024-10-29 NOTE — NURSING NOTE
"Chief Complaint   Patient presents with    Follow Up     Essential hypertension       Initial /80 (BP Location: Right arm, Patient Position: Sitting, Cuff Size: Adult Large)   Pulse 53   Temp 97.7  F (36.5  C) (Temporal)   Resp 16   Ht 1.74 m (5' 8.5\")   Wt 106.1 kg (234 lb)   SpO2 98%   BMI 35.06 kg/m   Estimated body mass index is 35.06 kg/m  as calculated from the following:    Height as of this encounter: 1.74 m (5' 8.5\").    Weight as of this encounter: 106.1 kg (234 lb).  Meds Reconciled: complete  Pt is on Aspirin  Pt is on a Statin  PHQ and/or STEVENSON reviewed. Pt referred to PCP/MH Provider as appropriate.    Marine Griffiths LPN      "

## 2024-10-30 ENCOUNTER — THERAPY VISIT (OUTPATIENT)
Dept: PHYSICAL THERAPY | Facility: OTHER | Age: 79
End: 2024-10-30
Attending: PHYSICIAN ASSISTANT
Payer: COMMERCIAL

## 2024-10-30 DIAGNOSIS — G89.29 CHRONIC BILATERAL LOW BACK PAIN WITH BILATERAL SCIATICA: Primary | ICD-10-CM

## 2024-10-30 DIAGNOSIS — M54.42 CHRONIC BILATERAL LOW BACK PAIN WITH BILATERAL SCIATICA: Primary | ICD-10-CM

## 2024-10-30 DIAGNOSIS — M54.41 CHRONIC BILATERAL LOW BACK PAIN WITH BILATERAL SCIATICA: Primary | ICD-10-CM

## 2024-10-30 PROCEDURE — 97140 MANUAL THERAPY 1/> REGIONS: CPT | Mod: GP,PN | Performed by: PHYSICAL THERAPIST

## 2024-10-30 PROCEDURE — 97110 THERAPEUTIC EXERCISES: CPT | Mod: GP,PN | Performed by: PHYSICAL THERAPIST

## 2024-11-06 ENCOUNTER — THERAPY VISIT (OUTPATIENT)
Dept: PHYSICAL THERAPY | Facility: OTHER | Age: 79
End: 2024-11-06
Attending: PHYSICIAN ASSISTANT
Payer: COMMERCIAL

## 2024-11-06 DIAGNOSIS — M54.42 CHRONIC BILATERAL LOW BACK PAIN WITH BILATERAL SCIATICA: Primary | ICD-10-CM

## 2024-11-06 DIAGNOSIS — G89.29 CHRONIC BILATERAL LOW BACK PAIN WITH BILATERAL SCIATICA: Primary | ICD-10-CM

## 2024-11-06 DIAGNOSIS — M54.41 CHRONIC BILATERAL LOW BACK PAIN WITH BILATERAL SCIATICA: Primary | ICD-10-CM

## 2024-11-06 PROCEDURE — 97110 THERAPEUTIC EXERCISES: CPT | Mod: GP,PN | Performed by: PHYSICAL THERAPIST

## 2024-11-06 PROCEDURE — 97140 MANUAL THERAPY 1/> REGIONS: CPT | Mod: GP,PN | Performed by: PHYSICAL THERAPIST

## 2024-11-18 ENCOUNTER — THERAPY VISIT (OUTPATIENT)
Dept: PHYSICAL THERAPY | Facility: OTHER | Age: 79
End: 2024-11-18
Attending: PHYSICIAN ASSISTANT
Payer: COMMERCIAL

## 2024-11-18 DIAGNOSIS — M54.42 CHRONIC BILATERAL LOW BACK PAIN WITH BILATERAL SCIATICA: Primary | ICD-10-CM

## 2024-11-18 DIAGNOSIS — M54.41 CHRONIC BILATERAL LOW BACK PAIN WITH BILATERAL SCIATICA: Primary | ICD-10-CM

## 2024-11-18 DIAGNOSIS — G89.29 CHRONIC BILATERAL LOW BACK PAIN WITH BILATERAL SCIATICA: Primary | ICD-10-CM

## 2024-11-18 PROCEDURE — 97110 THERAPEUTIC EXERCISES: CPT | Mod: GP,PN | Performed by: PHYSICAL THERAPIST

## 2024-11-20 ENCOUNTER — THERAPY VISIT (OUTPATIENT)
Dept: PHYSICAL THERAPY | Facility: OTHER | Age: 79
End: 2024-11-20
Attending: PHYSICIAN ASSISTANT
Payer: COMMERCIAL

## 2024-11-20 DIAGNOSIS — G89.29 CHRONIC BILATERAL LOW BACK PAIN WITH BILATERAL SCIATICA: Primary | ICD-10-CM

## 2024-11-20 DIAGNOSIS — M54.42 CHRONIC BILATERAL LOW BACK PAIN WITH BILATERAL SCIATICA: Primary | ICD-10-CM

## 2024-11-20 DIAGNOSIS — M54.41 CHRONIC BILATERAL LOW BACK PAIN WITH BILATERAL SCIATICA: Primary | ICD-10-CM

## 2024-11-20 PROCEDURE — 97110 THERAPEUTIC EXERCISES: CPT | Mod: GP,PN | Performed by: PHYSICAL THERAPIST

## 2024-11-25 ENCOUNTER — THERAPY VISIT (OUTPATIENT)
Dept: PHYSICAL THERAPY | Facility: OTHER | Age: 79
End: 2024-11-25
Attending: PHYSICIAN ASSISTANT
Payer: COMMERCIAL

## 2024-11-25 DIAGNOSIS — G89.29 CHRONIC BILATERAL LOW BACK PAIN WITH BILATERAL SCIATICA: Primary | ICD-10-CM

## 2024-11-25 DIAGNOSIS — M54.41 CHRONIC BILATERAL LOW BACK PAIN WITH BILATERAL SCIATICA: Primary | ICD-10-CM

## 2024-11-25 DIAGNOSIS — M54.42 CHRONIC BILATERAL LOW BACK PAIN WITH BILATERAL SCIATICA: Primary | ICD-10-CM

## 2024-11-25 PROCEDURE — 97110 THERAPEUTIC EXERCISES: CPT | Mod: GP,PN | Performed by: PHYSICAL THERAPIST

## 2024-11-25 NOTE — PROGRESS NOTES
DISCHARGE  Reason for Discharge: Patient has been seen 8 visits total.  Treatment included manual therapy techniques, stretching, pelvic stabilization exercises, and hip/LE strengthening.  Instructed a HEP and patient feels able to continue independently.  Instructed use of assistive device for increased walking tolerance.  Feels he has made some progress with therapy.  Only a slight improvement in Oswestry Low Back Disability Questionnaire from 26% disability to 24% disability. Patient reports he only has pain with prolonged standing and walking, consistent with spinal stenosis.  Reports pain goes away within about 1 minute of sitting.  Feels he is able to continue self-management and HEP at this time.    Equipment Issued: theraband for use with HEP    Discharge Plan: Patient to continue home program.    Referring Provider:  Corry Wakefield               11/25/24 5103   Appointment Info   Signing clinician's name / credentials Brain Daniels, PT   Visits Used 8   Medical Diagnosis 1) Dizziness   2) chronic bilateral low back pain, bilateral sciatica   PT Tx Diagnosis impaired mobility, balance, chronic pain, spinal stenosis   Other pertinent information appt through 12/4   Progress Note/Certification   Start of Care Date 10/21/24   Onset of illness/injury or Date of Surgery 01/01/22   Therapy Frequency 2x/week   Predicted Duration 8 weeks   Certification date from 10/21/24   Certification date to 12/16/24   Progress Note Due Date 12/16/24   GOALS   PT Goals 2;3;4   PT Goal 1   Goal Identifier HEP   Goal Description Patient will voice understanding of and demonstrate correct performance of flexion exercises for ability to continue with self-management of low back/leg pain and increase standing/walking tolerance to 20 min or more.   Target Date 11/18/24   Date Met 10/30/24   PT Goal 2   Goal Identifier walking   Goal Description Patient will voice understanding and demonstrate correct technique with use of  assistive device for increased standing and walking tolerance to 20 min or more.   Target Date 11/18/24   Date Met 10/30/24   PT Goal 3   Goal Identifier strength   Goal Description Patient will demonstrate increased bilateral plantarflexion strength from 3 and 4/5 to 4 and 5/5 for increased stability with standing and walking.   Goal Progress 11/25: R = 3+/5  L = 4+/5 - progress made   Target Date 12/16/24   PT Goal 4   Goal Identifier flexibility   Goal Description Patient will demonstrate correct performance of hamstring, quadriceps, and piriformis stretches and voice understanding of parameters for ability to safely continue with self-management of low back condition.   Goal Progress goal met - patient reports completing HEP with no difficulty   Target Date 12/16/24   Date Met 11/25/24   Subjective Report   Subjective Report Patient was able to complete HEP over the past couple of days with no proboems.  Feels able to continue with HEP and ongoing self-management of LBP/stenosis.  Voices understanding of prior instruction of use of assistive device to increase tolerance of walking.   Treatment Interventions (PT)   Interventions Therapeutic Procedure/Exercise;Gait Training;Manual Therapy   Therapeutic Procedure/Exercise   Therapeutic Procedures: strength, endurance, ROM, flexibility minutes (23353) 40   Ther Proc 1 Oswestry = 12/50 x 100 = 24% disability (was 26%);  NuStep L5 x 5 min warmup;     Bridges with weight shift x20 total bilaterally;   AB with LE elevation 5 count, 10 x 1;    supine hip IR/ER - cues for use of theraband resistance; Seated hamstring stretches 20 sec x 3 bilaterally;   Heel raises 10x 2;  Abduction 10 x 2 B; Standing forward bent hip Extension 10 x 2 B;   Slow stand to sit 5 x 1;  standing quad stretches, 20 sec x 2 B;   Ther Proc 1 - Details Eval:  SLR R = 48  L = 62;   11/18/24: SLR R = 56 L = 61   Skilled Intervention 11/25/24: SLR R = 59  L = 60   Patient Response/Progress Pt voiced  understanding.   Education   Learner/Method Patient;Listening;Demonstration   Education Comments spine education; activity modification/walking   Plan   Home program 10/23: SKTC, piriformis stretches, sidelying thoracic rotation option, abdominal bracing;    11/18: heel raises, bridges with wt shifting, AB with alt LE/UE lift, supine hip IR/ER (red);   Total Session Time   Timed Code Treatment Minutes 40   Total Treatment Time (sum of timed and untimed services) 40

## 2025-04-24 ENCOUNTER — OFFICE VISIT (OUTPATIENT)
Dept: FAMILY MEDICINE | Facility: OTHER | Age: 80
End: 2025-04-24
Attending: FAMILY MEDICINE
Payer: MEDICARE

## 2025-04-24 VITALS
DIASTOLIC BLOOD PRESSURE: 66 MMHG | TEMPERATURE: 97.4 F | OXYGEN SATURATION: 98 % | BODY MASS INDEX: 35.31 KG/M2 | RESPIRATION RATE: 14 BRPM | SYSTOLIC BLOOD PRESSURE: 124 MMHG | HEIGHT: 69 IN | WEIGHT: 238.4 LBS | HEART RATE: 65 BPM

## 2025-04-24 DIAGNOSIS — N40.1 BENIGN PROSTATIC HYPERPLASIA WITH NOCTURIA: ICD-10-CM

## 2025-04-24 DIAGNOSIS — I25.810 CORONARY ARTERY DISEASE INVOLVING AUTOLOGOUS ARTERY CORONARY BYPASS GRAFT WITHOUT ANGINA PECTORIS: ICD-10-CM

## 2025-04-24 DIAGNOSIS — I10 BENIGN ESSENTIAL HYPERTENSION: ICD-10-CM

## 2025-04-24 DIAGNOSIS — R35.1 BENIGN PROSTATIC HYPERPLASIA WITH NOCTURIA: ICD-10-CM

## 2025-04-24 DIAGNOSIS — L57.0 ACTINIC KERATOSIS: ICD-10-CM

## 2025-04-24 DIAGNOSIS — I50.32 DIASTOLIC DYSFUNCTION WITH CHRONIC HEART FAILURE (H): ICD-10-CM

## 2025-04-24 DIAGNOSIS — Z12.11 SCREEN FOR COLON CANCER: ICD-10-CM

## 2025-04-24 DIAGNOSIS — Z00.00 ENCOUNTER FOR MEDICARE ANNUAL WELLNESS EXAM: Primary | ICD-10-CM

## 2025-04-24 DIAGNOSIS — E66.01 MORBID OBESITY (H): ICD-10-CM

## 2025-04-24 DIAGNOSIS — Z71.85 VACCINE COUNSELING: ICD-10-CM

## 2025-04-24 DIAGNOSIS — I10 ESSENTIAL HYPERTENSION: ICD-10-CM

## 2025-04-24 DIAGNOSIS — E78.00 HYPERCHOLESTEROLEMIA: ICD-10-CM

## 2025-04-24 LAB
ALBUMIN SERPL BCG-MCNC: 4.4 G/DL (ref 3.5–5.2)
ALP SERPL-CCNC: 59 U/L (ref 40–150)
ALT SERPL W P-5'-P-CCNC: 21 U/L (ref 0–70)
ANION GAP SERPL CALCULATED.3IONS-SCNC: 9 MMOL/L (ref 7–15)
AST SERPL W P-5'-P-CCNC: 30 U/L (ref 0–45)
BILIRUB SERPL-MCNC: 0.6 MG/DL
BUN SERPL-MCNC: 28.7 MG/DL (ref 8–23)
CALCIUM SERPL-MCNC: 9.3 MG/DL (ref 8.8–10.4)
CHLORIDE SERPL-SCNC: 104 MMOL/L (ref 98–107)
CHOLEST SERPL-MCNC: 155 MG/DL
CREAT SERPL-MCNC: 1.25 MG/DL (ref 0.67–1.17)
EGFRCR SERPLBLD CKD-EPI 2021: 59 ML/MIN/1.73M2
ERYTHROCYTE [DISTWIDTH] IN BLOOD BY AUTOMATED COUNT: 14 % (ref 10–15)
FASTING STATUS PATIENT QL REPORTED: ABNORMAL
FASTING STATUS PATIENT QL REPORTED: NORMAL
GLUCOSE SERPL-MCNC: 98 MG/DL (ref 70–99)
HCO3 SERPL-SCNC: 29 MMOL/L (ref 22–29)
HCT VFR BLD AUTO: 47.5 % (ref 40–53)
HDLC SERPL-MCNC: 67 MG/DL
HGB BLD-MCNC: 16.1 G/DL (ref 13.3–17.7)
LDLC SERPL CALC-MCNC: 61 MG/DL
MCH RBC QN AUTO: 32.1 PG (ref 26.5–33)
MCHC RBC AUTO-ENTMCNC: 33.9 G/DL (ref 31.5–36.5)
MCV RBC AUTO: 95 FL (ref 78–100)
NONHDLC SERPL-MCNC: 88 MG/DL
PLATELET # BLD AUTO: 192 10E3/UL (ref 150–450)
POTASSIUM SERPL-SCNC: 4.2 MMOL/L (ref 3.4–5.3)
PROT SERPL-MCNC: 7.7 G/DL (ref 6.4–8.3)
RBC # BLD AUTO: 5.02 10E6/UL (ref 4.4–5.9)
SODIUM SERPL-SCNC: 142 MMOL/L (ref 135–145)
TRIGL SERPL-MCNC: 135 MG/DL
WBC # BLD AUTO: 7.9 10E3/UL (ref 4–11)

## 2025-04-24 PROCEDURE — 36415 COLL VENOUS BLD VENIPUNCTURE: CPT | Mod: ZL | Performed by: FAMILY MEDICINE

## 2025-04-24 PROCEDURE — 85041 AUTOMATED RBC COUNT: CPT | Mod: ZL | Performed by: FAMILY MEDICINE

## 2025-04-24 PROCEDURE — 80061 LIPID PANEL: CPT | Mod: ZL | Performed by: FAMILY MEDICINE

## 2025-04-24 PROCEDURE — 80053 COMPREHEN METABOLIC PANEL: CPT | Mod: ZL | Performed by: FAMILY MEDICINE

## 2025-04-24 PROCEDURE — 85018 HEMOGLOBIN: CPT | Mod: ZL | Performed by: FAMILY MEDICINE

## 2025-04-24 RX ORDER — HYDROCHLOROTHIAZIDE 25 MG/1
25 TABLET ORAL DAILY
Qty: 90 TABLET | Refills: 4 | Status: SHIPPED | OUTPATIENT
Start: 2025-04-24

## 2025-04-24 RX ORDER — ROSUVASTATIN CALCIUM 40 MG/1
40 TABLET, COATED ORAL DAILY
Qty: 90 TABLET | Refills: 4 | Status: SHIPPED | OUTPATIENT
Start: 2025-04-24

## 2025-04-24 RX ORDER — AMLODIPINE BESYLATE 5 MG/1
5 TABLET ORAL DAILY
Qty: 90 TABLET | Refills: 3 | Status: SHIPPED | OUTPATIENT
Start: 2025-04-24

## 2025-04-24 RX ORDER — LISINOPRIL 40 MG/1
40 TABLET ORAL AT BEDTIME
Qty: 90 TABLET | Refills: 4 | Status: SHIPPED | OUTPATIENT
Start: 2025-04-24

## 2025-04-24 RX ORDER — FLUOROURACIL 50 MG/G
CREAM TOPICAL 2 TIMES DAILY
Qty: 40 G | Refills: 2 | Status: SHIPPED | OUTPATIENT
Start: 2025-04-24

## 2025-04-24 RX ORDER — TAMSULOSIN HYDROCHLORIDE 0.4 MG/1
0.4 CAPSULE ORAL DAILY
Qty: 90 CAPSULE | Refills: 4 | Status: SHIPPED | OUTPATIENT
Start: 2025-04-24

## 2025-04-24 SDOH — HEALTH STABILITY: PHYSICAL HEALTH: ON AVERAGE, HOW MANY DAYS PER WEEK DO YOU ENGAGE IN MODERATE TO STRENUOUS EXERCISE (LIKE A BRISK WALK)?: 0 DAYS

## 2025-04-24 ASSESSMENT — SOCIAL DETERMINANTS OF HEALTH (SDOH): HOW OFTEN DO YOU GET TOGETHER WITH FRIENDS OR RELATIVES?: ONCE A WEEK

## 2025-04-24 NOTE — PATIENT INSTRUCTIONS
Patient Education   Preventive Care Advice   Take hydrochlorothiazide in the morning.   This is general advice given by our system to help you stay healthy. However, your care team may have specific advice just for you. Please talk to your care team about your preventive care needs.  Nutrition  Eat 5 or more servings of fruits and vegetables each day.  Try wheat bread, brown rice and whole grain pasta (instead of white bread, rice, and pasta).  Get enough calcium and vitamin D. Check the label on foods and aim for 100% of the RDA (recommended daily allowance).  Lifestyle  Exercise at least 150 minutes each week  (30 minutes a day, 5 days a week).  Do muscle strengthening activities 2 days a week. These help control your weight and prevent disease.  No smoking.  Wear sunscreen to prevent skin cancer.  Have a dental exam and cleaning every 6 months.  Yearly exams  See your health care team every year to talk about:  Any changes in your health.  Any medicines your care team has prescribed.  Preventive care, family planning, and ways to prevent chronic diseases.  Shots (vaccines)   HPV shots (up to age 26), if you've never had them before.  Hepatitis B shots (up to age 59), if you've never had them before.  COVID-19 shot: Get this shot when it's due.  Flu shot: Get a flu shot every year.  Tetanus shot: Get a tetanus shot every 10 years.  Pneumococcal, hepatitis A, and RSV shots: Ask your care team if you need these based on your risk.  Shingles shot (for age 50 and up)  General health tests  Diabetes screening:  Starting at age 35, Get screened for diabetes at least every 3 years.  If you are younger than age 35, ask your care team if you should be screened for diabetes.  Cholesterol test: At age 39, start having a cholesterol test every 5 years, or more often if advised.  Bone density scan (DEXA): At age 50, ask your care team if you should have this scan for osteoporosis (brittle bones).  Hepatitis C: Get tested at  least once in your life.  STIs (sexually transmitted infections)  Before age 24: Ask your care team if you should be screened for STIs.  After age 24: Get screened for STIs if you're at risk. You are at risk for STIs (including HIV) if:  You are sexually active with more than one person.  You don't use condoms every time.  You or a partner was diagnosed with a sexually transmitted infection.  If you are at risk for HIV, ask about PrEP medicine to prevent HIV.  Get tested for HIV at least once in your life, whether you are at risk for HIV or not.  Cancer screening tests  Cervical cancer screening: If you have a cervix, begin getting regular cervical cancer screening tests starting at age 21.  Breast cancer scan (mammogram): If you've ever had breasts, begin having regular mammograms starting at age 40. This is a scan to check for breast cancer.  Colon cancer screening: It is important to start screening for colon cancer at age 45.  Have a colonoscopy test every 10 years (or more often if you're at risk) Or, ask your provider about stool tests like a FIT test every year or Cologuard test every 3 years.  To learn more about your testing options, visit:   .  For help making a decision, visit:   https://bit.ly/gj10363.  Prostate cancer screening test: If you have a prostate, ask your care team if a prostate cancer screening test (PSA) at age 55 is right for you.  Lung cancer screening: If you are a current or former smoker ages 50 to 80, ask your care team if ongoing lung cancer screenings are right for you.  For informational purposes only. Not to replace the advice of your health care provider. Copyright   2023 [facility]. All rights reserved. Clinically reviewed by the [facility]. VHT 452975 - REV 01/24.  Preventing Falls: Care Instructions  Injuries and health problems such as trouble walking or poor eyesight can increase your risk of falling. So can some medicines. But  "there are things you can do to help prevent falls. You can exercise to get stronger. You can also arrange your home to make it safer.    Talk to your doctor about the medicines you take. Ask if any of them increase the risk of falls and whether they can be changed or stopped.   Try to exercise regularly. It can help improve your strength and balance. This can help lower your risk of falling.         Practice fall safety and prevention.   Wear low-heeled shoes that fit well and give your feet good support. Talk to your doctor if you have foot problems that make this hard.  Carry a cellphone or wear a medical alert device that you can use to call for help.  Use stepladders instead of chairs to reach high objects. Don't climb if you're at risk for falls. Ask for help, if needed.  Wear the correct eyeglasses, if you need them.        Make your home safer.   Remove rugs, cords, clutter, and furniture from walkways.  Keep your house well lit. Use night-lights in hallways and bathrooms.  Install and use sturdy handrails on stairways.  Wear nonskid footwear, even inside. Don't walk barefoot or in socks without shoes.        Be safe outside.   Use handrails, curb cuts, and ramps whenever possible.  Keep your hands free by using a shoulder bag or backpack.  Try to walk in well-lit areas. Watch out for uneven ground, changes in pavement, and debris.  Be careful in the winter. Walk on the grass or gravel when sidewalks are slippery. Use de-icer on steps and walkways. Add non-slip devices to shoes.    Put grab bars and nonskid mats in your shower or tub and near the toilet. Try to use a shower chair or bath bench when bathing.   Get into a tub or shower by putting in your weaker leg first. Get out with your strong side first. Have a phone or medical alert device in the bathroom with you.   Where can you learn more?  Go to https://www.advisorCONNECT.net/patiented  Enter G117 in the search box to learn more about \"Preventing Falls: " "Care Instructions.\"  Current as of: July 31, 2024  Content Version: 14.4    5736-3614 [employer].   Care instructions adapted under license by your healthcare professional. If you have questions about a medical condition or this instruction, always ask your healthcare professional. [employer] disclaims any warranty or liability for your use of this information.       "

## 2025-04-24 NOTE — NURSING NOTE
"Chief Complaint   Patient presents with    Medicare Visit       Initial /66   Pulse 65   Temp 97.4  F (36.3  C) (Temporal)   Resp 14   Ht 1.74 m (5' 8.5\")   Wt 108.1 kg (238 lb 6.4 oz)   SpO2 98%   BMI 35.72 kg/m   Estimated body mass index is 35.72 kg/m  as calculated from the following:    Height as of this encounter: 1.74 m (5' 8.5\").    Weight as of this encounter: 108.1 kg (238 lb 6.4 oz).  Medication Reconciliation: complete          "

## 2025-04-24 NOTE — PROGRESS NOTES
Preventive Care Visit  [facility] AND [facility]  Richard Arita MD, Family Medicine  Apr 24, 2025  {Provider  Link to [facility] :[id number]}    Assessment & Plan     (Z00.00) Encounter for Medicare annual wellness exam  (primary encounter diagnosis)  Comment: see below. Has known spinal stenosis, without lower extremity weakness. Only has pain. He wondered about surgery. Discussed with him risk of anesthesia, given his ASCVD, and advanced age. Also surgery for pain only does not improve outcomes long term. For now he decided to simply add on OTC tylenol. Nocturia which is more consistent with on history with BPH than with cauda equina syndrome. Stat flomax for symptoms improvement.   Plan:          (I10) Essential hypertension  Comment: is at goal  Plan: hydrochlorothiazide (HYDRODIURIL) 25 MG tablet,        CBC with platelets, Comprehensive metabolic         panel, amLODIPine (NORVASC) 5 MG tablet        refilled        (I50.32) Diastolic dysfunction with chronic heart failure (H)  Comment: no signs of fluid overload on exam  Plan: hydrochlorothiazide (HYDRODIURIL) 25 MG tablet             (I25.810) Coronary artery disease involving autologous artery coronary bypass graft without angina pectoris  Comment: no symptoms   Plan: lisinopril (ZESTRIL) 40 MG tablet        refilled    (E78.00) Hypercholesterolemia  Comment: also stable  Plan: rosuvastatin (CRESTOR) 40 MG tablet, Lipid         Profile        refilled    (E66.01) Morbid obesity (H)  Comment: advised weight loss to improve his mobility and back problems  Plan: diet and exercise advised.     (L57.0) Actinic keratosis  Comment: adequately treated with this med  Plan: fluorouracil (EFUDEX) 5 % external cream        Refilled.     The longitudinal plan of care for the diagnosis(es)/condition(s) as documented were addressed during this visit. Due to the added complexity in care, I will continue to support Gene in the subsequent management and with ongoing  "continuity of care.            BMI  Estimated body mass index is 35.72 kg/m  as calculated from the following:    Height as of this encounter: 1.74 m (5' 8.5\").    Weight as of this encounter: 108.1 kg (238 lb 6.4 oz).   {Weight Management Plan needed for ACO:165176}      {Follow-up (Optional):127672}    Juan J Hanson is a 80 year old, presenting for the following:  Medicare Visit        4/24/2025     9:13 AM   Additional Questions   Roomed by BRITTANY Wheatley   Accompanied by Self         4/24/2025     9:13 AM   Patient Reported Additional Medications   Patient reports taking the following new medications N/A     {ROOMER if patient is in their first year of Medicare a vision screen is required click here to document the Vison screen and then refresh the note to pull in results  :779974}      HPI  Is doing well overall. Has a cabg in 2020. Now headache no symptoms. No chest pain, no shortness of breath or palpitations.    Has had a rash on left ear, using his Efudex about once every 2 months. Only needs it for 2-3 days. Works well.  Wants a refill.    Checking blood pressure at home once in a while. Usually is in the 120's/60's. Rarely is 140/90.     Has nocturia, about every 2 hours or so.       Lots of back problems over the last few years, with PT, 2 steroid injections and cannot walking about 2 blocks, slowly, then needs to sit for 1 minutes, then he can resume it. Pain radiates from low back into buttocks, but not his legs. 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 Does not use any pain meds at all.     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 4  typical lumbar-type vertebra.     Facet degenerative changes are most pronounced at L3-4 and L4-5,  associated with moderate to severe foraminal stenoses.     SOURAV SOARES MD      {SUPERLIST (Optional):175918}  {additonal problems for provider to add (Optional):212117}  Advance Care " Planning  {The storyboard will display whether the patient has ACP docs on file. Hover over the Code section in the storyboard to access the ACP documents. :513660}  Document on file is a Health Care Directive or POLST.        4/24/2025   General Health   How would you rate your overall physical health? (!) FAIR   Feel stress (tense, anxious, or unable to sleep) Only a little   (!) STRESS CONCERN      4/24/2025   Nutrition   Diet: Regular (no restrictions)         4/24/2025   Exercise   Days per week of moderate/strenous exercise 0 days   (!) EXERCISE CONCERN      4/24/2025   Social Factors   Frequency of gathering with friends or relatives Once a week   Worry food won't last until get money to buy more No   Food not last or not have enough money for food? No   Do you have housing? (Housing is defined as stable permanent housing and does not include staying outside in a car, in a tent, in an abandoned building, in an overnight shelter, or couch-surfing.) No   Are you worried about losing your housing? No   Lack of transportation? No   Unable to get utilities (heat,electricity)? No   Want help with housing or utility concern? No   (!) HOUSING CONCERN PRESENT      4/24/2025   Fall Risk   Fallen 2 or more times in the past year? No   Trouble with walking or balance? Yes   Reason Gait Speed Test Not Completed Unable to complete test, patient reported symptoms          4/24/2025   Activities of Daily Living- Home Safety   Needs help with the following daily activites None of the above   Safety concerns in the home None of the above         4/24/2025   Dental   Dentist two times every year? Yes         4/24/2025   Hearing Screening   Hearing concerns? None of the above         4/24/2025   Driving Risk Screening   Patient/family members have concerns about driving No         4/24/2025   General Alertness/Fatigue Screening   Have you been more tired than usual lately? No         4/24/2025   Urinary Incontinence Screening    Bothered by leaking urine in past 6 months No         Today's PHQ-2 Score:       2025     8:39 AM   PHQ-2 (  [employer])   Q1: Little interest or pleasure in doing things 0   Q2: Feeling down, depressed or hopeless 0   PHQ-2 Score 0    Q1: Little interest or pleasure in doing things Not at all   Q2: Feeling down, depressed or hopeless Not at all   PHQ-2 Score 0       Patient-reported           2025   Substance Use   Alcohol more than 3/day or more than 7/wk No   Do you have a current opioid prescription? No   How severe/bad is pain from 1 to 10? 3/10   Do you use any other substances recreationally? No     Social History     Tobacco Use    Smoking status: Former     Current packs/day: 0.00     Types: Cigarettes     Start date: 1961     Quit date: 1988     Years since quittin.3    Smokeless tobacco: Never   Vaping Use    Vaping status: Never Used   Substance Use Topics    Alcohol use: Yes     Comment: Alcoholic Drinks/day: 1-2 drinks per day, whiskey    Drug use: No     {Provider  If there are gaps in the social history shown above, please follow the link to update and then refresh the note Link to Social and Substance History :834494}  ASCVD Risk   The 10-year ASCVD risk score (Hina DK, et al., 2019) is: 29.4%    Values used to calculate the score:      Age: 80 years      Sex: Male      Is Non- : No      Diabetic: No      Tobacco smoker: No      Systolic Blood Pressure: 124 mmHg      Is BP treated: Yes      HDL Cholesterol: 71 mg/dL      Total Cholesterol: 139 mg/dL    {Link to Fracture Risk Assessment Tool (Optional):891098}    {Provider  REQUIRED FOR AWV Use the storyboard to review patient history, after sections have been marked as reviewed, refresh note to capture documentation:825803}  {Provider   REQUIRED AWV use this link to review and update sexual activity history  after section has been marked as reviewed, refresh note to capture  documentation:119453}  Reviewed and updated as needed this visit by Provider                    Past Medical History:   Diagnosis Date    Actinic keratosis     05/19/09,Keratosis left external ear, treated with cryocautery, recheck if recurs or persists    Allergic contact dermatitis due to plants, except food     recurrent    Basal cell carcinoma of skin     No Comments Provided    Carpal tunnel syndrome     right greater than left    Closed fracture of base of skull (H)     No Comments Provided    Essential (primary) hypertension     No Comments Provided    Ganglion     No Comments Provided    Hydrocele     No Comments Provided    Male erectile dysfunction     decreased libido    Personal history of nicotine dependence     No Comments Provided    Pure hypercholesterolemia     No Comments Provided    Residual hemorrhoidal skin tags     Past external hemorrhoid    Tinea unguium     No Comments Provided     Past Surgical History:   Procedure Laterality Date    BYPASS GRAFT ARTERY CORONARY, REPAIR VALVE AORTIC, COMBINED N/A 05/31/2022    Procedure: MEDIAN STERNOTOMY. CARDIOPULMONARY BYPASS. ENDOSCOPIC HARVEST OF LEFT GREATER SAPHENOUS VEIN. CORONARY ARTERY BYPASS GRAFT (CABG) X2 . AORTIC VALVE REPLACEMENT USING 25MM INSPIRIS RESILIA  AORTIC VALVE. TRANSESOPHAGEAL ECHOCARDIOGRAM (BREANNE) PER ANESTHESIA.;  Surgeon: Sai Benítez MD;  Location:  OR    BYPASS GRAFT ARTERY CORONARY, REPAIR VALVE AORTIC, COMBINED  05/31/2022    Procedure: ;  Surgeon: Sai Benítez MD;  Location:  OR    COLONOSCOPY  01/01/1998 1998,at [facility], which was normal    COLONOSCOPY  03/03/2005 03/03/05,at [facility]    COLONOSCOPY  03/02/2015    3/2/15,F/U 2020  tubular adnomas    COLONOSCOPY  03/16/2020    F/U N/A hyperplastic    CV CORONARY ANGIOGRAM N/A 04/22/2022    Procedure: Coronary Angiogram with possible intervention;  Surgeon: Saulo Mccarthy MD;  Location:  HEART CARDIAC CATH LAB    CV RIGHT  HEART CATH MEASUREMENTS RECORDED N/A 04/22/2022    Procedure: Right Heart Cath;  Surgeon: Saulo Mccarthy MD;  Location:  HEART CARDIAC CATH LAB    OTHER SURGICAL HISTORY      ,HERNIA REPAIR    OTHER SURGICAL HISTORY      05/24/16,941036,OTHER,Left,Dr. Jeremiah GIBSON    OTHER SURGICAL HISTORY      07/28/16,IFM523,ORCHIECTOMY SIMPLE / PARTIAL / RADICAL W/ SCROTAL / INGUINAL APPROACH,Left,Dr. Jeremiah GIBSON    PICC DOUBLE LUMEN PLACEMENT Right 06/06/2022    on Right Basilic, cut 43 cm, needs X-ray to verify tip due to A-fib     Current Outpatient Medications   Medication Sig Dispense Refill    acetaminophen (TYLENOL) 325 MG tablet Take 2 tablets (650 mg) by mouth every 6 hours as needed for other (For optimal non-opioid multimodal pain management to improve pain control.) 50 tablet 0    amLODIPine (NORVASC) 5 MG tablet Take 1 tablet (5 mg) by mouth daily. 90 tablet 3    amLODIPine (NORVASC) 5 MG tablet Take 1 tablet (5 mg) by mouth daily. 90 tablet 3    aspirin (ASA) 81 MG chewable tablet Take 1 tablet (81 mg) by mouth daily. 90 tablet 3    hydrochlorothiazide (HYDRODIURIL) 25 MG tablet Take 1 tablet (25 mg) by mouth daily 90 tablet 4    lisinopril (ZESTRIL) 40 MG tablet TAKE 1 TABLET (40 MG) BY MOUTH AT BEDTIME 90 tablet 3    meclizine (ANTIVERT) 25 MG tablet Take 1 tablet (25 mg) by mouth 3 times daily as needed for dizziness. 30 tablet 2    nitroGLYcerin (NITROSTAT) 0.4 MG sublingual tablet For chest pain place 1 tablet under the tongue every 5 minutes for 3 doses. If symptoms persist 5 minutes after 1st dose call 911. 25 tablet 3    rosuvastatin (CRESTOR) 40 MG tablet TAKE 1 TABLET (40 MG) BY MOUTH DAILY 90 tablet 4    sildenafil (REVATIO) 20 MG tablet TAKE 4-5 TABLETS BY MOUTH ONCE DAILY IF NEEDED FOR OTHER (SPECIFY).TAKE 30 MINUTES BEFORE SEXUAL ACTIVITY. 80 tablet 1    Vitamin D, Cholecalciferol, 25 MCG (1000 UT) CAPS Take 25 mcg by mouth daily.      amoxicillin (AMOXIL) 500 MG capsule TAKE 4 CAPSULES BY  "MOUTH 30-60 MINUTES PRIOR TO DENTAL PROCEDURES/DENTAL CLEANINGS (Patient not taking: Reported on 4/24/2025) 4 capsule 3    senna-docusate (SENOKOT-S/PERICOLACE) 8.6-50 MG tablet Take 1 to 2 tablets by mouth daily. (Patient not taking: Reported on 4/24/2025) 180 tablet 3     No Known Allergies  Current providers sharing in care for this patient include:  Patient Care Team:  Richard Arita MD as PCP - General (Family Medicine)  Richard Arita MD as Assigned PCP  Jann Ulloa,  as Assigned Heart and Vascular Provider    The following health maintenance items are reviewed in Epic and correct as of today:  Health Maintenance   Topic Date Due    HF ACTION PLAN  Never done    RSV VACCINE (1 - 1-dose 75+ series) Never done    COVID-19 Vaccine (5 - 2024-25 season) 09/01/2024    MEDICARE ANNUAL WELLNESS VISIT  04/19/2025    BMP  04/29/2025    ALT  10/29/2025    LIPID  10/29/2025    CBC  10/29/2025    FALL RISK ASSESSMENT  04/24/2026    DIABETES SCREENING  10/29/2027    ADVANCE CARE PLANNING  04/23/2029    DTAP/TDAP/TD IMMUNIZATION (5 - Td or Tdap) 01/12/2033    TSH W/FREE T4 REFLEX  Completed    HEPATITIS C SCREENING  Completed    PHQ-2 (once per calendar year)  Completed    INFLUENZA VACCINE  Completed    Pneumococcal Vaccine: 50+ Years  Completed    ZOSTER IMMUNIZATION  Completed    HPV IMMUNIZATION  Aged Out    MENINGITIS IMMUNIZATION  Aged Out    COLORECTAL CANCER SCREENING  Discontinued    LUNG CANCER SCREENING  Discontinued       {ROS Picklists (Optional):391256}     Objective    Exam  /66   Pulse 65   Temp 97.4  F (36.3  C) (Temporal)   Resp 14   Ht 1.74 m (5' 8.5\")   Wt 108.1 kg (238 lb 6.4 oz)   SpO2 98%   BMI 35.72 kg/m     Estimated body mass index is 35.72 kg/m  as calculated from the following:    Height as of this encounter: 1.74 m (5' 8.5\").    Weight as of this encounter: 108.1 kg (238 lb 6.4 oz).    Physical Exam  GENERAL: alert and no distress  EYES: Eyes grossly normal to inspection, " PERRL and conjunctivae and sclerae normal  HENT: ear canals and TM's normal, nose and mouth without ulcers or lesions  NECK: no adenopathy, no asymmetry, masses, or scars  RESP: lungs clear to auscultation - no rales, rhonchi or wheezes  CV: regular rate and rhythm, normal S1 S2, no S3 or S4, no murmur, click or rub, no peripheral edema  ABDOMEN: soft, nontender, no hepatosplenomegaly, no masses and bowel sounds normal  MS: no gross musculoskeletal defects noted, no edema  SKIN: no suspicious lesions or rashes. Slight scaling in left pinna, perhaps 2-3 mm.   NEURO: Normal strength and tone, mentation intact and speech normal  PSYCH: mentation appears normal, affect normal/bright        4/24/2025   Mini Cog   Clock Draw Score 2 Normal   3 Item Recall 2 objects recalled   Mini Cog Total Score 4     {A Mini-Cog total score of 0-2 suggests the possibility of dementia, score of 3-5 suggests no dementia:820421}         Signed Electronically by: Ricahrd Arita MD  {Email feedback regarding this note to [email or web address]   :740877}

## 2025-05-22 ENCOUNTER — MYC MEDICAL ADVICE (OUTPATIENT)
Dept: FAMILY MEDICINE | Facility: OTHER | Age: 80
End: 2025-05-22
Payer: COMMERCIAL

## 2025-05-22 NOTE — TELEPHONE ENCOUNTER
"Daughter-in-law wondering \"how bad is his back\" and can we get a disability parking permit for him?     Let us know if you want help with a handicap parking form.     _____________________________________________________________________________    4/24/25  LOV with Dr. Arita for [facility]    Lots of back problems over the last few years, with PT, 2 steroid injections and cannot walking about 2 blocks, slowly, then needs to sit for 1 minutes, then he can resume it. Pain radiates from low back into buttocks, but not his legs. 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 Does not use any pain meds at all.      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     Facet degenerative changes are most pronounced at L3-4 and L4-5,  associated with moderate to severe foraminal stenoses.    Michelle Hawley RN on 5/22/2025 at 10:25 AM    "

## 2025-05-29 NOTE — TELEPHONE ENCOUNTER
Form filled out and left in [employer] in basket.     Please route back to pool when done. We will come .     Pratik Katz RN on 5/29/2025 at 8:57 AM

## 2025-06-06 ENCOUNTER — OFFICE VISIT (OUTPATIENT)
Dept: FAMILY MEDICINE | Facility: OTHER | Age: 80
End: 2025-06-06
Attending: NURSE PRACTITIONER
Payer: MEDICARE

## 2025-06-06 ENCOUNTER — RESULTS FOLLOW-UP (OUTPATIENT)
Dept: FAMILY MEDICINE | Facility: OTHER | Age: 80
End: 2025-06-06

## 2025-06-06 VITALS
BODY MASS INDEX: 34.57 KG/M2 | WEIGHT: 233.4 LBS | HEIGHT: 69 IN | TEMPERATURE: 97.4 F | OXYGEN SATURATION: 95 % | DIASTOLIC BLOOD PRESSURE: 86 MMHG | RESPIRATION RATE: 16 BRPM | SYSTOLIC BLOOD PRESSURE: 136 MMHG | HEART RATE: 51 BPM

## 2025-06-06 DIAGNOSIS — R42 DIZZINESS: ICD-10-CM

## 2025-06-06 DIAGNOSIS — R55 NEAR SYNCOPE: ICD-10-CM

## 2025-06-06 DIAGNOSIS — R00.1 BRADYCARDIA: ICD-10-CM

## 2025-06-06 DIAGNOSIS — Z98.890 STATUS POST CORONARY ANGIOGRAM: ICD-10-CM

## 2025-06-06 DIAGNOSIS — I71.21 ANEURYSM OF ASCENDING AORTA WITHOUT RUPTURE: ICD-10-CM

## 2025-06-06 DIAGNOSIS — M54.41 CHRONIC BILATERAL LOW BACK PAIN WITH BILATERAL SCIATICA: Primary | ICD-10-CM

## 2025-06-06 DIAGNOSIS — I35.0 AORTIC STENOSIS, SEVERE: ICD-10-CM

## 2025-06-06 DIAGNOSIS — I10 ESSENTIAL HYPERTENSION: ICD-10-CM

## 2025-06-06 DIAGNOSIS — G89.29 CHRONIC BILATERAL LOW BACK PAIN WITH BILATERAL SCIATICA: Primary | ICD-10-CM

## 2025-06-06 DIAGNOSIS — Z95.1 HISTORY OF CORONARY ARTERY BYPASS GRAFT X 2: ICD-10-CM

## 2025-06-06 DIAGNOSIS — I95.1 ORTHOSTATIC HYPOTENSION: ICD-10-CM

## 2025-06-06 DIAGNOSIS — I25.810 CORONARY ARTERY DISEASE INVOLVING AUTOLOGOUS ARTERY CORONARY BYPASS GRAFT WITHOUT ANGINA PECTORIS: ICD-10-CM

## 2025-06-06 DIAGNOSIS — I65.23 BILATERAL CAROTID ARTERY STENOSIS: ICD-10-CM

## 2025-06-06 DIAGNOSIS — Z95.2 S/P AVR: ICD-10-CM

## 2025-06-06 DIAGNOSIS — I50.32 DIASTOLIC DYSFUNCTION WITH CHRONIC HEART FAILURE (H): ICD-10-CM

## 2025-06-06 DIAGNOSIS — M54.42 CHRONIC BILATERAL LOW BACK PAIN WITH BILATERAL SCIATICA: Primary | ICD-10-CM

## 2025-06-06 LAB
ANION GAP SERPL CALCULATED.3IONS-SCNC: 11 MMOL/L (ref 7–15)
BASOPHILS # BLD AUTO: 0.1 10E3/UL (ref 0–0.2)
BASOPHILS NFR BLD AUTO: 1 %
BUN SERPL-MCNC: 26.6 MG/DL (ref 8–23)
CALCIUM SERPL-MCNC: 9.5 MG/DL (ref 8.8–10.4)
CHLORIDE SERPL-SCNC: 101 MMOL/L (ref 98–107)
CREAT SERPL-MCNC: 1.14 MG/DL (ref 0.67–1.17)
EGFRCR SERPLBLD CKD-EPI 2021: 65 ML/MIN/1.73M2
EOSINOPHIL # BLD AUTO: 0.5 10E3/UL (ref 0–0.7)
EOSINOPHIL NFR BLD AUTO: 6 %
ERYTHROCYTE [DISTWIDTH] IN BLOOD BY AUTOMATED COUNT: 13.7 % (ref 10–15)
GLUCOSE SERPL-MCNC: 101 MG/DL (ref 70–99)
HCO3 SERPL-SCNC: 29 MMOL/L (ref 22–29)
HCT VFR BLD AUTO: 49.3 % (ref 40–53)
HGB BLD-MCNC: 16.7 G/DL (ref 13.3–17.7)
IMM GRANULOCYTES # BLD: 0 10E3/UL
IMM GRANULOCYTES NFR BLD: 0 %
LYMPHOCYTES # BLD AUTO: 1.4 10E3/UL (ref 0.8–5.3)
LYMPHOCYTES NFR BLD AUTO: 17 %
MCH RBC QN AUTO: 32.6 PG (ref 26.5–33)
MCHC RBC AUTO-ENTMCNC: 33.9 G/DL (ref 31.5–36.5)
MCV RBC AUTO: 96 FL (ref 78–100)
MONOCYTES # BLD AUTO: 1.1 10E3/UL (ref 0–1.3)
MONOCYTES NFR BLD AUTO: 14 %
NEUTROPHILS # BLD AUTO: 5.2 10E3/UL (ref 1.6–8.3)
NEUTROPHILS NFR BLD AUTO: 62 %
NRBC # BLD AUTO: 0 10E3/UL
NRBC BLD AUTO-RTO: 0 /100
PLATELET # BLD AUTO: 187 10E3/UL (ref 150–450)
POTASSIUM SERPL-SCNC: 4.6 MMOL/L (ref 3.4–5.3)
RBC # BLD AUTO: 5.13 10E6/UL (ref 4.4–5.9)
SODIUM SERPL-SCNC: 141 MMOL/L (ref 135–145)
WBC # BLD AUTO: 8.3 10E3/UL (ref 4–11)

## 2025-06-06 PROCEDURE — 3075F SYST BP GE 130 - 139MM HG: CPT | Performed by: NURSE PRACTITIONER

## 2025-06-06 PROCEDURE — 93005 ELECTROCARDIOGRAM TRACING: CPT | Performed by: NURSE PRACTITIONER

## 2025-06-06 PROCEDURE — 1126F AMNT PAIN NOTED NONE PRSNT: CPT | Performed by: NURSE PRACTITIONER

## 2025-06-06 PROCEDURE — 36415 COLL VENOUS BLD VENIPUNCTURE: CPT | Mod: ZL | Performed by: NURSE PRACTITIONER

## 2025-06-06 PROCEDURE — 3079F DIAST BP 80-89 MM HG: CPT | Performed by: NURSE PRACTITIONER

## 2025-06-06 PROCEDURE — 99214 OFFICE O/P EST MOD 30 MIN: CPT | Performed by: NURSE PRACTITIONER

## 2025-06-06 PROCEDURE — G0463 HOSPITAL OUTPT CLINIC VISIT: HCPCS

## 2025-06-06 PROCEDURE — 80048 BASIC METABOLIC PNL TOTAL CA: CPT | Mod: ZL | Performed by: NURSE PRACTITIONER

## 2025-06-06 PROCEDURE — 85025 COMPLETE CBC W/AUTO DIFF WBC: CPT | Mod: ZL | Performed by: NURSE PRACTITIONER

## 2025-06-06 RX ORDER — LISINOPRIL 20 MG/1
20 TABLET ORAL DAILY
Qty: 90 TABLET | Refills: 0 | Status: SHIPPED | OUTPATIENT
Start: 2025-06-06

## 2025-06-06 ASSESSMENT — ANXIETY QUESTIONNAIRES
4. TROUBLE RELAXING: NOT AT ALL
IF YOU CHECKED OFF ANY PROBLEMS ON THIS QUESTIONNAIRE, HOW DIFFICULT HAVE THESE PROBLEMS MADE IT FOR YOU TO DO YOUR WORK, TAKE CARE OF THINGS AT HOME, OR GET ALONG WITH OTHER PEOPLE: NOT DIFFICULT AT ALL
5. BEING SO RESTLESS THAT IT IS HARD TO SIT STILL: NOT AT ALL
7. FEELING AFRAID AS IF SOMETHING AWFUL MIGHT HAPPEN: NOT AT ALL
2. NOT BEING ABLE TO STOP OR CONTROL WORRYING: NOT AT ALL
6. BECOMING EASILY ANNOYED OR IRRITABLE: NOT AT ALL
3. WORRYING TOO MUCH ABOUT DIFFERENT THINGS: NOT AT ALL
GAD7 TOTAL SCORE: 0
1. FEELING NERVOUS, ANXIOUS, OR ON EDGE: NOT AT ALL
GAD7 TOTAL SCORE: 0

## 2025-06-06 ASSESSMENT — PAIN SCALES - GENERAL: PAINLEVEL_OUTOF10: NO PAIN (0)

## 2025-06-06 ASSESSMENT — ENCOUNTER SYMPTOMS: BACK PAIN: 1

## 2025-06-06 ASSESSMENT — PATIENT HEALTH QUESTIONNAIRE - PHQ9: SUM OF ALL RESPONSES TO PHQ QUESTIONS 1-9: 0

## 2025-06-06 NOTE — PATIENT INSTRUCTIONS
Gene,     Your blood pressures show orthostatic hypotension which is likely causing your dizziness and near syncopal episodes. Please STOP the 40 mg of lisinopril - and I have sent a new prescription of a reduced dose of 20 mg daily (cut in half). Please monitor blood pressures at home, follow up with me in 1 month, sooner if new symptoms or not improving.     Referral placed to consider another injection for back pain.

## 2025-06-06 NOTE — PROGRESS NOTES
Assessment & Plan   Problem List Items Addressed This Visit       Essential hypertension    Relevant Medications    lisinopril (ZESTRIL) 20 MG tablet    Other Relevant Orders    Adult Cardiology Eval  Referral    Aneurysm of ascending aorta without rupture    Relevant Orders    Adult Cardiology Eval  Referral    H/O aortic stenosis, severe    Relevant Orders    Adult Cardiology Eval  Referral    Diastolic dysfunction with chronic heart failure (H)    Relevant Orders    Adult Cardiology Eval  Referral    Bilateral carotid artery stenosis    Relevant Orders    Adult Cardiology Eval  Referral    Status post coronary angiogram on 4/22/2022 with multivessel disease, [facility].    Relevant Orders    Adult Cardiology Eval  Referral    S/P AVR on 5/31/2022 with 25 mm [employer]itt Inspiris Resilia bovine pericardial valve at the [facility]    Relevant Orders    Adult Cardiology Eval  Referral    History of coronary artery bypass graft x 2 on 5/31/2020 at the [facility]    Relevant Orders    Adult Cardiology Eval  Referral    Chronic bilateral low back pain with bilateral sciatica - Primary     Other Visit Diagnoses         Orthostatic hypotension          Near syncope        Relevant Orders    Adult Cardiology Eval  Referral    CBC and Differential (Completed)    Basic Metabolic Panel (Completed)      Bradycardia        Relevant Orders    Adult Cardiology Eval  Referral    EKG 12-lead, tracing only (Same Day) (Completed)      Dizziness        Relevant Orders    EKG 12-lead, tracing only (Same Day) (Completed)      Coronary artery disease involving autologous artery coronary bypass graft without angina pectoris                 1. Chronic bilateral low back pain with bilateral sciatica (Primary)  - IR Referral; Future  IR referral placed to consider injection for back pain. I reviewed records and have discussed with Gene that he is not a good candidate  for surgery for various reasons including but not limited to his cardiac history; but also the type of back pain he endorses would not best be treated surgically. He verbalizes understanding; and has been told this previously by PCP as well. He was accepting of an IR referral to consider injection options to management of pain and an appointment is set up for 6/18/2025     2. Orthostatic hypotension  Orthostatic hypotension is present on exam today and likely causing some near syncopal episodes. I have opted to decrease the dose of his lisinopril from 40 mg to 20 mg daily to help address this. He is not on betablocker, but has been bradycardic which could also be causing some of his symptoms. Cardiology follow up is scheduled for October of 2025. I would like him to follow up with me on symptoms and hypertension in 1 month which he is agreeable to doing.     3. Essential hypertension  - Adult Cardiology Eval  Referral; Future  - lisinopril (ZESTRIL) 20 MG tablet; Take 1 tablet (20 mg) by mouth daily.  Dispense: 90 tablet; Refill: 0  See above.     4. Aneurysm of ascending aorta without rupture  5. H/O aortic stenosis, severe  6. Diastolic dysfunction with chronic heart failure (H)  7. Bilateral carotid artery stenosis  8. Status post coronary angiogram on 4/22/2022 with multivessel disease, [facility].  9. S/P AVR on 5/31/2022 with 25 mm Merritt Inspiris Resilia bovine pericardial valve at the [facility]  10. History of coronary artery bypass graft x 2 on 5/31/2020 at the [facility]  - Adult Cardiology Eval  Referral; Future  I have reviewed cardiology notes and recommendations - he is due for follow up in October of 2025.     11. Near syncope  Likely related to orthostatic hypotension. No red flags on exam and patient aware that should he develop chest pain or new symptoms or fainting episode/LOC, he is to present to ER for cardiac workup   - Adult Cardiology Eval  Referral; Future  - CBC and  "Differential  - Basic Metabolic Panel    12. Bradycardia  - Adult Cardiology Eval  Referral; Future  - EKG 12-lead, tracing only (Same Day)    13. Dizziness  - EKG 12-lead, tracing only (Same Day)    14. Coronary artery disease involving autologous artery coronary bypass graft without angina pectoris  -follows with cardiology        BMI  Estimated body mass index is 34.97 kg/m  as calculated from the following:    Height as of this encounter: 1.74 m (5' 8.5\").    Weight as of this encounter: 105.9 kg (233 lb 6.4 oz).   Weight management plan: Discussed healthy diet and exercise guidelines        Juan J Hanson is a 80 year old, presenting for the following health issues:  Generalized Weakness and Back Pain (Injury years ago; has done PT and had injections; PCP doesn't recommend having back surgery )        6/6/2025     9:21 AM   Additional Questions   Roomed by MORENO Valdes   Accompanied by Self     Generalized Weakness    Back Pain           Pain History:  When did you first notice your pain? Around 2 years ago   Have you seen this provider for your pain in the past? No   Where in your body do your have pain? Low back   Are you seeing anyone else for your pain? Yes- sees a massage therapist; has been seen by PCP in the past for it   What makes your pain better? Patches for pain   What makes your pain worse? Walking   How has pain affected your ability to work? N/A     Valentin presents today to talk about a couple of things:     Back pain: ongoing issue with back pain. States has been an issue ever since a fall a few years ago. Has tried chiropractic care, 2 shots of cortisone. Has had physical therapy, but not currently. Now he is seeing a massage therapist at times. He is bothered by the back pain. He inquires about surgery. He mentions he has talked to his pcp about this who did not advise the idea of surgery.     Dizziness/lightheadedness:   Reportedly had heart surgery - about 3 years ago - a valve was " "placed. He reports that since that operation, every once and a while he has near syncopal episodes. Has noticed dizziness/lightheadedness more often but not currently during our visit. He does endorse this occurs usually with position changes. He has not actually fainted or blacked out. He has gone \"down on his knees\" several times or sits down in time. Denies any chest pain, palpitations with this dizziness. Cardiology - sees Dr. Ulloa. Last saw him in October. He did mention to cardiologist and it was encouraged he push oral hydration; suspected dizziness was due to bradycardia or dehydration. He drinks 2 whiskey/water drinks each night -each drink contains 1-2 ounces.     Last time he was dizzy was yesterday. He gets \"embarrassed\" when this happens especially when he is out and about. He recovers quickly within a couple minutes.     HR has been slower - currently 51. When he checked his BP randomly last week was 102/75, 103/77- His heart rate was 58 and 57. He states he was not symptomatic at that time, and he was just watching TV. He has not necessarily checked his BP when he was dizzy. On average - his BP is usually around 120/80, never higher than 130/85.                   10/26/2017     9:00 AM 6/23/2022     2:00 PM 6/6/2025     9:26 AM   PHQ-9 SCORE   PHQ-9 Total Score 0  0 0       Data saved with a previous flowsheet row definition           10/29/2019     8:41 AM 11/13/2020     8:25 AM 6/6/2025     9:27 AM   STEVENSON-7 SCORE   Total Score 0 0 0           6/6/2025     9:27 AM   PEG Score   PEG Total Score 7     Review of Systems  Constitutional, HEENT, cardiovascular, pulmonary, gi and gu systems are negative, except as otherwise noted.      Objective    /86   Pulse 51   Temp 97.4  F (36.3  C) (Temporal)   Resp 16   Ht 1.74 m (5' 8.5\")   Wt 105.9 kg (233 lb 6.4 oz)   SpO2 95%   BMI 34.97 kg/m    Body mass index is 34.97 kg/m .  Physical Exam  Vitals and nursing note reviewed.   Constitutional:       " General: He is not in acute distress.     Appearance: Normal appearance. He is obese. He is not ill-appearing or toxic-appearing.   Cardiovascular:      Rate and Rhythm: Regular rhythm. Bradycardia present.      Heart sounds: Normal heart sounds. No murmur heard.  Pulmonary:      Effort: Pulmonary effort is normal. No respiratory distress.      Breath sounds: Normal breath sounds. No wheezing.   Musculoskeletal:         General: Normal range of motion.      Right lower leg: No edema.      Left lower leg: No edema.   Skin:     General: Skin is warm and dry.   Neurological:      General: No focal deficit present.      Mental Status: He is alert and oriented to person, place, and time.   Psychiatric:         Mood and Affect: Mood normal.         Behavior: Behavior normal.            Results for orders placed or performed in visit on 06/06/25   Basic Metabolic Panel     Status: Abnormal   Result Value Ref Range    Sodium 141 135 - 145 mmol/L    Potassium 4.6 3.4 - 5.3 mmol/L    Chloride 101 98 - 107 mmol/L    Carbon Dioxide (CO2) 29 22 - 29 mmol/L    Anion Gap 11 7 - 15 mmol/L    Urea Nitrogen 26.6 (H) 8.0 - 23.0 mg/dL    Creatinine 1.14 0.67 - 1.17 mg/dL    GFR Estimate 65 >60 mL/min/1.73m2    Calcium 9.5 8.8 - 10.4 mg/dL    Glucose 101 (H) 70 - 99 mg/dL   CBC with platelets and differential     Status: None   Result Value Ref Range    WBC Count 8.3 4.0 - 11.0 10e3/uL    RBC Count 5.13 4.40 - 5.90 10e6/uL    Hemoglobin 16.7 13.3 - 17.7 g/dL    Hematocrit 49.3 40.0 - 53.0 %    MCV 96 78 - 100 fL    MCH 32.6 26.5 - 33.0 pg    MCHC 33.9 31.5 - 36.5 g/dL    RDW 13.7 10.0 - 15.0 %    Platelet Count 187 150 - 450 10e3/uL    % Neutrophils 62 %    % Lymphocytes 17 %    % Monocytes 14 %    % Eosinophils 6 %    % Basophils 1 %    % Immature Granulocytes 0 %    NRBCs per 100 WBC 0 <1 /100    Absolute Neutrophils 5.2 1.6 - 8.3 10e3/uL    Absolute Lymphocytes 1.4 0.8 - 5.3 10e3/uL    Absolute Monocytes 1.1 0.0 - 1.3 10e3/uL     Absolute Eosinophils 0.5 0.0 - 0.7 10e3/uL    Absolute Basophils 0.1 0.0 - 0.2 10e3/uL    Absolute Immature Granulocytes 0.0 <=0.4 10e3/uL    Absolute NRBCs 0.0 10e3/uL   EKG 12-lead, tracing only (Same Day)     Status: None   Result Value Ref Range    Systolic Blood Pressure  mmHg    Diastolic Blood Pressure  mmHg    Ventricular Rate 47 BPM    Atrial Rate 47 BPM    NY Interval 184 ms    QRS Duration 104 ms     ms    QTc 444 ms    P Axis 55 degrees    R AXIS 47 degrees    T Axis 71 degrees    Interpretation ECG       Sinus bradycardia with Premature atrial complexes  Nonspecific T wave abnormality  Abnormal ECG  When compared with ECG of 29-Oct-2024 08:40,  Premature atrial complexes are now Present  Confirmed by DO WORTHINGTON STACY ([id number]) on 6/11/2025 7:03:08 AM     CBC and Differential     Status: None    Narrative    The following orders were created for panel order CBC and Differential.  Procedure                               Abnormality         Status                     ---------                               -----------         ------                     CBC with platelets and ...[[id number]]                      Final result                 Please view results for these tests on the individual orders.       Signed Electronically by: Keisha Serrato NP

## 2025-06-06 NOTE — NURSING NOTE
"Chief Complaint   Patient presents with    Generalized Weakness    Back Pain     Injury years ago; has done PT and had injections; PCP doesn't recommend having back surgery        Initial /86   Pulse 51   Temp 97.4  F (36.3  C) (Temporal)   Resp 16   Ht 1.74 m (5' 8.5\")   Wt 105.9 kg (233 lb 6.4 oz)   SpO2 95%   BMI 34.97 kg/m   Estimated body mass index is 34.97 kg/m  as calculated from the following:    Height as of this encounter: 1.74 m (5' 8.5\").    Weight as of this encounter: 105.9 kg (233 lb 6.4 oz).  Medication Review: complete    The next two questions are to help us understand your food security.  If you are feeling you need any assistance in this area, we have resources available to support you today.          4/24/2025   SDOH- Food Insecurity   Within the past 12 months, did you worry that your food would run out before you got money to buy more? N   Within the past 12 months, did the food you bought just not last and you didn t have money to get more? N         Health Care Directive:  Patient has a Health Care Directive on file      Keisha Spangler CMA      "

## 2025-06-11 ENCOUNTER — HOSPITAL ENCOUNTER (EMERGENCY)
Facility: OTHER | Age: 80
Discharge: HOME | End: 2025-06-12
Attending: FAMILY MEDICINE
Payer: MEDICARE

## 2025-06-11 ENCOUNTER — APPOINTMENT (OUTPATIENT)
Dept: GENERAL RADIOLOGY | Facility: OTHER | Age: 80
End: 2025-06-11
Attending: FAMILY MEDICINE
Payer: MEDICARE

## 2025-06-11 VITALS
RESPIRATION RATE: 16 BRPM | HEIGHT: 69 IN | TEMPERATURE: 97.1 F | OXYGEN SATURATION: 95 % | WEIGHT: 234 LBS | DIASTOLIC BLOOD PRESSURE: 88 MMHG | BODY MASS INDEX: 34.66 KG/M2 | SYSTOLIC BLOOD PRESSURE: 141 MMHG | HEART RATE: 56 BPM

## 2025-06-11 DIAGNOSIS — R42 DIZZINESS: ICD-10-CM

## 2025-06-11 DIAGNOSIS — E86.0 DEHYDRATION: ICD-10-CM

## 2025-06-11 LAB
ANION GAP SERPL CALCULATED.3IONS-SCNC: 15 MMOL/L (ref 7–15)
ATRIAL RATE - MUSE: 47 BPM
BASOPHILS # BLD AUTO: 0.1 10E3/UL (ref 0–0.2)
BASOPHILS NFR BLD AUTO: 1 %
BUN SERPL-MCNC: 30.5 MG/DL (ref 8–23)
CALCIUM SERPL-MCNC: 9.6 MG/DL (ref 8.8–10.4)
CHLORIDE SERPL-SCNC: 100 MMOL/L (ref 98–107)
CREAT SERPL-MCNC: 1.16 MG/DL (ref 0.67–1.17)
DIASTOLIC BLOOD PRESSURE - MUSE: NORMAL MMHG
EGFRCR SERPLBLD CKD-EPI 2021: 64 ML/MIN/1.73M2
EOSINOPHIL # BLD AUTO: 0.3 10E3/UL (ref 0–0.7)
EOSINOPHIL NFR BLD AUTO: 4 %
ERYTHROCYTE [DISTWIDTH] IN BLOOD BY AUTOMATED COUNT: 13.5 % (ref 10–15)
GLUCOSE SERPL-MCNC: 92 MG/DL (ref 70–99)
HCO3 SERPL-SCNC: 25 MMOL/L (ref 22–29)
HCT VFR BLD AUTO: 43.6 % (ref 40–53)
HGB BLD-MCNC: 15.2 G/DL (ref 13.3–17.7)
HOLD SPECIMEN: NORMAL
IMM GRANULOCYTES # BLD: 0 10E3/UL
IMM GRANULOCYTES NFR BLD: 0 %
INTERPRETATION ECG - MUSE: NORMAL
LYMPHOCYTES # BLD AUTO: 1.9 10E3/UL (ref 0.8–5.3)
LYMPHOCYTES NFR BLD AUTO: 25 %
MCH RBC QN AUTO: 32.8 PG (ref 26.5–33)
MCHC RBC AUTO-ENTMCNC: 34.9 G/DL (ref 31.5–36.5)
MCV RBC AUTO: 94 FL (ref 78–100)
MONOCYTES # BLD AUTO: 0.9 10E3/UL (ref 0–1.3)
MONOCYTES NFR BLD AUTO: 12 %
NEUTROPHILS # BLD AUTO: 4.4 10E3/UL (ref 1.6–8.3)
NEUTROPHILS NFR BLD AUTO: 59 %
NRBC # BLD AUTO: 0 10E3/UL
NRBC BLD AUTO-RTO: 0 /100
P AXIS - MUSE: 55 DEGREES
PLATELET # BLD AUTO: 185 10E3/UL (ref 150–450)
POTASSIUM SERPL-SCNC: 3.6 MMOL/L (ref 3.4–5.3)
PR INTERVAL - MUSE: 184 MS
QRS DURATION - MUSE: 104 MS
QT - MUSE: 502 MS
QTC - MUSE: 444 MS
R AXIS - MUSE: 47 DEGREES
RBC # BLD AUTO: 4.64 10E6/UL (ref 4.4–5.9)
SODIUM SERPL-SCNC: 140 MMOL/L (ref 135–145)
SYSTOLIC BLOOD PRESSURE - MUSE: NORMAL MMHG
T AXIS - MUSE: 71 DEGREES
TROPONIN T SERPL HS-MCNC: 15 NG/L
TROPONIN T SERPL HS-MCNC: 17 NG/L
TSH SERPL DL<=0.005 MIU/L-ACNC: 3.22 UIU/ML (ref 0.3–4.2)
VENTRICULAR RATE- MUSE: 47 BPM
WBC # BLD AUTO: 7.6 10E3/UL (ref 4–11)

## 2025-06-11 PROCEDURE — 96360 HYDRATION IV INFUSION INIT: CPT | Performed by: FAMILY MEDICINE

## 2025-06-11 PROCEDURE — 93005 ELECTROCARDIOGRAM TRACING: CPT | Performed by: FAMILY MEDICINE

## 2025-06-11 PROCEDURE — 258N000003 HC RX IP 258 OP 636: Performed by: FAMILY MEDICINE

## 2025-06-11 PROCEDURE — 85004 AUTOMATED DIFF WBC COUNT: CPT | Performed by: FAMILY MEDICINE

## 2025-06-11 PROCEDURE — 99284 EMERGENCY DEPT VISIT MOD MDM: CPT | Performed by: FAMILY MEDICINE

## 2025-06-11 PROCEDURE — 71045 X-RAY EXAM CHEST 1 VIEW: CPT

## 2025-06-11 PROCEDURE — 84443 ASSAY THYROID STIM HORMONE: CPT | Performed by: FAMILY MEDICINE

## 2025-06-11 PROCEDURE — 84484 ASSAY OF TROPONIN QUANT: CPT | Performed by: FAMILY MEDICINE

## 2025-06-11 PROCEDURE — 80048 BASIC METABOLIC PNL TOTAL CA: CPT | Performed by: FAMILY MEDICINE

## 2025-06-11 PROCEDURE — 71045 X-RAY EXAM CHEST 1 VIEW: CPT | Mod: 26 | Performed by: INTERNAL MEDICINE

## 2025-06-11 PROCEDURE — 99285 EMERGENCY DEPT VISIT HI MDM: CPT | Mod: 25 | Performed by: FAMILY MEDICINE

## 2025-06-11 PROCEDURE — 93010 ELECTROCARDIOGRAM REPORT: CPT | Performed by: INTERNAL MEDICINE

## 2025-06-11 PROCEDURE — 36415 COLL VENOUS BLD VENIPUNCTURE: CPT | Performed by: FAMILY MEDICINE

## 2025-06-11 RX ADMIN — SODIUM CHLORIDE 1000 ML: 0.9 INJECTION, SOLUTION INTRAVENOUS at 22:42

## 2025-06-11 ASSESSMENT — ACTIVITIES OF DAILY LIVING (ADL)
ADLS_ACUITY_SCORE: 53
ADLS_ACUITY_SCORE: 53

## 2025-06-12 LAB
ATRIAL RATE - MUSE: 52 BPM
DIASTOLIC BLOOD PRESSURE - MUSE: NORMAL MMHG
INTERPRETATION ECG - MUSE: NORMAL
P AXIS - MUSE: 40 DEGREES
PR INTERVAL - MUSE: 202 MS
QRS DURATION - MUSE: 104 MS
QT - MUSE: 468 MS
QTC - MUSE: 435 MS
R AXIS - MUSE: 42 DEGREES
SYSTOLIC BLOOD PRESSURE - MUSE: NORMAL MMHG
T AXIS - MUSE: 46 DEGREES
VENTRICULAR RATE- MUSE: 52 BPM

## 2025-06-12 NOTE — DISCHARGE INSTRUCTIONS
Arun    The labs and EKG and xrays all look good.    Thank you for choosing our Emergency Department for your care.     You may receive a phone call or letter for a survey about your care in our ED.  Please complete this as this is how we improve care for our patients.     If you have any questions after leaving the ED you can call or text me on my cell phone at [phone].  I am not on call so if I do not answer my phone please leave a message- I will get back to you.  If you are not doing well please return to the ED.     Sincerely,    Dr Roberto Wheeler M.D.  Medical Director  [facility] Emergency Department

## 2025-06-12 NOTE — ED TRIAGE NOTES
Pt repots hypotension at home, lowest bp 100/66.  Pt reports intermittent dizziness.  Denies sob.  BP in triage 121/75.  HX of stents and cow valve.     Triage Assessment (Adult)       Row Name 06/11/25 2008          Triage Assessment    Airway WDL WDL        Respiratory WDL    Respiratory WDL WDL        Skin Circulation/Temperature WDL    Skin Circulation/Temperature WDL WDL        Cardiac WDL    Cardiac WDL WDL        Peripheral/Neurovascular WDL    Peripheral Neurovascular WDL WDL        Cognitive/Neuro/Behavioral WDL    Cognitive/Neuro/Behavioral WDL WDL

## 2025-06-12 NOTE — ED PROVIDER NOTES
History     Chief Complaint   Patient presents with    Hypotension     The history is provided by the patient, the spouse and medical records.     Arun Wren is a 80 year old male who has had dizziness and low BP at home. He was checking his BP: 110/70 (P 53), 102/61 (P 54), and 100/66 (P 53). He did get dizzy enough to fall to his knees one time at home. He has not fallen to the floor or injured himself. He has not had N/V/D and has not been sick.     He has known CAD (2V CABG and bovine valve at [facility] three years ago), HTN (on lisinopril), obesity, dCHF, known AAA.  Last ECHO was May 2024 and it looked good.     Allergies:  No Known Allergies    Problem List:    Patient Active Problem List    Diagnosis Date Noted    Chronic bilateral low back pain with bilateral sciatica 10/23/2024     Priority: Medium    PAD (peripheral artery disease) 08/23/2022     Priority: Medium    Abdominal aortic aneurysm (AAA) without rupture 06/28/2022     Priority: Medium    Coronary artery disease involving native coronary artery of native heart without angina pectoris 06/28/2022     Priority: Medium    History of coronary artery bypass graft x 2 on 5/31/2020 at the [facility]  06/28/2022     Priority: Medium    S/P AVR on 5/31/2022 with 25 mm Merritt Inspiris Resilia bovine pericardial valve at the [facility]  05/31/2022     Priority: Medium    Status post coronary angiogram on 4/22/2022 with multivessel disease, [facility]. 04/22/2022     Priority: Medium    Bilateral carotid artery stenosis 04/21/2022     Priority: Medium    H/O aortic stenosis, severe 03/31/2022     Priority: Medium    Benign essential hypertension 03/31/2022     Priority: Medium    Diastolic dysfunction with chronic heart failure (H) 03/31/2022     Priority: Medium    Mixed hyperlipidemia 03/31/2022     Priority: Medium    Abscessed tooth 03/31/2022     Priority: Medium    History of tobacco abuse 03/31/2022     Priority: Medium    Morbid obesity (H) 01/19/2022      Priority: Medium    Aortic valve sclerosis 11/13/2020     Priority: Medium    Irritant contact dermatitis due to other agents 03/29/2018     Priority: Medium    Aneurysm of ascending aorta without rupture 07/24/2017     Priority: Medium    Diverticulosis of sigmoid colon 03/02/2015     Priority: Medium    H/O adenomatous polyp of colon 03/02/2015     Priority: Medium    Basal cell carcinoma of skin 01/22/2015     Priority: Medium    Contact dermatitis and other eczema due to plants (except food) 07/26/2011     Priority: Medium    Actinic keratosis 07/15/2010     Priority: Medium    Impotence of organic origin 07/15/2010     Priority: Medium    Essential hypertension 07/15/2010     Priority: Medium    Hypercholesterolemia 07/15/2010     Priority: Medium        Past Medical History:    Past Medical History:   Diagnosis Date    Actinic keratosis     Allergic contact dermatitis due to plants, except food     Basal cell carcinoma of skin     Carpal tunnel syndrome     Closed fracture of base of skull (H)     Essential (primary) hypertension     Ganglion     Hydrocele     Male erectile dysfunction     Personal history of nicotine dependence     Pure hypercholesterolemia     Residual hemorrhoidal skin tags     Tinea unguium        Past Surgical History:    Past Surgical History:   Procedure Laterality Date    BYPASS GRAFT ARTERY CORONARY, REPAIR VALVE AORTIC, COMBINED N/A 05/31/2022    Procedure: MEDIAN STERNOTOMY. CARDIOPULMONARY BYPASS. ENDOSCOPIC HARVEST OF LEFT GREATER SAPHENOUS VEIN. CORONARY ARTERY BYPASS GRAFT (CABG) X2 . AORTIC VALVE REPLACEMENT USING 25MM INSPIRIS RESILIA  AORTIC VALVE. TRANSESOPHAGEAL ECHOCARDIOGRAM (BREANNE) PER ANESTHESIA.;  Surgeon: Sai Benítez MD;  [facility]    BYPASS GRAFT ARTERY CORONARY, REPAIR VALVE AORTIC, COMBINED  05/31/2022    Procedure: ;  Surgeon: Sai Benítez MD;  [facility]    COLONOSCOPY  01/01/1998 1998,at [facility], which was normal     COLONOSCOPY  2005,at [facility]    COLONOSCOPY  2015    3/2/15,F/U   tubular adnomas    COLONOSCOPY  2020    F/U N/A hyperplastic    CV CORONARY ANGIOGRAM N/A 2022    Procedure: Coronary Angiogram with possible intervention;  Surgeon: Saulo Mccarthy MD;  [facility]    CV RIGHT HEART CATH MEASUREMENTS RECORDED N/A 2022    Procedure: Right Heart Cath;  Surgeon: Saulo Mccarthy MD;  Location:  HEART CARDIAC CATH LAB    OTHER SURGICAL HISTORY      ,HERNIA REPAIR    OTHER SURGICAL HISTORY      [id number],OTHER,Left,Dr. Jeremiah GIBSON    OTHER SURGICAL HISTORY      [id number],ORCHIECTOMY SIMPLE / PARTIAL / RADICAL W/ SCROTAL / INGUINAL APPROACH,Left,Dr. Jeremiah GIBSON    PICC DOUBLE LUMEN PLACEMENT Right 2022    on Right Basilic, cut 43 cm, needs X-ray to verify tip due to A-fib       Family History:    Family History   Problem Relation Age of Onset    Diabetes Mother         Diabetes    Other - See Comments Mother         joint disease    Heart Disease Mother 70        Heart Disease,CABG    Other - See Comments Father         Stroke,stroke/heavy smoker    Diabetes Paternal Grandmother         Diabetes,?GMother       Social History:  Marital Status:   [2]  Social History     Tobacco Use    Smoking status: Former     Current packs/day: 0.00     Types: Cigarettes     Start date: 1961     Quit date: 1988     Years since quittin.4    Smokeless tobacco: Never   Vaping Use    Vaping status: Never Used   Substance Use Topics    Alcohol use: Yes     Comment: Alcoholic Drinks/day: 1-2 drinks per day, whiskey    Drug use: No        Medications:    acetaminophen (TYLENOL) 325 MG tablet  amLODIPine (NORVASC) 5 MG tablet  amoxicillin (AMOXIL) 500 MG capsule  aspirin (ASA) 81 MG chewable tablet  fluorouracil (EFUDEX) 5 % external cream  hydrochlorothiazide (HYDRODIURIL) 25 MG tablet  lisinopril (ZESTRIL) 20 MG  "tablet  meclizine (ANTIVERT) 25 MG tablet  nitroGLYcerin (NITROSTAT) 0.4 MG sublingual tablet  rosuvastatin (CRESTOR) 40 MG tablet  senna-docusate (SENOKOT-S/PERICOLACE) 8.6-50 MG tablet  sildenafil (REVATIO) 20 MG tablet  tamsulosin (FLOMAX) 0.4 MG capsule  Vitamin D, Cholecalciferol, 25 MCG (1000 UT) CAPS      Review of Systems    Physical Exam   BP: 121/75  Pulse: 51  Temp: 97.1  F (36.2  C)  Resp: 20  Height: 175.3 cm (5' 9\")  Weight: 106.1 kg (234 lb)  SpO2: 95 %      Physical Exam    EKG reviewed and interpreted by me.  EKG shows sinus bradycardia, rate 52, normal axis.   Prior comparison: unchanged from June 6    Results for orders placed or performed during the hospital encounter of 06/11/25 (from the past 24 hours)   [place] Draw    Narrative    The following orders were created for panel order [place] Draw.  Procedure                               Abnormality         Status                     ---------                               -----------         ------                     Extra Blue Top Tube[[id number]]                             Final result               Extra Red Top Tube[[id number]]                              Final result               Extra Green Top (Lithiu...[[id number]]                      Final result               Extra Purple Top Tube[[id number]]                           Final result               Extra Green Top (Lithiu...[[id number]]                      Final result                 Please view results for these tests on the individual orders.   Extra Blue Top Tube   Result Value Ref Range    Hold Specimen JIC    Extra Red Top Tube   Result Value Ref Range    Hold Specimen JIC    Extra Green Top (Lithium Heparin) Tube   Result Value Ref Range    Hold Specimen JIC    Extra Purple Top Tube   Result Value Ref Range    Hold Specimen JIC    Extra Green Top (Lithium Heparin) ON ICE   Result Value Ref Range    Hold Specimen JIC    CBC with platelets differential    Narrative    The " following orders were created for panel order CBC with platelets differential.  Procedure                               Abnormality         Status                     ---------                               -----------         ------                     CBC with platelets and ...[[id number]]                      Final result                 Please view results for these tests on the individual orders.   Basic metabolic panel   Result Value Ref Range    Sodium 140 135 - 145 mmol/L    Potassium 3.6 3.4 - 5.3 mmol/L    Chloride 100 98 - 107 mmol/L    Carbon Dioxide (CO2) 25 22 - 29 mmol/L    Anion Gap 15 7 - 15 mmol/L    Urea Nitrogen 30.5 (H) 8.0 - 23.0 mg/dL    Creatinine 1.16 0.67 - 1.17 mg/dL    GFR Estimate 64 >60 mL/min/1.73m2    Calcium 9.6 8.8 - 10.4 mg/dL    Glucose 92 70 - 99 mg/dL   Troponin T, High Sensitivity   Result Value Ref Range    Troponin T, High Sensitivity 17 <=22 ng/L   TSH Reflex GH   Result Value Ref Range    TSH 3.22 0.30 - 4.20 uIU/mL   CBC with platelets and differential   Result Value Ref Range    WBC Count 7.6 4.0 - 11.0 10e3/uL    RBC Count 4.64 4.40 - 5.90 10e6/uL    Hemoglobin 15.2 13.3 - 17.7 g/dL    Hematocrit 43.6 40.0 - 53.0 %    MCV 94 78 - 100 fL    MCH 32.8 26.5 - 33.0 pg    MCHC 34.9 31.5 - 36.5 g/dL    RDW 13.5 10.0 - 15.0 %    Platelet Count 185 150 - 450 10e3/uL    % Neutrophils 59 %    % Lymphocytes 25 %    % Monocytes 12 %    % Eosinophils 4 %    % Basophils 1 %    % Immature Granulocytes 0 %    NRBCs per 100 WBC 0 <1 /100    Absolute Neutrophils 4.4 1.6 - 8.3 10e3/uL    Absolute Lymphocytes 1.9 0.8 - 5.3 10e3/uL    Absolute Monocytes 0.9 0.0 - 1.3 10e3/uL    Absolute Eosinophils 0.3 0.0 - 0.7 10e3/uL    Absolute Basophils 0.1 0.0 - 0.2 10e3/uL    Absolute Immature Granulocytes 0.0 <=0.4 10e3/uL    Absolute NRBCs 0.0 10e3/uL   XR Chest Port 1 View    Narrative    EXAM: XR CHEST PORT 1 VIEW  LOCATION: [facility]  DATE: 6/11/2025    INDICATION: low  "BP  COMPARISON: Chest radiograph 6/6/2022.      Impression    IMPRESSION: Heart size is normal. Mild pulmonary venous congestion with interstitial edema. No significant pleural effusion. No pneumothorax. Prior median sternotomy.   Troponin T, High Sensitivity   Result Value Ref Range    Troponin T, High Sensitivity 15 <=22 ng/L       Medications   sodium chloride 0.9% BOLUS 1,000 mL (1,000 mLs Intravenous $New Bag 6/11/25 2247)       Assessments & Plan (with Medical Decision Making)  Arun Wren is a 80 year old male who has had dizziness and low BP at home. He was checking his BP: 110/70 (P 53), 102/61 (P 54), and 100/66 (P 53). He did get dizzy enough to fall to his knees one time at home. He has not fallen to the floor or injured himself. He has not had N/V/D and has not been sick.   He has known CAD (2V CABG and bovine valve at [facility] three years ago), HTN (on lisinopril), obesity, dCHF, known AAA.  Last ECHO was May 2024 and it looked good.   VS in the ED   Patient Vitals for the past 24 hrs:   BP Temp Temp src Pulse Resp SpO2 Height Weight   06/11/25 2315 (!) 141/88 -- -- 56 16 95 % -- --   06/11/25 2300 (!) 144/85 -- -- 56 -- 96 % -- --   06/11/25 2245 137/87 -- -- 58 -- 95 % -- --   06/11/25 2235 126/78 -- -- 57 -- 94 % -- --   06/11/25 2232 (!) 142/91 -- -- 58 -- 95 % -- --   06/11/25 2230 (!) 149/92 -- -- 56 -- 95 % -- --   06/11/25 2215 135/83 -- -- 53 16 95 % -- --   06/11/25 2200 132/84 -- -- 52 17 96 % -- --   06/11/25 2145 132/83 -- -- (!) 49 16 97 % -- --   06/11/25 2130 133/88 -- -- 53 24 95 % -- --   06/11/25 2125 133/88 -- -- 52 18 95 % -- --   06/11/25 2115 (!) 144/75 -- -- 53 -- 94 % -- --   06/11/25 2009 121/75 -- -- -- -- -- -- --   06/11/25 2007 -- 97.1  F (36.2  C) Temporal 51 20 95 % 1.753 m (5' 9\") 106.1 kg (234 lb)     Exam shows bradycardia, normal heart sounds. No LE edema.  We gave a bolus of NS.  EKG stable (sinus bradycardia)  Labs show CBC normal, BMP with BUN 31, troponin 17, " TSH 3.22.  Chest xray shows heart size is normal. Mild pulmonary venous congestion with interstitial edema. No significant pleural effusion. No pneumothorax. Prior median sternotomy.   11:49 PM Bolus is done.  Repeat troponin is 15. JP Nguyen, took him for a walk and he did well.      I have reviewed the nursing notes.    I have reviewed the findings, diagnosis, plan and need for follow up with the patient.  Medical Decision Making  The patient's presentation was of moderate complexity (a chronic illness mild to moderate exacerbation, progression, or side effect of treatment).    The patient's evaluation involved:  an assessment requiring an independent historian (see separate area of note for details)  review of external note(s) from 1 sources (see separate area of note for details)  review of 1 test result(s) ordered prior to this encounter (see separate area of note for details)  ordering and/or review of 3+ test(s) in this encounter (see separate area of note for details)    The patient's management necessitated moderate risk (prescription drug management including medications given in the ED).      Final diagnoses:   Dehydration   Dizziness       6/11/2025   [facility], Marques Sandoval MD  06/11/25 [id number]

## 2025-06-18 ENCOUNTER — HOSPITAL ENCOUNTER (OUTPATIENT)
Dept: GENERAL RADIOLOGY | Facility: OTHER | Age: 80
Discharge: HOME | End: 2025-06-18
Attending: NURSE PRACTITIONER
Payer: MEDICARE

## 2025-06-18 DIAGNOSIS — M54.41 CHRONIC BILATERAL LOW BACK PAIN WITH BILATERAL SCIATICA: ICD-10-CM

## 2025-06-18 DIAGNOSIS — M53.3 SACROILIAC JOINT PAIN: ICD-10-CM

## 2025-06-18 DIAGNOSIS — G89.29 CHRONIC BILATERAL LOW BACK PAIN WITH BILATERAL SCIATICA: ICD-10-CM

## 2025-06-18 DIAGNOSIS — M54.42 CHRONIC BILATERAL LOW BACK PAIN WITH BILATERAL SCIATICA: ICD-10-CM

## 2025-06-18 PROCEDURE — 255N000002 HC RX 255 OP 636: Performed by: STUDENT IN AN ORGANIZED HEALTH CARE EDUCATION/TRAINING PROGRAM

## 2025-06-18 PROCEDURE — 250N000009 HC RX 250: Performed by: STUDENT IN AN ORGANIZED HEALTH CARE EDUCATION/TRAINING PROGRAM

## 2025-06-18 PROCEDURE — 250N000011 HC RX IP 250 OP 636: Performed by: STUDENT IN AN ORGANIZED HEALTH CARE EDUCATION/TRAINING PROGRAM

## 2025-06-18 PROCEDURE — 27096 INJECT SACROILIAC JOINT: CPT | Mod: 26 | Performed by: STUDENT IN AN ORGANIZED HEALTH CARE EDUCATION/TRAINING PROGRAM

## 2025-06-18 PROCEDURE — 27096 INJECT SACROILIAC JOINT: CPT | Mod: 50

## 2025-06-18 RX ORDER — TRIAMCINOLONE ACETONIDE 40 MG/ML
40 INJECTION, SUSPENSION INTRA-ARTICULAR; INTRAMUSCULAR ONCE
Status: COMPLETED | OUTPATIENT
Start: 2025-06-18 | End: 2025-06-18

## 2025-06-18 RX ORDER — BUPIVACAINE HYDROCHLORIDE 5 MG/ML
10 INJECTION, SOLUTION EPIDURAL; INTRACAUDAL; PERINEURAL ONCE
Status: COMPLETED | OUTPATIENT
Start: 2025-06-18 | End: 2025-06-18

## 2025-06-18 RX ADMIN — BUPIVACAINE HYDROCHLORIDE 6 ML: 5 INJECTION, SOLUTION EPIDURAL; INTRACAUDAL; PERINEURAL at 11:53

## 2025-06-18 RX ADMIN — IOHEXOL 2 ML: 240 INJECTION, SOLUTION INTRATHECAL; INTRAVASCULAR; INTRAVENOUS; ORAL at 11:53

## 2025-06-18 RX ADMIN — TRIAMCINOLONE ACETONIDE 80 MG: 40 INJECTION, SUSPENSION INTRA-ARTICULAR; INTRAMUSCULAR at 11:53

## 2025-06-18 RX ADMIN — LIDOCAINE HYDROCHLORIDE 5 ML: 10 INJECTION, SOLUTION INFILTRATION; PERINEURAL at 11:52

## 2025-06-30 ENCOUNTER — TELEPHONE (OUTPATIENT)
Dept: GENERAL RADIOLOGY | Facility: OTHER | Age: 80
End: 2025-06-30
Payer: COMMERCIAL

## 2025-06-30 NOTE — TELEPHONE ENCOUNTER
2 week follow up call post bilateral SI joint injections. Relief Value 1. 20% relief, wants to try different injection.  Madeline Vogt, ARRT on 6/30/2025 at 12:51 PM

## 2025-07-10 ENCOUNTER — OFFICE VISIT (OUTPATIENT)
Dept: FAMILY MEDICINE | Facility: OTHER | Age: 80
End: 2025-07-10
Attending: PHYSICIAN ASSISTANT
Payer: MEDICARE

## 2025-07-10 VITALS
DIASTOLIC BLOOD PRESSURE: 80 MMHG | BODY MASS INDEX: 33.98 KG/M2 | OXYGEN SATURATION: 95 % | RESPIRATION RATE: 16 BRPM | TEMPERATURE: 97.7 F | SYSTOLIC BLOOD PRESSURE: 110 MMHG | HEIGHT: 69 IN | WEIGHT: 229.4 LBS | HEART RATE: 60 BPM

## 2025-07-10 DIAGNOSIS — I50.32 DIASTOLIC DYSFUNCTION WITH CHRONIC HEART FAILURE (H): ICD-10-CM

## 2025-07-10 DIAGNOSIS — I10 BENIGN ESSENTIAL HYPERTENSION: ICD-10-CM

## 2025-07-10 DIAGNOSIS — I10 ESSENTIAL HYPERTENSION: ICD-10-CM

## 2025-07-10 DIAGNOSIS — L98.9 SKIN LESION: ICD-10-CM

## 2025-07-10 DIAGNOSIS — R42 DIZZINESS: Primary | ICD-10-CM

## 2025-07-10 DIAGNOSIS — R42 LIGHTHEADEDNESS: ICD-10-CM

## 2025-07-10 PROCEDURE — G0463 HOSPITAL OUTPT CLINIC VISIT: HCPCS

## 2025-07-10 PROCEDURE — 93242 EXT ECG>48HR<7D RECORDING: CPT | Performed by: PHYSICIAN ASSISTANT

## 2025-07-10 RX ORDER — HYDROCHLOROTHIAZIDE 12.5 MG/1
12.5 TABLET ORAL DAILY
Qty: 90 TABLET | Refills: 3 | Status: SHIPPED | OUTPATIENT
Start: 2025-07-10

## 2025-07-10 ASSESSMENT — PAIN SCALES - GENERAL: PAINLEVEL_OUTOF10: NO PAIN (0)

## 2025-07-10 NOTE — PROGRESS NOTES
Assessment & Plan   Problem List Items Addressed This Visit          Cardiovascular/Peripheral Vascular    Essential hypertension    Relevant Medications    hydrochlorothiazide 12.5 MG tablet    Other Relevant Orders    Echocardiogram Complete    HC ZIO PATCH 3-7 DAYS APPLICATION (Completed)    ZIO PATCH 3-7 DAYS (additional cost to patient) (Completed)    Adult Cardiology Eval  Referral    Benign essential hypertension    Relevant Medications    hydrochlorothiazide 12.5 MG tablet    Other Relevant Orders    Echocardiogram Complete    HC ZIO PATCH 3-7 DAYS APPLICATION (Completed)    ZIO PATCH 3-7 DAYS (additional cost to patient) (Completed)    Adult Cardiology Eval  Referral    Diastolic dysfunction with chronic heart failure (H)    Relevant Medications    hydrochlorothiazide 12.5 MG tablet    Other Relevant Orders    Echocardiogram Complete    HC ZIO PATCH 3-7 DAYS APPLICATION (Completed)    ZIO PATCH 3-7 DAYS (additional cost to patient) (Completed)    Adult Cardiology Eval  Referral     Other Visit Diagnoses         Dizziness    -  Primary    Relevant Medications    hydrochlorothiazide 12.5 MG tablet    Other Relevant Orders    Echocardiogram Complete    HC ZIO PATCH 3-7 DAYS APPLICATION (Completed)    ZIO PATCH 3-7 DAYS (additional cost to patient) (Completed)    Adult Cardiology Eval  Referral      Lightheadedness        Relevant Orders    Echocardiogram Complete    HC ZIO PATCH 3-7 DAYS APPLICATION (Completed)    ZIO PATCH 3-7 DAYS (additional cost to patient) (Completed)    Adult Cardiology Eval  Referral      Skin lesion        Relevant Orders    Adult Dermatology  Referral           Skin lesion: Refer to dermatology for skin consult.    Lightheadedness, dizziness, diastolic dysfunction with chronic heart failure, benign essential hypertension: Discussed symptoms and concerns at length.  With the lower blood pressure reduced his hydrochlorothiazide to  12.5 mg daily.  Ordered a 7-day heart monitor along with an echocardiogram for monitoring to rule out concerns.  Encourage close follow-up with cardiology.  Gave warning signs and symptoms.  Has a recheck appointment on 7/21/2025. Return to clinic or ER with change/worsening of symptoms.       Juan J Hanson is a 80 year old, presenting for the following health issues:  Patient Request (Talk about concerns with falling and fainting ) and Recheck Medication (Thinks fainting and falls may be due to medications )        7/10/2025    10:44 AM   Additional Questions   Roomed by Ellie Wilson LPN   Accompanied by spouse     History of Present Illness       Reason for visit:  Falling or fainting type spells    He eats 2-3 servings of fruits and vegetables daily.He consumes 1 sweetened beverage(s) daily.He exercises with enough effort to increase his heart rate 10 to 19 minutes per day.  He is missing 1 dose(s) of medications per week.  He is not taking prescribed medications regularly due to remembering to take.      Patient is struggling with dizziness off and on.  History of heart surgery in the aortic valve along with right heart cath procedures.  After the heart surgery for approximately 2 years his blood pressure was elevated.  Controlled with medication.  No recently has been having lower blood pressures.  Occasional weak spells along with feeling lightheaded and dizzy.  Collapsed to the floor occasionally.  No room spinning.  No chest pain, palpitations, shortness of breath, heart racing.  Blood pressure has ranged between 90 and 150.  Typically in the 100-120 range.  No GI or urinary symptoms.  Does not fully lose consciousness.    Patient has a skin lesion on the tip of his nose that is dry and irritated.  Unsure how long it has been there. Has a scar there from when he was younger.      Review of Systems  Constitutional, HEENT, cardiovascular, pulmonary, gi and gu systems are negative, except as otherwise  "noted.        Objective    /80   Pulse 60   Temp 97.7  F (36.5  C) (Tympanic)   Resp 16   Ht 1.753 m (5' 9\")   Wt 104.1 kg (229 lb 6.4 oz)   SpO2 95%   BMI 33.88 kg/m    Body mass index is 33.88 kg/m .  Physical Exam  Vitals and nursing note reviewed.   Constitutional:       Appearance: Normal appearance.   Cardiovascular:      Rate and Rhythm: Normal rate and regular rhythm.      Heart sounds: Normal heart sounds.   Pulmonary:      Effort: Pulmonary effort is normal.      Breath sounds: Normal breath sounds.   Musculoskeletal:         General: Normal range of motion.   Skin:     General: Skin is warm and dry.      Findings: No rash.   Neurological:      General: No focal deficit present.      Mental Status: He is alert and oriented to person, place, and time.   Psychiatric:         Mood and Affect: Mood normal.         Behavior: Behavior normal.            No results found for any visits on 07/10/25.        Signed Electronically by: Corry Wakefield PA-C    "

## 2025-07-10 NOTE — PROCEDURES
Patient arrived (date)7/10/25 (time)1145 for a 7 day Zio monitor per (provider name) Twyla for dizziness (Dx).  Patient's skin was prepped per protocol.  Zio monitor was placed.  Patient verbalized understanding of instructions and plan of care.  Patient was given Zio diary and prepaid .        Ana María Heredia, RT

## 2025-07-15 ENCOUNTER — HOSPITAL ENCOUNTER (OUTPATIENT)
Dept: CARDIOLOGY | Facility: OTHER | Age: 80
Discharge: HOME | End: 2025-07-15
Attending: PHYSICIAN ASSISTANT
Payer: MEDICARE

## 2025-07-15 DIAGNOSIS — I50.32 DIASTOLIC DYSFUNCTION WITH CHRONIC HEART FAILURE (H): ICD-10-CM

## 2025-07-15 DIAGNOSIS — R42 LIGHTHEADEDNESS: ICD-10-CM

## 2025-07-15 DIAGNOSIS — R42 DIZZINESS: ICD-10-CM

## 2025-07-15 DIAGNOSIS — I10 ESSENTIAL HYPERTENSION: ICD-10-CM

## 2025-07-15 DIAGNOSIS — I10 BENIGN ESSENTIAL HYPERTENSION: ICD-10-CM

## 2025-07-15 LAB — LVEF ECHO: NORMAL

## 2025-07-15 PROCEDURE — 93306 TTE W/DOPPLER COMPLETE: CPT

## 2025-07-21 ENCOUNTER — OFFICE VISIT (OUTPATIENT)
Dept: FAMILY MEDICINE | Facility: OTHER | Age: 80
End: 2025-07-21
Attending: NURSE PRACTITIONER
Payer: MEDICARE

## 2025-07-21 VITALS
RESPIRATION RATE: 16 BRPM | BODY MASS INDEX: 34.21 KG/M2 | TEMPERATURE: 97.3 F | DIASTOLIC BLOOD PRESSURE: 78 MMHG | HEIGHT: 69 IN | WEIGHT: 231 LBS | HEART RATE: 57 BPM | SYSTOLIC BLOOD PRESSURE: 116 MMHG | OXYGEN SATURATION: 95 %

## 2025-07-21 DIAGNOSIS — I25.10 CORONARY ARTERY DISEASE INVOLVING NATIVE CORONARY ARTERY OF NATIVE HEART WITHOUT ANGINA PECTORIS: ICD-10-CM

## 2025-07-21 DIAGNOSIS — R42 DIZZINESS: Primary | ICD-10-CM

## 2025-07-21 DIAGNOSIS — E78.2 MIXED HYPERLIPIDEMIA: ICD-10-CM

## 2025-07-21 DIAGNOSIS — I73.9 PAD (PERIPHERAL ARTERY DISEASE): ICD-10-CM

## 2025-07-21 DIAGNOSIS — I35.0 AORTIC STENOSIS, SEVERE: ICD-10-CM

## 2025-07-21 DIAGNOSIS — Z95.1 HISTORY OF CORONARY ARTERY BYPASS GRAFT X 2: ICD-10-CM

## 2025-07-21 DIAGNOSIS — I10 ESSENTIAL HYPERTENSION: ICD-10-CM

## 2025-07-21 LAB
ANION GAP SERPL CALCULATED.3IONS-SCNC: 11 MMOL/L (ref 7–15)
BASOPHILS # BLD AUTO: 0 10E3/UL (ref 0–0.2)
BASOPHILS NFR BLD AUTO: 0 %
BUN SERPL-MCNC: 21.6 MG/DL (ref 8–23)
CALCIUM SERPL-MCNC: 8.7 MG/DL (ref 8.8–10.4)
CHLORIDE SERPL-SCNC: 102 MMOL/L (ref 98–107)
CREAT SERPL-MCNC: 1.04 MG/DL (ref 0.67–1.17)
EGFRCR SERPLBLD CKD-EPI 2021: 73 ML/MIN/1.73M2
EOSINOPHIL # BLD AUTO: 0.1 10E3/UL (ref 0–0.7)
EOSINOPHIL NFR BLD AUTO: 1 %
ERYTHROCYTE [DISTWIDTH] IN BLOOD BY AUTOMATED COUNT: 13.7 % (ref 10–15)
GLUCOSE SERPL-MCNC: 99 MG/DL (ref 70–99)
HCO3 SERPL-SCNC: 27 MMOL/L (ref 22–29)
HCT VFR BLD AUTO: 45.3 % (ref 40–53)
HGB BLD-MCNC: 15.3 G/DL (ref 13.3–17.7)
IMM GRANULOCYTES # BLD: 0.1 10E3/UL
IMM GRANULOCYTES NFR BLD: 1 %
LYMPHOCYTES # BLD AUTO: 1.1 10E3/UL (ref 0.8–5.3)
LYMPHOCYTES NFR BLD AUTO: 12 %
MCH RBC QN AUTO: 32.5 PG (ref 26.5–33)
MCHC RBC AUTO-ENTMCNC: 33.8 G/DL (ref 31.5–36.5)
MCV RBC AUTO: 96 FL (ref 78–100)
MONOCYTES # BLD AUTO: 1.2 10E3/UL (ref 0–1.3)
MONOCYTES NFR BLD AUTO: 13 %
NEUTROPHILS # BLD AUTO: 6.8 10E3/UL (ref 1.6–8.3)
NEUTROPHILS NFR BLD AUTO: 73 %
NRBC # BLD AUTO: 0 10E3/UL
NRBC BLD AUTO-RTO: 0 /100
PLATELET # BLD AUTO: 173 10E3/UL (ref 150–450)
POTASSIUM SERPL-SCNC: 4.1 MMOL/L (ref 3.4–5.3)
RBC # BLD AUTO: 4.71 10E6/UL (ref 4.4–5.9)
SODIUM SERPL-SCNC: 140 MMOL/L (ref 135–145)
WBC # BLD AUTO: 9.3 10E3/UL (ref 4–11)

## 2025-07-21 PROCEDURE — 80048 BASIC METABOLIC PNL TOTAL CA: CPT | Mod: ZL | Performed by: NURSE PRACTITIONER

## 2025-07-21 PROCEDURE — 36415 COLL VENOUS BLD VENIPUNCTURE: CPT | Mod: ZL | Performed by: NURSE PRACTITIONER

## 2025-07-21 PROCEDURE — G2211 COMPLEX E/M VISIT ADD ON: HCPCS | Performed by: NURSE PRACTITIONER

## 2025-07-21 PROCEDURE — 3074F SYST BP LT 130 MM HG: CPT | Performed by: NURSE PRACTITIONER

## 2025-07-21 PROCEDURE — G0463 HOSPITAL OUTPT CLINIC VISIT: HCPCS

## 2025-07-21 PROCEDURE — 85004 AUTOMATED DIFF WBC COUNT: CPT | Mod: ZL | Performed by: NURSE PRACTITIONER

## 2025-07-21 PROCEDURE — 1126F AMNT PAIN NOTED NONE PRSNT: CPT | Performed by: NURSE PRACTITIONER

## 2025-07-21 PROCEDURE — 3078F DIAST BP <80 MM HG: CPT | Performed by: NURSE PRACTITIONER

## 2025-07-21 PROCEDURE — 99213 OFFICE O/P EST LOW 20 MIN: CPT | Performed by: NURSE PRACTITIONER

## 2025-07-21 ASSESSMENT — ENCOUNTER SYMPTOMS: BACK PAIN: 1

## 2025-07-21 ASSESSMENT — PAIN SCALES - GENERAL: PAINLEVEL_OUTOF10: NO PAIN (0)

## 2025-07-21 NOTE — PATIENT INSTRUCTIONS
Follow up with Dr. Ulloa as scheduled in 2 days. Consideration for stopping amlodipine potentially due to ongoing low normal Bps. Will let Dr. Ulloa decided. Awaiting zio patch monitor reading. Glad you are feeling better since decreasing hydrochlorothiazide.     For now, no changes. Continue with same regimen.

## 2025-07-21 NOTE — PROGRESS NOTES
Assessment & Plan   Problem List Items Addressed This Visit       Essential hypertension    Relevant Orders    Basic Metabolic Panel    CBC and Differential (Completed)    H/O aortic stenosis, severe    Mixed hyperlipidemia    Coronary artery disease involving native coronary artery of native heart without angina pectoris    [facility] coronary [facility]     PAD (peripheral artery disease)     Other Visit Diagnoses         Dizziness    -  Primary    Relevant Orders    Basic Metabolic Panel    CBC and Differential (Completed)           1. Dizziness (Primary)  2. Essential hypertension  - Basic Metabolic Panel  - CBC and Differential  Normal hemoglobin. Stable bmp.     3. History of coronary artery bypass graft x 2 on 5/31/2020 at the [facility]  4. Coronary artery disease involving native coronary artery of native heart without angina pectoris  5. PAD (peripheral artery disease)  6. Mixed hyperlipidemia  7. H/O aortic stenosis, severe  -amlodipine 5 mg daily   -lisinopril 20 mg daily   -hydrochlorothiazide 12.5 mg daily   -rosuvastatin 40 mg daily   Follow up with Dr. Ulloa as scheduled in 2 days. Blood pressures remain low normal, borderline hypotensive at times. Would consider discontinuing amlodipine pending Dr. Ulloa's recommendations, will defer to cardiology for that. Symptoms of dizziness/lightheadedness have seemed to overall improve since recent medications of decrease in lisinopril, and more so from recent decrease in hydrochlorothiazide. Continue at current doses.   Recent echocardiogram was stable; done on 7/10 and stable as comopared to study from 5/2024. Zio patch results are not yet accessible.     The longitudinal plan of care for the diagnosis(es)/condition(s) as documented were addressed during this visit. Due to the added complexity in care, I will continue to support Valentin in the subsequent management and with ongoing continuity of care.      Subjective   Gene  "is a 80 year old, presenting for the following health issues:  Back Pain (Follow up ) and Follow Up (Seen 6/6; 6/11; 7/10)        7/21/2025     9:17 AM   Additional Questions   Roomed by MORENO Valdes   Accompanied by Spouse     Back Pain           Gene presents today for follow up on recent issues with dizziness, hypotension, fainting spells. He is here with his wife. He was seen by me on 6/6 and at that time we decreased lisinopril in half due to hypotension and dizziness. He had an ER visit a few days later, received IV fluids and went home after negative cardiac workup. He followed up with Sulma Wakefield in clinic on 7/10. His hydrochlorothiazide was decreased at that time to 12.5 mg. He has not had as much dizziness since then. No additional syncopal or near syncopal episodes have occurred. However, blood pressure is still running low, brings a log with him today. Recent blood pressures remain around 110-125 over 70-80. He continues to take 5 mg of amlodipine, hydrochlorothiazide, and lisinopril.     Echocardiogram stable, done recently.     Wore zio patch a total of about 3 days; he sent this in and results are not yet back.     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 Otherwise no changes to diet/exercise.     Appointment with cardiology is scheduled for 7/23 - in 2 days from now     Review of Systems  Constitutional, neuro, ENT, endocrine, pulmonary, cardiac, gastrointestinal, genitourinary, musculoskeletal, integument and psychiatric systems are negative, except as otherwise noted.      Objective    /78   Pulse 57   Temp 97.3  F (36.3  C) (Temporal)   Resp 16   Ht 1.753 m (5' 9\")   Wt 104.8 kg (231 lb)   SpO2 95%   BMI 34.11 kg/m    Body mass index is 34.11 kg/m .  Physical Exam  Vitals and nursing note reviewed.   Constitutional:       General: He is not in acute distress.     Appearance: Normal appearance. He is obese. He is not ill-appearing or toxic-appearing. "   Cardiovascular:      Rate and Rhythm: Regular rhythm. Bradycardia present.      Heart sounds: Normal heart sounds. No murmur heard.  Pulmonary:      Effort: Pulmonary effort is normal. No respiratory distress.      Breath sounds: Normal breath sounds. No wheezing.   Musculoskeletal:         General: Normal range of motion.      Right lower leg: No edema.      Left lower leg: No edema.   Skin:     General: Skin is warm and dry.      Coloration: Skin is not pale.   Neurological:      General: No focal deficit present.      Mental Status: He is alert and oriented to person, place, and time.   Psychiatric:         Mood and Affect: Mood normal.        Results for orders placed or performed in visit on 07/21/25   CBC with platelets and differential     Status: None   Result Value Ref Range    WBC Count 9.3 4.0 - 11.0 10e3/uL    RBC Count 4.71 4.40 - 5.90 10e6/uL    Hemoglobin 15.3 13.3 - 17.7 g/dL    Hematocrit 45.3 40.0 - 53.0 %    MCV 96 78 - 100 fL    MCH 32.5 26.5 - 33.0 pg    MCHC 33.8 31.5 - 36.5 g/dL    RDW 13.7 10.0 - 15.0 %    Platelet Count 173 150 - 450 10e3/uL    % Neutrophils 73 %    % Lymphocytes 12 %    % Monocytes 13 %    % Eosinophils 1 %    % Basophils 0 %    % Immature Granulocytes 1 %    NRBCs per 100 WBC 0 <1 /100    Absolute Neutrophils 6.8 1.6 - 8.3 10e3/uL    Absolute Lymphocytes 1.1 0.8 - 5.3 10e3/uL    Absolute Monocytes 1.2 0.0 - 1.3 10e3/uL    Absolute Eosinophils 0.1 0.0 - 0.7 10e3/uL    Absolute Basophils 0.0 0.0 - 0.2 10e3/uL    Absolute Immature Granulocytes 0.1 <=0.4 10e3/uL    Absolute NRBCs 0.0 10e3/uL   CBC and Differential     Status: None    Narrative    The following orders were created for panel order CBC and Differential.  Procedure                               Abnormality         Status                     ---------                               -----------         ------                     CBC with platelets and ...[[id number]]                      Final result                  Please view results for these tests on the individual orders.                 Signed Electronically by: Keisha Serrato NP    Answers submitted by the patient for this visit:  Chronic or Recurring Back Pain Visit Questionnaire (Submitted on 7/21/2025)  Where is your back pain located? : right lower back, left lower back  How would you describe your back pain? : dull ache  Where does your back pain spread? : left thigh, right knee  Since you noticed your back pain, how has it changed? : unchanged  Does your back pain interfere with your job?: No

## 2025-07-21 NOTE — NURSING NOTE
"Chief Complaint   Patient presents with    Back Pain     Follow up     Follow Up     Seen 6/6; 6/11; 7/10       Initial /78   Pulse 57   Temp 97.3  F (36.3  C) (Temporal)   Resp 16   Ht 1.753 m (5' 9\")   Wt 104.8 kg (231 lb)   SpO2 95%   BMI 34.11 kg/m   Estimated body mass index is 34.11 kg/m  as calculated from the following:    Height as of this encounter: 1.753 m (5' 9\").    Weight as of this encounter: 104.8 kg (231 lb).  Medication Review: complete    The next two questions are to help us understand your food security.  If you are feeling you need any assistance in this area, we have resources available to support you today.          4/24/2025   SDOH- Food Insecurity   Within the past 12 months, did you worry that your food would run out before you got money to buy more? N   Within the past 12 months, did the food you bought just not last and you didn t have money to get more? N         Health Care Directive:  Patient has a Health Care Directive on file      Keisha Spang[facility]      "

## 2025-07-23 LAB — CV ZIO PRELIM RESULTS: NORMAL

## 2025-07-30 ENCOUNTER — TRANSFERRED RECORDS (OUTPATIENT)
Dept: HEALTH INFORMATION MANAGEMENT | Facility: OTHER | Age: 80
End: 2025-07-30
Payer: COMMERCIAL

## 2025-08-06 ENCOUNTER — OFFICE VISIT (OUTPATIENT)
Dept: CARDIOLOGY | Facility: OTHER | Age: 80
End: 2025-08-06
Attending: PHYSICIAN ASSISTANT
Payer: MEDICARE

## 2025-08-06 VITALS
HEART RATE: 55 BPM | WEIGHT: 233.8 LBS | RESPIRATION RATE: 16 BRPM | OXYGEN SATURATION: 95 % | HEIGHT: 69 IN | BODY MASS INDEX: 34.63 KG/M2

## 2025-08-06 DIAGNOSIS — I50.32 DIASTOLIC DYSFUNCTION WITH CHRONIC HEART FAILURE (H): ICD-10-CM

## 2025-08-06 DIAGNOSIS — R42 LIGHTHEADEDNESS: ICD-10-CM

## 2025-08-06 DIAGNOSIS — I10 BENIGN ESSENTIAL HYPERTENSION: ICD-10-CM

## 2025-08-06 DIAGNOSIS — I10 ESSENTIAL HYPERTENSION: ICD-10-CM

## 2025-08-06 DIAGNOSIS — R42 DIZZINESS: ICD-10-CM

## 2025-08-06 LAB
ATRIAL RATE - MUSE: 54 BPM
DIASTOLIC BLOOD PRESSURE - MUSE: NORMAL MMHG
INTERPRETATION ECG - MUSE: NORMAL
P AXIS - MUSE: 38 DEGREES
PR INTERVAL - MUSE: 188 MS
QRS DURATION - MUSE: 100 MS
QT - MUSE: 466 MS
QTC - MUSE: 441 MS
R AXIS - MUSE: 48 DEGREES
SYSTOLIC BLOOD PRESSURE - MUSE: NORMAL MMHG
T AXIS - MUSE: 51 DEGREES
VENTRICULAR RATE- MUSE: 54 BPM

## 2025-08-06 PROCEDURE — G0463 HOSPITAL OUTPT CLINIC VISIT: HCPCS | Mod: 25

## 2025-08-06 RX ORDER — MULTIVIT WITH MINERALS/LUTEIN
1 TABLET ORAL DAILY
COMMUNITY

## 2025-08-06 ASSESSMENT — PAIN SCALES - GENERAL: PAINLEVEL_OUTOF10: NO PAIN (0)

## 2025-08-20 DIAGNOSIS — I10 ESSENTIAL HYPERTENSION: ICD-10-CM

## 2025-08-21 RX ORDER — LISINOPRIL 20 MG/1
20 TABLET ORAL DAILY
Qty: 90 TABLET | Refills: 1 | Status: SHIPPED | OUTPATIENT
Start: 2025-08-21

## (undated) DEVICE — BLADE SAW STRK STERNAL 6207-97-101

## (undated) DEVICE — BLOWER/MISTER CLEARVIEW 22150

## (undated) DEVICE — STATLOCK PSI DEVICE

## (undated) DEVICE — BLADE KNIFE BEAVER MICROSHARP ORANGE 7511

## (undated) DEVICE — SPECIMEN CONTAINER 5OZ STERILE 2600SA

## (undated) DEVICE — LINEN TOWEL PACK X30 5481

## (undated) DEVICE — SOL NACL 0.9% INJ 1000ML BAG 2B1324X

## (undated) DEVICE — SPONGE RAY-TEC 4X8" 7318

## (undated) DEVICE — INTRO GLIDESHEATH SLENDER 6FR 10X45CM 60-1060

## (undated) DEVICE — PREP SKIN SCRUB TRAY 4461A

## (undated) DEVICE — SU SILK 0 TIE 6X30" A306H

## (undated) DEVICE — CATH ANGIO SUPERTORQUE PLUS JR4 6FRX100CM 533621

## (undated) DEVICE — INSERT FOGARTY 33MM TRACTION HYDRAJAW HYDRA33

## (undated) DEVICE — BONE WAX OSTENE 1.0GM BW-05

## (undated) DEVICE — GLOVE PROTEXIS MICRO 7.5  2D73PM75

## (undated) DEVICE — SHTH INTRO 0.021IN ID 6FR DIA

## (undated) DEVICE — ENDO KIT COMPLIANCE DYKENDOCMPLY

## (undated) DEVICE — Device

## (undated) DEVICE — CONNECTOR SIMS TUBING FOR CHEST TUBES 361

## (undated) DEVICE — SU VICRYL 2-0 SH 27" UND J417H

## (undated) DEVICE — ESU PENCIL W/COATED BLADE E2450H

## (undated) DEVICE — SU ETHIBOND 2-0 V-5DA 10X30" PXX52

## (undated) DEVICE — SURGICEL HEMOSTAT 4X8" 1952

## (undated) DEVICE — DRSG ABDOMINAL 07 1/2X8" 7197D

## (undated) DEVICE — KIT HAND CONTROL ACIST 014644 AR-P54

## (undated) DEVICE — PREP CHLORAPREP 26ML TINTED HI-LITE ORANGE 930815

## (undated) DEVICE — BLADE SAW STERNAL 20X30MM KM-32

## (undated) DEVICE — LEAD PACER MYOCARDIAL BIPOLAR TEMPORARY 53CM 6495F

## (undated) DEVICE — ESU ELEC BLADE E-SEP INSULATED NEPTUNE 70MM 0703-070-002

## (undated) DEVICE — PACK HEART LEFT CUSTOM

## (undated) DEVICE — SU VICRYL 3-0 FS-1 27" J442H

## (undated) DEVICE — SU PROLENE 4-0 SHDA 36" 8521H

## (undated) DEVICE — SU PROLENE 4-0 RB-1DA 36" 8557H

## (undated) DEVICE — SU PROLENE 7-0 BV-1DA 24" 8702H

## (undated) DEVICE — SOL NACL 0.9% 10ML VIAL 0409-4888-02

## (undated) DEVICE — SU STEEL 6 CCS 4X18" M654G

## (undated) DEVICE — CLIP HORIZON MED BLUE 002200

## (undated) DEVICE — PUNCH AORTIC 4.0MMX8" RCB40

## (undated) DEVICE — SU ETHIBOND 2-0 SHDA 30" X563H

## (undated) DEVICE — TUBING SUCTION DRAINAGE PLEURAL DUAL 8884714200

## (undated) DEVICE — PROTECTOR ARM ONE-STEP TRENDELENBURG 40418

## (undated) DEVICE — DRAPE IOBAN INCISE 23X17" 6650EZ

## (undated) DEVICE — SU PROLENE 5-0 RB-1DA 36"  8556H

## (undated) DEVICE — ENDO BRUSH CHANNEL MASTER CLEANING 2-4.2MM BW-412T

## (undated) DEVICE — DECANTER BAG 2002S

## (undated) DEVICE — SU PLEDGET SOFT TFE 3/8"X3/26"X1/16" PCP40

## (undated) DEVICE — SU PROLENE 7-0 BV-1DA 4X18" M8701

## (undated) DEVICE — DRSG KERLIX 4 1/2"X4YDS ROLL 6715

## (undated) DEVICE — BLADE KNIFE BEAVER MINI STR BEAVER6900

## (undated) DEVICE — BNDG ELASTIC 6"X5YDS STERILE 6611-6S

## (undated) DEVICE — LINEN TOWEL PACK X6 WHITE 5487

## (undated) DEVICE — CLIP HORIZON SM RED WIDE SLOT 001201

## (undated) DEVICE — CLIP HORIZON LG ORANGE 004200

## (undated) DEVICE — NDL COUNTER 40CT  31142311

## (undated) DEVICE — ESU ENDO FORCEP BX HOT FD-210U

## (undated) DEVICE — WIPES FOLEY CARE SURESTEP PROVON DFC100

## (undated) DEVICE — SUCTION DRY CHEST DRAIN OASIS 3600-100

## (undated) DEVICE — SU PROLENE 3-0 SHDA 36" 8522H

## (undated) DEVICE — KNIFE SERRATE AGGRESSIVE SONOPET 12.4X8MM 5450-815-114

## (undated) DEVICE — SU ETHIBOND 2-0 V-5 EXC 30" PXX82

## (undated) DEVICE — TUBING SUCTION 10'X3/16" N510

## (undated) DEVICE — SOL WATER 1500ML

## (undated) DEVICE — BONE WAX 2.5GM W31G

## (undated) DEVICE — GW VASC .035IN DIA 260CML 7CML 3 MM RADIUS J CURVE 502455

## (undated) DEVICE — ESU PENCIL SMOKE EVAC W/ROCKER SWITCH 0703-047-000

## (undated) DEVICE — CLIP SPRING FOGARTY SOFTJAW CSOFT6

## (undated) DEVICE — SU PROLENE 7-0 BV-1DA 4X24" M8702

## (undated) DEVICE — SU VICRYL 0 CTX 36" J370H

## (undated) DEVICE — SU ETHIBOND 3-0 BBDA 36" X588H

## (undated) DEVICE — GLOVE PROTEXIS W/NEU-THERA 7.5  2D73TE75

## (undated) DEVICE — ESU HOLSTER PLASTIC DISP E2400

## (undated) DEVICE — RX SURGIFLO HEMOSTATIC MATRIX W/THROMBIN 8ML 2994

## (undated) DEVICE — KIT ENDO VASOVIEW HEMOPRO 2 VH-4000

## (undated) DEVICE — CANNULA VESSEL DLP  30001

## (undated) DEVICE — INTRO SHEATH 7FRX10CM PINNACLE RSS702

## (undated) DEVICE — ADH SKIN CLOSURE PREMIERPRO EXOFIN 1.0ML 3470

## (undated) DEVICE — DRSG TELFA 3X8" 1238

## (undated) DEVICE — TUBING INSUFFLATION PNEUMOCLEAR 0620050100

## (undated) DEVICE — SU ETHIBOND 0 CT-1 CR 8X18" CX21D

## (undated) DEVICE — ESU GROUND PAD ADULT W/CORD E7507

## (undated) DEVICE — COR-KNOT® QUICK LOAD® SINGLES
Type: IMPLANTABLE DEVICE | Site: HEART | Status: NON-FUNCTIONAL
Brand: COR-KNOT® QUICK LOAD®

## (undated) DEVICE — TOURNIQUET VASCULAR KIT ARGYLE 8888-585000

## (undated) DEVICE — DRSG DRAIN 4X4" 7086

## (undated) DEVICE — TIES BANDING T50R

## (undated) DEVICE — BLADE CLIPPER SGL USE 9680

## (undated) DEVICE — DEFIB PRO-PADZ LVP LQD GEL ADULT 8900-2105-01

## (undated) DEVICE — SU PROLENE 6-0 C-1DA 4X24" M8726

## (undated) DEVICE — BLADE KNIFE BEAVER MICROSHARP GREEN 377515

## (undated) DEVICE — ANTIFOG SOLUTION W/FOAM PAD 31142527

## (undated) DEVICE — SUCTION MANIFOLD NEPTUNE 2 SYS 4 PORT 0702-020-000

## (undated) DEVICE — DRAPE SLUSH/WARMER 66X44" ORS-320

## (undated) DEVICE — PREP SCRUB SOL EXIDINE 4% CHG 4OZ 29002-404

## (undated) DEVICE — POSITIONER ASSIST ESSTECH 3S T401210S

## (undated) DEVICE — 30IN (76CM) EXCITE FLEXIBLE, CLEAR NYLON/POLYURETHANE CO-EXTRUDED CONTRAST INJECTION TUBING, FIXED MALE CONNECTOR

## (undated) DEVICE — BNDG ELASTIC 4"X5YDS STERILE 6611-4S

## (undated) DEVICE — SU STEEL MYO/WIRE II STERNOTOMY 8 BE-1 3X14" 048-217

## (undated) DEVICE — TOURNIQUET VASCULAR KIT 7 1/2" 79012

## (undated) DEVICE — DRAPE FLUID WARMING 52 X 60" ORS-321

## (undated) DEVICE — CONNECTOR BLAKE DRAIN SGL BCC1

## (undated) DEVICE — DRAIN CHEST TUBE 32FR STR 8032

## (undated) DEVICE — SLEEVE TR BAND RADIAL COMPRESSION DEVICE 24CM TRB24-REG

## (undated) DEVICE — MANIFOLD KIT ANGIO AUTOMATED 014613

## (undated) DEVICE — CATH ANGIO SUPERTORQUE PLUS JL4 6FRX100CM 533620

## (undated) DEVICE — PUNCH AORTIC 4.0MM LONG AP-540

## (undated) DEVICE — SUCTION CATH AIRLIFE TRI-FLO W/CONTROL PORT 14FR  T60C

## (undated) DEVICE — PACK ADULT HEART UMMC PV15CG92D

## (undated) DEVICE — PUNCH AORTIC 4MM SINGLE USE 1001-602

## (undated) RX ORDER — CEFAZOLIN SODIUM 1 G/3ML
INJECTION, POWDER, FOR SOLUTION INTRAMUSCULAR; INTRAVENOUS
Status: DISPENSED
Start: 2022-05-31

## (undated) RX ORDER — FENTANYL CITRATE 50 UG/ML
INJECTION, SOLUTION INTRAMUSCULAR; INTRAVENOUS
Status: DISPENSED
Start: 2022-05-31

## (undated) RX ORDER — DEXAMETHASONE SODIUM PHOSPHATE 10 MG/ML
INJECTION, SOLUTION INTRAMUSCULAR; INTRAVENOUS
Status: DISPENSED
Start: 2023-09-07

## (undated) RX ORDER — CEFTRIAXONE SODIUM 1 G
VIAL (EA) INJECTION
Status: DISPENSED
Start: 2022-06-22

## (undated) RX ORDER — TRIAMCINOLONE ACETONIDE 40 MG/ML
INJECTION, SUSPENSION INTRA-ARTICULAR; INTRAMUSCULAR
Status: DISPENSED
Start: 2025-06-18

## (undated) RX ORDER — GABAPENTIN 100 MG/1
CAPSULE ORAL
Status: DISPENSED
Start: 2022-05-31

## (undated) RX ORDER — HEPARIN SODIUM 1000 [USP'U]/ML
INJECTION, SOLUTION INTRAVENOUS; SUBCUTANEOUS
Status: DISPENSED
Start: 2022-05-31

## (undated) RX ORDER — ACETAMINOPHEN 325 MG/1
TABLET ORAL
Status: DISPENSED
Start: 2022-05-31

## (undated) RX ORDER — SODIUM CHLORIDE 9 MG/ML
INJECTION, SOLUTION INTRAVENOUS
Status: DISPENSED
Start: 2022-04-22

## (undated) RX ORDER — FENTANYL CITRATE 50 UG/ML
INJECTION, SOLUTION INTRAMUSCULAR; INTRAVENOUS
Status: DISPENSED
Start: 2022-04-22

## (undated) RX ORDER — ASPIRIN 325 MG
TABLET ORAL
Status: DISPENSED
Start: 2022-04-22

## (undated) RX ORDER — BUPIVACAINE HYDROCHLORIDE 5 MG/ML
INJECTION, SOLUTION EPIDURAL; INTRACAUDAL; PERINEURAL
Status: DISPENSED
Start: 2025-06-18

## (undated) RX ORDER — NITROGLYCERIN 5 MG/ML
VIAL (ML) INTRAVENOUS
Status: DISPENSED
Start: 2022-04-22

## (undated) RX ORDER — LIDOCAINE HYDROCHLORIDE 10 MG/ML
INJECTION, SOLUTION EPIDURAL; INFILTRATION; INTRACAUDAL; PERINEURAL
Status: DISPENSED
Start: 2022-06-22

## (undated) RX ORDER — CEFAZOLIN SODIUM/WATER 2 G/20 ML
SYRINGE (ML) INTRAVENOUS
Status: DISPENSED
Start: 2022-05-31

## (undated) RX ORDER — HYDRALAZINE HYDROCHLORIDE 20 MG/ML
INJECTION INTRAMUSCULAR; INTRAVENOUS
Status: DISPENSED
Start: 2022-04-22

## (undated) RX ORDER — ASPIRIN 81 MG/1
TABLET, CHEWABLE ORAL
Status: DISPENSED
Start: 2022-05-31

## (undated) RX ORDER — LIDOCAINE 40 MG/G
CREAM TOPICAL
Status: DISPENSED
Start: 2022-04-22

## (undated) RX ORDER — HEPARIN SODIUM 1000 [USP'U]/ML
INJECTION, SOLUTION INTRAVENOUS; SUBCUTANEOUS
Status: DISPENSED
Start: 2022-04-22

## (undated) RX ORDER — FAMOTIDINE 20 MG/1
TABLET, FILM COATED ORAL
Status: DISPENSED
Start: 2022-05-31

## (undated) RX ORDER — NICARDIPINE HCL-0.9% SOD CHLOR 1 MG/10 ML
SYRINGE (ML) INTRAVENOUS
Status: DISPENSED
Start: 2022-04-22

## (undated) RX ORDER — LIDOCAINE HYDROCHLORIDE 10 MG/ML
INJECTION, SOLUTION INFILTRATION; PERINEURAL
Status: DISPENSED
Start: 2025-06-18

## (undated) RX ORDER — PAPAVERINE HYDROCHLORIDE 30 MG/ML
INJECTION INTRAMUSCULAR; INTRAVENOUS
Status: DISPENSED
Start: 2022-05-31

## (undated) RX ORDER — LIDOCAINE HYDROCHLORIDE 10 MG/ML
INJECTION, SOLUTION INFILTRATION; PERINEURAL
Status: DISPENSED
Start: 2023-09-07

## (undated) RX ORDER — LIDOCAINE HYDROCHLORIDE 10 MG/ML
INJECTION, SOLUTION EPIDURAL; INFILTRATION; INTRACAUDAL; PERINEURAL
Status: DISPENSED
Start: 2023-09-07

## (undated) RX ORDER — CHLORHEXIDINE GLUCONATE ORAL RINSE 1.2 MG/ML
SOLUTION DENTAL
Status: DISPENSED
Start: 2022-05-31

## (undated) RX ORDER — HEPARIN SODIUM,PORCINE 10 UNIT/ML
VIAL (ML) INTRAVENOUS
Status: DISPENSED
Start: 2022-05-31